# Patient Record
Sex: MALE | Race: WHITE | NOT HISPANIC OR LATINO | Employment: OTHER | URBAN - METROPOLITAN AREA
[De-identification: names, ages, dates, MRNs, and addresses within clinical notes are randomized per-mention and may not be internally consistent; named-entity substitution may affect disease eponyms.]

---

## 2017-01-03 ENCOUNTER — ANTI-COAG VISIT (OUTPATIENT)
Dept: CARDIOLOGY | Facility: CLINIC | Age: 77
End: 2017-01-03

## 2017-01-03 ENCOUNTER — LAB VISIT (OUTPATIENT)
Dept: LAB | Facility: HOSPITAL | Age: 77
End: 2017-01-03
Attending: INTERNAL MEDICINE
Payer: MEDICARE

## 2017-01-03 DIAGNOSIS — Z79.01 LONG TERM CURRENT USE OF ANTICOAGULANT THERAPY: ICD-10-CM

## 2017-01-03 DIAGNOSIS — I48.91 ATRIAL FIBRILLATION: ICD-10-CM

## 2017-01-03 LAB
INR PPP: 2.3
PROTHROMBIN TIME: 23.3 SEC

## 2017-01-03 PROCEDURE — 36415 COLL VENOUS BLD VENIPUNCTURE: CPT | Mod: PO

## 2017-01-03 PROCEDURE — 85610 PROTHROMBIN TIME: CPT

## 2017-01-24 ENCOUNTER — ANTI-COAG VISIT (OUTPATIENT)
Dept: CARDIOLOGY | Facility: CLINIC | Age: 77
End: 2017-01-24

## 2017-01-24 ENCOUNTER — LAB VISIT (OUTPATIENT)
Dept: LAB | Facility: HOSPITAL | Age: 77
End: 2017-01-24
Attending: INTERNAL MEDICINE
Payer: MEDICARE

## 2017-01-24 DIAGNOSIS — I48.91 ATRIAL FIBRILLATION: ICD-10-CM

## 2017-01-24 DIAGNOSIS — Z79.01 LONG TERM CURRENT USE OF ANTICOAGULANT THERAPY: ICD-10-CM

## 2017-01-24 LAB
INR PPP: 2.1
PROTHROMBIN TIME: 22.9 SEC

## 2017-01-24 PROCEDURE — 85610 PROTHROMBIN TIME: CPT

## 2017-01-24 PROCEDURE — 36415 COLL VENOUS BLD VENIPUNCTURE: CPT

## 2017-02-03 DIAGNOSIS — I10 ESSENTIAL HYPERTENSION: Chronic | ICD-10-CM

## 2017-02-03 DIAGNOSIS — I25.10 CORONARY ARTERY DISEASE INVOLVING NATIVE CORONARY ARTERY OF NATIVE HEART WITHOUT ANGINA PECTORIS: Chronic | ICD-10-CM

## 2017-02-03 NOTE — TELEPHONE ENCOUNTER
----- Message from Partha Lamar sent at 2/3/2017  4:31 PM CST -----  Contact: Nathalie/ SHAWANDA in [place]/ [phone]   Type: Rx    Name of medication(s): losartan (COZAAR) 50 MG tablet    Is this a refill? New rx? refill    Who prescribed medication? Dr. Kothari    Pharmacy Name, Phone, & Location: [facility] on file    Comments: Pt is switching pharmacies and needs to have a new prescription for a 90 day refill sent to [facility].  Please call and advise.    Thank you

## 2017-02-06 RX ORDER — LOSARTAN POTASSIUM 50 MG/1
50 TABLET ORAL DAILY
Qty: 90 TABLET | Refills: 3 | Status: SHIPPED | OUTPATIENT
Start: 2017-02-06 | End: 2017-03-09 | Stop reason: SDUPTHER

## 2017-02-14 ENCOUNTER — LAB VISIT (OUTPATIENT)
Dept: LAB | Facility: HOSPITAL | Age: 77
End: 2017-02-14
Attending: INTERNAL MEDICINE
Payer: MEDICARE

## 2017-02-14 ENCOUNTER — ANTI-COAG VISIT (OUTPATIENT)
Dept: CARDIOLOGY | Facility: CLINIC | Age: 77
End: 2017-02-14

## 2017-02-14 DIAGNOSIS — Z79.01 LONG TERM CURRENT USE OF ANTICOAGULANT THERAPY: ICD-10-CM

## 2017-02-14 DIAGNOSIS — I48.91 ATRIAL FIBRILLATION: ICD-10-CM

## 2017-02-14 LAB
INR PPP: 2.5
PROTHROMBIN TIME: 24.9 SEC

## 2017-02-14 PROCEDURE — 36415 COLL VENOUS BLD VENIPUNCTURE: CPT | Mod: PO

## 2017-02-14 PROCEDURE — 85610 PROTHROMBIN TIME: CPT

## 2017-02-15 DIAGNOSIS — I10 ESSENTIAL HYPERTENSION: Chronic | ICD-10-CM

## 2017-02-16 RX ORDER — DILTIAZEM HYDROCHLORIDE 180 MG/1
180 CAPSULE, COATED, EXTENDED RELEASE ORAL DAILY
Qty: 90 CAPSULE | Refills: 3 | Status: SHIPPED | OUTPATIENT
Start: 2017-02-16 | End: 2017-03-09 | Stop reason: SDUPTHER

## 2017-03-07 ENCOUNTER — ANTI-COAG VISIT (OUTPATIENT)
Dept: CARDIOLOGY | Facility: CLINIC | Age: 77
End: 2017-03-07

## 2017-03-07 ENCOUNTER — LAB VISIT (OUTPATIENT)
Dept: LAB | Facility: HOSPITAL | Age: 77
End: 2017-03-07
Attending: INTERNAL MEDICINE
Payer: MEDICARE

## 2017-03-07 DIAGNOSIS — I48.91 ATRIAL FIBRILLATION: ICD-10-CM

## 2017-03-07 DIAGNOSIS — I25.810 CORONARY ARTERY DISEASE INVOLVING OTHER CORONARY ARTERY BYPASS GRAFT WITHOUT ANGINA PECTORIS: Chronic | ICD-10-CM

## 2017-03-07 DIAGNOSIS — Z79.01 LONG TERM CURRENT USE OF ANTICOAGULANT THERAPY: ICD-10-CM

## 2017-03-07 LAB
ALBUMIN SERPL BCP-MCNC: 3.6 G/DL
ALP SERPL-CCNC: 57 U/L
ALT SERPL W/O P-5'-P-CCNC: 23 U/L
ANION GAP SERPL CALC-SCNC: 5 MMOL/L
AST SERPL-CCNC: 16 U/L
BASOPHILS # BLD AUTO: 0.03 K/UL
BASOPHILS NFR BLD: 0.5 %
BILIRUB SERPL-MCNC: 0.8 MG/DL
BUN SERPL-MCNC: 16 MG/DL
CALCIUM SERPL-MCNC: 9 MG/DL
CHLORIDE SERPL-SCNC: 106 MMOL/L
CO2 SERPL-SCNC: 29 MMOL/L
CREAT SERPL-MCNC: 1.2 MG/DL
DIFFERENTIAL METHOD: ABNORMAL
EOSINOPHIL # BLD AUTO: 0.2 K/UL
EOSINOPHIL NFR BLD: 3 %
ERYTHROCYTE [DISTWIDTH] IN BLOOD BY AUTOMATED COUNT: 13.1 %
EST. GFR  (AFRICAN AMERICAN): >60 ML/MIN/1.73 M^2
EST. GFR  (NON AFRICAN AMERICAN): 58 ML/MIN/1.73 M^2
GLUCOSE SERPL-MCNC: 117 MG/DL
HCT VFR BLD AUTO: 43 %
HGB BLD-MCNC: 14.2 G/DL
INR PPP: 2.2
LYMPHOCYTES # BLD AUTO: 1.3 K/UL
LYMPHOCYTES NFR BLD: 19 %
MCH RBC QN AUTO: 32.3 PG
MCHC RBC AUTO-ENTMCNC: 33 %
MCV RBC AUTO: 98 FL
MONOCYTES # BLD AUTO: 0.6 K/UL
MONOCYTES NFR BLD: 8.9 %
NEUTROPHILS # BLD AUTO: 4.5 K/UL
NEUTROPHILS NFR BLD: 68.4 %
PLATELET # BLD AUTO: 185 K/UL
PMV BLD AUTO: 10.9 FL
POTASSIUM SERPL-SCNC: 3.9 MMOL/L
PROT SERPL-MCNC: 6.9 G/DL
PROTHROMBIN TIME: 22 SEC
RBC # BLD AUTO: 4.39 M/UL
SODIUM SERPL-SCNC: 140 MMOL/L
WBC # BLD AUTO: 6.63 K/UL

## 2017-03-07 PROCEDURE — 85610 PROTHROMBIN TIME: CPT

## 2017-03-07 PROCEDURE — 80053 COMPREHEN METABOLIC PANEL: CPT

## 2017-03-07 PROCEDURE — 85025 COMPLETE CBC W/AUTO DIFF WBC: CPT

## 2017-03-07 PROCEDURE — 36415 COLL VENOUS BLD VENIPUNCTURE: CPT | Mod: PO

## 2017-03-09 ENCOUNTER — OFFICE VISIT (OUTPATIENT)
Dept: INTERNAL MEDICINE | Facility: CLINIC | Age: 77
End: 2017-03-09
Payer: MEDICARE

## 2017-03-09 VITALS
BODY MASS INDEX: 27.73 KG/M2 | WEIGHT: 187.19 LBS | HEART RATE: 57 BPM | HEIGHT: 69 IN | DIASTOLIC BLOOD PRESSURE: 78 MMHG | SYSTOLIC BLOOD PRESSURE: 124 MMHG

## 2017-03-09 DIAGNOSIS — Z86.73 HX-TIA (TRANSIENT ISCHEMIC ATTACK): Chronic | ICD-10-CM

## 2017-03-09 DIAGNOSIS — Z95.1 S/P CABG (CORONARY ARTERY BYPASS GRAFT): Chronic | ICD-10-CM

## 2017-03-09 DIAGNOSIS — E78.5 HYPERLIPIDEMIA, UNSPECIFIED HYPERLIPIDEMIA TYPE: Chronic | ICD-10-CM

## 2017-03-09 DIAGNOSIS — I25.10 CORONARY ARTERY DISEASE INVOLVING NATIVE CORONARY ARTERY OF NATIVE HEART WITHOUT ANGINA PECTORIS: Chronic | ICD-10-CM

## 2017-03-09 DIAGNOSIS — I48.91 ATRIAL FIBRILLATION, UNSPECIFIED TYPE: Primary | Chronic | ICD-10-CM

## 2017-03-09 DIAGNOSIS — I10 ESSENTIAL HYPERTENSION: Chronic | ICD-10-CM

## 2017-03-09 DIAGNOSIS — Z79.01 LONG TERM CURRENT USE OF ANTICOAGULANT THERAPY: ICD-10-CM

## 2017-03-09 PROCEDURE — 99499 UNLISTED E&M SERVICE: CPT | Mod: S$PBB,,, | Performed by: INTERNAL MEDICINE

## 2017-03-09 PROCEDURE — 99999 PR PBB SHADOW E&M-EST. PATIENT-LVL III: CPT | Mod: PBBFAC,,, | Performed by: INTERNAL MEDICINE

## 2017-03-09 PROCEDURE — 1126F AMNT PAIN NOTED NONE PRSNT: CPT | Mod: S$GLB,,, | Performed by: INTERNAL MEDICINE

## 2017-03-09 PROCEDURE — 1160F RVW MEDS BY RX/DR IN RCRD: CPT | Mod: S$GLB,,, | Performed by: INTERNAL MEDICINE

## 2017-03-09 PROCEDURE — 1157F ADVNC CARE PLAN IN RCRD: CPT | Mod: S$GLB,,, | Performed by: INTERNAL MEDICINE

## 2017-03-09 PROCEDURE — G0009 ADMIN PNEUMOCOCCAL VACCINE: HCPCS | Mod: S$GLB,,, | Performed by: INTERNAL MEDICINE

## 2017-03-09 PROCEDURE — 3074F SYST BP LT 130 MM HG: CPT | Mod: S$GLB,,, | Performed by: INTERNAL MEDICINE

## 2017-03-09 PROCEDURE — 1159F MED LIST DOCD IN RCRD: CPT | Mod: S$GLB,,, | Performed by: INTERNAL MEDICINE

## 2017-03-09 PROCEDURE — 90732 PPSV23 VACC 2 YRS+ SUBQ/IM: CPT | Mod: S$GLB,,, | Performed by: INTERNAL MEDICINE

## 2017-03-09 PROCEDURE — 3078F DIAST BP <80 MM HG: CPT | Mod: S$GLB,,, | Performed by: INTERNAL MEDICINE

## 2017-03-09 PROCEDURE — 99214 OFFICE O/P EST MOD 30 MIN: CPT | Mod: S$GLB,,, | Performed by: INTERNAL MEDICINE

## 2017-03-09 RX ORDER — CARVEDILOL 12.5 MG/1
12.5 TABLET ORAL 2 TIMES DAILY WITH MEALS
Qty: 180 TABLET | Refills: 3 | Status: SHIPPED | OUTPATIENT
Start: 2017-03-09 | End: 2018-04-09 | Stop reason: SDUPTHER

## 2017-03-09 RX ORDER — FLUTICASONE PROPIONATE 50 MCG
2 SPRAY, SUSPENSION (ML) NASAL DAILY
Qty: 16 G | Refills: 12 | Status: SHIPPED | OUTPATIENT
Start: 2017-03-09 | End: 2018-04-09 | Stop reason: SDUPTHER

## 2017-03-09 RX ORDER — DILTIAZEM HYDROCHLORIDE 180 MG/1
180 CAPSULE, COATED, EXTENDED RELEASE ORAL DAILY
Qty: 90 CAPSULE | Refills: 3 | Status: SHIPPED | OUTPATIENT
Start: 2017-03-09 | End: 2017-11-14 | Stop reason: ALTCHOICE

## 2017-03-09 RX ORDER — ATORVASTATIN CALCIUM 40 MG/1
40 TABLET, FILM COATED ORAL DAILY
Qty: 90 TABLET | Refills: 3 | Status: SHIPPED | OUTPATIENT
Start: 2017-03-09 | End: 2018-04-09 | Stop reason: SDUPTHER

## 2017-03-09 RX ORDER — LOSARTAN POTASSIUM 50 MG/1
50 TABLET ORAL DAILY
Qty: 90 TABLET | Refills: 3 | Status: SHIPPED | OUTPATIENT
Start: 2017-03-09 | End: 2018-03-20 | Stop reason: SDUPTHER

## 2017-03-09 NOTE — MR AVS SNAPSHOT
[facility]  [address]  [facility] [address]  Phone: [phone]  Fax: [phone]                  Eliazar James   3/9/2017 8:00 AM   Office Visit    Description:  Male : 1940   Provider:  Devon Kothari Jr., MD   Department:  [facility]           Reason for Visit     Follow-up           Diagnoses this Visit        Comments    Atrial fibrillation, unspecified type    -  Primary     Coronary artery disease involving native coronary artery of native heart without angina pectoris         Essential hypertension         Hx-TIA (transient ischemic attack)         Hyperlipidemia, unspecified hyperlipidemia type         S/P CABG (coronary artery bypass graft)         Long term current use of anticoagulant therapy                To Do List           Future Appointments        Provider Department Dept Phone    3/21/2017 8:00 AM [facility], [facility] [phone]      Goals (5 Years of Data)     None       These Medications        Disp Refills Start End    losartan (COZAAR) 50 MG tablet 90 tablet 3 3/9/2017     Take 1 tablet (50 mg total) by mouth once daily. - Oral    Pharmacy: [facility]/pharmacy #[id number] - [place] - [address] AT [facility] Ph #: [phone]       diltiaZEM (CARDIZEM CD) 180 MG 24 hr capsule 90 capsule 3 3/9/2017     Take 1 capsule (180 mg total) by mouth once daily. - Oral    Pharmacy: [facility]/pharmacy #[id number] - [place] - [address] AT [facility] Ph #: [phone]       carvedilol (COREG) 12.5 MG tablet 180 tablet 3 3/9/2017     Take 1 tablet (12.5 mg total) by mouth 2 (two) times daily with meals. - Oral    Pharmacy: [facility]/pharmacy #[id number] - [place] - [address] AT [facility] Ph #: [phone]       atorvastatin (LIPITOR) 40 MG tablet 90 tablet 3 3/9/2017     Take 1 tablet (40 mg total) by mouth once daily. - Oral    Pharmacy: [facility]/pharmacy #[id number] - [place] -  [address] AT [facility] #: [phone]       fluticasone (FLONASE) 50 mcg/actuation nasal spray 16 g 12 3/9/2017     2 sprays by Each Nare route once daily. - Each Nare    Pharmacy: [facility]/pharmacy #[id number] - [place] - [address] AT [facility] Ph #: [phone]         [facility]     [facility] On Call Nurse Care Line - 24/7 Assistance  Registered nurses in the [facility] provide clinical advisement, health education, appointment booking, and other advisory services.  Call for this free service at [phone].             Medications           Message regarding Medications     Verify the changes and/or additions to your medication regime listed below are the same as discussed with your clinician today.  If any of these changes or additions are incorrect, please notify your healthcare provider.             Verify that the below list of medications is an accurate representation of the medications you are currently taking.  If none reported, the list may be blank. If incorrect, please contact your healthcare provider. Carry this list with you in case of emergency.           Current Medications     aspirin 81 mg Tab Take 1 tablet by mouth Daily.    atorvastatin (LIPITOR) 40 MG tablet Take 1 tablet (40 mg total) by mouth once daily.    carvedilol (COREG) 12.5 MG tablet Take 1 tablet (12.5 mg total) by mouth 2 (two) times daily with meals.    diltiaZEM (CARDIZEM CD) 180 MG 24 hr capsule Take 1 capsule (180 mg total) by mouth once daily.    fluticasone (FLONASE) 50 mcg/actuation nasal spray 2 sprays by Each Nare route once daily.    losartan (COZAAR) 50 MG tablet Take 1 tablet (50 mg total) by mouth once daily.    nitroGLYCERIN (NITROSTAT) 0.4 MG SL tablet Place 1 tablet (0.4 mg total) under the tongue every 5 (five) minutes as needed for Chest pain.    warfarin (COUMADIN) 5 MG tablet Take 1 tablet (5mg.) by mouth daily, except 1/2 tablet (2.5mg.) on Saturdays,   "Or as directed by Coumadin Clinic           Clinical Reference Information           Your Vitals Were     BP Pulse Height Weight BMI    124/78 (BP Location: Left arm, Patient Position: Sitting, BP Method: Manual) 57 5' 9" (1.753 m) 84.9 kg (187 lb 2.7 oz) 27.64 kg/m2      Blood Pressure          Most Recent Value    BP  124/78      Allergies as of 3/9/2017     No Known Drug Allergies      Immunizations Administered on Date of Encounter - 3/9/2017     Name Date Dose VIS Date Route    Pneumococcal Polysaccharide - 23 Valent 3/9/2017 0.5 mL 4/24/2015 Intramuscular      Orders Placed During Today's Visit      Normal Orders This Visit    Pneumococcal Polysaccharide Vaccine (23 Valent) (SQ/IM)     Future Labs/Procedures Expected by Expires    Comprehensive metabolic panel  3/9/2017 5/8/2018    Lipid panel  3/9/2017 5/8/2018      [facility] Sign-Up     Activating your [facility] account is as easy as 1-2-3!     1) Visit [email or web address], select Sign Up Now, enter this activation code and your date of birth, then select Next.  [email or web address]  Expires: 4/23/2017  8:52 AM      2) Create a username and password to use when you visit [facility] in the future and select a security question in case you lose your password and select Next.    3) Enter your e-mail address and click Sign Up!    Additional Information  If you have questions, please e-mail [email or web address] or call [phone] to talk to our [facility] staff. Remember, [facility] is NOT to be used for urgent needs. For medical emergencies, dial 911.         Language Assistance Services     ATTENTION: Language assistance services are available, free of charge. Please call [phone].      ATENCIÓN: Si habla vandana, tiene a guillen disposición servicios gratuitos de asistencia lingüística. Llame al [phone].     CHÚ Ý: N?u b?n nói Ti?ng Vi?t, có các d?ch v? h? tr? ngôn ng? mi?n phí dành cho b?n. G?i s? [phone].         [facility] complies " with applicable Federal civil rights laws and does not discriminate on the basis of race, color, national origin, age, disability, or sex.

## 2017-03-09 NOTE — PROGRESS NOTES
Subjective:      Patient ID: Eliazar James is a 75 y.o. male.      Chief Complaint: Follow-up      HPI    Mr. James is 76-year-old gentleman who comes in today for follow up   of his medical problems.    1. History of hyperlipidemia. Stable. Checked lipid panel ; it   showed total cholesterol 103, HDL 46, LDL 47. He is currently on   Lipitor 40 mg a day, and tolerating them well.     2. He has hypertension. He saw Cards recently . He has had not had any problems with his meds. He denies any current chest pain, shortness of breath, palpitations, nausea, vomiting. Blood pressure today is 124/78.  He is tolerating Cozaar 50mg, Coreg 12.5., and Cardizem  CD .  NO HA or dizziness.      3. Atrial fibrillation. DCCV 6/11/12 and placed on ditiazem-- has maintained NSR since then and on coumadin; last INR w/i range at 2.2. Sees cards later today. Had a TIA in 2012 secondary to Afib.       4. CAD history of coronary artery disease, status post CABG in 1997 x3 (see details below). He has   had several stents in the past. No Chest pain- He is relativity active. Walks, cut grass. Has not had to use SL NTG in over a year.      REVASCULARIZATION   Previous Angios:   08/15/2000 mid LAD,   07/10/2000 mid LAD, mid RCA, proximal PDA,   CABG x 3 (1997 LIMA to LAD (single graft), SVG to OM1 (single graft),   SVG to RCA (single graft))   Status (07/08/2009 LIMA to LAD (single graft) atretic, 07/08/2009   SVG to OM1 (single graft) patent, SVG to RCA (single graft) patent)   Previous PCI:   S/P stent to LAD, 08/15/2000   S/P stent to RCA, 07/10/2000   S/P stent to LAD, 07/10/2000   S/P stent to PDA, 07/10/2000   ACS:   unstable angina 07/07/2009         Review of Systems    Constitutional: Negative for fever, chills, activity change, appetite change and unexpected weight change.    Eyes: Negative for visual disturbance.    Respiratory: Negative for cough, choking, chest tightness, shortness of breath and wheezing.     Cardiovascular: Negative for chest pain, palpitations and leg swelling.    Gastrointestinal: Negative for nausea, vomiting, diarrhea and constipation.    Genitourinary: Negative for dysuria, urgency and decreased urine volume.    Musculoskeletal: Negative for myalgias.    Skin: Negative for rash.    Neurological: Negative for dizziness, syncope, weakness, light-headedness and headaches.    Objective:      Physical Exam   Constitutional: He is oriented to person, place, and time. He appears well-developed and well-nourished. No distress.    HENT:    Head: Normocephalic and atraumatic.    Mouth/Throat: No oropharyngeal exudate.    Eyes: EOM are normal. Pupils are equal, round, and reactive to light. No scleral icterus.    Neck: Normal range of motion. Neck supple. No tracheal deviation present. No thyromegaly present.    Cardiovascular: Normal rate. An regular rhythm present. Exam reveals no friction rub.    No murmur heard.  Pulmonary/Chest: Effort normal and breath sounds normal. No respiratory distress. He has no wheezes. He has no rales.    Abdominal: Soft. Bowel sounds are normal. There is no tenderness.   Lymphadenopathy:   He has no cervical adenopathy.   Neurological: He is alert and oriented to person, place, and time.    Skin: Skin is warm and dry. No rash noted. He does have  A couple of spots on his face , papules- raised, on the right side- slightly scaly.   He is not diaphoretic. No erythema. No pallor.   Psychiatric: He has a normal mood and affect. His behavior is normal. Judgment and thought content normal.         Assessment:        1.   Atrial fibrillation      2.   CAD (coronary artery disease)     3.   HTN (hypertension)     4.   Hyperlipidemia     5.   Hx-TIA (transient ischemic attack)     Plan:        Atrial fibrillation/CAD (coronary artery disease)  Continue current meds.       HTN (hypertension)  - Well controlled on current regimen; no changes.      Hyperlipidemia  - Continue  atorvastatin (LIPITOR) 40 MG tablet; Take 1 tablet (40 mg total) by mouth once daily. Dispense: 90 tablet; Refill: 3      Hx-TIA (transient ischemic attack)  - Stable. On warfarin, daily aspirin.      F/u in 6 mos, or sooner PRN.

## 2017-03-21 ENCOUNTER — ANTI-COAG VISIT (OUTPATIENT)
Dept: CARDIOLOGY | Facility: CLINIC | Age: 77
End: 2017-03-21

## 2017-03-21 ENCOUNTER — LAB VISIT (OUTPATIENT)
Dept: LAB | Facility: HOSPITAL | Age: 77
End: 2017-03-21
Attending: INTERNAL MEDICINE
Payer: MEDICARE

## 2017-03-21 DIAGNOSIS — Z79.01 LONG TERM CURRENT USE OF ANTICOAGULANT THERAPY: ICD-10-CM

## 2017-03-21 DIAGNOSIS — I48.91 ATRIAL FIBRILLATION, UNSPECIFIED TYPE: ICD-10-CM

## 2017-03-21 DIAGNOSIS — E78.5 HYPERLIPIDEMIA, UNSPECIFIED HYPERLIPIDEMIA TYPE: Chronic | ICD-10-CM

## 2017-03-21 DIAGNOSIS — I48.91 ATRIAL FIBRILLATION: ICD-10-CM

## 2017-03-21 LAB
ALBUMIN SERPL BCP-MCNC: 3.6 G/DL
ALP SERPL-CCNC: 55 U/L
ALT SERPL W/O P-5'-P-CCNC: 25 U/L
ANION GAP SERPL CALC-SCNC: 5 MMOL/L
AST SERPL-CCNC: 19 U/L
BILIRUB SERPL-MCNC: 1.2 MG/DL
BUN SERPL-MCNC: 23 MG/DL
CALCIUM SERPL-MCNC: 9 MG/DL
CHLORIDE SERPL-SCNC: 107 MMOL/L
CHOLEST/HDLC SERPL: 2.6 {RATIO}
CO2 SERPL-SCNC: 28 MMOL/L
CREAT SERPL-MCNC: 1.3 MG/DL
EST. GFR  (AFRICAN AMERICAN): >60 ML/MIN/1.73 M^2
EST. GFR  (NON AFRICAN AMERICAN): 52.6 ML/MIN/1.73 M^2
GLUCOSE SERPL-MCNC: 100 MG/DL
HDL/CHOLESTEROL RATIO: 38 %
HDLC SERPL-MCNC: 108 MG/DL
HDLC SERPL-MCNC: 41 MG/DL
INR PPP: 2.2
LDLC SERPL CALC-MCNC: 55.8 MG/DL
NONHDLC SERPL-MCNC: 67 MG/DL
POTASSIUM SERPL-SCNC: 4.4 MMOL/L
PROT SERPL-MCNC: 7.2 G/DL
PROTHROMBIN TIME: 21.8 SEC
SODIUM SERPL-SCNC: 140 MMOL/L
TRIGL SERPL-MCNC: 56 MG/DL

## 2017-03-21 PROCEDURE — 36415 COLL VENOUS BLD VENIPUNCTURE: CPT | Mod: PO

## 2017-03-21 PROCEDURE — 85610 PROTHROMBIN TIME: CPT

## 2017-03-21 PROCEDURE — 80053 COMPREHEN METABOLIC PANEL: CPT

## 2017-03-21 PROCEDURE — 80061 LIPID PANEL: CPT

## 2017-04-11 ENCOUNTER — LAB VISIT (OUTPATIENT)
Dept: LAB | Facility: HOSPITAL | Age: 77
End: 2017-04-11
Attending: INTERNAL MEDICINE
Payer: MEDICARE

## 2017-04-11 ENCOUNTER — ANTI-COAG VISIT (OUTPATIENT)
Dept: CARDIOLOGY | Facility: CLINIC | Age: 77
End: 2017-04-11

## 2017-04-11 DIAGNOSIS — Z79.01 LONG TERM CURRENT USE OF ANTICOAGULANT THERAPY: ICD-10-CM

## 2017-04-11 DIAGNOSIS — I48.91 ATRIAL FIBRILLATION, UNSPECIFIED TYPE: ICD-10-CM

## 2017-04-11 DIAGNOSIS — I48.91 ATRIAL FIBRILLATION: ICD-10-CM

## 2017-04-11 LAB
INR PPP: 2.8
PROTHROMBIN TIME: 27.8 SEC

## 2017-04-11 PROCEDURE — 36415 COLL VENOUS BLD VENIPUNCTURE: CPT | Mod: PO

## 2017-04-11 PROCEDURE — 85610 PROTHROMBIN TIME: CPT

## 2017-04-25 ENCOUNTER — OFFICE VISIT (OUTPATIENT)
Dept: CARDIOLOGY | Facility: CLINIC | Age: 77
End: 2017-04-25
Payer: MEDICARE

## 2017-04-25 VITALS
SYSTOLIC BLOOD PRESSURE: 119 MMHG | WEIGHT: 185.88 LBS | HEART RATE: 61 BPM | HEIGHT: 69 IN | DIASTOLIC BLOOD PRESSURE: 68 MMHG | BODY MASS INDEX: 27.53 KG/M2

## 2017-04-25 DIAGNOSIS — I25.810 CORONARY ARTERY DISEASE INVOLVING CORONARY BYPASS GRAFT OF NATIVE HEART WITHOUT ANGINA PECTORIS: Primary | ICD-10-CM

## 2017-04-25 PROCEDURE — 99213 OFFICE O/P EST LOW 20 MIN: CPT | Mod: GC,S$GLB,, | Performed by: INTERNAL MEDICINE

## 2017-04-25 PROCEDURE — 1159F MED LIST DOCD IN RCRD: CPT | Mod: GC,S$GLB,, | Performed by: INTERNAL MEDICINE

## 2017-04-25 PROCEDURE — 3078F DIAST BP <80 MM HG: CPT | Mod: GC,S$GLB,, | Performed by: INTERNAL MEDICINE

## 2017-04-25 PROCEDURE — 1126F AMNT PAIN NOTED NONE PRSNT: CPT | Mod: GC,S$GLB,, | Performed by: INTERNAL MEDICINE

## 2017-04-25 PROCEDURE — 99999 PR PBB SHADOW E&M-EST. PATIENT-LVL III: CPT | Mod: PBBFAC,GC,, | Performed by: INTERNAL MEDICINE

## 2017-04-25 PROCEDURE — 3074F SYST BP LT 130 MM HG: CPT | Mod: GC,S$GLB,, | Performed by: INTERNAL MEDICINE

## 2017-04-25 PROCEDURE — 1160F RVW MEDS BY RX/DR IN RCRD: CPT | Mod: GC,S$GLB,, | Performed by: INTERNAL MEDICINE

## 2017-04-25 NOTE — PROGRESS NOTES
Subjective:    Patient ID:  Eliazar James is a 76 y.o. male who presents for follow-up of Coronary Artery Disease (8 month f/u )      HPI Comments: Pt has a h/o HTN, mixed HPL, CAD s/p CABG in 1997 and is s/p PCI in 7-09. Anatomy as of 7-09 was LIMA-->LAD which is atretic, SVG--->OM patent and SVG--->RCA patent. In 7-09 pt was admitted for an ACS and had DALY placed in prox LAD and distal RCA. Since that time, he has been CP free and denies any LUCAS. LVEF is 55% with diastolic dysfunction and apical HK.  Pt with TIA in 2-2012 and new onset afib. Now on coumadin- declined NOACs due to cost. CHADS 4- was followed by EP. Was on Multaq initially -discontinued due to cost issues and it was decided to continue rate control strategy with anticoagulation.  SPECT on 03/09/2012 was normal.     Interval History : He denies new episodes of exertional chest discomfort, exertional dyspnea, palpitations, TIA's, syncope or presyncope. He walks (45-60 min) for exercise daily and is the primary caregiver for his wife. Home BPs are all under 130/80.       Past Medical History:   Diagnosis Date    *Atrial fibrillation     Anxiety     Atrial fibrillation 7/5/2012    Blood transfusion     CAD (coronary artery disease) 7/10/2012    Cancer     skin    Cataract     removed from both    Clotting disorder     Coronary artery disease     Depression     HTN (hypertension) 7/10/2012    Hx-TIA (transient ischemic attack) 7/10/2012    Hyperlipidemia     Hypertension     Myocardial infarction     S/P CABG (coronary artery bypass graft) 1/8/2013    Stroke 2/12    TIA     Past Surgical History:   Procedure Laterality Date    CORONARY ARTERY BYPASS GRAFT      triple bypass       Current Outpatient Prescriptions on File Prior to Visit   Medication Sig Dispense Refill    aspirin 81 mg Tab Take 1 tablet by mouth Daily.      atorvastatin (LIPITOR) 40 MG tablet Take 1 tablet (40 mg total) by mouth once daily. 90 tablet 3     carvedilol (COREG) 12.5 MG tablet Take 1 tablet (12.5 mg total) by mouth 2 (two) times daily with meals. 180 tablet 3    diltiaZEM (CARDIZEM CD) 180 MG 24 hr capsule Take 1 capsule (180 mg total) by mouth once daily. 90 capsule 3    fluticasone (FLONASE) 50 mcg/actuation nasal spray 2 sprays by Each Nare route once daily. 16 g 12    losartan (COZAAR) 50 MG tablet Take 1 tablet (50 mg total) by mouth once daily. 90 tablet 3    nitroGLYCERIN (NITROSTAT) 0.4 MG SL tablet Place 1 tablet (0.4 mg total) under the tongue every 5 (five) minutes as needed for Chest pain. 20 tablet 12    warfarin (COUMADIN) 5 MG tablet Take 1 tablet (5mg.) by mouth daily, except 1/2 tablet (2.5mg.) on Saturdays,  Or as directed by Coumadin Clinic 90 tablet 2     No current facility-administered medications on file prior to visit.      Review of patient's allergies indicates:   Allergen Reactions    No known drug allergies        Review of Systems   Constitution: Negative for decreased appetite, weakness, malaise/fatigue, weight gain and weight loss.   HENT: Negative for headaches.    Eyes: Negative for visual disturbance.   Cardiovascular: Negative for chest pain, claudication, dyspnea on exertion, irregular heartbeat, orthopnea, palpitations, paroxysmal nocturnal dyspnea and syncope.   Respiratory: Negative for cough, shortness of breath and snoring.    Skin: Negative for rash.   Musculoskeletal: Negative for arthritis, muscle cramps, muscle weakness and myalgias.   Gastrointestinal: Negative for abdominal pain, anorexia, change in bowel habit and nausea.   Genitourinary: Negative for dysuria and frequency.   Neurological: Negative for excessive daytime sleepiness, dizziness, loss of balance and numbness.   Psychiatric/Behavioral: Negative for depression.        Objective:    Physical Exam   Constitutional: He is oriented to person, place, and time. He appears well-developed and well-nourished.   HENT:   Head: Normocephalic and  "atraumatic.   Eyes: Pupils are equal, round, and reactive to light.   Neck: Normal range of motion. Neck supple.   Cardiovascular: Normal rate, regular rhythm, normal heart sounds, intact distal pulses and normal pulses.  Exam reveals no gallop.    No murmur heard.  Pulmonary/Chest: Effort normal and breath sounds normal.   Abdominal: Soft. Bowel sounds are normal. There is no hepatosplenomegaly. There is no tenderness.   Musculoskeletal: Normal range of motion.   Neurological: He is alert and oriented to person, place, and time.   Skin: Skin is warm and dry.   Psychiatric: He has a normal mood and affect. His speech is normal and behavior is normal. Judgment and thought content normal.   Vitals reviewed.  /68 (BP Location: Left arm, Patient Position: Sitting, BP Method: Automatic)  Pulse 61  Ht 5' 9" (1.753 m)  Wt 84.3 kg (185 lb 13.6 oz)  BMI 27.44 kg/m2      Assessment:       1. Coronary artery disease involving native coronary artery of native heart without angina pectoris    2. Atrial fibrillation, unspecified    3. Essential hypertension    4. Hyperlipidemia    5. S/P CABG (coronary artery bypass graft)    6. Hx-TIA (transient ischemic attack)    7. Long term current use of anticoagulant therapy        Plan:       76 year old male patient with known CAD with previous CABG and prior PCIs stable from a cardiac standpoint on appropriate medical therapy.    RTC 6 months.    D/w Dr Anderson.    Drew Arana MD, MPH, FACP  Cardiovascular Disease Fellow( PGY 5)  Pager: [phone]            "

## 2017-04-25 NOTE — MR AVS SNAPSHOT
[facility]  [address]  [place] [address]  Phone: [phone]                  Eliazar James   2017 2:20 PM   Office Visit    Description:  Male : 1940   Provider:  Drew Arana MD   Department:  [facility]           Reason for Visit     Coronary Artery Disease                To Do List           Future Appointments        Provider Department Dept Phone    2017 2:20 PM Drew Arana MD [facility] Cardiology [phone]    2017 9:00 AM LAB, [facility] [phone]      Goals (5 Years of Data)     None      [facility] On Call     [facility] Nurse Care Line -  Assistance  Unless otherwise directed by your provider, please contact [facility], our nurse care line that is available for  assistance.     Registered nurses in the [facility] provide: appointment scheduling, clinical advisement, health education, and other advisory services.  Call: [phone] (toll free)               Medications           Message regarding Medications     Verify the changes and/or additions to your medication regime listed below are the same as discussed with your clinician today.  If any of these changes or additions are incorrect, please notify your healthcare provider.             Verify that the below list of medications is an accurate representation of the medications you are currently taking.  If none reported, the list may be blank. If incorrect, please contact your healthcare provider. Carry this list with you in case of emergency.           Current Medications     aspirin 81 mg Tab Take 1 tablet by mouth Daily.    atorvastatin (LIPITOR) 40 MG tablet Take 1 tablet (40 mg total) by mouth once daily.    carvedilol (COREG) 12.5 MG tablet Take 1 tablet (12.5 mg total) by mouth 2 (two) times daily with meals.    diltiaZEM (CARDIZEM CD) 180 MG 24 hr capsule Take 1 capsule (180 mg total) by  "mouth once daily.    fluticasone (FLONASE) 50 mcg/actuation nasal spray 2 sprays by Each Nare route once daily.    losartan (COZAAR) 50 MG tablet Take 1 tablet (50 mg total) by mouth once daily.    nitroGLYCERIN (NITROSTAT) 0.4 MG SL tablet Place 1 tablet (0.4 mg total) under the tongue every 5 (five) minutes as needed for Chest pain.    warfarin (COUMADIN) 5 MG tablet Take 1 tablet (5mg.) by mouth daily, except 1/2 tablet (2.5mg.) on Saturdays,  Or as directed by Coumadin Clinic           Clinical Reference Information           Your Vitals Were     BP Pulse Height Weight BMI    119/68 (BP Location: Left arm, Patient Position: Sitting, BP Method: Automatic) 61 5' 9" (1.753 m) 84.3 kg (185 lb 13.6 oz) 27.44 kg/m2      Blood Pressure          Most Recent Value    Right Arm BP - Sitting  114/73    Left Arm BP - Sitting  119/68    BP  119/68      Allergies as of 4/25/2017     No Known Drug Allergies      Immunizations Administered on Date of Encounter - 4/25/2017     None      [facility] Sign-Up     Activating your [facility] account is as easy as 1-2-3!     1) Visit [email or web address], select Sign Up Now, enter this activation code and your date of birth, then select Next.  [email or web address]  Expires: 6/9/2017 12:54 PM      2) Create a username and password to use when you visit [facility] in the future and select a security question in case you lose your password and select Next.    3) Enter your e-mail address and click Sign Up!    Additional Information  If you have questions, please e-mail [email or web address] or call [phone] to talk to our [facility] staff. Remember, [facility] is NOT to be used for urgent needs. For medical emergencies, dial 911.         Language Assistance Services     ATTENTION: Language assistance services are available, free of charge. Please call [phone].      ATENCIÓN: Si habla español, tiene a guillen disposición servicios gratuitos de asistencia lingüística. Llame al " [phone].     ZI Ý: N?u b?n nói Ti?ng Vi?t, có các d?ch v? h? tr? ngôn ng? mi?n phí dành cho b?n. G?i s? [phone].         [facility] complies with applicable Federal civil rights laws and does not discriminate on the basis of race, color, national origin, age, disability, or sex.

## 2017-05-02 ENCOUNTER — ANTI-COAG VISIT (OUTPATIENT)
Dept: CARDIOLOGY | Facility: CLINIC | Age: 77
End: 2017-05-02

## 2017-05-02 ENCOUNTER — LAB VISIT (OUTPATIENT)
Dept: LAB | Facility: HOSPITAL | Age: 77
End: 2017-05-02
Attending: INTERNAL MEDICINE
Payer: MEDICARE

## 2017-05-02 DIAGNOSIS — Z79.01 LONG TERM CURRENT USE OF ANTICOAGULANT THERAPY: ICD-10-CM

## 2017-05-02 DIAGNOSIS — I48.91 ATRIAL FIBRILLATION: ICD-10-CM

## 2017-05-02 LAB
INR PPP: 2.8
PROTHROMBIN TIME: 28 SEC

## 2017-05-02 PROCEDURE — 85610 PROTHROMBIN TIME: CPT

## 2017-05-02 PROCEDURE — 36415 COLL VENOUS BLD VENIPUNCTURE: CPT | Mod: PO

## 2017-06-02 ENCOUNTER — LAB VISIT (OUTPATIENT)
Dept: LAB | Facility: HOSPITAL | Age: 77
End: 2017-06-02
Attending: INTERNAL MEDICINE
Payer: MEDICARE

## 2017-06-02 ENCOUNTER — ANTI-COAG VISIT (OUTPATIENT)
Dept: CARDIOLOGY | Facility: CLINIC | Age: 77
End: 2017-06-02

## 2017-06-02 DIAGNOSIS — I48.91 ATRIAL FIBRILLATION: ICD-10-CM

## 2017-06-02 DIAGNOSIS — Z79.01 LONG TERM CURRENT USE OF ANTICOAGULANT THERAPY: ICD-10-CM

## 2017-06-02 LAB
INR PPP: 2.5
PROTHROMBIN TIME: 24.6 SEC

## 2017-06-02 PROCEDURE — 36415 COLL VENOUS BLD VENIPUNCTURE: CPT | Mod: PO

## 2017-06-02 PROCEDURE — 85610 PROTHROMBIN TIME: CPT

## 2017-06-28 ENCOUNTER — LAB VISIT (OUTPATIENT)
Dept: LAB | Facility: HOSPITAL | Age: 77
End: 2017-06-28
Attending: INTERNAL MEDICINE
Payer: MEDICARE

## 2017-06-28 ENCOUNTER — ANTI-COAG VISIT (OUTPATIENT)
Dept: CARDIOLOGY | Facility: CLINIC | Age: 77
End: 2017-06-28

## 2017-06-28 DIAGNOSIS — Z79.01 LONG TERM CURRENT USE OF ANTICOAGULANT THERAPY: ICD-10-CM

## 2017-06-28 DIAGNOSIS — I48.91 ATRIAL FIBRILLATION: ICD-10-CM

## 2017-06-28 LAB
INR PPP: 2.5
PROTHROMBIN TIME: 24.7 SEC

## 2017-06-28 PROCEDURE — 36415 COLL VENOUS BLD VENIPUNCTURE: CPT | Mod: PO

## 2017-06-28 PROCEDURE — 85610 PROTHROMBIN TIME: CPT

## 2017-07-10 ENCOUNTER — OFFICE VISIT (OUTPATIENT)
Dept: OPTOMETRY | Facility: CLINIC | Age: 77
End: 2017-07-10
Payer: MEDICARE

## 2017-07-10 DIAGNOSIS — Z96.1 PSEUDOPHAKIA OF BOTH EYES: ICD-10-CM

## 2017-07-10 DIAGNOSIS — I10 ESSENTIAL HYPERTENSION: Primary | Chronic | ICD-10-CM

## 2017-07-10 DIAGNOSIS — H26.492 AFTER CATARACT NOT OBSCURING VISION, LEFT: ICD-10-CM

## 2017-07-10 PROCEDURE — 92004 COMPRE OPH EXAM NEW PT 1/>: CPT | Mod: S$GLB,,, | Performed by: OPTOMETRIST

## 2017-07-10 PROCEDURE — 99499 UNLISTED E&M SERVICE: CPT | Mod: S$PBB,,, | Performed by: OPTOMETRIST

## 2017-07-10 PROCEDURE — 99999 PR PBB SHADOW E&M-EST. PATIENT-LVL II: CPT | Mod: PBBFAC,,, | Performed by: OPTOMETRIST

## 2017-07-10 NOTE — PROGRESS NOTES
HPI     Concerns About Ocular Health    Additional comments: overdue for eye exam           Comments   Last eye exam was 12/10/08 with Dr. Valdivia.  Patient states no vision complaints. Uses OTC readers only in dim light   (unsure of power).   Patient denies diplopia, headaches, flashes/floaters, and pain.           Last edited by Luci Ruth on 7/10/2017  8:20 AM. (History)        ROS     Positive for: Eyes    Negative for: Constitutional, Gastrointestinal, Neurological, Skin,   Genitourinary, Musculoskeletal, HENT, Endocrine, Cardiovascular,   Respiratory, Psychiatric, Allergic/Imm, Heme/Lymph    Last edited by Kiana Reed, OD on 7/10/2017  8:49 AM. (History)        Assessment /Plan     For exam results, see Encounter Report.    Essential hypertension    After cataract not obscuring vision, left    Pseudophakia of both eyes          1.  No retinopathy--monitor yearly.  BP control.  2.  Early PCO OS--not affecting vision.  Monitor.  3.  No rx given.  Continue w/ current rx          RTC 1 year for routine exam.

## 2017-07-25 ENCOUNTER — LAB VISIT (OUTPATIENT)
Dept: LAB | Facility: HOSPITAL | Age: 77
End: 2017-07-25
Attending: INTERNAL MEDICINE
Payer: MEDICARE

## 2017-07-25 ENCOUNTER — ANTI-COAG VISIT (OUTPATIENT)
Dept: CARDIOLOGY | Facility: CLINIC | Age: 77
End: 2017-07-25

## 2017-07-25 DIAGNOSIS — Z79.01 LONG TERM CURRENT USE OF ANTICOAGULANT THERAPY: Primary | ICD-10-CM

## 2017-07-25 DIAGNOSIS — I48.91 ATRIAL FIBRILLATION: ICD-10-CM

## 2017-07-25 LAB
INR PPP: 2.6
PROTHROMBIN TIME: 26.1 SEC

## 2017-07-25 PROCEDURE — 36415 COLL VENOUS BLD VENIPUNCTURE: CPT | Mod: PO

## 2017-07-25 PROCEDURE — 85610 PROTHROMBIN TIME: CPT

## 2017-07-29 ENCOUNTER — ANTI-COAG VISIT (OUTPATIENT)
Dept: CARDIOLOGY | Facility: CLINIC | Age: 77
End: 2017-07-29

## 2017-07-29 DIAGNOSIS — Z79.01 LONG TERM CURRENT USE OF ANTICOAGULANT THERAPY: ICD-10-CM

## 2017-07-29 LAB — INR PPP: 4

## 2017-07-31 ENCOUNTER — TELEPHONE (OUTPATIENT)
Dept: INTERNAL MEDICINE | Facility: CLINIC | Age: 77
End: 2017-07-31

## 2017-07-31 ENCOUNTER — LAB VISIT (OUTPATIENT)
Dept: LAB | Facility: HOSPITAL | Age: 77
End: 2017-07-31
Attending: INTERNAL MEDICINE
Payer: MEDICARE

## 2017-07-31 ENCOUNTER — ANTI-COAG VISIT (OUTPATIENT)
Dept: CARDIOLOGY | Facility: CLINIC | Age: 77
End: 2017-07-31

## 2017-07-31 ENCOUNTER — OFFICE VISIT (OUTPATIENT)
Dept: INTERNAL MEDICINE | Facility: CLINIC | Age: 77
End: 2017-07-31
Payer: MEDICARE

## 2017-07-31 VITALS
WEIGHT: 183.44 LBS | HEIGHT: 69 IN | DIASTOLIC BLOOD PRESSURE: 79 MMHG | BODY MASS INDEX: 27.17 KG/M2 | SYSTOLIC BLOOD PRESSURE: 133 MMHG | OXYGEN SATURATION: 98 % | HEART RATE: 75 BPM

## 2017-07-31 DIAGNOSIS — I48.91 ATRIAL FIBRILLATION: ICD-10-CM

## 2017-07-31 DIAGNOSIS — Z79.01 LONG TERM CURRENT USE OF ANTICOAGULANT THERAPY: ICD-10-CM

## 2017-07-31 DIAGNOSIS — R31.9 HEMATURIA: Primary | ICD-10-CM

## 2017-07-31 LAB
INR PPP: 3
PROTHROMBIN TIME: 32.6 SEC

## 2017-07-31 PROCEDURE — 99999 PR PBB SHADOW E&M-EST. PATIENT-LVL IV: CPT | Mod: PBBFAC,,, | Performed by: INTERNAL MEDICINE

## 2017-07-31 PROCEDURE — 1159F MED LIST DOCD IN RCRD: CPT | Mod: S$GLB,,, | Performed by: INTERNAL MEDICINE

## 2017-07-31 PROCEDURE — 1126F AMNT PAIN NOTED NONE PRSNT: CPT | Mod: S$GLB,,, | Performed by: INTERNAL MEDICINE

## 2017-07-31 PROCEDURE — 3075F SYST BP GE 130 - 139MM HG: CPT | Mod: S$GLB,,, | Performed by: INTERNAL MEDICINE

## 2017-07-31 PROCEDURE — 3078F DIAST BP <80 MM HG: CPT | Mod: S$GLB,,, | Performed by: INTERNAL MEDICINE

## 2017-07-31 PROCEDURE — 99499 UNLISTED E&M SERVICE: CPT | Mod: S$GLB,,, | Performed by: INTERNAL MEDICINE

## 2017-07-31 PROCEDURE — 3008F BODY MASS INDEX DOCD: CPT | Mod: S$GLB,,, | Performed by: INTERNAL MEDICINE

## 2017-07-31 PROCEDURE — 99214 OFFICE O/P EST MOD 30 MIN: CPT | Mod: S$GLB,,, | Performed by: INTERNAL MEDICINE

## 2017-07-31 NOTE — PROGRESS NOTES
The pt called this morning to let us know that he was seen in the ED on 7/29 for blood in his urine.  He reports that he was not given any antibiotics or new meds and it was determined that he did not have a UTI. He held his coumadin on 7/30 and will get an INR re-check today.  Prior to this, his INR was therapeutic on 7/25.  He denies any new meds, no N/V/D, no alcohol intake and no changes to his diet.

## 2017-07-31 NOTE — TELEPHONE ENCOUNTER
----- Message from Meron Granda sent at 7/31/2017  8:20 AM CDT -----  Contact: Self/ [phone]   Type: Sooner appointment than  is able to schedule    When is the first available appointment? 09/11/2017     What is the nature of the appointment? Blood in the urine     What appointment type: ep     Comments: pt is calling for a sooner appt. Please call and advise     Thank you

## 2017-08-01 NOTE — PROGRESS NOTES
Patient was called and given lab result, states he did take 2.5mg -7/31, reports no more bleeding, Patient was advised of coumadin  Instructions and next lab date, Patient refused 8/03 appointment, rescheduled 8/08 appointment to 8/08 at [facility] when his wife has an appointment, appointment was rescheduled

## 2017-08-04 ENCOUNTER — TELEPHONE (OUTPATIENT)
Dept: INTERNAL MEDICINE | Facility: CLINIC | Age: 77
End: 2017-08-04

## 2017-08-04 DIAGNOSIS — R91.8 PULMONARY NODULES: Primary | ICD-10-CM

## 2017-08-04 NOTE — TELEPHONE ENCOUNTER
Please call-- he has 1 small kidney stone that might be causing his problem-- follow up with urology.  He also has lung nodule that is probably nothing.  However I would like to get a ct of his lung to make sure nothing is going on.  Orders are in.

## 2017-08-04 NOTE — TELEPHONE ENCOUNTER
Spoke to pt and advised of results. Pt already scheduled with urology for 8/18/17. Scheduled Chest CT for 8/14/17 at [place]

## 2017-08-07 ENCOUNTER — TELEPHONE (OUTPATIENT)
Dept: INTERNAL MEDICINE | Facility: CLINIC | Age: 77
End: 2017-08-07

## 2017-08-07 NOTE — TELEPHONE ENCOUNTER
----- Message from Meron Granda sent at 8/7/2017  3:38 PM CDT -----  Contact: Self/ [phone]   Pt stated he was supposed to have a CT Scan schedule but is not schedule. He want to know if anyone will schedule the CT Scan. Please call and advise     Thank you

## 2017-08-08 ENCOUNTER — ANTI-COAG VISIT (OUTPATIENT)
Dept: CARDIOLOGY | Facility: CLINIC | Age: 77
End: 2017-08-08

## 2017-08-08 ENCOUNTER — LAB VISIT (OUTPATIENT)
Dept: LAB | Facility: HOSPITAL | Age: 77
End: 2017-08-08
Payer: MEDICARE

## 2017-08-08 DIAGNOSIS — I48.91 ATRIAL FIBRILLATION: ICD-10-CM

## 2017-08-08 DIAGNOSIS — Z79.01 LONG TERM CURRENT USE OF ANTICOAGULANT THERAPY: ICD-10-CM

## 2017-08-08 LAB — INR PPP: 2.4

## 2017-08-08 PROCEDURE — 36415 COLL VENOUS BLD VENIPUNCTURE: CPT

## 2017-08-08 PROCEDURE — 85610 PROTHROMBIN TIME: CPT

## 2017-08-09 LAB
INR PPP: 2.4
PROTHROMBIN TIME: 24 SEC

## 2017-08-15 NOTE — PROGRESS NOTES
HISTORY OF PRESENT ILLNESS:  He is a 76-year-old gentleman coming in.  He had   blood in urine, Saturday.  He had a bit clot of blood that he noticed first   thing in the morning and some read urine.  He had his next urine and the urine   had cleared out a little bit.  He denies any dysuria or any other complaints.    He did go to the Emergency Room and there, it is noted his INR to be 4.  He had   a repeat INR today and it is down to 3.  He has not noticed any more hematuria.    He had normal appearing CBC on the 29th Emergency Room and he has CKD III with   creatinine clearance of 48.5, in the Emergency Room on the 29th.  His creatinine   usually hangs around 1.2-1.3.  Otherwise, he denies any toilet complaints.  No   chest pain, shortness of breath, palpitations, nausea, or vomiting.  No   blurriness of vision.  No PND or orthopnea.    PHYSICAL EXAMINATION:  GENERAL:  He is a well-appearing 76-year-old gentleman in no acute distress.  NECK:  Supple.  No JVD.  Thyroid is not enlarged.  CARDIOVASCULAR:  S1 and S2, regular rate and rhythm without murmur, gallop, or   rub.  ABDOMEN:  Soft, nontender, no hepatosplenomegaly, no guarding or rebound   tenderness.  LOWER EXTREMITIES:  No edema.  GENITOURINARY:  No testicular masses or penile lesions.    ASSESSMENT:  Blood in urine in a gentleman who has chronic coumadinization   secondary to AFib.  We are going to continue his Coumadin.  We will get his   renal stone CT and we will have him followed up with Urology for this.  If he   has any more bleeding, he can let me know.  We will see him back again as   already scheduled.      ELIANA  dd: 08/15/2017 09:59:52 (CDT)  td: 08/15/2017 23:44:13 (CDT)  Doc ID   #[id number]  Job ID #[id number]    CC:

## 2017-08-16 ENCOUNTER — TELEPHONE (OUTPATIENT)
Dept: INTERNAL MEDICINE | Facility: CLINIC | Age: 77
End: 2017-08-16

## 2017-08-16 DIAGNOSIS — R91.8 PULMONARY NODULES: Primary | ICD-10-CM

## 2017-08-16 NOTE — TELEPHONE ENCOUNTER
Please call-- he has a few pulmonary nodules-- they don't look too big, but I would like to get a PET scan to rule out anything bad.  Please help him set this up.

## 2017-08-18 ENCOUNTER — OFFICE VISIT (OUTPATIENT)
Dept: UROLOGY | Facility: CLINIC | Age: 77
End: 2017-08-18
Payer: MEDICARE

## 2017-08-18 VITALS — BODY MASS INDEX: 26.66 KG/M2 | WEIGHT: 180 LBS | HEIGHT: 69 IN

## 2017-08-18 DIAGNOSIS — N28.1 BILATERAL RENAL CYSTS: ICD-10-CM

## 2017-08-18 DIAGNOSIS — R35.0 URINARY FREQUENCY: ICD-10-CM

## 2017-08-18 DIAGNOSIS — R31.9 HEMATURIA: Primary | ICD-10-CM

## 2017-08-18 DIAGNOSIS — R35.1 NOCTURIA: ICD-10-CM

## 2017-08-18 DIAGNOSIS — N20.0 RENAL CALCULUS, LEFT: ICD-10-CM

## 2017-08-18 LAB
BILIRUB UR QL STRIP: NEGATIVE
CLARITY UR REFRACT.AUTO: CLEAR
COLOR UR AUTO: YELLOW
GLUCOSE UR QL STRIP: NEGATIVE
HGB UR QL STRIP: ABNORMAL
KETONES UR QL STRIP: NEGATIVE
LEUKOCYTE ESTERASE UR QL STRIP: NEGATIVE
MICROSCOPIC COMMENT: ABNORMAL
NITRITE UR QL STRIP: NEGATIVE
PH UR STRIP: 5 [PH] (ref 5–8)
PROT UR QL STRIP: NEGATIVE
RBC #/AREA URNS AUTO: 8 /HPF (ref 0–4)
SP GR UR STRIP: 1.01 (ref 1–1.03)
URN SPEC COLLECT METH UR: ABNORMAL
UROBILINOGEN UR STRIP-ACNC: NEGATIVE EU/DL
WBC #/AREA URNS AUTO: 1 /HPF (ref 0–5)

## 2017-08-18 PROCEDURE — 99204 OFFICE O/P NEW MOD 45 MIN: CPT | Mod: S$GLB,,, | Performed by: UROLOGY

## 2017-08-18 PROCEDURE — 81001 URINALYSIS AUTO W/SCOPE: CPT

## 2017-08-18 PROCEDURE — 1159F MED LIST DOCD IN RCRD: CPT | Mod: S$GLB,,, | Performed by: UROLOGY

## 2017-08-18 PROCEDURE — 87086 URINE CULTURE/COLONY COUNT: CPT

## 2017-08-18 PROCEDURE — 99999 PR PBB SHADOW E&M-EST. PATIENT-LVL III: CPT | Mod: PBBFAC,,, | Performed by: UROLOGY

## 2017-08-18 PROCEDURE — 88112 CYTOPATH CELL ENHANCE TECH: CPT | Performed by: PATHOLOGY

## 2017-08-18 PROCEDURE — 3008F BODY MASS INDEX DOCD: CPT | Mod: S$GLB,,, | Performed by: UROLOGY

## 2017-08-18 PROCEDURE — 1126F AMNT PAIN NOTED NONE PRSNT: CPT | Mod: S$GLB,,, | Performed by: UROLOGY

## 2017-08-18 NOTE — LETTER
August 18, 2017      Devon Kothari Jr., MD  [address]  [facility] [address]           [place] [facility]  [address] [place] [address] [address]  [facility] [address]  Phone: [phone]  Fax: [phone]          Patient: Eliazar James   MR Number: [id number]   YOB: 1940   Date of Visit: 8/18/2017       Dear Dr. Devon Kothari Jr.:    Thank you for referring Eliazar James to me for evaluation. Attached you will find relevant portions of my assessment and plan of care.    If you have questions, please do not hesitate to call me. I look forward to following Eliazar James along with you.    Sincerely,    Ge Rmaon MD    Enclosure  CC:  No Recipients    If you would like to receive this communication electronically, please contact [email or web address] or [phone] to request more information on [employer] access.    For providers and/or their staff who would like to refer a patient to [facility], please contact us through our one-stop-shop provider referral line, [facility], at [phone].    If you feel you have received this communication in error or would no longer like to receive these types of communications, please e-mail [email or web address]

## 2017-08-18 NOTE — PROGRESS NOTES
Subjective:       Patient ID: Eliazar James is a 76 y.o. male.    Chief Complaint: Hematuria    One episode of clot and gross blood In urine. No pain and no residual bleeding. Ct - bilateral renal cysts, left renal calculi. Referred for evaluation.        Hematuria   This is a new problem. The current episode started 1 to 4 weeks ago. The problem has been resolved since onset. He describes the hematuria as gross hematuria. The hematuria occurs during the initial portion of his urinary stream. He reports no clotting in his urine stream. His pain is at a severity of 0/10. He is experiencing no pain. He describes his urine color as bright red. Irritative symptoms include frequency, nocturia and urgency. Pertinent negatives include no abdominal pain, chills, dysuria, facial swelling, fever, flank pain, genital pain, hesitancy, inability to urinate, nausea, vomiting or sore throat. He is not sexually active. His past medical history is significant for hypertension and kidney stones. There is no history of  trauma, recent infection, sickle cell disease, STDs, tobacco use or UTI.     Review of Systems   Constitutional: Negative for activity change, appetite change, chills, diaphoresis, fatigue, fever and unexpected weight change.   HENT: Negative for congestion, facial swelling, hearing loss, sinus pressure, sore throat and trouble swallowing.    Eyes: Negative for photophobia, pain, discharge and visual disturbance.   Respiratory: Negative for apnea, cough and shortness of breath.    Cardiovascular: Negative for chest pain, palpitations and leg swelling.   Gastrointestinal: Negative for abdominal distention, abdominal pain, anal bleeding, blood in stool, constipation, diarrhea, nausea, rectal pain and vomiting.   Endocrine: Negative for cold intolerance, heat intolerance, polydipsia, polyphagia and polyuria.   Genitourinary: Positive for frequency, hematuria, nocturia and urgency. Negative for decreased urine volume,  difficulty urinating, discharge, dysuria, enuresis, flank pain, genital sores, hesitancy, penile pain, penile swelling, scrotal swelling and testicular pain.   Musculoskeletal: Negative for arthralgias, back pain and myalgias.   Skin: Negative for color change, pallor, rash and wound.   Allergic/Immunologic: Negative for environmental allergies, food allergies and immunocompromised state.   Neurological: Negative for dizziness, seizures, weakness and headaches.   Hematological: Negative for adenopathy. Does not bruise/bleed easily.   Psychiatric/Behavioral: Negative.        Objective:      Physical Exam   Nursing note and vitals reviewed.  Constitutional: He is oriented to person, place, and time. He appears well-developed and well-nourished.   HENT:   Head: Normocephalic.   Nose: Nose normal.   Mouth/Throat: Oropharynx is clear and moist.   Eyes: Conjunctivae and EOM are normal. Pupils are equal, round, and reactive to light.   Neck: Normal range of motion. Neck supple.   Cardiovascular: Normal rate, regular rhythm, normal heart sounds and intact distal pulses.    Pulmonary/Chest: Effort normal and breath sounds normal.   Abdominal: Soft. Bowel sounds are normal.   Genitourinary: Rectum normal, testes normal and penis normal. Prostate is enlarged. Prostate is not tender. Cremasteric reflex is present. Circumcised.   Musculoskeletal: Normal range of motion.   Neurological: He is alert and oriented to person, place, and time. He has normal reflexes.   Skin: Skin is warm and dry.     Psychiatric: He has a normal mood and affect. His behavior is normal. Judgment and thought content normal.       Assessment:       1. Hematuria    2. Nocturia    3. Urinary frequency    4. Renal calculus, left    5. Bilateral renal cysts        Plan:       Patient Instructions   Renal U/S  U/A and Urine C+S  Urine Cytology

## 2017-08-20 LAB — BACTERIA UR CULT: NO GROWTH

## 2017-08-22 ENCOUNTER — TELEPHONE (OUTPATIENT)
Dept: UROLOGY | Facility: CLINIC | Age: 77
End: 2017-08-22

## 2017-08-22 NOTE — TELEPHONE ENCOUNTER
----- Message from Ge Ramon MD sent at 8/22/2017  5:46 AM CDT -----  Labs normal, culture neg. please notify pt

## 2017-08-28 ENCOUNTER — TELEPHONE (OUTPATIENT)
Dept: UROLOGY | Facility: CLINIC | Age: 77
End: 2017-08-28

## 2017-08-28 NOTE — TELEPHONE ENCOUNTER
----- Message from Piedad Jacob sent at 8/28/2017  8:43 AM CDT -----  Contact: [phone] / self   Patient called in returning your call. Please advise.

## 2017-08-28 NOTE — TELEPHONE ENCOUNTER
----- Message from Ge Ramon MD sent at 8/27/2017  3:45 PM CDT -----  Cytology is negative, please notify pt

## 2017-08-31 ENCOUNTER — TELEPHONE (OUTPATIENT)
Dept: UROLOGY | Facility: CLINIC | Age: 77
End: 2017-08-31

## 2017-08-31 NOTE — TELEPHONE ENCOUNTER
----- Message from Ge Ramon MD sent at 8/31/2017  2:12 PM CDT -----  U/S shows renal cysts, medical renal disease and enlarged prostate

## 2017-08-31 NOTE — TELEPHONE ENCOUNTER
----- Message from Audie Wallace sent at 8/31/2017  2:31 PM CDT -----  Contact: self/[phone]  Patient is returning your call.  Please advise

## 2017-09-05 ENCOUNTER — LAB VISIT (OUTPATIENT)
Dept: LAB | Facility: HOSPITAL | Age: 77
End: 2017-09-05
Attending: INTERNAL MEDICINE
Payer: MEDICARE

## 2017-09-05 ENCOUNTER — ANTI-COAG VISIT (OUTPATIENT)
Dept: CARDIOLOGY | Facility: CLINIC | Age: 77
End: 2017-09-05

## 2017-09-05 DIAGNOSIS — Z79.01 LONG TERM CURRENT USE OF ANTICOAGULANT THERAPY: ICD-10-CM

## 2017-09-05 LAB
INR PPP: 3.3
PROTHROMBIN TIME: 32.8 SEC

## 2017-09-05 PROCEDURE — 36415 COLL VENOUS BLD VENIPUNCTURE: CPT | Mod: PO

## 2017-09-05 PROCEDURE — 85610 PROTHROMBIN TIME: CPT

## 2017-09-12 ENCOUNTER — PROCEDURE VISIT (OUTPATIENT)
Dept: UROLOGY | Facility: CLINIC | Age: 77
End: 2017-09-12
Payer: MEDICARE

## 2017-09-12 ENCOUNTER — ANTI-COAG VISIT (OUTPATIENT)
Dept: CARDIOLOGY | Facility: CLINIC | Age: 77
End: 2017-09-12

## 2017-09-12 VITALS — WEIGHT: 180 LBS | HEIGHT: 69 IN | BODY MASS INDEX: 26.66 KG/M2

## 2017-09-12 DIAGNOSIS — R31.9 HEMATURIA: Primary | ICD-10-CM

## 2017-09-12 DIAGNOSIS — R35.0 URINARY FREQUENCY: ICD-10-CM

## 2017-09-12 DIAGNOSIS — Z79.01 LONG TERM CURRENT USE OF ANTICOAGULANT THERAPY: ICD-10-CM

## 2017-09-12 DIAGNOSIS — R35.1 NOCTURIA: ICD-10-CM

## 2017-09-12 DIAGNOSIS — R35.1 BENIGN PROSTATIC HYPERPLASIA WITH NOCTURIA: ICD-10-CM

## 2017-09-12 DIAGNOSIS — N40.1 BENIGN PROSTATIC HYPERPLASIA WITH NOCTURIA: ICD-10-CM

## 2017-09-12 PROCEDURE — 52000 CYSTOURETHROSCOPY: CPT | Mod: S$GLB,,, | Performed by: UROLOGY

## 2017-09-12 RX ORDER — CIPROFLOXACIN 500 MG/1
500 TABLET ORAL 2 TIMES DAILY
Qty: 10 TABLET | Refills: 0 | Status: SHIPPED | OUTPATIENT
Start: 2017-09-12 | End: 2017-09-17

## 2017-09-12 RX ORDER — DUTASTERIDE AND TAMSULOSIN HYDROCHLORIDE CAPSULES .5; .4 MG/1; MG/1
1 CAPSULE ORAL DAILY
Qty: 90 CAPSULE | Refills: 3 | Status: SHIPPED | OUTPATIENT
Start: 2017-09-12 | End: 2018-04-09 | Stop reason: SDUPTHER

## 2017-09-12 NOTE — PROCEDURES
"Cystoscopy  Date/Time: 9/12/2017 10:20 AM  Performed by: BEATRIZ TITUS  Authorized by: BEATRIZ TITUS     Consent Done?:  Yes (Written)  Time out: Immediately prior to procedure a "time out" was called to verify the correct patient, procedure, equipment, support staff and site/side marked as required.    Indications: hematuria and BPH    Position:  Supine  Anesthesia:  Intraurethral instillation  Patient sedated?: No    Preparation: Patient was prepped and draped in usual sterile fashion      Scope type:  Flexible cystoscope  Stent inserted: No    Stent removed: No    External exam normal: Yes    Digital exam performed: No    Urethra normal: No (multiple soft non-obstructing strictures)         Urethral Internal Findings:  Stricture  Prostate normal: No (elevated median bar)          Hyperplasia (Bilobar)(Length (cm):  6)Bladder neck normal: Bladder neck normal   Bladder normal: No (Grade 4 trabeculation and Cellules)      Patient tolerance:  Patient tolerated the procedure well with no immediate complications      "

## 2017-09-25 ENCOUNTER — OFFICE VISIT (OUTPATIENT)
Dept: DERMATOLOGY | Facility: CLINIC | Age: 77
End: 2017-09-25
Payer: MEDICARE

## 2017-09-25 DIAGNOSIS — Z86.018 HISTORY OF DYSPLASTIC NEVUS: Primary | ICD-10-CM

## 2017-09-25 DIAGNOSIS — L82.1 SK (SEBORRHEIC KERATOSIS): ICD-10-CM

## 2017-09-25 DIAGNOSIS — L73.8 SEBACEOUS HYPERPLASIA: ICD-10-CM

## 2017-09-25 DIAGNOSIS — D22.9 NEVUS: ICD-10-CM

## 2017-09-25 DIAGNOSIS — L30.9 DERMATITIS: ICD-10-CM

## 2017-09-25 PROCEDURE — 88305 TISSUE EXAM BY PATHOLOGIST: CPT | Mod: 26,,, | Performed by: PATHOLOGY

## 2017-09-25 PROCEDURE — 1126F AMNT PAIN NOTED NONE PRSNT: CPT | Mod: S$GLB,,, | Performed by: DERMATOLOGY

## 2017-09-25 PROCEDURE — 99214 OFFICE O/P EST MOD 30 MIN: CPT | Mod: 25,S$GLB,, | Performed by: DERMATOLOGY

## 2017-09-25 PROCEDURE — 99499 UNLISTED E&M SERVICE: CPT | Mod: S$PBB,,, | Performed by: DERMATOLOGY

## 2017-09-25 PROCEDURE — 88305 TISSUE EXAM BY PATHOLOGIST: CPT | Performed by: PATHOLOGY

## 2017-09-25 PROCEDURE — 99999 PR PBB SHADOW E&M-EST. PATIENT-LVL II: CPT | Mod: PBBFAC,,, | Performed by: DERMATOLOGY

## 2017-09-25 PROCEDURE — 11100 PR BIOPSY OF SKIN LESION: CPT | Mod: S$GLB,,, | Performed by: DERMATOLOGY

## 2017-09-25 PROCEDURE — 88313 SPECIAL STAINS GROUP 2: CPT | Mod: 26,,, | Performed by: PATHOLOGY

## 2017-09-25 PROCEDURE — 3008F BODY MASS INDEX DOCD: CPT | Mod: S$GLB,,, | Performed by: DERMATOLOGY

## 2017-09-25 PROCEDURE — 1159F MED LIST DOCD IN RCRD: CPT | Mod: S$GLB,,, | Performed by: DERMATOLOGY

## 2017-10-04 ENCOUNTER — ANTI-COAG VISIT (OUTPATIENT)
Dept: CARDIOLOGY | Facility: CLINIC | Age: 77
End: 2017-10-04

## 2017-10-04 DIAGNOSIS — Z79.01 LONG TERM CURRENT USE OF ANTICOAGULANT THERAPY: ICD-10-CM

## 2017-10-06 DIAGNOSIS — Z79.01 LONG-TERM (CURRENT) USE OF ANTICOAGULANTS: ICD-10-CM

## 2017-10-06 RX ORDER — WARFARIN SODIUM 5 MG/1
TABLET ORAL
Qty: 90 TABLET | Refills: 2
Start: 2017-10-06 | End: 2017-12-26 | Stop reason: SDUPTHER

## 2017-10-06 NOTE — TELEPHONE ENCOUNTER
----- Message from Meron Granda sent at 10/5/2017  4:35 PM CDT -----  Contact: [facility]/ [facility]/ [phone]   Type: Rx    Name of medication(s): warfarin (COUMADIN) 5 MG tablet    Is this a refill? New rx? Refill     Who prescribed medication? Dr. Kothari     Pharmacy Name, Phone, & Location:  [facility] - [place] - [address], [facility] [phone] (Phone)  [phone] (Fax    Comments: Gama is calling to have a refill on the Rx above. Please call and advise     Thank you

## 2017-10-10 RX ORDER — WARFARIN SODIUM 5 MG/1
2.5-5 TABLET ORAL DAILY
Qty: 90 TABLET | Refills: 3 | Status: SHIPPED | OUTPATIENT
Start: 2017-10-10 | End: 2017-10-10 | Stop reason: SDUPTHER

## 2017-10-17 ENCOUNTER — ANTI-COAG VISIT (OUTPATIENT)
Dept: CARDIOLOGY | Facility: CLINIC | Age: 77
End: 2017-10-17

## 2017-10-17 DIAGNOSIS — Z79.01 LONG TERM CURRENT USE OF ANTICOAGULANT THERAPY: ICD-10-CM

## 2017-10-31 ENCOUNTER — ANTI-COAG VISIT (OUTPATIENT)
Dept: CARDIOLOGY | Facility: CLINIC | Age: 77
End: 2017-10-31

## 2017-10-31 DIAGNOSIS — Z79.01 LONG TERM CURRENT USE OF ANTICOAGULANT THERAPY: ICD-10-CM

## 2017-11-14 ENCOUNTER — ANTI-COAG VISIT (OUTPATIENT)
Dept: CARDIOLOGY | Facility: CLINIC | Age: 77
End: 2017-11-14

## 2017-11-14 ENCOUNTER — LAB VISIT (OUTPATIENT)
Dept: LAB | Facility: HOSPITAL | Age: 77
End: 2017-11-14
Attending: INTERNAL MEDICINE
Payer: MEDICARE

## 2017-11-14 ENCOUNTER — OFFICE VISIT (OUTPATIENT)
Dept: CARDIOLOGY | Facility: CLINIC | Age: 77
End: 2017-11-14
Payer: MEDICARE

## 2017-11-14 VITALS
BODY MASS INDEX: 26.66 KG/M2 | HEIGHT: 69 IN | SYSTOLIC BLOOD PRESSURE: 134 MMHG | DIASTOLIC BLOOD PRESSURE: 63 MMHG | WEIGHT: 180 LBS | HEART RATE: 53 BPM

## 2017-11-14 DIAGNOSIS — Z79.01 LONG TERM CURRENT USE OF ANTICOAGULANT THERAPY: ICD-10-CM

## 2017-11-14 DIAGNOSIS — I25.10 CORONARY ARTERY DISEASE INVOLVING NATIVE CORONARY ARTERY OF NATIVE HEART WITHOUT ANGINA PECTORIS: Chronic | ICD-10-CM

## 2017-11-14 LAB
INR PPP: 3
PROTHROMBIN TIME: 30.4 SEC

## 2017-11-14 PROCEDURE — 85610 PROTHROMBIN TIME: CPT

## 2017-11-14 PROCEDURE — 36415 COLL VENOUS BLD VENIPUNCTURE: CPT

## 2017-11-14 PROCEDURE — 99999 PR PBB SHADOW E&M-EST. PATIENT-LVL IV: CPT | Mod: PBBFAC,GC,, | Performed by: INTERNAL MEDICINE

## 2017-11-14 PROCEDURE — 99213 OFFICE O/P EST LOW 20 MIN: CPT | Mod: GC,S$GLB,, | Performed by: INTERNAL MEDICINE

## 2017-11-14 RX ORDER — NITROGLYCERIN 0.4 MG/1
0.4 TABLET SUBLINGUAL EVERY 5 MIN PRN
Qty: 30 TABLET | Refills: 6 | Status: ON HOLD | OUTPATIENT
Start: 2017-11-14 | End: 2019-04-29 | Stop reason: HOSPADM

## 2017-11-14 NOTE — PROGRESS NOTES
Subjective:    Patient ID:  Eliazar James is a 77 y.o. male who presents for follow-up of Coronary Artery Disease (6 month f/u )      Pt has a h/o HTN, mixed HPL, CAD s/p CABG in 1997 and is s/p PCI in 7-09. Anatomy as of 7-09 was LIMA-->LAD which is atretic, SVG--->OM patent and SVG--->RCA patent. In 7-09 pt was admitted for an ACS and had DALY placed in prox LAD and distal RCA. Since that time, he has been CP free and denies any LUCAS. LVEF is 55% with diastolic dysfunction and apical HK.  Pt with TIA in 2-2012 and new onset afib. Now on coumadin- declined NOACs due to cost. CHADS 4- was followed by EP. Was on Multaq initially -discontinued due to cost issues and it was decided to continue rate control strategy with anticoagulation.  SPECT on 03/09/2012 was normal.    Interval history: Patient denies any chest pain, SOB, palpitation, syncope. He walks for about 45 minutes 5 times a week and cuts his own grass. Reports that his blood pressure sometimes is in high 90s and low 100s.       Past Medical History:   Diagnosis Date    *Atrial fibrillation     Anxiety     Atrial fibrillation 7/5/2012    Blood transfusion     CAD (coronary artery disease) 7/10/2012    Cancer     skin    Cataract     removed from both    Clotting disorder     Coronary artery disease     Depression     HTN (hypertension) 7/10/2012    Hx-TIA (transient ischemic attack) 7/10/2012    Hyperlipidemia     Hypertension     Myocardial infarction     Renal calculus, left 8/18/2017    S/P CABG (coronary artery bypass graft) 1/8/2013    Stroke 2/12    TIA    Urinary tract infection      Past Surgical History:   Procedure Laterality Date    CATARACT EXTRACTION W/  INTRAOCULAR LENS IMPLANT Right 03/27/2008    CATARACT EXTRACTION W/  INTRAOCULAR LENS IMPLANT Left 12/06/2007    CORONARY ARTERY BYPASS GRAFT      triple bypass       Current Outpatient Prescriptions on File Prior to Visit   Medication Sig Dispense Refill    aspirin 81  mg Tab Take 1 tablet by mouth Daily.      atorvastatin (LIPITOR) 40 MG tablet Take 1 tablet (40 mg total) by mouth once daily. 90 tablet 3    carvedilol (COREG) 12.5 MG tablet Take 1 tablet (12.5 mg total) by mouth 2 (two) times daily with meals. 180 tablet 3    dutasteride-tamsulosin 0.5-0.4 mg CM24 Take 1 capsule by mouth once daily. 90 capsule 3    fluticasone (FLONASE) 50 mcg/actuation nasal spray 2 sprays by Each Nare route once daily. 16 g 12    losartan (COZAAR) 50 MG tablet Take 1 tablet (50 mg total) by mouth once daily. 90 tablet 3    warfarin (COUMADIN) 5 MG tablet Take 1 tablet (5mg.) by mouth daily, except 1/2 tablet (2.5mg.) on Saturdays,  Or as directed by [facility] 90 tablet 2    [DISCONTINUED] diltiaZEM (CARDIZEM CD) 180 MG 24 hr capsule Take 1 capsule (180 mg total) by mouth once daily. 90 capsule 3    [DISCONTINUED] nitroGLYCERIN (NITROSTAT) 0.4 MG SL tablet Place 1 tablet (0.4 mg total) under the tongue every 5 (five) minutes as needed for Chest pain. 20 tablet 12     No current facility-administered medications on file prior to visit.      Review of patient's allergies indicates:   Allergen Reactions    No known drug allergies        Review of Systems   Constitution: Negative for decreased appetite, weakness, malaise/fatigue, weight gain and weight loss.   Eyes: Negative for visual disturbance.   Cardiovascular: Negative for chest pain, claudication, dyspnea on exertion, irregular heartbeat, orthopnea, palpitations, paroxysmal nocturnal dyspnea and syncope.   Respiratory: Negative for cough, shortness of breath and snoring.    Skin: Negative for rash.   Musculoskeletal: Negative for arthritis, muscle cramps, muscle weakness and myalgias.   Gastrointestinal: Negative for abdominal pain, anorexia, change in bowel habit and nausea.   Genitourinary: Negative for dysuria and frequency.   Neurological: Negative for excessive daytime sleepiness, dizziness, headaches, loss of balance and  "numbness.   Psychiatric/Behavioral: Negative for depression.     /63 (BP Location: Left arm, Patient Position: Sitting, BP Method: Medium (Automatic))   Pulse (!) 53   Ht 5' 9" (1.753 m)   Wt 81.6 kg (180 lb)   BMI 26.58 kg/m²      Objective:    Physical Exam   Constitutional: He is oriented to person, place, and time. He appears well-developed and well-nourished.   HENT:   Head: Normocephalic and atraumatic.   Eyes: Pupils are equal, round, and reactive to light.   Neck: Normal range of motion. Neck supple.   Cardiovascular: Normal rate, regular rhythm, normal heart sounds, intact distal pulses and normal pulses.  Exam reveals no gallop.    No murmur heard.  Pulmonary/Chest: Effort normal and breath sounds normal.   Abdominal: Soft. Bowel sounds are normal. There is no hepatosplenomegaly. There is no tenderness.   Musculoskeletal: Normal range of motion.   Neurological: He is alert and oriented to person, place, and time.   Skin: Skin is warm and dry.   Psychiatric: He has a normal mood and affect. His speech is normal and behavior is normal. Judgment and thought content normal.   Vitals reviewed.         Ref. Range 3/21/2017 08:04   Cholesterol Latest Ref Range: 120 - 199 mg/dL 108 (L)   HDL Latest Ref Range: 40 - 75 mg/dL 41   LDL Cholesterol Latest Ref Range: 63.0 - 159.0 mg/dL 55.8 (L)   Total Cholesterol/HDL Ratio Latest Ref Range: 2.0 - 5.0  2.6   Triglycerides Latest Ref Range: 30 - 150 mg/dL 56           Assessment:         1. Coronary artery disease involving native coronary artery of native heart without angina pectoris    2. Atrial fibrillation, unspecified    3. Essential hypertension    4. Hyperlipidemia    5. S/P CABG (coronary artery bypass graft)    6. Hx-TIA (transient ischemic attack)    7. Long term current use of anticoagulant therapy          Plan:       76 year old male patient with known permanent Afib on rate control and coumadin, CAD with previous CABG and prior PCIs stable from a " cardiac standpoint on appropriate medical therapy.     RTC 6 months.     D/w Dr Anderson.     Drew Arana MD, MPH, FACP  Cardiovascular Disease Fellow  Pager: [phone]

## 2017-12-05 ENCOUNTER — LAB VISIT (OUTPATIENT)
Dept: LAB | Facility: HOSPITAL | Age: 77
End: 2017-12-05
Attending: INTERNAL MEDICINE
Payer: MEDICARE

## 2017-12-05 DIAGNOSIS — Z79.01 LONG TERM CURRENT USE OF ANTICOAGULANT THERAPY: ICD-10-CM

## 2017-12-05 LAB
INR PPP: 2.6
PROTHROMBIN TIME: 26.2 SEC

## 2017-12-05 PROCEDURE — 36415 COLL VENOUS BLD VENIPUNCTURE: CPT | Mod: PO

## 2017-12-05 PROCEDURE — 85610 PROTHROMBIN TIME: CPT

## 2017-12-06 ENCOUNTER — ANTI-COAG VISIT (OUTPATIENT)
Dept: CARDIOLOGY | Facility: CLINIC | Age: 77
End: 2017-12-06

## 2017-12-06 DIAGNOSIS — Z79.01 LONG TERM CURRENT USE OF ANTICOAGULANT THERAPY: ICD-10-CM

## 2017-12-26 ENCOUNTER — ANTI-COAG VISIT (OUTPATIENT)
Dept: CARDIOLOGY | Facility: CLINIC | Age: 77
End: 2017-12-26

## 2017-12-26 ENCOUNTER — LAB VISIT (OUTPATIENT)
Dept: LAB | Facility: HOSPITAL | Age: 77
End: 2017-12-26
Payer: MEDICARE

## 2017-12-26 DIAGNOSIS — Z79.01 LONG TERM CURRENT USE OF ANTICOAGULANT THERAPY: ICD-10-CM

## 2017-12-26 DIAGNOSIS — Z79.01 LONG TERM (CURRENT) USE OF ANTICOAGULANTS: ICD-10-CM

## 2017-12-26 LAB
INR PPP: 2.7
PROTHROMBIN TIME: 26.7 SEC

## 2017-12-26 PROCEDURE — 36415 COLL VENOUS BLD VENIPUNCTURE: CPT

## 2017-12-26 PROCEDURE — 85610 PROTHROMBIN TIME: CPT

## 2018-01-08 ENCOUNTER — ANTI-COAG VISIT (OUTPATIENT)
Dept: CARDIOLOGY | Facility: CLINIC | Age: 78
End: 2018-01-08

## 2018-01-08 ENCOUNTER — LAB VISIT (OUTPATIENT)
Dept: LAB | Facility: HOSPITAL | Age: 78
End: 2018-01-08
Payer: MEDICARE

## 2018-01-08 DIAGNOSIS — Z79.01 LONG TERM CURRENT USE OF ANTICOAGULANT THERAPY: ICD-10-CM

## 2018-01-08 LAB
INR PPP: 2.4
PROTHROMBIN TIME: 25.8 SEC

## 2018-01-08 PROCEDURE — 85610 PROTHROMBIN TIME: CPT

## 2018-01-08 PROCEDURE — 36415 COLL VENOUS BLD VENIPUNCTURE: CPT

## 2018-01-17 RX ORDER — WARFARIN SODIUM 5 MG/1
TABLET ORAL
Qty: 90 TABLET | Refills: 2 | Status: SHIPPED | OUTPATIENT
Start: 2018-01-17 | End: 2018-09-14 | Stop reason: SDUPTHER

## 2018-02-06 ENCOUNTER — LAB VISIT (OUTPATIENT)
Dept: LAB | Facility: HOSPITAL | Age: 78
End: 2018-02-06
Attending: INTERNAL MEDICINE
Payer: MEDICARE

## 2018-02-06 DIAGNOSIS — Z79.01 LONG TERM CURRENT USE OF ANTICOAGULANT THERAPY: ICD-10-CM

## 2018-02-06 LAB
INR PPP: 2.6
PROTHROMBIN TIME: 25 SEC

## 2018-02-06 PROCEDURE — 36415 COLL VENOUS BLD VENIPUNCTURE: CPT | Mod: PO

## 2018-02-06 PROCEDURE — 85610 PROTHROMBIN TIME: CPT

## 2018-02-07 ENCOUNTER — ANTI-COAG VISIT (OUTPATIENT)
Dept: CARDIOLOGY | Facility: CLINIC | Age: 78
End: 2018-02-07

## 2018-02-07 DIAGNOSIS — Z79.01 LONG TERM CURRENT USE OF ANTICOAGULANT THERAPY: ICD-10-CM

## 2018-03-06 ENCOUNTER — ANTI-COAG VISIT (OUTPATIENT)
Dept: CARDIOLOGY | Facility: CLINIC | Age: 78
End: 2018-03-06

## 2018-03-06 ENCOUNTER — LAB VISIT (OUTPATIENT)
Dept: LAB | Facility: HOSPITAL | Age: 78
End: 2018-03-06
Attending: INTERNAL MEDICINE
Payer: MEDICARE

## 2018-03-06 DIAGNOSIS — Z79.01 LONG TERM CURRENT USE OF ANTICOAGULANT THERAPY: ICD-10-CM

## 2018-03-06 LAB
INR PPP: 2.8
PROTHROMBIN TIME: 27.2 SEC

## 2018-03-06 PROCEDURE — 85610 PROTHROMBIN TIME: CPT

## 2018-03-06 PROCEDURE — 36415 COLL VENOUS BLD VENIPUNCTURE: CPT | Mod: PO

## 2018-03-20 DIAGNOSIS — I10 ESSENTIAL HYPERTENSION: Chronic | ICD-10-CM

## 2018-03-21 RX ORDER — LOSARTAN POTASSIUM 50 MG/1
50 TABLET ORAL DAILY
Qty: 90 TABLET | Refills: 0 | Status: SHIPPED | OUTPATIENT
Start: 2018-03-21 | End: 2018-03-21

## 2018-03-21 RX ORDER — LOSARTAN POTASSIUM 50 MG/1
TABLET ORAL
Qty: 90 TABLET | Refills: 0 | Status: SHIPPED | OUTPATIENT
Start: 2018-03-21 | End: 2018-04-09 | Stop reason: SDUPTHER

## 2018-04-09 ENCOUNTER — OFFICE VISIT (OUTPATIENT)
Dept: FAMILY MEDICINE | Facility: CLINIC | Age: 78
End: 2018-04-09
Payer: MEDICARE

## 2018-04-09 VITALS
BODY MASS INDEX: 27.06 KG/M2 | RESPIRATION RATE: 18 BRPM | HEART RATE: 63 BPM | WEIGHT: 182.69 LBS | SYSTOLIC BLOOD PRESSURE: 120 MMHG | DIASTOLIC BLOOD PRESSURE: 60 MMHG | HEIGHT: 69 IN | OXYGEN SATURATION: 97 %

## 2018-04-09 DIAGNOSIS — E78.5 HYPERLIPIDEMIA, UNSPECIFIED HYPERLIPIDEMIA TYPE: Chronic | ICD-10-CM

## 2018-04-09 DIAGNOSIS — Z95.1 S/P CABG (CORONARY ARTERY BYPASS GRAFT): Chronic | ICD-10-CM

## 2018-04-09 DIAGNOSIS — Z79.01 LONG TERM CURRENT USE OF ANTICOAGULANT THERAPY: ICD-10-CM

## 2018-04-09 DIAGNOSIS — Z86.73 HX-TIA (TRANSIENT ISCHEMIC ATTACK): Chronic | ICD-10-CM

## 2018-04-09 DIAGNOSIS — I10 ESSENTIAL HYPERTENSION: Chronic | ICD-10-CM

## 2018-04-09 DIAGNOSIS — R31.9 HEMATURIA, UNSPECIFIED TYPE: ICD-10-CM

## 2018-04-09 DIAGNOSIS — I48.20 CHRONIC ATRIAL FIBRILLATION: Chronic | ICD-10-CM

## 2018-04-09 DIAGNOSIS — F43.0 ACUTE STRESS REACTION: ICD-10-CM

## 2018-04-09 DIAGNOSIS — I25.10 CORONARY ARTERY DISEASE INVOLVING NATIVE CORONARY ARTERY OF NATIVE HEART WITHOUT ANGINA PECTORIS: Chronic | ICD-10-CM

## 2018-04-09 DIAGNOSIS — Z00.00 GENERAL MEDICAL EXAM: Primary | ICD-10-CM

## 2018-04-09 DIAGNOSIS — N30.01 ACUTE CYSTITIS WITH HEMATURIA: ICD-10-CM

## 2018-04-09 LAB
BILIRUB SERPL-MCNC: ABNORMAL MG/DL
BLOOD, POC UA: 250
GLUCOSE UR QL STRIP: ABNORMAL
KETONES UR QL STRIP: ABNORMAL
LEUKOCYTE ESTERASE URINE, POC: ABNORMAL
NITRITE, POC UA: ABNORMAL
PH, POC UA: 5
PROTEIN, POC: ABNORMAL
SPECIFIC GRAVITY, POC UA: 1010
UROBILINOGEN, POC UA: 1

## 2018-04-09 PROCEDURE — 99999 PR PBB SHADOW E&M-EST. PATIENT-LVL III: CPT | Mod: PBBFAC,,, | Performed by: INTERNAL MEDICINE

## 2018-04-09 PROCEDURE — 99397 PER PM REEVAL EST PAT 65+ YR: CPT | Mod: 25,S$GLB,, | Performed by: INTERNAL MEDICINE

## 2018-04-09 PROCEDURE — 99499 UNLISTED E&M SERVICE: CPT | Mod: S$GLB,,, | Performed by: INTERNAL MEDICINE

## 2018-04-09 PROCEDURE — 93005 ELECTROCARDIOGRAM TRACING: CPT | Mod: S$GLB,,, | Performed by: INTERNAL MEDICINE

## 2018-04-09 PROCEDURE — 81000 URINALYSIS NONAUTO W/SCOPE: CPT | Mod: S$GLB,,, | Performed by: INTERNAL MEDICINE

## 2018-04-09 PROCEDURE — 93010 ELECTROCARDIOGRAM REPORT: CPT | Mod: S$GLB,,, | Performed by: INTERNAL MEDICINE

## 2018-04-09 RX ORDER — ATORVASTATIN CALCIUM 40 MG/1
40 TABLET, FILM COATED ORAL DAILY
Qty: 90 TABLET | Refills: 1 | Status: SHIPPED | OUTPATIENT
Start: 2018-04-09 | End: 2018-09-14 | Stop reason: SDUPTHER

## 2018-04-09 RX ORDER — DUTASTERIDE AND TAMSULOSIN HYDROCHLORIDE CAPSULES .5; .4 MG/1; MG/1
1 CAPSULE ORAL DAILY
Qty: 90 CAPSULE | Refills: 1 | Status: SHIPPED | OUTPATIENT
Start: 2018-04-09 | End: 2018-10-30

## 2018-04-09 RX ORDER — LOSARTAN POTASSIUM 50 MG/1
50 TABLET ORAL DAILY
Qty: 90 TABLET | Refills: 1 | Status: SHIPPED | OUTPATIENT
Start: 2018-04-09 | End: 2018-09-14 | Stop reason: DRUGHIGH

## 2018-04-09 RX ORDER — CARVEDILOL 12.5 MG/1
12.5 TABLET ORAL 2 TIMES DAILY WITH MEALS
Qty: 180 TABLET | Refills: 1 | Status: SHIPPED | OUTPATIENT
Start: 2018-04-09 | End: 2018-06-12 | Stop reason: SDUPTHER

## 2018-04-09 RX ORDER — FLUTICASONE PROPIONATE 50 MCG
2 SPRAY, SUSPENSION (ML) NASAL DAILY
Qty: 16 G | Refills: 5 | Status: SHIPPED | OUTPATIENT
Start: 2018-04-09

## 2018-04-09 RX ORDER — CIPROFLOXACIN 500 MG/1
500 TABLET ORAL 2 TIMES DAILY
Qty: 14 TABLET | Refills: 0 | Status: SHIPPED | OUTPATIENT
Start: 2018-04-09 | End: 2018-04-16

## 2018-04-09 NOTE — PATIENT INSTRUCTIONS
You are up-to-date on preventive services. I have refilled your medicines. We will check your labs. I am sending you a prescription for cipro for a bladder infection. Drink plenty of water. Keep your appointment with Dr Alvarez tomorrow.

## 2018-04-10 ENCOUNTER — TELEPHONE (OUTPATIENT)
Dept: FAMILY MEDICINE | Facility: CLINIC | Age: 78
End: 2018-04-10

## 2018-04-10 NOTE — TELEPHONE ENCOUNTER
----- Message from Jazlyn Carroll sent at 4/10/2018  2:03 PM CDT -----  Contact: [phone]  Patient says that he a call that the lab made an error with his coumadin labs and he wants to know if there was an issue with the other labs Dr Domingo ordered. Please advise.

## 2018-04-10 NOTE — PROGRESS NOTES
Received call from lab that unable to process and coming in as PT>100. Lab could be falsely elevated. We are cancelling this draw and he will return to lab for redrawn. Contacted patient to get repeat INR. He is not able to go today. He will go tomorrow. No signs or symptoms of bleeding. He has increased stress due to wife being in [place]. He does has UTI and started cipro yesterday. We will hold coumadin today as INR could be truly elevated. Follow up 4/11.

## 2018-04-10 NOTE — PROGRESS NOTES
Subjective:       Patient ID: Eliazar James is a 77 y.o. male.    Chief Complaint: Annual Exam    This is a new patient to me.  I have reviewed the PMH,  and  for this patient. The patient presents today for a wellness exam. He has a history of CAD with Cabg and stents. HE also has a fib. He saw his cardiologist recently. He wants to get labs tomorrow when he gets his Pt. He has BPH with nocturia for which he sees DR Ramon. He is on lipitor for his cholesterol. HE is up-to-date on prtevention except for flu shot and tetanus shot.       Hyperlipidemia   This is a chronic problem. The current episode started more than 1 year ago. The problem is controlled. Recent lipid tests were reviewed and are normal. He has no history of chronic renal disease, diabetes, hypothyroidism, liver disease, obesity or nephrotic syndrome. Factors aggravating his hyperlipidemia include beta blockers. Pertinent negatives include no chest pain, focal sensory loss, focal weakness, leg pain, myalgias or shortness of breath. Current antihyperlipidemic treatment includes statins. The current treatment provides significant improvement of lipids. There are no compliance problems.  Risk factors for coronary artery disease include male sex.     Review of Systems   Constitutional: Negative for appetite change, chills, fatigue, fever and unexpected weight change.   HENT: Negative for congestion, ear pain, mouth sores, postnasal drip, rhinorrhea, sinus pain, sinus pressure and sore throat.    Eyes: Negative for photophobia, discharge, redness, itching and visual disturbance.   Respiratory: Negative for cough, chest tightness, shortness of breath and wheezing.    Cardiovascular: Negative for chest pain, palpitations and leg swelling.   Gastrointestinal: Negative for abdominal pain, blood in stool, constipation, diarrhea, nausea and vomiting.   Endocrine: Negative for cold intolerance and heat intolerance.   Genitourinary: Negative for difficulty  urinating, discharge, dysuria, flank pain, frequency and urgency.   Musculoskeletal: Negative for arthralgias, back pain, joint swelling, myalgias and neck pain.   Skin: Negative for rash and wound.   Neurological: Negative for dizziness, tremors, focal weakness, syncope, weakness, light-headedness, numbness and headaches.   Hematological: Negative for adenopathy. Does not bruise/bleed easily.   Psychiatric/Behavioral: Negative for agitation, confusion and sleep disturbance. The patient is not nervous/anxious.        Objective:      Physical Exam   Constitutional: He is oriented to person, place, and time. He appears well-developed and well-nourished.   HENT:   Head: Normocephalic and atraumatic.   Right Ear: External ear normal. Tympanic membrane is not erythematous. No middle ear effusion.   Left Ear: External ear normal. Tympanic membrane is not erythematous.  No middle ear effusion.   Mouth/Throat: No oropharyngeal exudate or posterior oropharyngeal erythema.   Eyes: Conjunctivae and EOM are normal. Pupils are equal, round, and reactive to light. Right eye exhibits no discharge. Left eye exhibits no discharge. No scleral icterus.   Neck: Normal range of motion. Neck supple. No thyromegaly present.   Cardiovascular: Normal rate, regular rhythm, normal heart sounds and intact distal pulses.  Exam reveals no gallop and no friction rub.    No murmur heard.  Pulmonary/Chest: He has no wheezes. He has no rales. He exhibits no tenderness.   Abdominal: Soft. He exhibits no distension and no mass. There is no tenderness. There is no rebound and no guarding. No hernia.   Musculoskeletal: Normal range of motion. He exhibits no edema or tenderness.   Neurological: He is alert and oriented to person, place, and time. He displays normal reflexes. No cranial nerve deficit or sensory deficit.   Skin: Skin is warm and dry.   Psychiatric: He has a normal mood and affect. His behavior is normal. Judgment normal.   Vitals  reviewed.      Assessment and Plan:     Problem List Items Addressed This Visit     Atrial fibrillation (chronic) - he is on carvedilol and coumadin.     Long term current use of anticoagulant therapy - his cardiologist manages his coumadin.       CAD (coronary artery disease) (chronic) - he sees cardiology      HTN (hypertension) (chronic) - BP looks good on current meds.     Relevant Medications    losartan (COZAAR) 50 MG tablet    carvedilol (COREG) 12.5 MG tablet    Other Relevant Orders    CBC auto differential    Comprehensive metabolic panel    Hx-TIA (transient ischemic attack) (chronic) - control bp. On coumadin for A fib.       Hyperlipidemia (chronic) - continue lipitor.     Relevant Medications    atorvastatin (LIPITOR) 40 MG tablet    Other Relevant Orders    Lipid panel    S/P CABG (coronary artery bypass graft) (chronic) - stbale. F/up with cardiology      Hematuria - UA shows UTI    Relevant Orders    POCT Urinalysis (Completed)      Other Visit Diagnoses     General medical exam    -  Primary - stable exam    Relevant Orders    IN OFFICE EKG 12-LEAD (to Muse) (Completed)    Acute stress reaction    - he does not want medication.       Acute cystitis with hematuria    - treat with cipro and send culture.    Relevant Medications    ciprofloxacin HCl (CIPRO) 500 MG tablet    Other Relevant Orders    Urine culture

## 2018-04-11 ENCOUNTER — ANTI-COAG VISIT (OUTPATIENT)
Dept: CARDIOLOGY | Facility: CLINIC | Age: 78
End: 2018-04-11

## 2018-04-11 DIAGNOSIS — I48.20 CHRONIC ATRIAL FIBRILLATION: ICD-10-CM

## 2018-04-11 DIAGNOSIS — Z79.01 LONG TERM CURRENT USE OF ANTICOAGULANT THERAPY: ICD-10-CM

## 2018-04-13 ENCOUNTER — OFFICE VISIT (OUTPATIENT)
Dept: CARDIOLOGY | Facility: CLINIC | Age: 78
End: 2018-04-13
Payer: MEDICARE

## 2018-04-13 VITALS
HEIGHT: 69 IN | BODY MASS INDEX: 26.83 KG/M2 | SYSTOLIC BLOOD PRESSURE: 126 MMHG | WEIGHT: 181.13 LBS | DIASTOLIC BLOOD PRESSURE: 60 MMHG | HEART RATE: 54 BPM | OXYGEN SATURATION: 97 %

## 2018-04-13 DIAGNOSIS — Z95.1 S/P CABG (CORONARY ARTERY BYPASS GRAFT): Chronic | ICD-10-CM

## 2018-04-13 DIAGNOSIS — E78.2 MIXED HYPERLIPIDEMIA: Chronic | ICD-10-CM

## 2018-04-13 DIAGNOSIS — I10 ESSENTIAL HYPERTENSION: Chronic | ICD-10-CM

## 2018-04-13 DIAGNOSIS — I25.10 CORONARY ARTERY DISEASE INVOLVING NATIVE CORONARY ARTERY OF NATIVE HEART WITHOUT ANGINA PECTORIS: Chronic | ICD-10-CM

## 2018-04-13 DIAGNOSIS — I48.20 CHRONIC ATRIAL FIBRILLATION: Primary | Chronic | ICD-10-CM

## 2018-04-13 DIAGNOSIS — Z79.01 LONG TERM CURRENT USE OF ANTICOAGULANT THERAPY: ICD-10-CM

## 2018-04-13 PROCEDURE — 3078F DIAST BP <80 MM HG: CPT | Mod: CPTII,S$GLB,, | Performed by: INTERNAL MEDICINE

## 2018-04-13 PROCEDURE — 99215 OFFICE O/P EST HI 40 MIN: CPT | Mod: S$GLB,,, | Performed by: INTERNAL MEDICINE

## 2018-04-13 PROCEDURE — 99999 PR PBB SHADOW E&M-EST. PATIENT-LVL IV: CPT | Mod: PBBFAC,,, | Performed by: INTERNAL MEDICINE

## 2018-04-13 PROCEDURE — 3074F SYST BP LT 130 MM HG: CPT | Mod: CPTII,S$GLB,, | Performed by: INTERNAL MEDICINE

## 2018-04-13 NOTE — LETTER
April 13, 2018      Macario Valencia           [facility]  [address]  [address]  [place] [address]  Phone: [phone]  Fax: [phone]          Patient: Eliazar James   MR Number: [id number]   YOB: 1940   Date of Visit: 4/13/2018       Dear Macario Valencia:    Thank you for referring Eliazar James to me for evaluation. Attached you will find relevant portions of my assessment and plan of care.    If you have questions, please do not hesitate to call me. I look forward to following Eliazar James along with you.    Sincerely,    Macario Valencia MD PhD    Enclosure  CC:  No Recipients    If you would like to receive this communication electronically, please contact [email or web address] or [phone] to request more information on [facility] access.    For providers and/or their staff who would like to refer a patient to [facility], please contact us through our one-stop-shop provider referral line, [facility], at [phone].    If you feel you have received this communication in error or would no longer like to receive these types of communications, please e-mail [email or web address]

## 2018-04-13 NOTE — PATIENT INSTRUCTIONS
Assessment/Plan:  Eliazar James is a 77 y.o. male with a past medical history of HTN, HLD, CAD s/p CABG in 1997 and is s/p PCI/DALY in 7/2009 (prox LAD and distal RCA), chronic Afib (on rate control and coumadin), who presents for an initial appointment.      1. CAD s/p CABG- No angina currently.  Continue ASA, beta blocker, statin, ARB.      2. Chronic Afib- Continue rate control and coumadin.    3. HTN- Controlled.  Continue current medication regimen.    4. HLD- Continue atorvastatin 40 mg daily.     5. Depression- Mr. James is very depressed due to his wife's failing health.  He does not want to be evaluated by a psychiatrist, but would rather talk with me on a regular basis.  Will see him monthly to assist with coping, given his wife's clinical status.     Follow up in 1 month

## 2018-04-13 NOTE — PROGRESS NOTES
[facility]    CC:   Chief Complaint   Patient presents with    [facility]       Patient ID: Eliazar James is a 77 y.o. male with a past medical history of HTN, HLD, CAD s/p CABG in 1997 and is s/p PCI/DALY in 7/2009 (prox LAD and distal RCA), chronic Afib (on rate control and coumadin), who presents for an initial appointment.  Pertinent history/events are as follows:    -Pt followed by Cardiology at [facility] in the past.     -Anatomy as of 7/2009 was LIMA-->LAD which is atretic, SVG--->OM patent and SVG--->RCA patent. In 7/2009 pt was admitted for an ACS and had DALY placed in prox LAD and distal RCA.  Since that time, he has been CP free and denies any LUCAS. LVEF is 55% with diastolic dysfunction and apical HK.      -Pt with TIA in 2-2012 and new onset afib. Now on coumadin- declined NOACs due to cost. CHADS 4- was followed by EP. Was on Multaq initially -discontinued due to cost issues and it was decided to continue rate control strategy with anticoagulation.  SPECT on 03/09/2012 was normal.    HPI:  Mr. James reports no chest pain shortness of breath, lower extremity edema call palpitations, TIA symptoms, or syncope.  He is tearful and very depressed because his wife is not doing well clinically.  She is currently residing in a nursing home and suffering with recurrent episodes of decompensated heart failure.  States he's taking all medications as prescribed.  Blood pressure today is 120/60.    Past Medical History:   Diagnosis Date    *Atrial fibrillation     Anxiety     Atrial fibrillation 7/5/2012    Blood transfusion     CAD (coronary artery disease) 7/10/2012    Cancer     skin    Cataract     removed from both    Clotting disorder     Coronary artery disease     Depression     HTN (hypertension) 7/10/2012    Hx-TIA (transient ischemic attack) 7/10/2012    Hyperlipidemia     Hypertension     Myocardial infarction     Renal calculus, left 8/18/2017    S/P CABG  (coronary artery bypass graft) 1/8/2013    Stroke 2/12    TIA    Urinary tract infection      Past Surgical History:   Procedure Laterality Date    CATARACT EXTRACTION W/  INTRAOCULAR LENS IMPLANT Right 03/27/2008    CATARACT EXTRACTION W/  INTRAOCULAR LENS IMPLANT Left 12/06/2007    CORONARY ARTERY BYPASS GRAFT      triple bypass       Social History     Social History    Marital status:      Spouse name: N/A    Number of children: N/A    Years of education: N/A     Occupational History    Not on file.     Social History Main Topics    Smoking status: Never Smoker    Smokeless tobacco: Never Used    Alcohol use No    Drug use: No    Sexual activity: No     Other Topics Concern    Not on file     Social History Narrative    No narrative on file     Family History   Problem Relation Age of Onset    Heart attack Father     Heart disease Father     Melanoma Neg Hx     Psoriasis Neg Hx     Lupus Neg Hx     Eczema Neg Hx     Acne Neg Hx     Prostate cancer Neg Hx     Kidney disease Neg Hx        Review of patient's allergies indicates:   Allergen Reactions    No known drug allergies        Medication List with Changes/Refills   Current Medications    ASPIRIN 81 MG TAB    Take 1 tablet by mouth Daily.    ATORVASTATIN (LIPITOR) 40 MG TABLET    Take 1 tablet (40 mg total) by mouth once daily.    CARVEDILOL (COREG) 12.5 MG TABLET    Take 1 tablet (12.5 mg total) by mouth 2 (two) times daily with meals.    CIPROFLOXACIN HCL (CIPRO) 500 MG TABLET    Take 1 tablet (500 mg total) by mouth 2 (two) times daily.    DUTASTERIDE-TAMSULOSIN 0.5-0.4 MG CM24    Take 1 capsule by mouth once daily.    FLUTICASONE (FLONASE) 50 MCG/ACTUATION NASAL SPRAY    2 sprays (100 mcg total) by Each Nare route once daily.    LOSARTAN (COZAAR) 50 MG TABLET    Take 1 tablet (50 mg total) by mouth once daily.    NITROGLYCERIN (NITROSTAT) 0.4 MG SL TABLET    Place 1 tablet (0.4 mg total) under the tongue every 5 (five)  "minutes as needed for Chest pain.    WARFARIN (COUMADIN) 5 MG TABLET    Take 1 tablet (5mg.) by mouth daily, except 1/2 tablet (2.5mg.) on Tuesdays and Fridays,  Or as directed by Coumadin Clinic       Review of Systems  Constitution: Denies chills, fever, and sweats.  HENT: Denies headaches or blurry vision.  Cardiovascular: Denies chest pain or irregular heart beat.  Respiratory: Denies cough or shortness of breath.  Gastrointestinal: Denies abdominal pain, nausea, or vomiting.  Musculoskeletal: Denies muscle cramps.  Neurological: Denies dizziness or focal weakness.  Psychiatric/Behavioral: Depressed and tearful.  Hematologic/Lymphatic: Denies bleeding problem or easy bruising/bleeding.  Skin: Denies rash or suspicious lesions    Physical Examination  /60 (BP Location: Right arm, Patient Position: Sitting, BP Method: Large (Manual))   Pulse (!) 54   Ht 5' 9" (1.753 m)   Wt 82.2 kg (181 lb 1.7 oz)   SpO2 97%   BMI 26.74 kg/m²     Constitutional: No acute distress, conversant  HEENT: Sclera anicteric, Pupils equal, round and reactive to light, extraocular motions intact, Oropharynx clear  Neck: No JVD, no carotid bruits  Cardiovascular: Irregularly irregular, no murmur, rubs or gallops  Pulmonary: Clear to auscultation bilaterally  Abdominal: Abdomen soft, nontender, nondistended, positive bowel sounds  Extremities: No lower extremity edema,   Pulses:  Carotid pulses are 2+ on the right side, and 2+ on the left side.  Radial pulses are 2+ on the right side, and 2+ on the left side.   Femoral pulses are 2+ on the right side, and 2+ on the left side.  Skin: No ecchymosis, erythema, or ulcers  Psych: Alert and oriented x 3, appropriate affect  Neuro: CNII-XII intact, no focal deficits    Labs:  Most Recent Data  CBC:   Lab Results   Component Value Date    WBC 6.51 04/10/2018    HGB 14.0 04/10/2018    HCT 41.5 04/10/2018     04/10/2018    MCV 98 04/10/2018    RDW 14.3 04/10/2018     BMP:   Lab " Results   Component Value Date     04/10/2018    K 4.4 04/10/2018     04/10/2018    CO2 26 04/10/2018    BUN 19 04/10/2018    CREATININE 1.18 04/10/2018    GLU 95 04/10/2018    CALCIUM 8.8 04/10/2018    MG 2.2 08/11/2009    PHOS 2.7 07/07/2009     LFTS;   Lab Results   Component Value Date    PROT 7.1 04/10/2018    ALBUMIN 3.7 04/10/2018    BILITOT 0.8 04/10/2018    AST 26 04/10/2018    ALKPHOS 61 04/10/2018    ALT 36 04/10/2018     COAGS:   Lab Results   Component Value Date    INR 2.7 (H) 04/11/2018     FLP:   Lab Results   Component Value Date    CHOL 104 (L) 04/10/2018    HDL 41 04/10/2018    LDLCALC 54.6 (L) 04/10/2018    TRIG 42 04/10/2018    CHOLHDL 39.4 04/10/2018     CARDIAC:   Lab Results   Component Value Date    TROPONINI 0.008 08/09/2009     (H) 06/15/2010       EKG 4/9/2018:  Atrial fibrillation with slow ventricular response  Nonspecific T wave abnormality    Echo 4/24/2015:  CONCLUSIONS     1 - Enlarged ascending aorta.     2 - Normal left ventricular systolic function (EF 55-60%).     3 - Trivial mitral regurgitation.     4 - Mild pulmonic regurgitation.     5 - Low normal to mildly depressed right ventricular systolic function .     6 - Trivial tricuspid regurgitation.      Assessment/Plan:  Eliazar James is a 77 y.o. male with a past medical history of HTN, HLD, CAD s/p CABG in 1997 and is s/p PCI/DALY in 7/2009 (prox LAD and distal RCA), chronic Afib (on rate control and coumadin), who presents for an initial appointment.      1. CAD s/p CABG- No angina currently.  Continue ASA, beta blocker, statin, ARB.      2. Chronic Afib- Continue rate control and coumadin.    3. HTN- Controlled.  Continue current medication regimen.    4. HLD- Continue atorvastatin 40 mg daily.     5. Depression- Mr. James is very depressed due to his wife's failing health.  He does not want to be evaluated by a psychiatrist, but would rather talk with me on a regular basis.  Will see him monthly to  assist with coping, given his wife's clinical status.     Follow up in 1 month    Total duration of face to face visit time 30 minutes.  Total time spent counseling greater than fifty percent of total visit time.  Counseling included discussion regarding imaging findings, diagnosis, possibilities, treatment options, risks and benefits.  The patient had many questions regarding the options and long-term effects.    Macario Valencia MD, PhD  Interventional Cardiology

## 2018-04-23 ENCOUNTER — ANTI-COAG VISIT (OUTPATIENT)
Dept: CARDIOLOGY | Facility: CLINIC | Age: 78
End: 2018-04-23

## 2018-04-23 DIAGNOSIS — I48.20 CHRONIC ATRIAL FIBRILLATION: ICD-10-CM

## 2018-04-23 DIAGNOSIS — Z79.01 LONG TERM CURRENT USE OF ANTICOAGULANT THERAPY: ICD-10-CM

## 2018-05-10 ENCOUNTER — ANTI-COAG VISIT (OUTPATIENT)
Dept: CARDIOLOGY | Facility: CLINIC | Age: 78
End: 2018-05-10

## 2018-05-10 DIAGNOSIS — Z79.01 LONG TERM CURRENT USE OF ANTICOAGULANT THERAPY: ICD-10-CM

## 2018-05-23 ENCOUNTER — OFFICE VISIT (OUTPATIENT)
Dept: CARDIOLOGY | Facility: CLINIC | Age: 78
End: 2018-05-23
Payer: MEDICARE

## 2018-05-23 VITALS
WEIGHT: 184.06 LBS | OXYGEN SATURATION: 98 % | BODY MASS INDEX: 27.26 KG/M2 | DIASTOLIC BLOOD PRESSURE: 80 MMHG | SYSTOLIC BLOOD PRESSURE: 170 MMHG | HEART RATE: 47 BPM | HEIGHT: 69 IN

## 2018-05-23 DIAGNOSIS — Z79.01 LONG TERM CURRENT USE OF ANTICOAGULANT THERAPY: ICD-10-CM

## 2018-05-23 DIAGNOSIS — I10 ESSENTIAL HYPERTENSION: Chronic | ICD-10-CM

## 2018-05-23 DIAGNOSIS — I48.20 CHRONIC ATRIAL FIBRILLATION: Primary | Chronic | ICD-10-CM

## 2018-05-23 DIAGNOSIS — Z95.1 S/P CABG (CORONARY ARTERY BYPASS GRAFT): Chronic | ICD-10-CM

## 2018-05-23 DIAGNOSIS — I25.10 CORONARY ARTERY DISEASE INVOLVING NATIVE CORONARY ARTERY OF NATIVE HEART WITHOUT ANGINA PECTORIS: Chronic | ICD-10-CM

## 2018-05-23 DIAGNOSIS — E78.2 MIXED HYPERLIPIDEMIA: Chronic | ICD-10-CM

## 2018-05-23 PROCEDURE — 99999 PR PBB SHADOW E&M-EST. PATIENT-LVL III: CPT | Mod: PBBFAC,,, | Performed by: INTERNAL MEDICINE

## 2018-05-23 PROCEDURE — 3079F DIAST BP 80-89 MM HG: CPT | Mod: CPTII,S$GLB,, | Performed by: INTERNAL MEDICINE

## 2018-05-23 PROCEDURE — 3077F SYST BP >= 140 MM HG: CPT | Mod: CPTII,S$GLB,, | Performed by: INTERNAL MEDICINE

## 2018-05-23 PROCEDURE — 99215 OFFICE O/P EST HI 40 MIN: CPT | Mod: S$GLB,,, | Performed by: INTERNAL MEDICINE

## 2018-05-23 NOTE — PROGRESS NOTES
"[facility]    CC:   Chief Complaint   Patient presents with    Hypertension    Fatigue       Patient ID: Eliazar James is a 77 y.o. male with a past medical history of HTN, HLD, CAD s/p CABG in 1997 and is s/p PCI/DALY in 7/2009 (prox LAD and distal RCA), chronic Afib (on rate control and coumadin), who presents for a follow up appointment.  Pertinent history/events are as follows:    -Pt followed by Cardiology at [facility] in the past.     -Anatomy as of 7/2009 was LIMA-->LAD which is atretic, SVG--->OM patent and SVG--->RCA patent. In 7/2009 pt was admitted for an ACS and had DALY placed in prox LAD and distal RCA.  Since that time, he has been CP free and denies any LUCAS. LVEF is 55% with diastolic dysfunction and apical HK.      -Pt with TIA in 2-2012 and new onset afib. Now on coumadin- declined NOACs due to cost. CHADS 4- was followed by EP. Was on Multaq initially -discontinued due to cost issues and it was decided to continue rate control strategy with anticoagulation.  SPECT on 03/09/2012 was normal.    -At our initial clinic visit on 4/13/2018, Mr. James reported no chest pain shortness of breath, lower extremity edema call palpitations, TIA symptoms, or syncope.  He is tearful and very depressed because his wife is not doing well clinically.  She is currently residing in a nursing home and suffering with recurrent episodes of decompensated heart failure.  States he's taking all medications as prescribed.  Blood pressure today is 120/60.  Plan: Continue current medication regimen.  Mr. James is very depressed due to his wife's failing health.  He does not want to be evaluated by a psychiatrist, but would rather talk with me on a regular basis.  Will see him monthly to assist with coping, given his wife's clinical status.    HPI:  Feels tired and depressed.  Wife "doing a little better".  Feels stressed and blood pressure elevated today at 170/80.      Past Medical History: "   Diagnosis Date    *Atrial fibrillation     Anxiety     Atrial fibrillation 7/5/2012    Blood transfusion     CAD (coronary artery disease) 7/10/2012    Cancer     skin    Cataract     removed from both    Clotting disorder     Coronary artery disease     Depression     HTN (hypertension) 7/10/2012    Hx-TIA (transient ischemic attack) 7/10/2012    Hyperlipidemia     Hypertension     Myocardial infarction     Renal calculus, left 8/18/2017    S/P CABG (coronary artery bypass graft) 1/8/2013    Stroke 2/12    TIA    Urinary tract infection      Past Surgical History:   Procedure Laterality Date    CATARACT EXTRACTION W/  INTRAOCULAR LENS IMPLANT Right 03/27/2008    CATARACT EXTRACTION W/  INTRAOCULAR LENS IMPLANT Left 12/06/2007    CORONARY ARTERY BYPASS GRAFT      triple bypass       Social History     Social History    Marital status:      Spouse name: N/A    Number of children: N/A    Years of education: N/A     Occupational History    Not on file.     Social History Main Topics    Smoking status: Never Smoker    Smokeless tobacco: Never Used    Alcohol use No    Drug use: No    Sexual activity: No     Other Topics Concern    Not on file     Social History Narrative    No narrative on file     Family History   Problem Relation Age of Onset    Heart attack Father     Heart disease Father     Melanoma Neg Hx     Psoriasis Neg Hx     Lupus Neg Hx     Eczema Neg Hx     Acne Neg Hx     Prostate cancer Neg Hx     Kidney disease Neg Hx        Review of patient's allergies indicates:   Allergen Reactions    No known drug allergies        Medication List with Changes/Refills   Current Medications    ASPIRIN 81 MG TAB    Take 1 tablet by mouth Daily.    ATORVASTATIN (LIPITOR) 40 MG TABLET    Take 1 tablet (40 mg total) by mouth once daily.    CARVEDILOL (COREG) 12.5 MG TABLET    Take 1 tablet (12.5 mg total) by mouth 2 (two) times daily with meals.     "DUTASTERIDE-TAMSULOSIN 0.5-0.4 MG CM24    Take 1 capsule by mouth once daily.    FLUTICASONE (FLONASE) 50 MCG/ACTUATION NASAL SPRAY    2 sprays (100 mcg total) by Each Nare route once daily.    LOSARTAN (COZAAR) 50 MG TABLET    Take 1 tablet (50 mg total) by mouth once daily.    NITROGLYCERIN (NITROSTAT) 0.4 MG SL TABLET    Place 1 tablet (0.4 mg total) under the tongue every 5 (five) minutes as needed for Chest pain.    WARFARIN (COUMADIN) 5 MG TABLET    Take 1 tablet (5mg.) by mouth daily, except 1/2 tablet (2.5mg.) on Tuesdays and Fridays,  Or as directed by Coumadin Clinic       Review of Systems  Constitution: Denies chills, fever, and sweats.  HENT: Denies headaches or blurry vision.  Cardiovascular: Denies chest pain or irregular heart beat.  Respiratory: Denies cough or shortness of breath.  Gastrointestinal: Denies abdominal pain, nausea, or vomiting.  Musculoskeletal: Denies muscle cramps.  Neurological: Denies dizziness or focal weakness.  Psychiatric/Behavioral: Depressed and tearful.  Hematologic/Lymphatic: Denies bleeding problem or easy bruising/bleeding.  Skin: Denies rash or suspicious lesions    Physical Examination  BP (!) 170/80 (BP Location: Left arm, Patient Position: Sitting, BP Method: Large (Manual))   Pulse (!) 47   Ht 5' 9" (1.753 m)   Wt 83.5 kg (184 lb 1.4 oz)   SpO2 98%   BMI 27.18 kg/m²     Constitutional: No acute distress, conversant  HEENT: Sclera anicteric, Pupils equal, round and reactive to light, extraocular motions intact, Oropharynx clear  Neck: No JVD, no carotid bruits  Cardiovascular: Irregularly irregular, no murmur, rubs or gallops  Pulmonary: Clear to auscultation bilaterally  Abdominal: Abdomen soft, nontender, nondistended, positive bowel sounds  Extremities: No lower extremity edema,   Pulses:  Carotid pulses are 2+ on the right side, and 2+ on the left side.  Radial pulses are 2+ on the right side, and 2+ on the left side.   Femoral pulses are 2+ on the right " side, and 2+ on the left side.  Skin: No ecchymosis, erythema, or ulcers  Psych: Alert and oriented x 3, appropriate affect  Neuro: CNII-XII intact, no focal deficits    Labs:  Most Recent Data  CBC:   Lab Results   Component Value Date    WBC 6.51 04/10/2018    HGB 14.0 04/10/2018    HCT 41.5 04/10/2018     04/10/2018    MCV 98 04/10/2018    RDW 14.3 04/10/2018     BMP:   Lab Results   Component Value Date     04/10/2018    K 4.4 04/10/2018     04/10/2018    CO2 26 04/10/2018    BUN 19 04/10/2018    CREATININE 1.18 04/10/2018    GLU 95 04/10/2018    CALCIUM 8.8 04/10/2018    MG 2.2 08/11/2009    PHOS 2.7 07/07/2009     LFTS;   Lab Results   Component Value Date    PROT 7.1 04/10/2018    ALBUMIN 3.7 04/10/2018    BILITOT 0.8 04/10/2018    AST 26 04/10/2018    ALKPHOS 61 04/10/2018    ALT 36 04/10/2018     COAGS:   Lab Results   Component Value Date    INR 3.1 (H) 05/10/2018     FLP:   Lab Results   Component Value Date    CHOL 104 (L) 04/10/2018    HDL 41 04/10/2018    LDLCALC 54.6 (L) 04/10/2018    TRIG 42 04/10/2018    CHOLHDL 39.4 04/10/2018     CARDIAC:   Lab Results   Component Value Date    TROPONINI 0.008 08/09/2009     (H) 06/15/2010       EKG 4/9/2018:  Atrial fibrillation with slow ventricular response  Nonspecific T wave abnormality    Echo 4/24/2015:  CONCLUSIONS     1 - Enlarged ascending aorta.     2 - Normal left ventricular systolic function (EF 55-60%).     3 - Trivial mitral regurgitation.     4 - Mild pulmonic regurgitation.     5 - Low normal to mildly depressed right ventricular systolic function .     6 - Trivial tricuspid regurgitation.      Assessment/Plan:  Eliazar James is a 77 y.o. male with a past medical history of HTN, HLD, CAD s/p CABG in 1997 and is s/p PCI/DALY in 7/2009 (prox LAD and distal RCA), chronic Afib (on rate control and coumadin), who presents for a follow up appointment.      1. CAD s/p CABG- No angina currently.  Continue ASA, beta blocker,  statin, ARB.      2. Chronic Afib- Continue rate control and coumadin.    3. HTN- Controlled.  Continue current medication regimen.    4. HLD- Continue atorvastatin 40 mg daily.     5. Depression- Mr. James is very depressed due to his wife's failing health.  He does not want to be evaluated by a psychiatrist, but would rather talk with me on a regular basis.  Will see him monthly to assist with coping, given his wife's clinical status.     Follow up in 1 month    Total duration of face to face visit time 30 minutes.  Total time spent counseling greater than fifty percent of total visit time.  Counseling included discussion regarding imaging findings, diagnosis, possibilities, treatment options, risks and benefits.  The patient had many questions regarding the options and long-term effects.    Macario Valencia MD, PhD  Interventional Cardiology

## 2018-05-31 ENCOUNTER — ANTI-COAG VISIT (OUTPATIENT)
Dept: CARDIOLOGY | Facility: CLINIC | Age: 78
End: 2018-05-31

## 2018-05-31 DIAGNOSIS — Z79.01 LONG TERM CURRENT USE OF ANTICOAGULANT THERAPY: ICD-10-CM

## 2018-06-12 DIAGNOSIS — I10 ESSENTIAL HYPERTENSION: Chronic | ICD-10-CM

## 2018-06-12 RX ORDER — CARVEDILOL 12.5 MG/1
12.5 TABLET ORAL 2 TIMES DAILY WITH MEALS
Qty: 180 TABLET | Refills: 1 | Status: SHIPPED | OUTPATIENT
Start: 2018-06-12 | End: 2018-09-14 | Stop reason: SDUPTHER

## 2018-06-12 NOTE — TELEPHONE ENCOUNTER
----- Message from Jennifer Ramos sent at 6/12/2018 11:03 AM CDT -----  Contact: [facility] [phone]  Prescription Request:     Name of medication: diltiaZEM (CARDIZEM CD) 180 MG 24 hr capsule     Reason for request: Refill    Pharmacy: [facility] - [place], [place] - [address], [address]    Please advise.    Thank You

## 2018-06-28 ENCOUNTER — ANTI-COAG VISIT (OUTPATIENT)
Dept: CARDIOLOGY | Facility: CLINIC | Age: 78
End: 2018-06-28

## 2018-06-28 DIAGNOSIS — Z79.01 LONG TERM CURRENT USE OF ANTICOAGULANT THERAPY: ICD-10-CM

## 2018-07-03 ENCOUNTER — HOSPITAL ENCOUNTER (INPATIENT)
Facility: HOSPITAL | Age: 78
LOS: 2 days | Discharge: HOME | DRG: 923 | End: 2018-07-05
Attending: EMERGENCY MEDICINE | Admitting: HOSPITALIST
Payer: MEDICARE

## 2018-07-03 DIAGNOSIS — R65.10 SIRS (SYSTEMIC INFLAMMATORY RESPONSE SYNDROME): ICD-10-CM

## 2018-07-03 DIAGNOSIS — R41.82 ALTERED MENTAL STATUS: ICD-10-CM

## 2018-07-03 DIAGNOSIS — T68.XXXA HYPOTHERMIA, INITIAL ENCOUNTER: Primary | ICD-10-CM

## 2018-07-03 LAB
ALBUMIN SERPL BCP-MCNC: 4 G/DL
ALLENS TEST: ABNORMAL
ALP SERPL-CCNC: 67 U/L
ALT SERPL W/O P-5'-P-CCNC: 28 U/L
AMMONIA PLAS-SCNC: 36 UMOL/L
ANION GAP SERPL CALC-SCNC: 10 MMOL/L
AST SERPL-CCNC: 22 U/L
BASOPHILS # BLD AUTO: 0.06 K/UL
BASOPHILS NFR BLD: 0.4 %
BILIRUB SERPL-MCNC: 1 MG/DL
BILIRUB UR QL STRIP: NEGATIVE
BUN SERPL-MCNC: 20 MG/DL
CALCIUM SERPL-MCNC: 9.2 MG/DL
CHLORIDE SERPL-SCNC: 106 MMOL/L
CLARITY UR REFRACT.AUTO: ABNORMAL
CO2 SERPL-SCNC: 23 MMOL/L
COLOR UR AUTO: YELLOW
CREAT SERPL-MCNC: 1.4 MG/DL
DELSYS: ABNORMAL
DIFFERENTIAL METHOD: ABNORMAL
EOSINOPHIL # BLD AUTO: 0.1 K/UL
EOSINOPHIL NFR BLD: 0.8 %
ERYTHROCYTE [DISTWIDTH] IN BLOOD BY AUTOMATED COUNT: 13.1 %
EST. GFR  (AFRICAN AMERICAN): 55.2 ML/MIN/1.73 M^2
EST. GFR  (NON AFRICAN AMERICAN): 47.8 ML/MIN/1.73 M^2
GLUCOSE SERPL-MCNC: 163 MG/DL
GLUCOSE UR QL STRIP: NEGATIVE
HCO3 UR-SCNC: 26.4 MMOL/L (ref 24–28)
HCT VFR BLD AUTO: 42.4 %
HGB BLD-MCNC: 14.6 G/DL
HGB UR QL STRIP: NEGATIVE
HYALINE CASTS UR QL AUTO: 11 /LPF
IMM GRANULOCYTES # BLD AUTO: 0.08 K/UL
IMM GRANULOCYTES NFR BLD AUTO: 0.5 %
INR PPP: 2.8
KETONES UR QL STRIP: NEGATIVE
LACTATE SERPL-SCNC: 0.8 MMOL/L
LEUKOCYTE ESTERASE UR QL STRIP: NEGATIVE
LYMPHOCYTES # BLD AUTO: 1.1 K/UL
LYMPHOCYTES NFR BLD: 7.3 %
MCH RBC QN AUTO: 34.2 PG
MCHC RBC AUTO-ENTMCNC: 34.4 G/DL
MCV RBC AUTO: 99 FL
MICROSCOPIC COMMENT: ABNORMAL
MODE: ABNORMAL
MONOCYTES # BLD AUTO: 0.9 K/UL
MONOCYTES NFR BLD: 6 %
NEUTROPHILS # BLD AUTO: 13.1 K/UL
NEUTROPHILS NFR BLD: 85 %
NITRITE UR QL STRIP: NEGATIVE
NRBC BLD-RTO: 0 /100 WBC
PCO2 BLDA: 56.7 MMHG (ref 35–45)
PH SMN: 7.28 [PH] (ref 7.35–7.45)
PH UR STRIP: 5 [PH] (ref 5–8)
PLATELET # BLD AUTO: 202 K/UL
PMV BLD AUTO: 9.9 FL
PO2 BLDA: 38 MMHG (ref 40–60)
POC BE: 0 MMOL/L
POC SATURATED O2: 64 % (ref 95–100)
POC TCO2: 28 MMOL/L (ref 24–29)
POCT GLUCOSE: 163 MG/DL (ref 70–110)
POTASSIUM SERPL-SCNC: 4.1 MMOL/L
PROT SERPL-MCNC: 7.7 G/DL
PROT UR QL STRIP: NEGATIVE
PROTHROMBIN TIME: 26.9 SEC
RBC # BLD AUTO: 4.27 M/UL
RBC #/AREA URNS AUTO: 14 /HPF (ref 0–4)
SAMPLE: ABNORMAL
SITE: ABNORMAL
SODIUM SERPL-SCNC: 139 MMOL/L
SP GR UR STRIP: 1.02 (ref 1–1.03)
SQUAMOUS #/AREA URNS AUTO: 0 /HPF
TROPONIN I SERPL DL<=0.01 NG/ML-MCNC: <0.006 NG/ML
URN SPEC COLLECT METH UR: ABNORMAL
UROBILINOGEN UR STRIP-ACNC: 2 EU/DL
WBC # BLD AUTO: 15.39 K/UL
WBC #/AREA URNS AUTO: 1 /HPF (ref 0–5)

## 2018-07-03 PROCEDURE — 80053 COMPREHEN METABOLIC PANEL: CPT

## 2018-07-03 PROCEDURE — 99285 EMERGENCY DEPT VISIT HI MDM: CPT | Mod: 25

## 2018-07-03 PROCEDURE — 80307 DRUG TEST PRSMV CHEM ANLYZR: CPT

## 2018-07-03 PROCEDURE — 81001 URINALYSIS AUTO W/SCOPE: CPT

## 2018-07-03 PROCEDURE — 96375 TX/PRO/DX INJ NEW DRUG ADDON: CPT

## 2018-07-03 PROCEDURE — 99900035 HC TECH TIME PER 15 MIN (STAT)

## 2018-07-03 PROCEDURE — 82803 BLOOD GASES ANY COMBINATION: CPT

## 2018-07-03 PROCEDURE — 96365 THER/PROPH/DIAG IV INF INIT: CPT

## 2018-07-03 PROCEDURE — 83605 ASSAY OF LACTIC ACID: CPT

## 2018-07-03 PROCEDURE — 12000002 HC ACUTE/MED SURGE SEMI-PRIVATE ROOM

## 2018-07-03 PROCEDURE — 82962 GLUCOSE BLOOD TEST: CPT

## 2018-07-03 PROCEDURE — 99285 EMERGENCY DEPT VISIT HI MDM: CPT | Mod: ,,, | Performed by: PHYSICIAN ASSISTANT

## 2018-07-03 PROCEDURE — 85610 PROTHROMBIN TIME: CPT

## 2018-07-03 PROCEDURE — 93010 ELECTROCARDIOGRAM REPORT: CPT | Mod: ,,, | Performed by: INTERNAL MEDICINE

## 2018-07-03 PROCEDURE — 84145 PROCALCITONIN (PCT): CPT

## 2018-07-03 PROCEDURE — 87040 BLOOD CULTURE FOR BACTERIA: CPT

## 2018-07-03 PROCEDURE — 93005 ELECTROCARDIOGRAM TRACING: CPT

## 2018-07-03 PROCEDURE — 84484 ASSAY OF TROPONIN QUANT: CPT

## 2018-07-03 PROCEDURE — 82140 ASSAY OF AMMONIA: CPT

## 2018-07-03 PROCEDURE — 85025 COMPLETE CBC W/AUTO DIFF WBC: CPT

## 2018-07-03 PROCEDURE — 63600175 PHARM REV CODE 636 W HCPCS: Performed by: PHYSICIAN ASSISTANT

## 2018-07-03 RX ORDER — ONDANSETRON 2 MG/ML
4 INJECTION INTRAMUSCULAR; INTRAVENOUS
Status: COMPLETED | OUTPATIENT
Start: 2018-07-03 | End: 2018-07-03

## 2018-07-03 RX ORDER — CEFEPIME HYDROCHLORIDE 2 G/1
2 INJECTION, POWDER, FOR SOLUTION INTRAVENOUS
Status: COMPLETED | OUTPATIENT
Start: 2018-07-03 | End: 2018-07-03

## 2018-07-03 RX ORDER — NALOXONE HCL 0.4 MG/ML
0.4 VIAL (ML) INJECTION
Status: COMPLETED | OUTPATIENT
Start: 2018-07-04 | End: 2018-07-03

## 2018-07-03 RX ADMIN — CEFEPIME 2 G: 2 INJECTION, POWDER, FOR SOLUTION INTRAVENOUS at 11:07

## 2018-07-03 RX ADMIN — NALOXONE HYDROCHLORIDE 0.4 MG: 0.4 INJECTION, SOLUTION INTRAMUSCULAR; INTRAVENOUS; SUBCUTANEOUS at 11:07

## 2018-07-03 RX ADMIN — ONDANSETRON 4 MG: 2 INJECTION INTRAMUSCULAR; INTRAVENOUS at 10:07

## 2018-07-04 PROBLEM — R40.1 STUPOR: Status: ACTIVE | Noted: 2018-07-04

## 2018-07-04 PROBLEM — R41.82 ALTERED MENTAL STATUS: Status: ACTIVE | Noted: 2018-07-04

## 2018-07-04 PROBLEM — T68.XXXA HYPOTHERMIA: Status: ACTIVE | Noted: 2018-07-04

## 2018-07-04 PROBLEM — R65.10 SIRS (SYSTEMIC INFLAMMATORY RESPONSE SYNDROME): Status: ACTIVE | Noted: 2018-07-04

## 2018-07-04 LAB
AMPHET+METHAMPHET UR QL: NEGATIVE
ANION GAP SERPL CALC-SCNC: 9 MMOL/L
BARBITURATES UR QL SCN>200 NG/ML: NEGATIVE
BASOPHILS # BLD AUTO: 0.03 K/UL
BASOPHILS NFR BLD: 0.3 %
BENZODIAZ UR QL SCN>200 NG/ML: NEGATIVE
BUN SERPL-MCNC: 22 MG/DL
BZE UR QL SCN: NEGATIVE
CALCIUM SERPL-MCNC: 9.2 MG/DL
CANNABINOIDS UR QL SCN: NEGATIVE
CHLORIDE SERPL-SCNC: 107 MMOL/L
CO2 SERPL-SCNC: 26 MMOL/L
CREAT SERPL-MCNC: 1.2 MG/DL
CREAT UR-MCNC: 147 MG/DL
DIFFERENTIAL METHOD: ABNORMAL
EOSINOPHIL # BLD AUTO: 0 K/UL
EOSINOPHIL NFR BLD: 0.3 %
ERYTHROCYTE [DISTWIDTH] IN BLOOD BY AUTOMATED COUNT: 13.3 %
EST. GFR  (AFRICAN AMERICAN): >60 ML/MIN/1.73 M^2
EST. GFR  (NON AFRICAN AMERICAN): 57.6 ML/MIN/1.73 M^2
GLUCOSE SERPL-MCNC: 88 MG/DL
HCT VFR BLD AUTO: 40.7 %
HGB BLD-MCNC: 13.8 G/DL
IMM GRANULOCYTES # BLD AUTO: 0.03 K/UL
IMM GRANULOCYTES NFR BLD AUTO: 0.3 %
INR PPP: 2.8
LYMPHOCYTES # BLD AUTO: 1.3 K/UL
LYMPHOCYTES NFR BLD: 12.1 %
MAGNESIUM SERPL-MCNC: 2.2 MG/DL
MCH RBC QN AUTO: 34.2 PG
MCHC RBC AUTO-ENTMCNC: 33.9 G/DL
MCV RBC AUTO: 101 FL
METHADONE UR QL SCN>300 NG/ML: NEGATIVE
MONOCYTES # BLD AUTO: 1 K/UL
MONOCYTES NFR BLD: 8.9 %
NEUTROPHILS # BLD AUTO: 8.3 K/UL
NEUTROPHILS NFR BLD: 78.1 %
NRBC BLD-RTO: 0 /100 WBC
OPIATES UR QL SCN: NEGATIVE
PCP UR QL SCN>25 NG/ML: NEGATIVE
PHOSPHATE SERPL-MCNC: 4 MG/DL
PLATELET # BLD AUTO: 192 K/UL
PMV BLD AUTO: 10.1 FL
POTASSIUM SERPL-SCNC: 4.5 MMOL/L
PROCALCITONIN SERPL IA-MCNC: <0.02 NG/ML
PROTHROMBIN TIME: 27.2 SEC
RBC # BLD AUTO: 4.04 M/UL
SODIUM SERPL-SCNC: 142 MMOL/L
TOXICOLOGY INFORMATION: NORMAL
TROPONIN I SERPL DL<=0.01 NG/ML-MCNC: <0.006 NG/ML
TSH SERPL DL<=0.005 MIU/L-ACNC: 1.34 UIU/ML
WBC # BLD AUTO: 10.66 K/UL

## 2018-07-04 PROCEDURE — 25000003 PHARM REV CODE 250: Performed by: PHYSICIAN ASSISTANT

## 2018-07-04 PROCEDURE — 85610 PROTHROMBIN TIME: CPT

## 2018-07-04 PROCEDURE — 83735 ASSAY OF MAGNESIUM: CPT

## 2018-07-04 PROCEDURE — 25000003 PHARM REV CODE 250: Performed by: STUDENT IN AN ORGANIZED HEALTH CARE EDUCATION/TRAINING PROGRAM

## 2018-07-04 PROCEDURE — 99223 1ST HOSP IP/OBS HIGH 75: CPT | Mod: AI,GC,, | Performed by: HOSPITALIST

## 2018-07-04 PROCEDURE — 36415 COLL VENOUS BLD VENIPUNCTURE: CPT

## 2018-07-04 PROCEDURE — 25000242 PHARM REV CODE 250 ALT 637 W/ HCPCS: Performed by: STUDENT IN AN ORGANIZED HEALTH CARE EDUCATION/TRAINING PROGRAM

## 2018-07-04 PROCEDURE — 84484 ASSAY OF TROPONIN QUANT: CPT

## 2018-07-04 PROCEDURE — 84443 ASSAY THYROID STIM HORMONE: CPT

## 2018-07-04 PROCEDURE — 20600001 HC STEP DOWN PRIVATE ROOM

## 2018-07-04 PROCEDURE — 85025 COMPLETE CBC W/AUTO DIFF WBC: CPT

## 2018-07-04 PROCEDURE — 63600175 PHARM REV CODE 636 W HCPCS: Performed by: PHYSICIAN ASSISTANT

## 2018-07-04 PROCEDURE — 84100 ASSAY OF PHOSPHORUS: CPT

## 2018-07-04 PROCEDURE — 80048 BASIC METABOLIC PNL TOTAL CA: CPT

## 2018-07-04 RX ORDER — FLUTICASONE PROPIONATE 50 MCG
2 SPRAY, SUSPENSION (ML) NASAL DAILY
Status: DISCONTINUED | OUTPATIENT
Start: 2018-07-04 | End: 2018-07-05 | Stop reason: HOSPADM

## 2018-07-04 RX ORDER — ATORVASTATIN CALCIUM 20 MG/1
40 TABLET, FILM COATED ORAL DAILY
Status: DISCONTINUED | OUTPATIENT
Start: 2018-07-04 | End: 2018-07-05 | Stop reason: HOSPADM

## 2018-07-04 RX ORDER — NAPROXEN SODIUM 220 MG/1
81 TABLET, FILM COATED ORAL DAILY
Status: DISCONTINUED | OUTPATIENT
Start: 2018-07-04 | End: 2018-07-05 | Stop reason: HOSPADM

## 2018-07-04 RX ORDER — DILTIAZEM HYDROCHLORIDE 180 MG/1
180 CAPSULE, EXTENDED RELEASE ORAL DAILY
Refills: 3 | COMMUNITY
Start: 2018-06-13 | End: 2018-09-14 | Stop reason: SDUPTHER

## 2018-07-04 RX ORDER — WARFARIN SODIUM 5 MG/1
5 TABLET ORAL DAILY
Status: DISCONTINUED | OUTPATIENT
Start: 2018-07-04 | End: 2018-07-05

## 2018-07-04 RX ORDER — SODIUM CHLORIDE 0.9 % (FLUSH) 0.9 %
5 SYRINGE (ML) INJECTION
Status: DISCONTINUED | OUTPATIENT
Start: 2018-07-04 | End: 2018-07-05 | Stop reason: HOSPADM

## 2018-07-04 RX ORDER — ACETAMINOPHEN 325 MG/1
650 TABLET ORAL EVERY 6 HOURS PRN
Status: DISCONTINUED | OUTPATIENT
Start: 2018-07-04 | End: 2018-07-05 | Stop reason: HOSPADM

## 2018-07-04 RX ORDER — NITROGLYCERIN 0.4 MG/1
0.4 TABLET SUBLINGUAL EVERY 5 MIN PRN
Status: DISCONTINUED | OUTPATIENT
Start: 2018-07-04 | End: 2018-07-05 | Stop reason: HOSPADM

## 2018-07-04 RX ORDER — LOSARTAN POTASSIUM 50 MG/1
50 TABLET ORAL DAILY
Status: DISCONTINUED | OUTPATIENT
Start: 2018-07-04 | End: 2018-07-05 | Stop reason: HOSPADM

## 2018-07-04 RX ORDER — DILTIAZEM HYDROCHLORIDE 180 MG/1
180 CAPSULE, COATED, EXTENDED RELEASE ORAL DAILY
Status: DISCONTINUED | OUTPATIENT
Start: 2018-07-04 | End: 2018-07-05 | Stop reason: HOSPADM

## 2018-07-04 RX ORDER — CARVEDILOL 6.25 MG/1
12.5 TABLET ORAL 2 TIMES DAILY WITH MEALS
Status: DISCONTINUED | OUTPATIENT
Start: 2018-07-04 | End: 2018-07-05 | Stop reason: HOSPADM

## 2018-07-04 RX ADMIN — CARVEDILOL 12.5 MG: 6.25 TABLET, FILM COATED ORAL at 08:07

## 2018-07-04 RX ADMIN — CARVEDILOL 12.5 MG: 6.25 TABLET, FILM COATED ORAL at 05:07

## 2018-07-04 RX ADMIN — VANCOMYCIN HYDROCHLORIDE 1250 MG: 10 INJECTION, POWDER, LYOPHILIZED, FOR SOLUTION INTRAVENOUS at 12:07

## 2018-07-04 RX ADMIN — WARFARIN SODIUM 5 MG: 5 TABLET ORAL at 05:07

## 2018-07-04 RX ADMIN — FLUTICASONE PROPIONATE 100 MCG: 50 SPRAY, METERED NASAL at 09:07

## 2018-07-04 RX ADMIN — SODIUM CHLORIDE 1000 ML: 0.9 INJECTION, SOLUTION INTRAVENOUS at 03:07

## 2018-07-04 RX ADMIN — ATORVASTATIN CALCIUM 40 MG: 20 TABLET, FILM COATED ORAL at 08:07

## 2018-07-04 RX ADMIN — DILTIAZEM HYDROCHLORIDE 180 MG: 180 CAPSULE, COATED, EXTENDED RELEASE ORAL at 09:07

## 2018-07-04 RX ADMIN — ASPIRIN 81 MG CHEWABLE TABLET 81 MG: 81 TABLET CHEWABLE at 08:07

## 2018-07-04 RX ADMIN — LOSARTAN POTASSIUM 50 MG: 50 TABLET ORAL at 08:07

## 2018-07-04 NOTE — SUBJECTIVE & OBJECTIVE
Past Medical History:   Diagnosis Date    *Atrial fibrillation     Anxiety     Atrial fibrillation 7/5/2012    Blood transfusion     CAD (coronary artery disease) 7/10/2012    Cancer     skin    Cataract     removed from both    Clotting disorder     Coronary artery disease     Depression     HTN (hypertension) 7/10/2012    Hx-TIA (transient ischemic attack) 7/10/2012    Hyperlipidemia     Hypertension     Myocardial infarction     Renal calculus, left 8/18/2017    S/P CABG (coronary artery bypass graft) 1/8/2013    Stroke 2/12    TIA    Urinary tract infection        Past Surgical History:   Procedure Laterality Date    CATARACT EXTRACTION W/  INTRAOCULAR LENS IMPLANT Right 03/27/2008    CATARACT EXTRACTION W/  INTRAOCULAR LENS IMPLANT Left 12/06/2007    CORONARY ARTERY BYPASS GRAFT      triple bypass         Review of patient's allergies indicates:   Allergen Reactions    No known drug allergies        No current facility-administered medications on file prior to encounter.      Current Outpatient Prescriptions on File Prior to Encounter   Medication Sig    aspirin 81 mg Tab Take 1 tablet by mouth Daily.    atorvastatin (LIPITOR) 40 MG tablet Take 1 tablet (40 mg total) by mouth once daily.    carvedilol (COREG) 12.5 MG tablet Take 1 tablet (12.5 mg total) by mouth 2 (two) times daily with meals.    fluticasone (FLONASE) 50 mcg/actuation nasal spray 2 sprays (100 mcg total) by Each Nare route once daily.    losartan (COZAAR) 50 MG tablet Take 1 tablet (50 mg total) by mouth once daily.    nitroGLYCERIN (NITROSTAT) 0.4 MG SL tablet Place 1 tablet (0.4 mg total) under the tongue every 5 (five) minutes as needed for Chest pain.    warfarin (COUMADIN) 5 MG tablet Take 1 tablet (5mg.) by mouth daily, except 1/2 tablet (2.5mg.) on Tuesdays and Fridays,  Or as directed by [facility]    dutasteride-tamsulosin 0.5-0.4 mg CM24 Take 1 capsule by mouth once daily.     Family History      Problem Relation (Age of Onset)    Heart attack Father    Heart disease Father        Social History Main Topics    Smoking status: Never Smoker    Smokeless tobacco: Never Used    Alcohol use No    Drug use: No    Sexual activity: No     Review of Systems   Constitutional: Positive for fatigue. Negative for chills, diaphoresis and fever.   HENT: Positive for facial swelling (Due to recent fall). Negative for congestion, rhinorrhea and sore throat.    Eyes: Negative for photophobia, pain and visual disturbance.   Respiratory: Negative for cough, choking, shortness of breath and wheezing.    Cardiovascular: Negative for chest pain and leg swelling.   Gastrointestinal: Negative for abdominal pain, blood in stool, constipation, diarrhea, nausea and vomiting.   Genitourinary: Negative for dysuria, frequency and hematuria.   Musculoskeletal: Negative for arthralgias and myalgias.   Skin: Positive for wound (Abrasion to L face). Negative for color change, pallor and rash.   Neurological: Positive for dizziness, weakness and light-headedness. Negative for seizures and headaches.   Psychiatric/Behavioral: Positive for confusion. Negative for agitation.     Objective:     Vital Signs (Most Recent):  Temp: 98.3 °F (36.8 °C) (07/04/18 0204)  Pulse: 69 (07/04/18 0304)  Resp: 18 (07/04/18 0153)  BP: (!) 163/76 (07/04/18 0153)  SpO2: (!) 92 % (07/04/18 0153) Vital Signs (24h Range):  Temp:  [93.6 °F (34.2 °C)-98.3 °F (36.8 °C)] 98.3 °F (36.8 °C)  Pulse:  [54-74] 69  Resp:  [12-20] 18  SpO2:  [91 %-100 %] 92 %  BP: (163-206)/() 163/76     Weight: 80.8 kg (178 lb 2.1 oz)  Body mass index is 26.31 kg/m².    Physical Exam   Constitutional: He appears lethargic. No distress.   HENT:   Mouth/Throat: Oropharynx is clear and moist.   Abrasion to L side of face, no bleeding currently.  Mildly tender to palpation   Eyes: Conjunctivae and EOM are normal. Pupils are equal, round, and reactive to light.   Neck: Normal range of  motion. No JVD present.   Cardiovascular: Normal rate.    No murmur heard.  Irregularly irregular due to AFib   Pulmonary/Chest: Effort normal and breath sounds normal. No respiratory distress. He has no wheezes. He has no rales.   Abdominal: Soft. Bowel sounds are normal. He exhibits no distension and no mass. There is no tenderness.   Musculoskeletal: Normal range of motion. He exhibits no edema or tenderness.   Neurological: He appears lethargic. No sensory deficit.   AOx2, can state name and hospital but not day of the week.  Normal strength in upper extremities, decreased strength in lower extremities.   Skin: Skin is warm and dry. No rash noted. He is not diaphoretic. No erythema.         CRANIAL NERVES     CN III, IV, VI   Pupils are equal, round, and reactive to light.  Extraocular motions are normal.        Significant Labs:   Recent Lab Results       07/03/18  2343 07/03/18  2322 07/03/18  2250 07/03/18  2230 07/03/18  2229      Benzodiazepines  Negative        Methadone metabolites  Negative        Phencyclidine  Negative        Immature Granulocytes    0.5      Immature Grans (Abs)    0.08  Comment:  Mild elevation in immature granulocytes is non specific and   can be seen in a variety of conditions including stress response,   acute inflammation, trauma and pregnancy. Correlation with other   laboratory and clinical findings is essential.  (H)      Procalcitonin    <0.02  Comment:  Please re-baseline procalcitonin if a patient is transferred from  other facilities to [facility].  A concentration < 0.25 ng/mL represents a low risk bacterial   infection.  Procalcitonin may not be accurate among patients with localized   infection, recent trauma or major surgery, immunosuppressed state,   invasive fungal infection, renal dysfunction. Decisions regarding   initiation or continuation of antibiotic therapy should not be based   solely on procalcitonin levels.        Albumin    4.0      Alkaline  Phosphatase    67      Allens Test N/A         ALT    28      Ammonia   36       Amphetamine Screen, Ur  Negative        Anion Gap    10      Appearance, UA  Hazy(A)        AST    22      Barbiturate Screen, Ur  Negative        Baso #    0.06      Basophil%    0.4      Bilirubin (UA)  Negative        Total Bilirubin    1.0  Comment:  For infants and newborns, interpretation of results should be based  on gestational age, weight and in agreement with clinical  observations.  Premature Infant recommended reference ranges:  Up to 24 hours.............<8.0 mg/dL  Up to 48 hours............<12.0 mg/dL  3-5 days..................<15.0 mg/dL  6-29 days.................<15.0 mg/dL        Site Other         BUN, Bld    20      Calcium    9.2      Chloride    106      CO2    23      Cocaine (Metab.)  Negative        Color, UA  Yellow        Creatinine    1.4      Creatinine, Random Ur  147.0  Comment:  The random urine reference ranges provided were established   for 24 hour urine collections.  No reference ranges exist for  random urine specimens.  Correlate clinically.          [facility] Room Air         Differential Method    Automated      eGFR if     55.2(A)      eGFR if non     47.8  Comment:  Calculation used to obtain the estimated glomerular filtration  rate (eGFR) is the CKD-EPI equation.   (A)      Eos #    0.1      Eosinophil%    0.8      Glucose    163(H)      Glucose, UA  Negative        Gran # (ANC)    13.1(H)      Gran%    85.0(H)      Hematocrit    42.4      Hemoglobin    14.6      Hyaline Casts, UA  11(A)        Coumadin Monitoring INR    2.8  Comment:  Coumadin Therapy:  2.0 - 3.0 for INR for all indicators except mechanical heart valves  and antiphospholipid syndromes which should use 2.5 - 3.5.  (H)      Ketones, UA  Negative        Lactate, [place]   0.8  Comment:  Falsely low lactic acid results can be found in samples   containing >=13.0 mg/dL total bilirubin and/or >=3.5 mg/dL    direct bilirubin.         Leukocytes, UA  Negative        Lymph #    1.1      Lymph%    7.3(L)      MCH    34.2(H)      MCHC    34.4      MCV    99(H)      Microscopic Comment  SEE COMMENT  Comment:  Other formed elements not mentioned in the report are not   present in the microscopic examination.           Mode SPONT         Mono #    0.9      Mono%    6.0      MPV    9.9      Nitrite, UA  Negative        nRBC    0      Occult Blood UA  Negative        Opiate Scrn, Ur  Negative        pH, UA  5.0        Platelets    202      POC BE 0         POC HCO3 26.4         POC PCO2 56.7(H)         POC PH 7.275(L)         POC PO2 38(LL)         POC SATURATED O2 64(L)         POC TCO2 28         POCT Glucose     163(H)     Potassium    4.1      Total Protein    7.7      Protein, UA  Negative  Comment:  Recommend a 24 hour urine protein or a urine   protein/creatinine ratio if globulin induced proteinuria is  clinically suspected.          Protime    26.9(H)      RBC    4.27(L)      RBC, UA  14(H)        RDW    13.1      Sample VENOUS         Sodium    139      Specific [place], UA  1.020        Specimen UA  Urine, Catheterized        Squam Epithel, UA  0        Marijuana (THC) Metabolite  Negative        Toxicology Information  SEE COMMENT  Comment:  This screen includes the following classes of drugs at the   listed cut-off:  Benzodiazepines                  200 ng/ml  Methadone                        300 ng/ml  Cocaine metabolite               300 ng/ml  Opiates                          300 ng/ml  Barbiturates                     200 ng/ml  Amphetamines                    1000 ng/ml  Marijuana metabs (THC)            50 ng/ml  Phencyclidine (PCP)               25 ng/ml  High concentrations of Diphenhydramine may cross-react with  Phencyclidine PCP screening immunoassay giving a false   positive result.  High concentrations of Methylenedioxymethamphetamine (MDMA aka  Ectasy) and other structurally similar compounds may  cross-   react with the Amphetamine/Methamphetamine screening   immunoassay giving a false positive result.  A metabolite of the anti-HIV drug Sustiva () may cause  false positive results in the Marijuana metabolite (THC)   screening assay.  Note: This exception list includes only more common   interferants in toxicology screen testing.  Because of many   cross-reactantspositive results on toxicology drug screens   should be confirmed whenever results do not correlate with   clinical presentation.  This report is intended for use in clinical monitoring and  management of patients. It is not intended for use in   employment related drug testing.  Because of any cross-reactants, positive results on toxicology  drug screens should be confirmed whenever results do not  correlate with clinical presentation.  Presumptive positive results are unconfirmed and may be used   only for medical purposes.          Troponin I    <0.006  Comment:  The reference interval for Troponin I represents the 99th percentile   cutoff   for our facility and is consistent with 3rd generation assay   performance.        Urobilinogen, UA  2.0        WBC, UA  1        WBC    15.39(H)                      Significant Imaging: I have reviewed all pertinent imaging results/findings within the past 24 hours.

## 2018-07-04 NOTE — ASSESSMENT & PLAN NOTE
-2/4 SIRS criteria at admission (Temp 93.6 and WBC 15.39), currently 1/4 (temperature resolved, waiting on new WBC count)  -Cefepime 2g and vancomycin 1250mg administered in ED  -BCx pending  -CXR suggestive of edema as opposed to pneumonia or aspiration  -UA does not show signs of UTI  -No current indications to continue antibiotics; continue to monitor

## 2018-07-04 NOTE — ASSESSMENT & PLAN NOTE
-Currently in AFib  -INR 2.8, monitor daily  -Continue warfarin 5mg Mon, Wed, Thurs, Sat, Sun.  2.5mg Tues, Fri.

## 2018-07-04 NOTE — ASSESSMENT & PLAN NOTE
-Discuss baseline mental status with family  -CT head negative for acute pathology post-fall  -Collect TSH today

## 2018-07-04 NOTE — PLAN OF CARE
Problem: Patient Care Overview  Goal: Plan of Care Review  Outcome: Ongoing (interventions implemented as appropriate)  Plan of care discussed with patient, no new questions at this time. VSS, denies SOB, no complaint of pain. Pt core temperature is WNL. Pt still on observation. Safety precautions taken, no falls/trauma during the shift. Will continue to monitor.

## 2018-07-04 NOTE — PROGRESS NOTES
07/04/18 0153 07/04/18 0204   Vital Signs   Temp 98.2 °F (36.8 °C) 98.3 °F (36.8 °C)   Temp src Oral Oral   Pulse 74 --    Heart Rate Source Monitor --    Resp 18 --    SpO2 (!) 92 % --    O2 Device (Oxygen Therapy) room air --    BP (!) 163/76 --    MAP (mmHg) 109 --    BP Location Left arm --    Patient Position Lying --      Notified MD, Dr. Choi, of pt's arrival to floor. Pt does not have any orders. Also, Pt no longer Hypothermic. Oral temp (as seen above). Vitals are stale. Pt Awake, alert, oriented to self and place. Pt disoriented to situation and unable to answer admission questions. Not a good historian. Md acknowledged findings. Md stated he will come see pt shortly.

## 2018-07-04 NOTE — H&P
[facility] Medicine  History & Physical    Patient Name: Eliazar James  MRN: [id number]  Admission Date: 7/3/2018  Attending Physician: Ashley Johnson MD   Primary Care Provider: Francine Domingo MD    [facility] Medicine Team: [facility]P MED 4 Emile Peters MD     Patient information was obtained from patient, past medical records and ER records.     Subjective:     Principal Problem:Hypothermia    Chief Complaint:   Chief Complaint   Patient presents with    Fall     Pt had a fall earlier this eveing and hit the left side of his face. Denies LOC.  Pt is on coumadin. Pt is alert to painful stimulation and is oriented except for day of the weak. Pt appear to be falling alseep during examination.         HPI: Eliazar James is a 77 year old male who presented on 7/3/18 to the ED after being found with AMS by his granddaughter. He has a PMHx of HTN, HLD, CAD w/ CABG (1997) and PCI (2009), AFib, multiple TIAs, and depression.     He states that he fell in the AM while getting out of bed and walking to the bathroom.  He attributes the fall to acute onset of dizziness.  He reports that he fell on the L side of his face leading to pain and bleeding.  He denies loss of consciousness and denies previous falls.  He was later found by his granddaughter (unsure of how long after the fall) slouched over in his chair.  He was then BIBA to [facility] ED.  He claims that he does not feel any different than usual, although his family is not currently bedside to describe his baseline mental status.      At time of interview, he is in somnolent and in no apparent distress.  He repeatedly falls asleep during the interview and has trouble communicating due to low volume of voice.  He denies current pain.      Past Medical History:   Diagnosis Date    *Atrial fibrillation     Anxiety     Atrial fibrillation 7/5/2012    Blood transfusion     CAD (coronary artery disease) 7/10/2012    Cancer     skin     Cataract     removed from both    Clotting disorder     Coronary artery disease     Depression     HTN (hypertension) 7/10/2012    Hx-TIA (transient ischemic attack) 7/10/2012    Hyperlipidemia     Hypertension     Myocardial infarction     Renal calculus, left 8/18/2017    S/P CABG (coronary artery bypass graft) 1/8/2013    Stroke 2/12    TIA    Urinary tract infection        Past Surgical History:   Procedure Laterality Date    CATARACT EXTRACTION W/  INTRAOCULAR LENS IMPLANT Right 03/27/2008    CATARACT EXTRACTION W/  INTRAOCULAR LENS IMPLANT Left 12/06/2007    CORONARY ARTERY BYPASS GRAFT      triple bypass         Review of patient's allergies indicates:   Allergen Reactions    No known drug allergies        No current facility-administered medications on file prior to encounter.      Current Outpatient Prescriptions on File Prior to Encounter   Medication Sig    aspirin 81 mg Tab Take 1 tablet by mouth Daily.    atorvastatin (LIPITOR) 40 MG tablet Take 1 tablet (40 mg total) by mouth once daily.    carvedilol (COREG) 12.5 MG tablet Take 1 tablet (12.5 mg total) by mouth 2 (two) times daily with meals.    fluticasone (FLONASE) 50 mcg/actuation nasal spray 2 sprays (100 mcg total) by Each Nare route once daily.    losartan (COZAAR) 50 MG tablet Take 1 tablet (50 mg total) by mouth once daily.    nitroGLYCERIN (NITROSTAT) 0.4 MG SL tablet Place 1 tablet (0.4 mg total) under the tongue every 5 (five) minutes as needed for Chest pain.    warfarin (COUMADIN) 5 MG tablet Take 1 tablet (5mg.) by mouth daily, except 1/2 tablet (2.5mg.) on Tuesdays and Fridays,  Or as directed by [facility] Clinic    dutasteride-tamsulosin 0.5-0.4 mg CM24 Take 1 capsule by mouth once daily.     Family History     Problem Relation (Age of Onset)    Heart attack Father    Heart disease Father        Social History Main Topics    Smoking status: Never Smoker    Smokeless tobacco: Never Used    Alcohol use No     Drug use: No    Sexual activity: No     Review of Systems   Constitutional: Positive for fatigue. Negative for chills, diaphoresis and fever.   HENT: Positive for facial swelling (Due to recent fall). Negative for congestion, rhinorrhea and sore throat.    Eyes: Negative for photophobia, pain and visual disturbance.   Respiratory: Negative for cough, choking, shortness of breath and wheezing.    Cardiovascular: Negative for chest pain and leg swelling.   Gastrointestinal: Negative for abdominal pain, blood in stool, constipation, diarrhea, nausea and vomiting.   Genitourinary: Negative for dysuria, frequency and hematuria.   Musculoskeletal: Negative for arthralgias and myalgias.   Skin: Positive for wound (Abrasion to L face). Negative for color change, pallor and rash.   Neurological: Positive for dizziness, weakness and light-headedness. Negative for seizures and headaches.   Psychiatric/Behavioral: Positive for confusion. Negative for agitation.     Objective:     Vital Signs (Most Recent):  Temp: 98.3 °F (36.8 °C) (07/04/18 0204)  Pulse: 69 (07/04/18 0304)  Resp: 18 (07/04/18 0153)  BP: (!) 163/76 (07/04/18 0153)  SpO2: (!) 92 % (07/04/18 0153) Vital Signs (24h Range):  Temp:  [93.6 °F (34.2 °C)-98.3 °F (36.8 °C)] 98.3 °F (36.8 °C)  Pulse:  [54-74] 69  Resp:  [12-20] 18  SpO2:  [91 %-100 %] 92 %  BP: (163-206)/() 163/76     Weight: 80.8 kg (178 lb 2.1 oz)  Body mass index is 26.31 kg/m².    Physical Exam   Constitutional: He appears lethargic. No distress.   HENT:   Mouth/Throat: Oropharynx is clear and moist.   Abrasion to L side of face, no bleeding currently.  Mildly tender to palpation   Eyes: Conjunctivae and EOM are normal. Pupils are equal, round, and reactive to light.   Neck: Normal range of motion. No JVD present.   Cardiovascular: Normal rate.    No murmur heard.  Irregularly irregular due to AFib   Pulmonary/Chest: Effort normal and breath sounds normal. No respiratory distress. He has no  wheezes. He has no rales.   Abdominal: Soft. Bowel sounds are normal. He exhibits no distension and no mass. There is no tenderness.   Musculoskeletal: Normal range of motion. He exhibits no edema or tenderness.   Neurological: He appears lethargic. No sensory deficit.   AOx2, can state name and hospital but not day of the week.  Normal strength in upper extremities, decreased strength in lower extremities.   Skin: Skin is warm and dry. No rash noted. He is not diaphoretic. No erythema.         CRANIAL NERVES     CN III, IV, VI   Pupils are equal, round, and reactive to light.  Extraocular motions are normal.        Significant Labs:   Recent Lab Results       07/03/18  2343 07/03/18  2322 07/03/18  2250 07/03/18  2230 07/03/18  2229      Benzodiazepines  Negative        Methadone metabolites  Negative        Phencyclidine  Negative        Immature Granulocytes    0.5      Immature Grans (Abs)    0.08  Comment:  Mild elevation in immature granulocytes is non specific and   can be seen in a variety of conditions including stress response,   acute inflammation, trauma and pregnancy. Correlation with other   laboratory and clinical findings is essential.  (H)      Procalcitonin    <0.02  Comment:  Please re-baseline procalcitonin if a patient is transferred from  other facilities to [facility].  A concentration < 0.25 ng/mL represents a low risk bacterial   infection.  Procalcitonin may not be accurate among patients with localized   infection, recent trauma or major surgery, immunosuppressed state,   invasive fungal infection, renal dysfunction. Decisions regarding   initiation or continuation of antibiotic therapy should not be based   solely on procalcitonin levels.        Albumin    4.0      Alkaline Phosphatase    67      Allens Test N/A         ALT    28      Ammonia   36       Amphetamine Screen, Ur  Negative        Anion Gap    10      Appearance, UA  Hazy(A)        AST    22      Barbiturate Screen, Ur   Negative        Baso #    0.06      Basophil%    0.4      Bilirubin (UA)  Negative        Total Bilirubin    1.0  Comment:  For infants and newborns, interpretation of results should be based  on gestational age, weight and in agreement with clinical  observations.  Premature Infant recommended reference ranges:  Up to 24 hours.............<8.0 mg/dL  Up to 48 hours............<12.0 mg/dL  3-5 days..................<15.0 mg/dL  6-29 days.................<15.0 mg/dL        Site Other         BUN, Bld    20      Calcium    9.2      Chloride    106      CO2    23      Cocaine (Metab.)  Negative        Color, UA  Yellow        Creatinine    1.4      Creatinine, Random Ur  147.0  Comment:  The random urine reference ranges provided were established   for 24 hour urine collections.  No reference ranges exist for  random urine specimens.  Correlate clinically.          [facility] Room Air         Differential Method    Automated      eGFR if     55.2(A)      eGFR if non     47.8  Comment:  Calculation used to obtain the estimated glomerular filtration  rate (eGFR) is the CKD-EPI equation.   (A)      Eos #    0.1      Eosinophil%    0.8      Glucose    163(H)      Glucose, UA  Negative        Gran # (ANC)    13.1(H)      Gran%    85.0(H)      Hematocrit    42.4      Hemoglobin    14.6      Hyaline Casts, UA  11(A)        Coumadin Monitoring INR    2.8  Comment:  Coumadin Therapy:  2.0 - 3.0 for INR for all indicators except mechanical heart valves  and antiphospholipid syndromes which should use 2.5 - 3.5.  (H)      Ketones, UA  Negative        Lactate, [place]   0.8  Comment:  Falsely low lactic acid results can be found in samples   containing >=13.0 mg/dL total bilirubin and/or >=3.5 mg/dL   direct bilirubin.         Leukocytes, UA  Negative        Lymph #    1.1      Lymph%    7.3(L)      MCH    34.2(H)      MCHC    34.4      MCV    99(H)      Microscopic Comment  SEE COMMENT  Comment:  Other  formed elements not mentioned in the report are not   present in the microscopic examination.           Mode SPONT         Mono #    0.9      Mono%    6.0      MPV    9.9      Nitrite, UA  Negative        nRBC    0      Occult Blood UA  Negative        Opiate Scrn, Ur  Negative        pH, UA  5.0        Platelets    202      POC BE 0         POC HCO3 26.4         POC PCO2 56.7(H)         POC PH 7.275(L)         POC PO2 38(LL)         POC SATURATED O2 64(L)         POC TCO2 28         POCT Glucose     163(H)     Potassium    4.1      Total Protein    7.7      Protein, UA  Negative  Comment:  Recommend a 24 hour urine protein or a urine   protein/creatinine ratio if globulin induced proteinuria is  clinically suspected.          Protime    26.9(H)      RBC    4.27(L)      RBC, UA  14(H)        RDW    13.1      Sample VENOUS         Sodium    139      Specific [place], UA  1.020        Specimen UA  Urine, Catheterized        Squam Epithel, UA  0        Marijuana (THC) Metabolite  Negative        Toxicology Information  SEE COMMENT  Comment:  This screen includes the following classes of drugs at the   listed cut-off:  Benzodiazepines                  200 ng/ml  Methadone                        300 ng/ml  Cocaine metabolite               300 ng/ml  Opiates                          300 ng/ml  Barbiturates                     200 ng/ml  Amphetamines                    1000 ng/ml  Marijuana metabs (THC)            50 ng/ml  Phencyclidine (PCP)               25 ng/ml  High concentrations of Diphenhydramine may cross-react with  Phencyclidine PCP screening immunoassay giving a false   positive result.  High concentrations of Methylenedioxymethamphetamine (MDMA aka  Ectasy) and other structurally similar compounds may cross-   react with the Amphetamine/Methamphetamine screening   immunoassay giving a false positive result.  A metabolite of the anti-HIV drug Sustiva () may cause  false positive results in the Marijuana  metabolite (THC)   screening assay.  Note: This exception list includes only more common   interferants in toxicology screen testing.  Because of many   cross-reactantspositive results on toxicology drug screens   should be confirmed whenever results do not correlate with   clinical presentation.  This report is intended for use in clinical monitoring and  management of patients. It is not intended for use in   employment related drug testing.  Because of any cross-reactants, positive results on toxicology  drug screens should be confirmed whenever results do not  correlate with clinical presentation.  Presumptive positive results are unconfirmed and may be used   only for medical purposes.          Troponin I    <0.006  Comment:  The reference interval for Troponin I represents the 99th percentile   cutoff   for our facility and is consistent with 3rd generation assay   performance.        Urobilinogen, UA  2.0        WBC, UA  1        WBC    15.39(H)                      Significant Imaging: I have reviewed all pertinent imaging results/findings within the past 24 hours.    Assessment/Plan:     * Hypothermia    -Temp 93.6 at admission, currently 98.3 after Gale hugger use  -Source of hypothermia currently unknown  -Monitor today          SIRS (systemic inflammatory response syndrome)    -2/4 SIRS criteria at admission (Temp 93.6 and WBC 15.39), currently 1/4 (temperature resolved, waiting on new WBC count)  -Cefepime 2g and vancomycin 1250mg administered in ED  -BCx pending  -CXR suggestive of edema as opposed to pneumonia or aspiration  -UA does not show signs of UTI  -No current indications to continue antibiotics; continue to monitor        Altered mental status    -Discuss baseline mental status with family  -CT head negative for acute pathology post-fall  -Collect TSH today        Hyperlipidemia    -Continue atorvastatin 40mg daily        HTN (hypertension)    -BP most recently 159/77  -Continue diltiazem  24hr 180mg daily, carvedilol 12.5mg 2x daily w/ meals, losartan 50mg daily  -Monitor vitals          Atrial fibrillation    -Currently in AFib  -INR 2.8, monitor daily  -Continue warfarin 5mg Mon, Wed, Thurs, Sat, Sun.  2.5mg Tues, Fri.            VTE Risk Mitigation         Ordered     warfarin (COUMADIN) tablet 5 mg  Daily      07/04/18 0528             Emile Peters MD  Department of Hospital Medicine   [facility]

## 2018-07-04 NOTE — HPI
Eliazar James is a 77 year old male who presented on 7/3/18 to the ED after being found with AMS by his granddaughter. He has a PMHx of HTN, HLD, CAD w/ CABG (1997) and PCI (2009), AFib, multiple TIAs, and depression.     He states that he fell in the AM while getting out of bed and walking to the bathroom.  He attributes the fall to acute onset of dizziness.  He reports that he fell on the L side of his face leading to pain and bleeding.  He denies loss of consciousness and denies previous falls.  He was later found by his granddaughter (unsure of how long after the fall) slouched over in his chair.  He was then BIBA to [facility] ED.  He claims that he does not feel any different than usual, although his family is not currently bedside to describe his baseline mental status.      At time of interview, he is in somnolent and in no apparent distress.  He repeatedly falls asleep during the interview and has trouble communicating due to low volume of voice.  He denies current pain.

## 2018-07-04 NOTE — PLAN OF CARE
Problem: Patient Care Overview  Goal: Plan of Care Review  Outcome: Ongoing (interventions implemented as appropriate)  Pt remains in rate controlled AFIB.  Pt has tremors since he fell yesterday, MD aware.  Pt unable to urinate today.  Bladder scan showed 400.  Pt states he wants a little more time before a straight cath.  Will cont to monitor.

## 2018-07-04 NOTE — ED TRIAGE NOTES
Eliazar James, a 77 y.o. male presents to the ED      Chief Complaint   Patient presents with    Fall     Pt had a fall earlier this eveing and hit the left side of his face. Denies LOC.  Pt is on coumadin. Pt is alert to painful stimulation and is oriented except for day of the weak. Pt appear to be falling alseep during examination.      Review of patient's allergies indicates:   Allergen Reactions    No known drug allergies      Past Medical History:   Diagnosis Date    *Atrial fibrillation     Anxiety     Atrial fibrillation 7/5/2012    Blood transfusion     CAD (coronary artery disease) 7/10/2012    Cancer     skin    Cataract     removed from both    Clotting disorder     Coronary artery disease     Depression     HTN (hypertension) 7/10/2012    Hx-TIA (transient ischemic attack) 7/10/2012    Hyperlipidemia     Hypertension     Myocardial infarction     Renal calculus, left 8/18/2017    S/P CABG (coronary artery bypass graft) 1/8/2013    Stroke 2/12    TIA    Urinary tract infection

## 2018-07-04 NOTE — ASSESSMENT & PLAN NOTE
-Temp 93.6 at admission, currently 98.3 after Gale hugger use  -Source of hypothermia currently unknown  -Monitor today

## 2018-07-04 NOTE — HOSPITAL COURSE
Upon arrival to the ED, his temperature was 93.3.  A colleen hugger was provided and his temperature has increased to 98.3.  At time of arrival he met 2/4 SIRS criteria (temp 93.3, WBC 15.39).  Troponins were negative, INR was in therapeutic range of 2.8, lactic acid was 0.8.  Blood cultures were collected, and UA showed hazy urine w/ 14 RBCs but was not suggestive of UTI.  CT of the head showed no evidence of acute pathology, and CXR suggested edema but no significant acute changes. His blood culture were negative growth during his admission. His WCC returned to normal after his initial presentation. His temperature was also normal during his admission. However, his pulse was low during his admission which may have contributed to his syncopal episodes. HR of 40s-50s on carvedilol 12.5mg. He is stable for discharge and will need strict follow up with cardiology for possible medication adjustments. Patient was instructed to hold his coumadin for 2 days after discharge due to elevated INR prior to discharge. He will need strict follow up with coumadin consult.

## 2018-07-04 NOTE — ASSESSMENT & PLAN NOTE
-BP most recently 159/77  -Continue diltiazem 24hr 180mg daily, carvedilol 12.5mg 2x daily w/ meals, losartan 50mg daily  -Monitor vitals

## 2018-07-04 NOTE — PROGRESS NOTES
Pt arrived to unit accompanied by [facility] staff. Pt transported by stretcher. Family left pt and will come back in the am. Pt Awake, alert, and oriented to self. Pt disoriented to situation and place. Pt not a good historian for admission questions. Will ask daughter when she arrives in am. Vital signs are stable. Pt no longer hypothermic. Oral temp 98.2. Bear hugger removed. MD notified (Dr. Choi). Bed in lowest position, call bell in reach, pt oriented to unit, bed alarm on, telemetry monitoring applied. Will continue to monitor.

## 2018-07-04 NOTE — ED PROVIDER NOTES
Encounter Date: 7/3/2018       History     Chief Complaint   Patient presents with    Fall     Pt had a fall earlier this eveing and hit the left side of his face. Denies LOC.  Pt is on coumadin. Pt is alert to painful stimulation and is oriented except for day of the weak. Pt appear to be falling alseep during examination.      77-year-old male presents to the ER via EMS.  The patient was found by his granddaughter with altered mental status slumped over the bed.  Patient reports a mechanical trip and fall earlier in today and hitting his head.  Mild abrasion noted to the left side of the face.  EMS reports decreased mental status.  CBG in the field was good patient was hypertensive in the 190s.   Upon arrival to the ER the patient is alert and oriented stating he does not want to be here and wants to leave.  I advised the patient that if he left he would be AGAINST MEDICAL ADVICE.  Around this time the patient became more obtunded.  He was following commands but not answering questions.  Patient was emergently brought to the ED bed 1.            Review of patient's allergies indicates:   Allergen Reactions    No known drug allergies      Past Medical History:   Diagnosis Date    *Atrial fibrillation     Anxiety     Atrial fibrillation 7/5/2012    Blood transfusion     CAD (coronary artery disease) 7/10/2012    Cancer     skin    Cataract     removed from both    Clotting disorder     Coronary artery disease     Depression     HTN (hypertension) 7/10/2012    Hx-TIA (transient ischemic attack) 7/10/2012    Hyperlipidemia     Hypertension     Myocardial infarction     Renal calculus, left 8/18/2017    S/P CABG (coronary artery bypass graft) 1/8/2013    Stroke 2/12    TIA    Urinary tract infection      Past Surgical History:   Procedure Laterality Date    CATARACT EXTRACTION W/  INTRAOCULAR LENS IMPLANT Right 03/27/2008    CATARACT EXTRACTION W/  INTRAOCULAR LENS IMPLANT Left 12/06/2007     CORONARY ARTERY BYPASS GRAFT      triple bypass       Family History   Problem Relation Age of Onset    Heart attack Father     Heart disease Father     Melanoma Neg Hx     Psoriasis Neg Hx     Lupus Neg Hx     Eczema Neg Hx     Acne Neg Hx     Prostate cancer Neg Hx     Kidney disease Neg Hx      Social History   Substance Use Topics    Smoking status: Never Smoker    Smokeless tobacco: Never Used    Alcohol use No     Review of Systems   Constitutional: Negative for fever.   HENT: Negative for sore throat.         Head injury   Respiratory: Negative for shortness of breath.    Cardiovascular: Negative for chest pain.   Gastrointestinal: Negative for nausea.   Genitourinary: Negative for dysuria.   Musculoskeletal: Negative for back pain.   Skin: Negative for rash.   Neurological: Negative for weakness.   Hematological: Does not bruise/bleed easily.       Physical Exam     Initial Vitals   BP Pulse Resp Temp SpO2   07/03/18 2216 07/03/18 2216 07/03/18 2216 07/03/18 2250 07/03/18 2216   (!) 183/103 (!) 57 15 (!) 93.6 °F (34.2 °C) 97 %      MAP       --                Physical Exam    Constitutional: Vital signs are normal. He appears well-developed and well-nourished. He is not diaphoretic. He appears distressed.   HENT:   Head: Normocephalic.   Right Ear: External ear normal.   Left Ear: External ear normal.   Small abrasion L side of face     Eyes: Conjunctivae are normal.   Pinpoint pupils     Cardiovascular: Exam reveals no gallop and no friction rub.    No murmur heard.  Afib, rate slow   Pulmonary/Chest: No respiratory distress. He has no wheezes. He has no rhonchi. He has no rales. He exhibits no tenderness.   Clear on exam   Abdominal: Soft. Normal appearance and bowel sounds are normal.   Benign abdomen   Musculoskeletal: Normal range of motion.   No acute abnormal findings   Neurological: He is alert.   Pt obtunded after initial assesment   Skin: Skin is warm and intact.   Psychiatric: He has  a normal mood and affect. His speech is normal and behavior is normal. Cognition and memory are normal.         ED Course   Procedures  Labs Reviewed   CBC W/ AUTO DIFFERENTIAL - Abnormal; Notable for the following:        Result Value    WBC 15.39 (*)     RBC 4.27 (*)     MCV 99 (*)     MCH 34.2 (*)     Gran # (ANC) 13.1 (*)     Immature Grans (Abs) 0.08 (*)     Gran% 85.0 (*)     Lymph% 7.3 (*)     All other components within normal limits   COMPREHENSIVE METABOLIC PANEL - Abnormal; Notable for the following:     Glucose 163 (*)     eGFR if  55.2 (*)     eGFR if non  47.8 (*)     All other components within normal limits   PROTIME-INR - Abnormal; Notable for the following:     Prothrombin Time 26.9 (*)     INR 2.8 (*)     All other components within normal limits   URINALYSIS, REFLEX TO URINE CULTURE - Abnormal; Notable for the following:     Appearance, UA Hazy (*)     All other components within normal limits    Narrative:     rec'd 1 cup   URINALYSIS MICROSCOPIC - Abnormal; Notable for the following:     RBC, UA 14 (*)     Hyaline Casts, UA 11 (*)     All other components within normal limits    Narrative:     rec'd 1 cup   POCT GLUCOSE - Abnormal; Notable for the following:     POCT Glucose 163 (*)     All other components within normal limits   ISTAT PROCEDURE - Abnormal; Notable for the following:     POC PH 7.275 (*)     POC PCO2 56.7 (*)     POC PO2 38 (*)     POC SATURATED O2 64 (*)     All other components within normal limits   CULTURE, BLOOD   CULTURE, BLOOD   TROPONIN I   AMMONIA   LACTIC ACID, PLASMA   DRUG SCREEN PANEL, URINE EMERGENCY   DRUG SCREEN PANEL, URINE EMERGENCY    Narrative:     rec'd 1 cup Add on per Dr Wiggins order #[id number] 07/03/2018  23:53   UDRUG   PROCALCITONIN   POCT GLUCOSE MONITORING CONTINUOUS          Imaging Results          X-Ray Chest AP Portable (Final result)  Result time 07/03/18 23:42:38    Final result by Tanvir Gracia MD (07/03/18  23:42:38)                 Impression:      Diffusely increased interstitial attenuation with mild bibasilar airspace disease, suggestive of edema versus pneumonia or aspiration.      Electronically signed by: Tanvir Gracia MD  Date:    07/03/2018  Time:    23:42             Narrative:    EXAMINATION:  XR CHEST AP PORTABLE    CLINICAL HISTORY:  Altered mental status, unspecified    TECHNIQUE:  One view of the chest.    COMPARISON:  08/09/2009.    FINDINGS:  Cardiac wires overlie the chest.  Cardiac silhouette is magnified by technique but felt to be mildly enlarged.  Tortuous thoracic aorta with atherosclerotic calcifications.  There are postoperative changes of prior median sternotomy.  Increased interstitial attenuation is noted bilaterally along with mild bibasilar airspace disease, left side greater than right.  No large pleural effusion.  No pneumothorax.                               CT Head Without Contrast (Final result)  Result time 07/03/18 23:34:42    Final result by Tanvir Gracia MD (07/03/18 23:34:42)                 Impression:      No CT evidence of acute intracranial pathology.    Senescent changes.    Electronically signed by resident: Jone Watson  Date:    07/03/2018  Time:    22:58    Electronically signed by: Tanvir Gracia MD  Date:    07/03/2018  Time:    23:34             Narrative:    EXAMINATION:  CT HEAD WITHOUT CONTRAST    CLINICAL HISTORY:  altered mental status;    TECHNIQUE:  Low dose axial images were obtained through the head.  Coronal and sagittal reformations were also performed. Contrast was not administered.    COMPARISON:  None.    FINDINGS:  There is generalized cerebral volume loss with compensatory enlargement of the ventricular system without evidence of hydrocephalus.  No evidence of major vascular distribution infarct, hemorrhage, edema, or parenchymal mass.  No extra-axial blood or fluid collections.  The cranium is intact.  Mild mucosal thickening of the left  maxillary sinus noted.  The paranasal sinuses and mastoid air cells otherwise are clear.  Postoperative changes of cataract surgery.                                 Medical Decision Making:   ED Management:  76 yo M with AMS  No acute findings on head CT  CXR shows evidence of pulmonary edema vs pneumonia.   Leukocytosis of 15k, lactic acid WNL  Pt initially alert and oriented, though somewhat lethargic, he became much more lethargic and obtunded, and was not following commands  Bear hugger placed for temp of 93  Narcan given @ 1200am,   Assessment @ 1210, pt still sleeping, but arousable and answering questions.   Procalcitonin was added on, [employer] ABX given  Pt admitted to Hospital Medicine                      Clinical Impression:   The primary encounter diagnosis was Hypothermia, initial encounter. Diagnoses of Altered mental status and SIRS (systemic inflammatory response syndrome) were also pertinent to this visit.      Disposition:   Disposition: Admitted  Condition: Stable                        Keon Soriano PA-C  07/04/18 0040

## 2018-07-05 VITALS
WEIGHT: 178.13 LBS | HEIGHT: 69 IN | OXYGEN SATURATION: 95 % | DIASTOLIC BLOOD PRESSURE: 65 MMHG | BODY MASS INDEX: 26.38 KG/M2 | HEART RATE: 52 BPM | SYSTOLIC BLOOD PRESSURE: 142 MMHG | TEMPERATURE: 99 F | RESPIRATION RATE: 16 BRPM

## 2018-07-05 LAB
ANION GAP SERPL CALC-SCNC: 7 MMOL/L
BASOPHILS # BLD AUTO: 0.03 K/UL
BASOPHILS NFR BLD: 0.3 %
BUN SERPL-MCNC: 17 MG/DL
CALCIUM SERPL-MCNC: 8.5 MG/DL
CHLORIDE SERPL-SCNC: 105 MMOL/L
CO2 SERPL-SCNC: 26 MMOL/L
CREAT SERPL-MCNC: 0.9 MG/DL
DIFFERENTIAL METHOD: ABNORMAL
EOSINOPHIL # BLD AUTO: 0.2 K/UL
EOSINOPHIL NFR BLD: 2 %
ERYTHROCYTE [DISTWIDTH] IN BLOOD BY AUTOMATED COUNT: 13.2 %
EST. GFR  (AFRICAN AMERICAN): >60 ML/MIN/1.73 M^2
EST. GFR  (NON AFRICAN AMERICAN): >60 ML/MIN/1.73 M^2
GLUCOSE SERPL-MCNC: 94 MG/DL
HCT VFR BLD AUTO: 38.4 %
HGB BLD-MCNC: 12.8 G/DL
IMM GRANULOCYTES # BLD AUTO: 0.02 K/UL
IMM GRANULOCYTES NFR BLD AUTO: 0.2 %
INR PPP: 3.6
LYMPHOCYTES # BLD AUTO: 1.4 K/UL
LYMPHOCYTES NFR BLD: 16 %
MAGNESIUM SERPL-MCNC: 1.8 MG/DL
MCH RBC QN AUTO: 34 PG
MCHC RBC AUTO-ENTMCNC: 33.3 G/DL
MCV RBC AUTO: 102 FL
MONOCYTES # BLD AUTO: 0.8 K/UL
MONOCYTES NFR BLD: 8.9 %
NEUTROPHILS # BLD AUTO: 6.3 K/UL
NEUTROPHILS NFR BLD: 72.6 %
NRBC BLD-RTO: 0 /100 WBC
PHOSPHATE SERPL-MCNC: 2.2 MG/DL
PLATELET # BLD AUTO: 178 K/UL
PMV BLD AUTO: 10.6 FL
POTASSIUM SERPL-SCNC: 4.1 MMOL/L
PROTHROMBIN TIME: 35.2 SEC
RBC # BLD AUTO: 3.77 M/UL
SODIUM SERPL-SCNC: 138 MMOL/L
WBC # BLD AUTO: 8.69 K/UL

## 2018-07-05 PROCEDURE — 99239 HOSP IP/OBS DSCHRG MGMT >30: CPT | Mod: GC,,, | Performed by: HOSPITALIST

## 2018-07-05 PROCEDURE — 85025 COMPLETE CBC W/AUTO DIFF WBC: CPT

## 2018-07-05 PROCEDURE — 85610 PROTHROMBIN TIME: CPT

## 2018-07-05 PROCEDURE — 94761 N-INVAS EAR/PLS OXIMETRY MLT: CPT

## 2018-07-05 PROCEDURE — 25000003 PHARM REV CODE 250: Performed by: STUDENT IN AN ORGANIZED HEALTH CARE EDUCATION/TRAINING PROGRAM

## 2018-07-05 PROCEDURE — 97162 PT EVAL MOD COMPLEX 30 MIN: CPT

## 2018-07-05 PROCEDURE — 80048 BASIC METABOLIC PNL TOTAL CA: CPT

## 2018-07-05 PROCEDURE — 36415 COLL VENOUS BLD VENIPUNCTURE: CPT

## 2018-07-05 PROCEDURE — 83735 ASSAY OF MAGNESIUM: CPT

## 2018-07-05 PROCEDURE — 97116 GAIT TRAINING THERAPY: CPT

## 2018-07-05 PROCEDURE — 97165 OT EVAL LOW COMPLEX 30 MIN: CPT

## 2018-07-05 PROCEDURE — 84100 ASSAY OF PHOSPHORUS: CPT

## 2018-07-05 RX ADMIN — DILTIAZEM HYDROCHLORIDE 180 MG: 180 CAPSULE, COATED, EXTENDED RELEASE ORAL at 08:07

## 2018-07-05 RX ADMIN — ATORVASTATIN CALCIUM 40 MG: 20 TABLET, FILM COATED ORAL at 08:07

## 2018-07-05 RX ADMIN — ASPIRIN 81 MG CHEWABLE TABLET 81 MG: 81 TABLET CHEWABLE at 08:07

## 2018-07-05 RX ADMIN — CARVEDILOL 12.5 MG: 6.25 TABLET, FILM COATED ORAL at 08:07

## 2018-07-05 RX ADMIN — LOSARTAN POTASSIUM 50 MG: 50 TABLET ORAL at 08:07

## 2018-07-05 NOTE — PROGRESS NOTES
Pt is refusing to call for assistance when getting out of the chair. Chair alarm is in place and daughter is at bedside.

## 2018-07-05 NOTE — DISCHARGE INSTRUCTIONS
Please hold your Coumadin for Tomorrow and Saturday. Your INR was mildly elevated and we would like you to hold your dose for two days. You can resume your normal schedule on Sunday. Please follow up with coumadin clinic ASAP after discharge.

## 2018-07-05 NOTE — PLAN OF CARE
Problem: Occupational Therapy Goal  Goal: Occupational Therapy Goal  Outcome: Unable to achieve outcome(s) by discharge  Pt discharged from hospital prior to documentation of evaluation.

## 2018-07-05 NOTE — PT/OT/SLP EVAL
Occupational Therapy   Co-Evaluation    Name: Eliazar James  MRN: [id number]  Admitting Diagnosis:  Hypothermia      Recommendations:     Discharge Recommendations: home with home health (with 24/7 family support )  Discharge Equipment Recommendations:  walker, rolling  Barriers to discharge:  None    History:     Occupational Profile:  Living Environment: Pt reports living alone, however as per daughter, pt's granddaughter lives with him. The bathroom has a tub/shower combo.   Previous level of function: PTA, pt reports he was independent with functional mobility and ADLs. 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 He reports he will not longer be able to do this as his spouse is now in a NH. Pt reports no prior falls at home.  Roles and Routines: Pt is a father, , and grandfather.   Equipment Owned:  none  Assistance upon Discharge: As per daughter report, pt's granddaughter is able to assist as needed at d/c. Daughter will also assist.     Past Medical History:   Diagnosis Date    *Atrial fibrillation     Anxiety     Atrial fibrillation 7/5/2012    Blood transfusion     CAD (coronary artery disease) 7/10/2012    Cancer     skin    Cataract     removed from both    Clotting disorder     Coronary artery disease     Depression     HTN (hypertension) 7/10/2012    Hx-TIA (transient ischemic attack) 7/10/2012    Hyperlipidemia     Hypertension     Myocardial infarction     Renal calculus, left 8/18/2017    S/P CABG (coronary artery bypass graft) 1/8/2013    Stroke 2/12    TIA    Urinary tract infection        Past Surgical History:   Procedure Laterality Date    CATARACT EXTRACTION W/  INTRAOCULAR LENS IMPLANT Right 03/27/2008    CATARACT EXTRACTION W/  INTRAOCULAR LENS IMPLANT Left 12/06/2007    CORONARY ARTERY BYPASS GRAFT      triple bypass         Subjective     Chief Complaint: BLE weakness   Patient/Family stated goals: return home  Communicated with: RN  (Anabel) prior to session. Pt agreeable to therapy evaluation.   Pain/Comfort:  · Pain Rating 1: 0/10  · Pain Rating Post-Intervention 1: 0/10    Patients cultural, spiritual, Judaism conflicts given the current situation: none reported     Objective:     Patient found with: telemetry    General Precautions: Standard, fall (impulsive)   Orthopedic Precautions:N/A   Braces: N/A     Occupational Performance:    Bed Mobility:    · NT as pt found seated UIC at start of session    Functional Mobility/Transfers:  · Sit<>stand: SBA  · From/onto bedside chair w/o the use of any AD  · Functional Mobility: Pt engaging in functional mobility to simulate household distances with SBA mostly approx 200 ft w/o the use of any AD and with several bouts of instability and one major LOB at end of mobility in the room requiring mod A to recover.   · Pt declining use of RW or any other AD    Activities of Daily Living:  · UB Dressing: setup assistance   · To don back gown as a jacket while seated in bedside chair   · Toileting: SBA  · To stand to utilize toilet in bathroom without UE support     Cognitive/Visual Perceptual:  Pt alert and oriented with fair insight into condition and with fair motivation to engage in therapy session. Pt able to follow multi-step commands, communicate effectively throughout treatment session, and exhibits impaired memory. Pt reporting he lives alone, yet upon CM speaking with daughter, she reports pt lives with granddaughter. Pt with impaired safety awareness as d/t impulsivity as evidenced by pt engaging in mobility to the bathroom to toilet after OT reminding pt to ask for RN prior to any mobility 2/2 instability. Pt declining use of any AD. No visual deficits present as pt with intact convergence, horizontal/vertical tracking, and visual field cuts.      Physical Exam:  Balance:  · Sitting: supervision    · Standing: CGA-mod A for LOB during mobility   Postural Examination: no deviations noted    Skin Integrity: intact   Edema: none noted   Sensation: intact to light touch   UE ROM:  · Right: WFL  · Left: WFL  UE Strength:  · Right: WFL  · Left: WFL   Strength:  · Right: WFL  · Left: WFL  Fine Motor Control: WFL- however tremors present in (B) hands   Gross Motor Control: WFL    Patient left up in chair with all lines intact, call button in reach, chair alarm on and RN notified/ present    [facility] 6 Click:  [facility] Total Score: 21    Treatment & Education:  · Communicated OT POC-pt declining use of AD and HH therapy   · Updated communication board: Level 3  · Educated patient about instability with mobility and need for AD and supervision for fall prevention, however pt stating he does not feel like the need to ask for assistance as he can complete the task independently; continuous education provided, however pt is non-compliant with safety measures as he engaged in functional mobility to the bathroom immediately after therapist left the room--OT back to room to supervise for safety in bathroom  · Called SW at cessation of session regarding concerns of safety with home environment and support; CM spoke with daughter and pt will have 24/7 assist if d/c'ed   · Educated on purpose and importance of chair alarm prior to placing in bedside chair for safety   Education:    Assessment:     Eliazar James is a 77 y.o. male with a medical diagnosis of Hypothermia. Performance deficits affecting function are weakness, impaired endurance, impaired self care skills, impaired functional mobilty, gait instability, impaired balance, impaired cognition, decreased safety awareness. Pt with impulsivity and non-compliance to safety measures regarding functional mobility, placing pt at high risk for falls. Pt is not currently safe to d/c without 24/7 support at home. Pt requiring CGA for functional mobility with instability and one major LOB, requiring mod A to regain, setup assistance for UB dressing, SBA for transfer  "from chair, and SBA for standing toileting. Pt would benefit from HH, use of RW, and / supervision for safe d/c to PLOF and living environment.     Rehab Prognosis:  good; patient would benefit from acute skilled OT services to address these deficits and reach maximum level of function.         Clinical Decision Makin.  OT Low:  "Pt evaluation falls under low complexity for evaluation coding due to performance deficits noted in 1-3 areas as stated above and 0 co-morbities affecting current functional status. Data obtained from problem focused assessments. No modifications or assistance was required for completion of evaluation. Only brief occupational profile and history review completed."     Plan:     Patient to be seen 3 x/week to address the above listed problems via self-care/home management, therapeutic activities, therapeutic exercises  · Plan of Care Expires: 18  · Plan of Care Reviewed with: patient    This Plan of care has been discussed with the patient who was involved in its development and understands and is in agreement with the identified goals and treatment plan    GOALS:    Occupational Therapy Goals        Problem: Occupational Therapy Goal    Goal Priority Disciplines Outcome Interventions   Occupational Therapy Goal     OT, PT/OT Unable to achieve outcome(s) by discharge                    Time Tracking:     OT Date of Treatment: 18  OT Start Time: 920  OT Stop Time: 936  OT Total Time (min): 16 min    Billable Minutes:Evaluation 16 minutes    Linda Martell OT  2018    "

## 2018-07-05 NOTE — PT/OT/SLP EVAL
"Physical Therapy Evaluation and Discharge Note    Patient Name:  Eliazar James   MRN:  [id number]    Recommendations:     Discharge Recommendations:  home with home health   Discharge Equipment Recommendations: walker, rolling   Barriers to discharge: None    Assessment:     Eliazar James is a 77 y.o. male admitted with a medical diagnosis of Hypothermia. Pt's mobility deficits include impaired balance, gait instability, and impaired functional mobility. Pt tolerated PT session well today, but experienced LOB's t/o gait training w/ decreased safety awareness. SPT is recommending pt is discharged home w/ home health to address pt's balance deficits and to ensure pt does not experience another fall at home. OT communicated w/  regarding discharge recommendations as well as pt's need for RW at home to ensure safety.    Recent Surgery: * No surgery found *      Plan:      Discharge    Subjective     Communicated with RN prior to session.  Patient found sitting UIC upon PT entry to room, agreeable to evaluation.      Chief Complaint: "I need to get out the hospital and help my wife."  Patient comments/goals: Return home  Pain/Comfort:  · Pain Rating 1: 0/10    Patients cultural, spiritual, Tenriism conflicts given the current situation: None    Living Environment:  Pt lives w/ his granddaughter in a 1SH w/ 0 LARRY. Pt's wife is elderly and living and currently living in a nursing home. Pt has a shower/tub combo w/ a shower chair and grab bars. Pt's granddaughter will be available at all times of the day to provide assistance.  Prior to admission, patients level of function was independent w/ mobility and ADL's. Patient has the following equipment: none. Upon discharge, patient will have assistance from his granddaughter.    Objective:     Patient found with: telemetry     General Precautions: Standard, fall   Orthopedic Precautions:N/A   Braces: N/A     Exams:  · Cognitive Exam:  Patient is oriented to " "Person, Place, Time and Situation and follows 100% of verbal commands. However, pt was highly distractible and confused at times.  · Sensation: Intact  · RLE ROM: WFL  · RLE Strength: WFL  · LLE ROM: WFL  · LLE Strength: WFL    Functional Mobility:  · Transfers:  · Sit <> stand to/from bedside chair- CGA w/ no AD  · Gait:  · 400 ft w/ CGA and no AD  · Pt experienced 2 minor LOB w. Maria M to recover  · Pt lost balance when distracted by loud sounds and movements in the environment  · Pt experienced 1 major LOB w/ MaxA to recover  · Pt B LE crossing midline causing anterior LOB while turning to sit in bedside chair.    AM-PAC 6 CLICK MOBILITY  Total Score:18       Therapeutic Activities and Exercises:  Pt educated on PT role and POC. Pt verbalized understanding.  D/t poor balance and altered mental status, PT ordered chair alarm for pt to maintain safety while staying in the hospital. RN notified.  SPT and OT educated pt on need for RW use at home to ensure safety as well as discharge recommendation of home health therapy. Pt was resistant to recommendations stating "I don't need any of that, I want to be independent." SPT and OT further educated pt on benefits of both RW and HH therapy. Pt will require reinforcement.    Patient left up in chair with all lines intact, call button in reach and chair alarm on.    GOALS:    Physical Therapy Goals        Problem: Physical Therapy Goal    Goal Priority Disciplines Outcome Goal Variances Interventions   Physical Therapy Goal     PT/OT, PT      Description:  Discharge from PT.                    History:     Past Medical History:   Diagnosis Date    *Atrial fibrillation     Anxiety     Atrial fibrillation 7/5/2012    Blood transfusion     CAD (coronary artery disease) 7/10/2012    Cancer     skin    Cataract     removed from both    Clotting disorder     Coronary artery disease     Depression     HTN (hypertension) 7/10/2012    Hx-TIA (transient ischemic attack) " 7/10/2012    Hyperlipidemia     Hypertension     Myocardial infarction     Renal calculus, left 8/18/2017    S/P CABG (coronary artery bypass graft) 1/8/2013    Stroke 2/12    TIA    Urinary tract infection        Past Surgical History:   Procedure Laterality Date    CATARACT EXTRACTION W/  INTRAOCULAR LENS IMPLANT Right 03/27/2008    CATARACT EXTRACTION W/  INTRAOCULAR LENS IMPLANT Left 12/06/2007    CORONARY ARTERY BYPASS GRAFT      triple bypass         Clinical Decision Making:     History  Co-morbidities and personal factors that may impact the plan of care Examination  Body Structures and Functions, activity limitations and participation restrictions that may impact the plan of care Clinical Presentation   Decision Making/ Complexity Score   Co-morbidities:   [] Time since onset of injury / illness / exacerbation  [] Status of current condition  [x]Patient's cognitive status and safety concerns    [] Multiple Medical Problems (see med hx)  Personal Factors:   [] Patient's age  [] Prior Level of function   [] Patient's home situation (environment and family support)  [] Patient's level of motivation  [] Expected progression of patient      HISTORY:(criteria)    [] 33897 - no personal factors/history    [x] 79922 - has 1-2 personal factor/comorbidity     [] 26005 - has >3 personal factor/comorbidity     Body Regions:  [] Objective examination findings  [] Head     []  Neck  [] Trunk   [] Upper Extremity  [] Lower Extremity    Body Systems:  [x] For communication ability, affect, cognition, language, and learning style: the assessment of the ability to make needs known, consciousness, orientation (person, place, and time), expected emotional /behavioral responses, and learning preferences (eg, learning barriers, education  needs)  [] For the neuromuscular system: a general assessment of gross coordinated movement (eg, balance, gait, locomotion, transfers, and transitions) and motor function  (motor  control and motor learning)  [] For the musculoskeletal system: the assessment of gross symmetry, gross range of motion, gross strength, height, and weight  [] For the integumentary system: the assessment of pliability(texture), presence of scar formation, skin color, and skin integrity  [x] For cardiovascular/pulmonary system: the assessment of heart rate, respiratory rate, blood pressure, and edema     Activity limitations:    [x] Patient's cognitive status and saf ety concerns          [] Status of current condition      [] Weight bearing restriction  [] Cardiopulmunary Restriction    Participation Restrictions:   [] Goals and goal agreement with the patient     [] Rehab potential (prognosis) and probable outcome      Examination of Body System: (criteria)    [] 73398 - addressing 1-2 elements    [x] 11107 - addressing a total of 3 or more elements     [] 68853 -  Addressing a total of 4 or more elements         Clinical Presentation: (criteria)  Evolving - 40589     On examination of body system using standardized tests and measures patient presents with 3 or more elements from any of the following: body structures and functions, activity limitations, and/or participation restrictions.  Leading to a clinical presentation that is considered evolving with changing characteristics                              Clinical Decision Making  (Eval Complexity):  Moderate - 03519     Time Tracking:     PT Received On: 07/05/18  PT Start Time: 0920     PT Stop Time: 0943  PT Total Time (min): 23 min     Billable Minutes: Evaluation 10, Gait Training 13 and Total Time 23      ELIF Monroy  07/05/2018

## 2018-07-05 NOTE — PLAN OF CARE
07/05/18 1056   Final Note   Assessment Type Final Discharge Note   Discharge Disposition Home   Hospital Follow Up  Appt(s) scheduled? Yes   Discharge plans and expectations educations in teach back method with documentation complete? Yes   Right Care Referral Info   Post Acute Recommendation No Care

## 2018-07-05 NOTE — DISCHARGE SUMMARY
[facility] Hospital Medicine  Discharge Summary    Patient Name: Eliazar James  MRN: [id number]  Admission Date: 7/3/2018  Hospital Length of Stay: 1 days  Discharge Date and Time:  07/05/2018 10:46 AM  Attending Physician: Ashley Johnson MD   Discharging Provider: Doug Barger MD  Primary Care Provider: Francine Domingo MD  Hospital Medicine Team: [facility] 4 Doug Barger MD    HPI:   Eliazar James is a 77 year old male who presented on 7/3/18 to the ED after being found with AMS by his granddaughter. He has a PMHx of HTN, HLD, CAD w/ CABG (1997) and PCI (2009), AFib, multiple TIAs, and depression.     He states that he fell in the AM while getting out of bed and walking to the bathroom.  He attributes the fall to acute onset of dizziness.  He reports that he fell on the L side of his face leading to pain and bleeding.  He denies loss of consciousness and denies previous falls.  He was later found by his granddaughter (unsure of how long after the fall) slouched over in his chair.  He was then BIBA to [facility] ED.  He claims that he does not feel any different than usual, although his family is not currently bedside to describe his baseline mental status.      At time of interview, he is in somnolent and in no apparent distress.  He repeatedly falls asleep during the interview and has trouble communicating due to low volume of voice.  He denies current pain.      * No surgery found *      Hospital Course:   Upon arrival to the ED, his temperature was 93.3.  A colleen hugger was provided and his temperature has increased to 98.3.  At time of arrival he met 2/4 SIRS criteria (temp 93.3, WBC 15.39).  Troponins were negative, INR was in therapeutic range of 2.8, lactic acid was 0.8.  Blood cultures were collected, and UA showed hazy urine w/ 14 RBCs but was not suggestive of UTI.  CT of the head showed no evidence of acute pathology, and CXR suggested edema but no significant acute  changes. His blood culture were negative growth during his admission. His WCC returned to normal after his initial presentation. His temperature was also normal during his admission. However, his pulse was low during his admission which may have contributed to his syncopal episodes. HR of 40s-50s on carvedilol 12.5mg. He is stable for discharge and will need strict follow up with cardiology for possible medication adjustments. Patient was instructed to hold his coumadin for 2 days after discharge due to elevated INR prior to discharge. He will need strict follow up with coumadin consult.      Consults:     No new Assessment & Plan notes have been filed under this hospital service since the last note was generated.  Service: Hospital Medicine    Final Active Diagnoses:    Diagnosis Date Noted POA    PRINCIPAL PROBLEM:  Hypothermia [T68.XXXA] 07/04/2018 Yes    SIRS (systemic inflammatory response syndrome) [R65.10] 07/04/2018 Yes    Stupor [R40.1] 07/04/2018 Yes    Hyperlipidemia [E78.5] 12/11/2012 Yes     Chronic    Essential hypertension [I10] 07/10/2012 Yes    Chronic atrial fibrillation [I48.2] 07/05/2012 Yes      Problems Resolved During this Admission:    Diagnosis Date Noted Date Resolved POA       Discharged Condition: good    Disposition:     Follow Up:  Follow-up Information     Macario Valencia MD PhD On 7/13/2018.    Specialty:  Cardiology  Why:  @9:00am  Contact information:  [address] [place] [id number]  [phone]                 Patient Instructions:   No discharge procedures on file.    Significant Diagnostic Studies: Labs:   CMP     Recent Labs  Lab 07/03/18  2230 07/04/18  1055 07/05/18  0640    142 138   K 4.1 4.5 4.1    107 105   CO2 23 26 26   * 88 94   BUN 20 22 17   CREATININE 1.4 1.2 0.9   CALCIUM 9.2 9.2 8.5*   PROT 7.7  --   --    ALBUMIN 4.0  --   --    BILITOT 1.0  --   --    ALKPHOS 67  --   --    AST 22  --   --    ALT 28  --   --     ANIONGAP 10 9 7*   ESTGFRAFRICA 55.2* >60.0 >60.0   EGFRNONAA 47.8* 57.6* >60.0    and CBC     Recent Labs  Lab 07/03/18  2230 07/04/18  1055 07/05/18  0640   WBC 15.39* 10.66 8.69   HGB 14.6 13.8* 12.8*   HCT 42.4 40.7 38.4*    192 178       Pending Diagnostic Studies:     None         Medications:  Reconciled Home Medications:      Medication List      CONTINUE taking these medications    aspirin 81 mg Tab  Take 1 tablet by mouth Daily.     atorvastatin 40 MG tablet  Commonly known as:  LIPITOR  Take 1 tablet (40 mg total) by mouth once daily.     CARTIA  MG 24 hr capsule  Generic drug:  diltiaZEM  Take 180 mg by mouth once daily.     carvedilol 12.5 MG tablet  Commonly known as:  COREG  Take 1 tablet (12.5 mg total) by mouth 2 (two) times daily with meals.     dutasteride-tamsulosin 0.5-0.4 mg Cm24  Take 1 capsule by mouth once daily.     fluticasone 50 mcg/actuation nasal spray  Commonly known as:  FLONASE  2 sprays (100 mcg total) by Each Nare route once daily.     losartan 50 MG tablet  Commonly known as:  COZAAR  Take 1 tablet (50 mg total) by mouth once daily.     nitroGLYCERIN 0.4 MG SL tablet  Commonly known as:  NITROSTAT  Place 1 tablet (0.4 mg total) under the tongue every 5 (five) minutes as needed for Chest pain.     warfarin 5 MG tablet  Commonly known as:  COUMADIN  Take 1 tablet (5mg.) by mouth daily, except 1/2 tablet (2.5mg.) on Tuesdays and Fridays,  Or as directed by Coumadin Clinic          Indwelling Lines/Drains at time of discharge:   Lines/Drains/Airways          No matching active lines, drains, or airways        Time spent on the discharge of patient: 30 minutes  Patient was seen and examined on the date of discharge and determined to be suitable for discharge.     Doug Barger MD  Department of Hospital Medicine  [facility]

## 2018-07-05 NOTE — PLAN OF CARE
10:24 AM  SW received phone call from PT Yaya, requesting This Wk to f/u to see if Pt has family support if d/c'ed home.    At approx 10:22 AM This Wker spoke w/Susan, Pt's  daughter [phone].  She reported that Pt has her support and Pt's grand daughter resides in the home w/Pt.  Reported Pt has support of family for assistance at home.    SW following for DC needs.  BROOK in communication with THERESA Leiva, MSW  [facility]

## 2018-07-05 NOTE — PLAN OF CARE
Francine Domingo MD     Payor: [employer] MANAGED MEDICARE / Plan: The [employer] CHOICES 65 / Product Type: Medicare Advantage /      Extended Emergency Contact Information  Primary Emergency Contact: Lissa James  Address: [address] [place] [address] [facility] [place]  Home Phone: [phone]  Mobile Phone: [phone]  Relation: Spouse        07/05/18 1054   Discharge Assessment   Assessment Type Discharge Planning Assessment   Confirmed/corrected address and phone number on facesheet? Yes   Assessment information obtained from? Patient;Caregiver   Expected Length of Stay (days) 3   Communicated expected length of stay with patient/caregiver yes   Prior to hospitilization cognitive status: Alert/Oriented   Prior to hospitalization functional status: Needs Assistance   Current cognitive status: Alert/Oriented   Current Functional Status: Needs Assistance   Lives With grandchild(jennifer)   Able to Return to Prior Arrangements yes   Is patient able to care for self after discharge? Yes  (with assistance)   Patient's perception of discharge disposition home or selfcare   Readmission Within The Last 30 Days no previous admission in last 30 days   Patient currently being followed by outpatient case management? No   Patient currently receives any other outside agency services? No   Do you have any problems affording any of your prescribed medications? No   Is the patient taking medications as prescribed? yes   Does the patient have transportation home? Yes   Transportation Available family or friend will provide   Does the patient receive services at the Coumadin Clinic? No   Discharge Plan A Home with family   Discharge Plan B Home with family;Home Health   Patient/Family In Agreement With Plan yes   Readmission Questionnaire   Have you felt down, depressed, or hopeless? 0   Have you felt little interest or pleasure in doing things? 0

## 2018-07-05 NOTE — PLAN OF CARE
07/05/18 1049   Final Note   Assessment Type Final Discharge Note   Discharge Disposition Home   Hospital Follow Up  Appt(s) scheduled? Yes   Discharge plans and expectations educations in teach back method with documentation complete? Yes   Right Care Referral Info   Post Acute Recommendation No Care

## 2018-07-05 NOTE — PLAN OF CARE
Problem: Physical Therapy Goal  Goal: Physical Therapy Goal  Discharge from PT.  Ralf Mason, ELIF  7/5/2018

## 2018-07-06 NOTE — PT/OT/SLP DISCHARGE
Occupational Therapy Discharge Summary    Eliazar James  MRN: [id number]   Principal Problem: Hypothermia      Patient Discharged from acute Occupational Therapy on 7/5.  Please refer to prior OT note dated 7/5 for functional status.    Assessment:      Patient appropriate for care in another setting.    Objective:     GOALS:    Occupational Therapy Goals        Problem: Occupational Therapy Goal    Goal Priority Disciplines Outcome Interventions   Occupational Therapy Goal     OT, PT/OT Unable to achieve outcome(s) by discharge                    Reasons for Discontinuation of Therapy Services  Transfer to alternate level of care.      Plan:     Patient Discharged to: Home with Home Health Service    Linda Martell OT  7/6/2018

## 2018-07-09 LAB
BACTERIA BLD CULT: NORMAL
BACTERIA BLD CULT: NORMAL

## 2018-07-13 ENCOUNTER — OFFICE VISIT (OUTPATIENT)
Dept: CARDIOLOGY | Facility: CLINIC | Age: 78
End: 2018-07-13
Payer: MEDICARE

## 2018-07-13 VITALS
HEIGHT: 69 IN | WEIGHT: 181.75 LBS | OXYGEN SATURATION: 97 % | BODY MASS INDEX: 26.92 KG/M2 | SYSTOLIC BLOOD PRESSURE: 160 MMHG | HEART RATE: 64 BPM | DIASTOLIC BLOOD PRESSURE: 70 MMHG

## 2018-07-13 DIAGNOSIS — Z95.1 S/P CABG (CORONARY ARTERY BYPASS GRAFT): Chronic | ICD-10-CM

## 2018-07-13 DIAGNOSIS — T68.XXXD HYPOTHERMIA, SUBSEQUENT ENCOUNTER: ICD-10-CM

## 2018-07-13 DIAGNOSIS — E78.2 MIXED HYPERLIPIDEMIA: Chronic | ICD-10-CM

## 2018-07-13 DIAGNOSIS — I48.20 CHRONIC ATRIAL FIBRILLATION: ICD-10-CM

## 2018-07-13 DIAGNOSIS — Z86.73 HX-TIA (TRANSIENT ISCHEMIC ATTACK): Primary | Chronic | ICD-10-CM

## 2018-07-13 DIAGNOSIS — I25.10 CORONARY ARTERY DISEASE INVOLVING NATIVE CORONARY ARTERY OF NATIVE HEART WITHOUT ANGINA PECTORIS: Chronic | ICD-10-CM

## 2018-07-13 DIAGNOSIS — I10 ESSENTIAL HYPERTENSION: ICD-10-CM

## 2018-07-13 DIAGNOSIS — Z79.01 LONG TERM CURRENT USE OF ANTICOAGULANT THERAPY: ICD-10-CM

## 2018-07-13 PROCEDURE — 3078F DIAST BP <80 MM HG: CPT | Mod: CPTII,S$GLB,, | Performed by: INTERNAL MEDICINE

## 2018-07-13 PROCEDURE — 99999 PR PBB SHADOW E&M-EST. PATIENT-LVL III: CPT | Mod: PBBFAC,,, | Performed by: INTERNAL MEDICINE

## 2018-07-13 PROCEDURE — 3077F SYST BP >= 140 MM HG: CPT | Mod: CPTII,S$GLB,, | Performed by: INTERNAL MEDICINE

## 2018-07-13 PROCEDURE — 99215 OFFICE O/P EST HI 40 MIN: CPT | Mod: S$GLB,,, | Performed by: INTERNAL MEDICINE

## 2018-07-13 NOTE — PROGRESS NOTES
"[facility]    CC:   Chief Complaint   Patient presents with    Hospital Follow Up     Hypothermia       Patient ID: Eliazar James is a 77 y.o. male with a past medical history of HTN, HLD, CAD s/p CABG in 1997 and is s/p PCI/DALY in 7/2009 (prox LAD and distal RCA), chronic Afib (on rate control and coumadin), who presents for a follow up appointment after recent hospital discharge.  Pertinent history/events are as follows:    -Pt followed by Cardiology at [facility] in the past.     -Anatomy as of 7/2009 was LIMA-->LAD which is atretic, SVG--->OM patent and SVG--->RCA patent. In 7/2009 pt was admitted for an ACS and had DALY placed in prox LAD and distal RCA.  Since that time, he has been CP free and denies any LUCAS. LVEF is 55% with diastolic dysfunction and apical HK.      -Pt with TIA in 2-2012 and new onset afib. Now on coumadin- declined NOACs due to cost. CHADS 4- was followed by EP. Was on Multaq initially -discontinued due to cost issues and it was decided to continue rate control strategy with anticoagulation.  SPECT on 03/09/2012 was normal.    -At our initial clinic visit on 4/13/2018, Mr. James reported no chest pain shortness of breath, lower extremity edema call palpitations, TIA symptoms, or syncope.  He is tearful and very depressed because his wife is not doing well clinically.  She is currently residing in a nursing home and suffering with recurrent episodes of decompensated heart failure.  States he's taking all medications as prescribed.  Blood pressure today is 120/60.  Plan: Continue current medication regimen.  Mr. James is very depressed due to his wife's failing health.  He does not want to be evaluated by a psychiatrist, but would rather talk with me on a regular basis.  Will see him monthly to assist with coping, given his wife's clinical status.    -At 5/23/2018 clinic visit, Mr. James state he feels tired and depressed.  Wife "doing a little better".  Feels " "stressed and blood pressure elevated today at 170/80.   Plan: Continue current medication regimen.  Pt to keep log of blood pressure/heart rate and bring in next visit for review.     -On 7/3/2018, pt admitted at [facility] with fall and found to be hypothermic in the ED initially on admission- no further episodes of hypothermia throughout hospital stay. No episodes of syncope, lightheadedness, negative orthostatic vitals and BP normal throughout rest of stay after volume resuscitation. No ectopy on telemetry, afib on ekg, no elevations in troponin, blood cx/lactate/lytes wnl. unclear cause of event.    HPI:  Mr. James reports having "absolutely no energy" since hospital discharge.  Feels tired all the time.  Pt is well compensated on exam today.  Blood pressure 160/70 with pulse of 64 bpm.      Past Medical History:   Diagnosis Date    *Atrial fibrillation     Anxiety     Atrial fibrillation 7/5/2012    Blood transfusion     CAD (coronary artery disease) 7/10/2012    Cancer     skin    Cataract     removed from both    Clotting disorder     Coronary artery disease     Depression     HTN (hypertension) 7/10/2012    Hx-TIA (transient ischemic attack) 7/10/2012    Hyperlipidemia     Hypertension     Myocardial infarction     Renal calculus, left 8/18/2017    S/P CABG (coronary artery bypass graft) 1/8/2013    Stroke 2/12    TIA    Urinary tract infection      Past Surgical History:   Procedure Laterality Date    CATARACT EXTRACTION W/  INTRAOCULAR LENS IMPLANT Right 03/27/2008    CATARACT EXTRACTION W/  INTRAOCULAR LENS IMPLANT Left 12/06/2007    CORONARY ARTERY BYPASS GRAFT      triple bypass       Social History     Social History    Marital status:      Spouse name: N/A    Number of children: N/A    Years of education: N/A     Occupational History    Not on file.     Social History Main Topics    Smoking status: Never Smoker    Smokeless tobacco: Never Used    " Alcohol use No    Drug use: No    Sexual activity: No     Other Topics Concern    Not on file     Social History Narrative    No narrative on file     Family History   Problem Relation Age of Onset    Heart attack Father     Heart disease Father     Melanoma Neg Hx     Psoriasis Neg Hx     Lupus Neg Hx     Eczema Neg Hx     Acne Neg Hx     Prostate cancer Neg Hx     Kidney disease Neg Hx        Review of patient's allergies indicates:   Allergen Reactions    No known drug allergies        Medication List with Changes/Refills   Current Medications    ASPIRIN 81 MG TAB    Take 1 tablet by mouth Daily.    ATORVASTATIN (LIPITOR) 40 MG TABLET    Take 1 tablet (40 mg total) by mouth once daily.    CARTIA  MG 24 HR CAPSULE    Take 180 mg by mouth once daily.    CARVEDILOL (COREG) 12.5 MG TABLET    Take 1 tablet (12.5 mg total) by mouth 2 (two) times daily with meals.    DUTASTERIDE-TAMSULOSIN 0.5-0.4 MG CM24    Take 1 capsule by mouth once daily.    FLUTICASONE (FLONASE) 50 MCG/ACTUATION NASAL SPRAY    2 sprays (100 mcg total) by Each Nare route once daily.    LOSARTAN (COZAAR) 50 MG TABLET    Take 1 tablet (50 mg total) by mouth once daily.    NITROGLYCERIN (NITROSTAT) 0.4 MG SL TABLET    Place 1 tablet (0.4 mg total) under the tongue every 5 (five) minutes as needed for Chest pain.    WARFARIN (COUMADIN) 5 MG TABLET    Take 1 tablet (5mg.) by mouth daily, except 1/2 tablet (2.5mg.) on Tuesdays and Fridays,  Or as directed by Coumadin Clinic       Review of Systems  Constitution: Positive for fatigue.  HENT: Denies headaches or blurry vision.  Cardiovascular: Denies chest pain or irregular heart beat.  Respiratory: Denies cough or shortness of breath.  Gastrointestinal: Denies abdominal pain, nausea, or vomiting.  Musculoskeletal: Denies muscle cramps.  Neurological: Denies dizziness or focal weakness.  Psychiatric/Behavioral: Appears depressed.  Hematologic/Lymphatic: Denies bleeding problem or easy  "bruising/bleeding.  Skin: Denies rash or suspicious lesions    Physical Examination  BP (!) 160/70 (BP Location: Right arm, Patient Position: Sitting, BP Method: Large (Manual))   Pulse 64   Ht 5' 9" (1.753 m)   Wt 82.5 kg (181 lb 12.3 oz)   SpO2 97%   BMI 26.84 kg/m²     Constitutional: No acute distress, conversant  HEENT: Sclera anicteric, Pupils equal, round and reactive to light, extraocular motions intact, Oropharynx clear  Neck: No JVD, no carotid bruits  Cardiovascular: Irregularly irregular, no murmur, rubs or gallops  Pulmonary: Clear to auscultation bilaterally  Abdominal: Abdomen soft, nontender, nondistended, positive bowel sounds  Extremities: No lower extremity edema,   Pulses:  Carotid pulses are 2+ on the right side, and 2+ on the left side.  Radial pulses are 2+ on the right side, and 2+ on the left side.   Femoral pulses are 2+ on the right side, and 2+ on the left side.  Skin: No ecchymosis, erythema, or ulcers  Psych: Alert and oriented x 3, appropriate affect  Neuro: CNII-XII intact, no focal deficits    Labs:  Most Recent Data  CBC:   Lab Results   Component Value Date    WBC 8.69 07/05/2018    HGB 12.8 (L) 07/05/2018    HCT 38.4 (L) 07/05/2018     07/05/2018     (H) 07/05/2018    RDW 13.2 07/05/2018     BMP:   Lab Results   Component Value Date     07/05/2018    K 4.1 07/05/2018     07/05/2018    CO2 26 07/05/2018    BUN 17 07/05/2018    CREATININE 0.9 07/05/2018    GLU 94 07/05/2018    CALCIUM 8.5 (L) 07/05/2018    MG 1.8 07/05/2018    PHOS 2.2 (L) 07/05/2018     LFTS;   Lab Results   Component Value Date    PROT 7.7 07/03/2018    ALBUMIN 4.0 07/03/2018    BILITOT 1.0 07/03/2018    AST 22 07/03/2018    ALKPHOS 67 07/03/2018    ALT 28 07/03/2018     COAGS:   Lab Results   Component Value Date    INR 3.6 (H) 07/05/2018     FLP:   Lab Results   Component Value Date    CHOL 104 (L) 04/10/2018    HDL 41 04/10/2018    LDLCALC 54.6 (L) 04/10/2018    TRIG 42 04/10/2018 "    CHOLHDL 39.4 04/10/2018     CARDIAC:   Lab Results   Component Value Date    TROPONINI <0.006 07/04/2018     (H) 06/15/2010       EKG 4/9/2018:  Atrial fibrillation with slow ventricular response  Nonspecific T wave abnormality    Echo 4/24/2015:  CONCLUSIONS     1 - Enlarged ascending aorta.     2 - Normal left ventricular systolic function (EF 55-60%).     3 - Trivial mitral regurgitation.     4 - Mild pulmonic regurgitation.     5 - Low normal to mildly depressed right ventricular systolic function .     6 - Trivial tricuspid regurgitation.      Assessment/Plan:  Eliazar James is a 77 y.o. male with a past medical history of HTN, HLD, CAD s/p CABG in 1997 and is s/p PCI/DALY in 7/2009 (prox LAD and distal RCA), chronic Afib (on rate control and coumadin), who presents for a follow up appointment after recent hospital dishcarge.      1. CAD s/p CABG- No angina currently.  Continue ASA, beta blocker, statin, ARB.      2. Chronic Afib- Continue rate control and coumadin.  INR 3.6 on 7/5/2018.  Pt to report to lab for INR check today.      3. HTN- Elevated on exam today.  Continue current medication regimen.  Pt to bring in log of blood pressure/heart rate.  Adjustments to his antihypertensive regimen will be made accordingly.      4. HLD- Continue atorvastatin 40 mg daily.     5. Depression- Mr. James is very depressed due to his wife's failing health.  He does not want to be evaluated by a psychiatrist, but would rather talk with me on a regular basis.  Will see him monthly to assist with coping, given his wife's clinical status.    6. Syncope-  Pt admitted with syncope episodes.  Etiology unclear.  I recommend placing a 30 day event monitor to evaluate further, but Mr. James does not wish to do this.  Continue to monitor.      Follow up in 2 months    Total duration of face to face visit time 30 minutes.  Total time spent counseling greater than fifty percent of total visit time.  Counseling  included discussion regarding imaging findings, diagnosis, possibilities, treatment options, risks and benefits.  The patient had many questions regarding the options and long-term effects.    Macario Valencia MD, PhD  Interventional Cardiology

## 2018-07-13 NOTE — PATIENT INSTRUCTIONS
Assessment/Plan:  Eliazar James is a 77 y.o. male with a past medical history of HTN, HLD, CAD s/p CABG in 1997 and is s/p PCI/DALY in 7/2009 (prox LAD and distal RCA), chronic Afib (on rate control and coumadin), who presents for a follow up appointment after recent hospital dishcarge.      1. CAD s/p CABG- No angina currently.  Continue ASA, beta blocker, statin, ARB.      2. Chronic Afib- Continue rate control and coumadin.  INR 3.6 on 7/5/2018.  Pt to report to lab for INR check today.      3. HTN- Elevated on exam today.  Continue current medication regimen.  Pt to bring in log of blood pressure/heart rate.  Adjustments to his antihypertensive regimen will be made accordingly.      4. HLD- Continue atorvastatin 40 mg daily.     5. Depression- Mr. James is very depressed due to his wife's failing health.  He does not want to be evaluated by a psychiatrist, but would rather talk with me on a regular basis.  Will see him monthly to assist with coping, given his wife's clinical status.    6. Syncope-  Pt admitted with syncope episodes.  Etiology unclear.  I recommend placing a 30 day event monitor to evaluate further, but Mr. James does not wish to do this.  Continue to monitor.      Follow up in 2 months

## 2018-07-17 ENCOUNTER — ANTI-COAG VISIT (OUTPATIENT)
Dept: CARDIOLOGY | Facility: CLINIC | Age: 78
End: 2018-07-17

## 2018-07-17 DIAGNOSIS — Z79.01 LONG TERM CURRENT USE OF ANTICOAGULANT THERAPY: ICD-10-CM

## 2018-07-17 DIAGNOSIS — I48.20 CHRONIC ATRIAL FIBRILLATION: ICD-10-CM

## 2018-07-17 NOTE — PROGRESS NOTES
Patient admitted 7/3-7/5 after a fall. His INR was 3.6 upon d/c. He had cardiology follow up last week who instructed to get INR due to high INR at d/c. He went on Saturday and it was good. We will repeat INR this week for safe measure due to recent health changes and recent high INR.

## 2018-08-02 ENCOUNTER — ANTI-COAG VISIT (OUTPATIENT)
Dept: CARDIOLOGY | Facility: CLINIC | Age: 78
End: 2018-08-02

## 2018-08-02 DIAGNOSIS — Z79.01 LONG TERM CURRENT USE OF ANTICOAGULANT THERAPY: ICD-10-CM

## 2018-08-02 DIAGNOSIS — I48.20 CHRONIC ATRIAL FIBRILLATION: ICD-10-CM

## 2018-08-30 ENCOUNTER — ANTI-COAG VISIT (OUTPATIENT)
Dept: CARDIOLOGY | Facility: CLINIC | Age: 78
End: 2018-08-30

## 2018-08-30 DIAGNOSIS — Z79.01 LONG TERM CURRENT USE OF ANTICOAGULANT THERAPY: ICD-10-CM

## 2018-08-30 DIAGNOSIS — I48.20 CHRONIC ATRIAL FIBRILLATION: ICD-10-CM

## 2018-09-14 ENCOUNTER — OFFICE VISIT (OUTPATIENT)
Dept: CARDIOLOGY | Facility: CLINIC | Age: 78
End: 2018-09-14
Payer: MEDICARE

## 2018-09-14 VITALS
HEART RATE: 56 BPM | WEIGHT: 180.19 LBS | SYSTOLIC BLOOD PRESSURE: 138 MMHG | OXYGEN SATURATION: 97 % | HEIGHT: 69 IN | BODY MASS INDEX: 26.69 KG/M2 | DIASTOLIC BLOOD PRESSURE: 60 MMHG

## 2018-09-14 DIAGNOSIS — Z79.01 LONG TERM (CURRENT) USE OF ANTICOAGULANTS: ICD-10-CM

## 2018-09-14 DIAGNOSIS — Z79.01 LONG TERM CURRENT USE OF ANTICOAGULANT THERAPY: ICD-10-CM

## 2018-09-14 DIAGNOSIS — R53.83 OTHER FATIGUE: Primary | ICD-10-CM

## 2018-09-14 DIAGNOSIS — I25.10 CORONARY ARTERY DISEASE INVOLVING NATIVE CORONARY ARTERY OF NATIVE HEART WITHOUT ANGINA PECTORIS: Chronic | ICD-10-CM

## 2018-09-14 DIAGNOSIS — Z95.1 S/P CABG (CORONARY ARTERY BYPASS GRAFT): Chronic | ICD-10-CM

## 2018-09-14 DIAGNOSIS — I10 ESSENTIAL HYPERTENSION: Chronic | ICD-10-CM

## 2018-09-14 DIAGNOSIS — E78.5 HYPERLIPIDEMIA, UNSPECIFIED HYPERLIPIDEMIA TYPE: Chronic | ICD-10-CM

## 2018-09-14 PROCEDURE — 3075F SYST BP GE 130 - 139MM HG: CPT | Mod: CPTII,,, | Performed by: INTERNAL MEDICINE

## 2018-09-14 PROCEDURE — 99215 OFFICE O/P EST HI 40 MIN: CPT | Mod: S$PBB,,, | Performed by: INTERNAL MEDICINE

## 2018-09-14 PROCEDURE — 99999 PR PBB SHADOW E&M-EST. PATIENT-LVL III: CPT | Mod: PBBFAC,,, | Performed by: INTERNAL MEDICINE

## 2018-09-14 PROCEDURE — 1101F PT FALLS ASSESS-DOCD LE1/YR: CPT | Mod: CPTII,,, | Performed by: INTERNAL MEDICINE

## 2018-09-14 PROCEDURE — 99213 OFFICE O/P EST LOW 20 MIN: CPT | Mod: PBBFAC,PN | Performed by: INTERNAL MEDICINE

## 2018-09-14 PROCEDURE — 3078F DIAST BP <80 MM HG: CPT | Mod: CPTII,,, | Performed by: INTERNAL MEDICINE

## 2018-09-14 RX ORDER — ATORVASTATIN CALCIUM 40 MG/1
40 TABLET, FILM COATED ORAL DAILY
Qty: 90 TABLET | Refills: 3 | Status: SHIPPED | OUTPATIENT
Start: 2018-09-14

## 2018-09-14 RX ORDER — LOSARTAN POTASSIUM 100 MG/1
100 TABLET ORAL DAILY
Qty: 90 TABLET | Refills: 3 | Status: SHIPPED | OUTPATIENT
Start: 2018-09-14 | End: 2018-11-27 | Stop reason: SDUPTHER

## 2018-09-14 RX ORDER — WARFARIN SODIUM 5 MG/1
TABLET ORAL
Qty: 90 TABLET | Refills: 2 | Status: SHIPPED | OUTPATIENT
Start: 2018-09-14 | End: 2018-11-27 | Stop reason: SDUPTHER

## 2018-09-14 RX ORDER — CARVEDILOL 12.5 MG/1
12.5 TABLET ORAL 2 TIMES DAILY WITH MEALS
Qty: 180 TABLET | Refills: 3 | Status: ON HOLD | OUTPATIENT
Start: 2018-09-14 | End: 2020-01-22 | Stop reason: HOSPADM

## 2018-09-14 RX ORDER — DILTIAZEM HYDROCHLORIDE 180 MG/1
180 CAPSULE, EXTENDED RELEASE ORAL DAILY
Qty: 90 CAPSULE | Refills: 3 | Status: ON HOLD | OUTPATIENT
Start: 2018-09-14 | End: 2022-01-31 | Stop reason: HOSPADM

## 2018-09-14 NOTE — PATIENT INSTRUCTIONS
Assessment/Plan:  Eliazar James is a 77 y.o. male with a past medical history of HTN, HLD, CAD s/p CABG in 1997 and is s/p PCI/DALY in 7/2009 (prox LAD and distal RCA), chronic Afib (on rate control and coumadin), who presents for a follow up appointment.      1. Fatigue- Pt complains of ongoing fatigue.  Given significant CAD history, check PET stress test to evaluate further.  Continue ASA, beta blocker, statin, ARB.      2. Chronic Afib- Continue rate control and coumadin.  INR 3.6 on 7/5/2018.  Pt to report to lab for INR check today.      3. HTN- Increase losartan to 100 mg daily.  Pt to continue keeping log of blood pressure/heart rate.      4. HLD- Continue atorvastatin 40 mg daily.     5. Depression- Mr. James is very depressed due to his wife's failing health.  He does not want to be evaluated by a psychiatrist, but would rather talk with me on a regular basis.  Will see him monthly to assist with coping, given his wife's clinical status.    6. Syncope-  Pt previously admitted with syncope episodes.  Etiology unclear.  I recommend placing a 30 day event monitor to evaluate further, but Mr. James does not wish to do this.  Continue to monitor.      Follow up in 6 weeks

## 2018-09-14 NOTE — PROGRESS NOTES
"[facility]    CC:   Chief Complaint   Patient presents with    Follow-up       Patient ID: Eliazar James is a 77 y.o. male with a past medical history of HTN, HLD, CAD s/p CABG in 1997 and is s/p PCI/DALY in 7/2009 (prox LAD and distal RCA), chronic Afib (on rate control and coumadin), who presents for a follow up appointment.  Pertinent history/events are as follows:    -Pt followed by Cardiology at [facility] in the past.     -Anatomy as of 7/2009 was LIMA-->LAD which is atretic, SVG--->OM patent and SVG--->RCA patent. In 7/2009 pt was admitted for an ACS and had DALY placed in prox LAD and distal RCA.  Since that time, he has been CP free and denies any LUCAS. LVEF is 55% with diastolic dysfunction and apical HK.      -Pt with TIA in 2-2012 and new onset afib. Now on coumadin- declined NOACs due to cost. CHADS 4- was followed by EP. Was on Multaq initially -discontinued due to cost issues and it was decided to continue rate control strategy with anticoagulation.  SPECT on 03/09/2012 was normal.    -At our initial clinic visit on 4/13/2018, Mr. James reported no chest pain shortness of breath, lower extremity edema, palpitations, TIA symptoms, or syncope.  He is tearful and very depressed because his wife is not doing well clinically.  She is currently residing in a nursing home and suffering with recurrent episodes of decompensated heart failure.  States he's taking all medications as prescribed.  Blood pressure today is 120/60.  Plan: Continue current medication regimen.  Mr. James is very depressed due to his wife's failing health.  He does not want to be evaluated by a psychiatrist, but would rather talk with me on a regular basis.  Will see him monthly to assist with coping, given his wife's clinical status.    -At 5/23/2018 clinic visit, Mr. James state he feels tired and depressed.  Wife "doing a little better".  Feels stressed and blood pressure elevated today at 170/80.   Plan: " "Continue current medication regimen.  Pt to keep log of blood pressure/heart rate and bring in next visit for review.     -On 7/3/2018, pt admitted at [facility] with fall and found to be hypothermic in the ED initially on admission- no further episodes of hypothermia throughout hospital stay. No episodes of syncope, lightheadedness, negative orthostatic vitals and BP normal throughout rest of stay after volume resuscitation. No ectopy on telemetry, afib on ekg, no elevations in troponin, blood cx/lactate/lytes wnl. unclear cause of event.    -At clinic 7/13/2018 clinic visit, Mr. James reported having "absolutely no energy" since hospital discharge.  Feels tired all the time.  Pt is well compensated on exam today.  Blood pressure 160/70 with pulse of 64 bpm.   Plan: Blood pressure elevated on exam today.  Continue current medication regimen.  Pt to bring in log of blood pressure/heart rate.  Adjustments to his antihypertensive regimen will be made accordingly.      HPI:  Mr. James reports continued fatigue and "no energy at all".  States when he "gets down on the ground to work in the ground, I can't get up".  Also has trouble getting out of bed.  He has no chest pain, SOB, LE edema, TIA symptoms or syncope.  States he's very stressed out because his wife is not doing well.       Past Medical History:   Diagnosis Date    *Atrial fibrillation     Anxiety     Atrial fibrillation 7/5/2012    Blood transfusion     CAD (coronary artery disease) 7/10/2012    Cancer     skin    Cataract     removed from both    Clotting disorder     Coronary artery disease     Depression     HTN (hypertension) 7/10/2012    Hx-TIA (transient ischemic attack) 7/10/2012    Hyperlipidemia     Hypertension     Myocardial infarction     Renal calculus, left 8/18/2017    S/P CABG (coronary artery bypass graft) 1/8/2013    Stroke 2/12    TIA    Urinary tract infection      Past Surgical History:   Procedure " Laterality Date    CATARACT EXTRACTION W/  INTRAOCULAR LENS IMPLANT Right 03/27/2008    CATARACT EXTRACTION W/  INTRAOCULAR LENS IMPLANT Left 12/06/2007    CORONARY ARTERY BYPASS GRAFT      triple bypass       Social History     Socioeconomic History    Marital status:      Spouse name: Not on file    Number of children: Not on file    Years of education: Not on file    Highest education level: Not on file   Social Needs    Financial resource strain: Not on file    Food insecurity - worry: Not on file    Food insecurity - inability: Not on file    Transportation needs - medical: Not on file    Transportation needs - non-medical: Not on file   Occupational History    Not on file   Tobacco Use    Smoking status: Never Smoker    Smokeless tobacco: Never Used   Substance and Sexual Activity    Alcohol use: No    Drug use: No    Sexual activity: No   Other Topics Concern    Not on file   Social History Narrative    Not on file     Family History   Problem Relation Age of Onset    Heart attack Father     Heart disease Father     Melanoma Neg Hx     Psoriasis Neg Hx     Lupus Neg Hx     Eczema Neg Hx     Acne Neg Hx     Prostate cancer Neg Hx     Kidney disease Neg Hx        Review of patient's allergies indicates:   Allergen Reactions    No known drug allergies           Medication List           Accurate as of 9/14/18  9:40 AM. If you have any questions, ask your nurse or doctor.               CONTINUE taking these medications    aspirin 81 mg Tab     atorvastatin 40 MG tablet  Commonly known as:  LIPITOR  Take 1 tablet (40 mg total) by mouth once daily.     CARTIA  MG 24 hr capsule  Generic drug:  diltiaZEM     carvedilol 12.5 MG tablet  Commonly known as:  COREG  Take 1 tablet (12.5 mg total) by mouth 2 (two) times daily with meals.     dutasteride-tamsulosin 0.5-0.4 mg Cm24  Take 1 capsule by mouth once daily.     fluticasone 50 mcg/actuation nasal spray  Commonly known as:   "FLONASE  2 sprays (100 mcg total) by Each Nare route once daily.     losartan 50 MG tablet  Commonly known as:  COZAAR  Take 1 tablet (50 mg total) by mouth once daily.     nitroGLYCERIN 0.4 MG SL tablet  Commonly known as:  NITROSTAT  Place 1 tablet (0.4 mg total) under the tongue every 5 (five) minutes as needed for Chest pain.     warfarin 5 MG tablet  Commonly known as:  COUMADIN  Take 1 tablet (5mg.) by mouth daily, except 1/2 tablet (2.5mg.) on Tuesdays and Fridays,  Or as directed by Coumadin Clinic            Review of Systems  Constitution: Positive for fatigue.  HENT: Denies headaches or blurry vision.  Cardiovascular: Denies chest pain or irregular heart beat.  Respiratory: Denies cough or shortness of breath.  Gastrointestinal: Denies abdominal pain, nausea, or vomiting.  Musculoskeletal: Denies muscle cramps.  Neurological: Denies dizziness or focal weakness.  Psychiatric/Behavioral: Appears depressed.  Hematologic/Lymphatic: Denies bleeding problem or easy bruising/bleeding.  Skin: Denies rash or suspicious lesions    Physical Examination  /60   Pulse (!) 56   Ht 5' 9" (1.753 m)   Wt 81.7 kg (180 lb 3.2 oz)   SpO2 97%   BMI 26.61 kg/m²     Constitutional: No acute distress, conversant  HEENT: Sclera anicteric, Pupils equal, round and reactive to light, extraocular motions intact, Oropharynx clear  Neck: No JVD, no carotid bruits  Cardiovascular: Irregularly irregular, no murmur, rubs or gallops  Pulmonary: Clear to auscultation bilaterally  Abdominal: Abdomen soft, nontender, nondistended, positive bowel sounds  Extremities: No lower extremity edema,   Pulses:  Carotid pulses are 2+ on the right side, and 2+ on the left side.  Radial pulses are 2+ on the right side, and 2+ on the left side.   Femoral pulses are 2+ on the right side, and 2+ on the left side.  Skin: No ecchymosis, erythema, or ulcers  Psych: Alert and oriented x 3, appropriate affect  Neuro: CNII-XII intact, no focal " deficits    Labs:  Most Recent Data  CBC:   Lab Results   Component Value Date    WBC 8.69 07/05/2018    HGB 12.8 (L) 07/05/2018    HCT 38.4 (L) 07/05/2018     07/05/2018     (H) 07/05/2018    RDW 13.2 07/05/2018     BMP:   Lab Results   Component Value Date     07/05/2018    K 4.1 07/05/2018     07/05/2018    CO2 26 07/05/2018    BUN 17 07/05/2018    CREATININE 0.9 07/05/2018    GLU 94 07/05/2018    CALCIUM 8.5 (L) 07/05/2018    MG 1.8 07/05/2018    PHOS 2.2 (L) 07/05/2018     LFTS;   Lab Results   Component Value Date    PROT 7.7 07/03/2018    ALBUMIN 4.0 07/03/2018    BILITOT 1.0 07/03/2018    AST 22 07/03/2018    ALKPHOS 67 07/03/2018    ALT 28 07/03/2018     COAGS:   Lab Results   Component Value Date    INR 2.8 (H) 08/30/2018     FLP:   Lab Results   Component Value Date    CHOL 104 (L) 04/10/2018    HDL 41 04/10/2018    LDLCALC 54.6 (L) 04/10/2018    TRIG 42 04/10/2018    CHOLHDL 39.4 04/10/2018     CARDIAC:   Lab Results   Component Value Date    TROPONINI <0.006 07/04/2018     (H) 06/15/2010       EKG 4/9/2018:  Atrial fibrillation with slow ventricular response  Nonspecific T wave abnormality    Echo 4/24/2015:  CONCLUSIONS     1 - Enlarged ascending aorta.     2 - Normal left ventricular systolic function (EF 55-60%).     3 - Trivial mitral regurgitation.     4 - Mild pulmonic regurgitation.     5 - Low normal to mildly depressed right ventricular systolic function .     6 - Trivial tricuspid regurgitation.      Assessment/Plan:  Eliazar James is a 77 y.o. male with a past medical history of HTN, HLD, CAD s/p CABG in 1997 and is s/p PCI/DALY in 7/2009 (prox LAD and distal RCA), chronic Afib (on rate control and coumadin), who presents for a follow up appointment.      1. Fatigue- Pt complains of ongoing fatigue.  Given significant CAD history, check PET stress test to evaluate further.  Continue ASA, beta blocker, statin, ARB.      2. Chronic Afib- Continue rate control  and coumadin.  INR 3.6 on 7/5/2018.  Pt to report to lab for INR check today.      3. HTN- Increase losartan to 100 mg daily.  Pt to continue keeping log of blood pressure/heart rate.      4. HLD- Continue atorvastatin 40 mg daily.     5. Depression- Mr. James is very depressed due to his wife's failing health.  He does not want to be evaluated by a psychiatrist, but would rather talk with me on a regular basis.  Will see him monthly to assist with coping, given his wife's clinical status.    6. Syncope-  Pt previously admitted with syncope episodes.  Etiology unclear.  I recommend placing a 30 day event monitor to evaluate further, but Mr. James does not wish to do this.  Continue to monitor.      Follow up in 6 weeks    Total duration of face to face visit time 30 minutes.  Total time spent counseling greater than fifty percent of total visit time.  Counseling included discussion regarding imaging findings, diagnosis, possibilities, treatment options, risks and benefits.  The patient had many questions regarding the options and long-term effects.    Macario Valencia MD, PhD  Interventional Cardiology

## 2018-10-03 ENCOUNTER — ANTI-COAG VISIT (OUTPATIENT)
Dept: CARDIOLOGY | Facility: CLINIC | Age: 78
End: 2018-10-03

## 2018-10-03 DIAGNOSIS — I48.20 CHRONIC ATRIAL FIBRILLATION: ICD-10-CM

## 2018-10-03 DIAGNOSIS — Z79.01 LONG TERM CURRENT USE OF ANTICOAGULANT THERAPY: ICD-10-CM

## 2018-10-03 NOTE — PROGRESS NOTES
INR supratherapeutic today and has been therapeutic in the past on this same dose.  He denies any changes or bleeding.  Chart review reveals no changes as well.  Will hold today and resume usual dose.

## 2018-10-17 ENCOUNTER — TELEPHONE (OUTPATIENT)
Dept: FAMILY MEDICINE | Facility: CLINIC | Age: 78
End: 2018-10-17

## 2018-10-17 DIAGNOSIS — E78.2 HYPERLIPIDEMIA, MIXED: ICD-10-CM

## 2018-10-17 DIAGNOSIS — I10 ESSENTIAL HYPERTENSION: Primary | ICD-10-CM

## 2018-10-17 NOTE — TELEPHONE ENCOUNTER
----- Message from Angeli Lazcano sent at 10/17/2018 11:17 AM CDT -----  Contact: [phone]/ self   Pt its requesting lab work orders for a follow up  . Please advise

## 2018-10-17 NOTE — TELEPHONE ENCOUNTER
Patient would like to get his labs done before coming to see you for his follow up appt. Please advise

## 2018-10-17 NOTE — TELEPHONE ENCOUNTER
I can order labs, but other labs may be needed and he will not get results until his appointment.

## 2018-10-19 ENCOUNTER — CLINICAL SUPPORT (OUTPATIENT)
Dept: CARDIOLOGY | Facility: CLINIC | Age: 78
End: 2018-10-19
Attending: INTERNAL MEDICINE
Payer: MEDICARE

## 2018-10-19 DIAGNOSIS — I25.10 CORONARY ARTERY DISEASE INVOLVING NATIVE CORONARY ARTERY OF NATIVE HEART WITHOUT ANGINA PECTORIS: Chronic | ICD-10-CM

## 2018-10-19 DIAGNOSIS — E78.5 HYPERLIPIDEMIA, UNSPECIFIED HYPERLIPIDEMIA TYPE: Chronic | ICD-10-CM

## 2018-10-19 DIAGNOSIS — Z95.1 S/P CABG (CORONARY ARTERY BYPASS GRAFT): Chronic | ICD-10-CM

## 2018-10-19 DIAGNOSIS — R53.83 OTHER FATIGUE: ICD-10-CM

## 2018-10-19 DIAGNOSIS — I10 ESSENTIAL HYPERTENSION: Chronic | ICD-10-CM

## 2018-10-19 DIAGNOSIS — Z79.01 LONG TERM (CURRENT) USE OF ANTICOAGULANTS: ICD-10-CM

## 2018-10-19 LAB — DIASTOLIC DYSFUNCTION: NO

## 2018-10-19 PROCEDURE — 93018 CV STRESS TEST I&R ONLY: CPT | Mod: S$PBB,,, | Performed by: INTERNAL MEDICINE

## 2018-10-19 PROCEDURE — 93017 CV STRESS TEST TRACING ONLY: CPT | Mod: PBBFAC | Performed by: INTERNAL MEDICINE

## 2018-10-19 PROCEDURE — 93016 CV STRESS TEST SUPVJ ONLY: CPT | Mod: S$PBB,,, | Performed by: INTERNAL MEDICINE

## 2018-10-19 PROCEDURE — 78492 MYOCRD IMG PET MLT RST&STRS: CPT | Mod: 26,S$PBB,, | Performed by: INTERNAL MEDICINE

## 2018-10-22 ENCOUNTER — ANTI-COAG VISIT (OUTPATIENT)
Dept: CARDIOLOGY | Facility: CLINIC | Age: 78
End: 2018-10-22

## 2018-10-22 DIAGNOSIS — I48.20 CHRONIC ATRIAL FIBRILLATION: ICD-10-CM

## 2018-10-22 DIAGNOSIS — Z79.01 LONG TERM CURRENT USE OF ANTICOAGULANT THERAPY: ICD-10-CM

## 2018-10-22 NOTE — PROGRESS NOTES
Patient advised to take coumadin: (2.5mg) MWF; (5mg) ALL other days. Also to re test INR on 10/30. Patient verbalized understanding.

## 2018-10-26 ENCOUNTER — OFFICE VISIT (OUTPATIENT)
Dept: CARDIOLOGY | Facility: CLINIC | Age: 78
End: 2018-10-26
Payer: MEDICARE

## 2018-10-26 VITALS
TEMPERATURE: 98 F | OXYGEN SATURATION: 96 % | SYSTOLIC BLOOD PRESSURE: 122 MMHG | WEIGHT: 181.38 LBS | BODY MASS INDEX: 26.79 KG/M2 | HEART RATE: 65 BPM | DIASTOLIC BLOOD PRESSURE: 72 MMHG

## 2018-10-26 DIAGNOSIS — R53.83 OTHER FATIGUE: ICD-10-CM

## 2018-10-26 DIAGNOSIS — E78.2 MIXED HYPERLIPIDEMIA: Chronic | ICD-10-CM

## 2018-10-26 DIAGNOSIS — I10 ESSENTIAL HYPERTENSION: ICD-10-CM

## 2018-10-26 DIAGNOSIS — Z95.1 S/P CABG (CORONARY ARTERY BYPASS GRAFT): Chronic | ICD-10-CM

## 2018-10-26 DIAGNOSIS — I48.20 CHRONIC ATRIAL FIBRILLATION: ICD-10-CM

## 2018-10-26 DIAGNOSIS — I25.10 CORONARY ARTERY DISEASE INVOLVING NATIVE CORONARY ARTERY OF NATIVE HEART WITHOUT ANGINA PECTORIS: Primary | Chronic | ICD-10-CM

## 2018-10-26 PROCEDURE — 99213 OFFICE O/P EST LOW 20 MIN: CPT | Mod: PBBFAC,PN | Performed by: INTERNAL MEDICINE

## 2018-10-26 PROCEDURE — 1101F PT FALLS ASSESS-DOCD LE1/YR: CPT | Mod: CPTII,,, | Performed by: INTERNAL MEDICINE

## 2018-10-26 PROCEDURE — 99999 PR PBB SHADOW E&M-EST. PATIENT-LVL III: CPT | Mod: PBBFAC,,, | Performed by: INTERNAL MEDICINE

## 2018-10-26 PROCEDURE — 99215 OFFICE O/P EST HI 40 MIN: CPT | Mod: S$PBB,,, | Performed by: INTERNAL MEDICINE

## 2018-10-26 PROCEDURE — 3074F SYST BP LT 130 MM HG: CPT | Mod: CPTII,,, | Performed by: INTERNAL MEDICINE

## 2018-10-26 PROCEDURE — 3078F DIAST BP <80 MM HG: CPT | Mod: CPTII,,, | Performed by: INTERNAL MEDICINE

## 2018-10-26 NOTE — PROGRESS NOTES
"[facility]    Chief Complaint   Patient presents with    Follow-up     fatigue and to review tests       Patient ID: Eliazar James is a 77 y.o. male with a past medical history of HTN, HLD, CAD s/p CABG in 1997 and is s/p PCI/DALY in 7/2009 (prox LAD and distal RCA), chronic Afib (on rate control and coumadin), who presents for a follow up appointment.  Pertinent history/events are as follows:    -Pt followed by [facility]iology at [facility] in the past.     -Anatomy as of 7/2009 was LIMA-->LAD which is atretic, SVG--->OM patent and SVG--->RCA patent. In 7/2009 pt was admitted for an ACS and had DALY placed in prox LAD and distal RCA.  Since that time, he has been CP free and denies any LUCAS. LVEF is 55% with diastolic dysfunction and apical HK.      -Pt with TIA in 2-2012 and new onset afib. Now on coumadin- declined NOACs due to cost. CHADS 4- was followed by EP. Was on Multaq initially -discontinued due to cost issues and it was decided to continue rate control strategy with anticoagulation.  SPECT on 03/09/2012 was normal.    -At our initial clinic visit on 4/13/2018, Mr. James reported no chest pain shortness of breath, lower extremity edema, palpitations, TIA symptoms, or syncope.  He is tearful and very depressed because his wife is not doing well clinically.  She is currently residing in a nursing home and suffering with recurrent episodes of decompensated heart failure.  States he's taking all medications as prescribed.  Blood pressure today is 120/60.  Plan: Continue current medication regimen.  Mr. James is very depressed due to his wife's failing health.  He does not want to be evaluated by a psychiatrist, but would rather talk with me on a regular basis.  Will see him monthly to assist with coping, given his wife's clinical status.    -At 5/23/2018 clinic visit, Mr. James state he feels tired and depressed.  Wife "doing a little better".  Feels stressed and blood pressure elevated " "today at 170/80.   Plan: Continue current medication regimen.  Pt to keep log of blood pressure/heart rate and bring in next visit for review.     -On 7/3/2018, pt admitted at [facility] with fall and found to be hypothermic in the ED initially on admission- no further episodes of hypothermia throughout hospital stay. No episodes of syncope, lightheadedness, negative orthostatic vitals and BP normal throughout rest of stay after volume resuscitation. No ectopy on telemetry, afib on ekg, no elevations in troponin, blood cx/lactate/lytes wnl. unclear cause of event.    -At clinic 7/13/2018 clinic visit, Mr. James reported having "absolutely no energy" since hospital discharge.  Feels tired all the time.  Pt is well compensated on exam today.  Blood pressure 160/70 with pulse of 64 bpm.   Plan: Blood pressure elevated on exam today.  Continue current medication regimen.  Pt to bring in log of blood pressure/heart rate.  Adjustments to his antihypertensive regimen will be made accordingly.      -At clinic visit on 9/14/2018, Mr. James reported continued fatigue and "no energy at all".  States when he "gets down on the ground to work in the ground, I can't get up".  Also has trouble getting out of bed.  He has no chest pain, SOB, LE edema, TIA symptoms or syncope.  States he's very stressed out because his wife is not doing well.    Plan: Pt complains of ongoing fatigue.  Given significant CAD history, check PET stress test to evaluate further.  Continue ASA, beta blocker, statin, ARB.    HPI:  Mr. James reports some improvement in his fatigue since our visit on 9/14/2018.  He has no chest pain, SOB, or LE edema.  PET stress test from 10/19/2018 shows no evidence of a discrete hemodynamically significant coronary stenosis.    Past Medical History:   Diagnosis Date    *Atrial fibrillation     Anxiety     Atrial fibrillation 7/5/2012    Blood transfusion     CAD (coronary artery disease) 7/10/2012 "    Cancer     skin    Cataract     removed from both    Clotting disorder     Coronary artery disease     Depression     HTN (hypertension) 7/10/2012    Hx-TIA (transient ischemic attack) 7/10/2012    Hyperlipidemia     Hypertension     Myocardial infarction     Renal calculus, left 8/18/2017    S/P CABG (coronary artery bypass graft) 1/8/2013    Stroke 2/12    TIA    Urinary tract infection      Past Surgical History:   Procedure Laterality Date    CATARACT EXTRACTION W/  INTRAOCULAR LENS IMPLANT Right 03/27/2008    CATARACT EXTRACTION W/  INTRAOCULAR LENS IMPLANT Left 12/06/2007    CORONARY ARTERY BYPASS GRAFT      triple bypass       Social History     Socioeconomic History    Marital status:      Spouse name: Not on file    Number of children: Not on file    Years of education: Not on file    Highest education level: Not on file   Social Needs    Financial resource strain: Not on file    Food insecurity - worry: Not on file    Food insecurity - inability: Not on file    Transportation needs - medical: Not on file    Transportation needs - non-medical: Not on file   Occupational History    Not on file   Tobacco Use    Smoking status: Never Smoker    Smokeless tobacco: Never Used   Substance and Sexual Activity    Alcohol use: No    Drug use: No    Sexual activity: No   Other Topics Concern    Not on file   Social History Narrative    Not on file     Family History   Problem Relation Age of Onset    Heart attack Father     Heart disease Father     Melanoma Neg Hx     Psoriasis Neg Hx     Lupus Neg Hx     Eczema Neg Hx     Acne Neg Hx     Prostate cancer Neg Hx     Kidney disease Neg Hx        Review of patient's allergies indicates:   Allergen Reactions    No known drug allergies           Medication List           Accurate as of 10/26/18  1:34 PM. If you have any questions, ask your nurse or doctor.               CONTINUE taking these medications    aspirin 81  mg Tab     atorvastatin 40 MG tablet  Commonly known as:  LIPITOR  Take 1 tablet (40 mg total) by mouth once daily.     CARTIA  MG 24 hr capsule  Generic drug:  diltiaZEM  Take 1 capsule (180 mg total) by mouth once daily.     carvedilol 12.5 MG tablet  Commonly known as:  COREG  Take 1 tablet (12.5 mg total) by mouth 2 (two) times daily with meals.     dutasteride-tamsulosin 0.5-0.4 mg Cm24  Take 1 capsule by mouth once daily.     fluticasone 50 mcg/actuation nasal spray  Commonly known as:  FLONASE  2 sprays (100 mcg total) by Each Nare route once daily.     losartan 100 MG tablet  Commonly known as:  COZAAR  Take 1 tablet (100 mg total) by mouth once daily.     nitroGLYCERIN 0.4 MG SL tablet  Commonly known as:  NITROSTAT  Place 1 tablet (0.4 mg total) under the tongue every 5 (five) minutes as needed for Chest pain.     warfarin 5 MG tablet  Commonly known as:  COUMADIN  Take 1 tablet (5mg.) by mouth daily, except 1/2 tablet (2.5mg.) on Tuesdays and Fridays,  Or as directed by Coumadin Clinic            Review of Systems  Constitution: Positive for fatigue.  HENT: Denies headaches or blurry vision.  Cardiovascular: Denies chest pain or irregular heart beat.  Respiratory: Denies cough or shortness of breath.  Gastrointestinal: Denies abdominal pain, nausea, or vomiting.  Musculoskeletal: Denies muscle cramps.  Neurological: Denies dizziness or focal weakness.  Psychiatric/Behavioral: Appears depressed.  Hematologic/Lymphatic: Denies bleeding problem or easy bruising/bleeding.  Skin: Denies rash or suspicious lesions    Physical Examination  /72   Pulse 65   Temp 98.1 °F (36.7 °C)   Wt 82.3 kg (181 lb 6.4 oz)   SpO2 96%   BMI 26.79 kg/m²     Constitutional: No acute distress, conversant  HEENT: Sclera anicteric, Pupils equal, round and reactive to light, extraocular motions intact, Oropharynx clear  Neck: No JVD, no carotid bruits  Cardiovascular: Irregularly irregular, no murmur, rubs or  gallops  Pulmonary: Clear to auscultation bilaterally  Abdominal: Abdomen soft, nontender, nondistended, positive bowel sounds  Extremities: No lower extremity edema,   Pulses:  Carotid pulses are 2+ on the right side, and 2+ on the left side.  Radial pulses are 2+ on the right side, and 2+ on the left side.   Femoral pulses are 2+ on the right side, and 2+ on the left side.  Skin: No ecchymosis, erythema, or ulcers  Psych: Alert and oriented x 3, appropriate affect  Neuro: CNII-XII intact, no focal deficits    Labs:  Most Recent Data  CBC:   Lab Results   Component Value Date    WBC 7.46 10/22/2018    HGB 13.6 (L) 10/22/2018    HCT 40.8 10/22/2018     10/22/2018    MCV 99 (H) 10/22/2018    RDW 13.0 10/22/2018     BMP:   Lab Results   Component Value Date     10/22/2018    K 4.1 10/22/2018     10/22/2018    CO2 24 10/22/2018    BUN 20 10/22/2018    CREATININE 0.99 10/22/2018     10/22/2018    CALCIUM 8.8 10/22/2018    MG 1.8 07/05/2018    PHOS 2.2 (L) 07/05/2018     LFTS;   Lab Results   Component Value Date    PROT 7.2 10/22/2018    ALBUMIN 3.8 10/22/2018    BILITOT 1.0 10/22/2018    AST 22 10/22/2018    ALKPHOS 68 10/22/2018    ALT 28 10/22/2018     COAGS:   Lab Results   Component Value Date    INR 3.2 (H) 10/22/2018     FLP:   Lab Results   Component Value Date    CHOL 96 (L) 10/22/2018    HDL 39 (L) 10/22/2018    LDLCALC 48.2 (L) 10/22/2018    TRIG 44 10/22/2018    CHOLHDL 40.6 10/22/2018     CARDIAC:   Lab Results   Component Value Date    TROPONINI <0.006 07/04/2018     (H) 06/15/2010       EKG 4/9/2018:  Atrial fibrillation with slow ventricular response  Nonspecific T wave abnormality    PET Stress Test 10/19/2018:  Nuclear Quantitative Functional Analysis:   At rest:  LVEF: 64 % (normal is >= 51%)  LVED Volume: 109 ml (normal is <=171)  LVES Volume: 39 ml (normal is <=70)  At stress:  LVEF: 67 % (normal is >= 51%)  LVED Volume: 128 ml (normal is <=171)  LVES Volume: 43 ml  (normal is <=70)    CONCLUSIONS: NO CLINICALLY SIGNIFICANT STRESS INDUCED PERFUSION DEFECTS as assessed with PET perfusion imaging.  1. There is no evidence of a discrete hemodynamically significant coronary stenosis.   2. Although no clinically significant stress defect is seen, there is reduced coronary flow reserve. These results suggest mild endothelial dysfunction due to elevated cholesterol, high blood pressure and diffuse, non-obstructive coronary atherosclerosis   without clinically significant, localized perfusion defects.   3. Resting wall motion is physiologic. Stress wall motion is physiologic.   4. LV function is normal at rest and stress.  (normal is >= 51%)  5. The ventricular volumes are normal at rest and stress.   6. The extracardiac distribution of radioactivity is normal.   7. There was no previous study available to compare.    Echo 4/24/2015:  CONCLUSIONS     1 - Enlarged ascending aorta.     2 - Normal left ventricular systolic function (EF 55-60%).     3 - Trivial mitral regurgitation.     4 - Mild pulmonic regurgitation.     5 - Low normal to mildly depressed right ventricular systolic function .     6 - Trivial tricuspid regurgitation.      Assessment/Plan:  Eliazar James is a 77 y.o. male with a past medical history of HTN, HLD, CAD s/p CABG in 1997 and is s/p PCI/DALY in 7/2009 (prox LAD and distal RCA), chronic Afib (on rate control and coumadin), who presents for a follow up appointment.      1. Fatigue- Mr. James reports some improvement in his fatigue since our visit on 9/14/2018.  He has no chest pain, SOB, or LE edema.  PET stress test from 10/19/2018 shows no evidence of a discrete hemodynamically significant coronary stenosis.  Continue ASA, beta blocker, statin, ARB.      2. Chronic Afib- Continue rate control and coumadin.       3. HTN- Continue current antihypertensive medication regimen.  Pt to continue keeping log of blood pressure/heart rate.      4. HLD- Continue  atorvastatin 40 mg daily.     5. Depression- Mr. James is very depressed due to his wife's failing health.  He does not want to be evaluated by a psychiatrist, but would rather talk with me on a regular basis.  Will see him monthly to assist with coping, given his wife's clinical status.    6. Syncope-  Pt previously admitted with syncopal episodes.  Etiology unclear.  I recommend placing a 30 day event monitor to evaluate further, but Mr. James does not wish to do this.  Continue to monitor.      Follow up in 6 months    Total duration of face to face visit time 30 minutes.  Total time spent counseling greater than fifty percent of total visit time.  Counseling included discussion regarding imaging findings, diagnosis, possibilities, treatment options, risks and benefits.  The patient had many questions regarding the options and long-term effects.    Macario Valencia MD, PhD  Interventional Cardiology

## 2018-10-26 NOTE — PATIENT INSTRUCTIONS
Assessment/Plan:  Eliazar James is a 77 y.o. male with a past medical history of HTN, HLD, CAD s/p CABG in 1997 and is s/p PCI/DALY in 7/2009 (prox LAD and distal RCA), chronic Afib (on rate control and coumadin), who presents for a follow up appointment.      1. Fatigue- Mr. James reports some improvement in his fatigue since our visit on 9/14/2018.  He has no chest pain, SOB, or LE edema.  PET stress test from 10/19/2018 shows no evidence of a discrete hemodynamically significant coronary stenosis.  Continue ASA, beta blocker, statin, ARB.      2. Chronic Afib- Continue rate control and coumadin.       3. HTN- Continue current antihypertensive medication regimen.  Pt to continue keeping log of blood pressure/heart rate.      4. HLD- Continue atorvastatin 40 mg daily.     5. Depression- Mr. James is very depressed due to his wife's failing health.  He does not want to be evaluated by a psychiatrist, but would rather talk with me on a regular basis.  Will see him monthly to assist with coping, given his wife's clinical status.    6. Syncope-  Pt previously admitted with syncopal episodes.  Etiology unclear.  I recommend placing a 30 day event monitor to evaluate further, but Mr. James does not wish to do this.  Continue to monitor.      Follow up in 6 months

## 2018-10-30 ENCOUNTER — OFFICE VISIT (OUTPATIENT)
Dept: FAMILY MEDICINE | Facility: CLINIC | Age: 78
End: 2018-10-30
Payer: MEDICARE

## 2018-10-30 VITALS
OXYGEN SATURATION: 97 % | WEIGHT: 180.5 LBS | BODY MASS INDEX: 26.73 KG/M2 | DIASTOLIC BLOOD PRESSURE: 62 MMHG | HEIGHT: 69 IN | SYSTOLIC BLOOD PRESSURE: 116 MMHG | RESPIRATION RATE: 16 BRPM | HEART RATE: 48 BPM

## 2018-10-30 DIAGNOSIS — I48.20 CHRONIC ATRIAL FIBRILLATION: ICD-10-CM

## 2018-10-30 DIAGNOSIS — F32.9 REACTIVE DEPRESSION: ICD-10-CM

## 2018-10-30 DIAGNOSIS — E78.2 MIXED HYPERLIPIDEMIA: Chronic | ICD-10-CM

## 2018-10-30 DIAGNOSIS — R35.1 BENIGN PROSTATIC HYPERPLASIA WITH NOCTURIA: ICD-10-CM

## 2018-10-30 DIAGNOSIS — N40.1 BENIGN PROSTATIC HYPERPLASIA WITH NOCTURIA: ICD-10-CM

## 2018-10-30 DIAGNOSIS — I10 ESSENTIAL HYPERTENSION: ICD-10-CM

## 2018-10-30 DIAGNOSIS — I25.10 CORONARY ARTERY DISEASE INVOLVING NATIVE CORONARY ARTERY OF NATIVE HEART WITHOUT ANGINA PECTORIS: Primary | ICD-10-CM

## 2018-10-30 PROCEDURE — 1101F PT FALLS ASSESS-DOCD LE1/YR: CPT | Mod: CPTII,,, | Performed by: INTERNAL MEDICINE

## 2018-10-30 PROCEDURE — 99213 OFFICE O/P EST LOW 20 MIN: CPT | Mod: PBBFAC,PN | Performed by: INTERNAL MEDICINE

## 2018-10-30 PROCEDURE — 3074F SYST BP LT 130 MM HG: CPT | Mod: CPTII,,, | Performed by: INTERNAL MEDICINE

## 2018-10-30 PROCEDURE — 99999 PR PBB SHADOW E&M-EST. PATIENT-LVL III: CPT | Mod: PBBFAC,,, | Performed by: INTERNAL MEDICINE

## 2018-10-30 PROCEDURE — 3078F DIAST BP <80 MM HG: CPT | Mod: CPTII,,, | Performed by: INTERNAL MEDICINE

## 2018-10-30 PROCEDURE — 99214 OFFICE O/P EST MOD 30 MIN: CPT | Mod: S$PBB,,, | Performed by: INTERNAL MEDICINE

## 2018-10-30 RX ORDER — CITALOPRAM 20 MG/1
20 TABLET, FILM COATED ORAL DAILY
Qty: 30 TABLET | Refills: 3 | Status: SHIPPED | OUTPATIENT
Start: 2018-10-30 | End: 2019-02-12

## 2018-10-30 NOTE — PATIENT INSTRUCTIONS
We have reviewed your prescription medications. I have sent you a prescription for a medicine to help with depression -- celexa (citolapram). I would like to recheck you in about a month to make sure it is working well for you. Your labs all looked ok. Continue to follow-up with Dr Alvarez as recommended and with Dr Ramon if needed.

## 2018-11-01 ENCOUNTER — ANTI-COAG VISIT (OUTPATIENT)
Dept: CARDIOLOGY | Facility: CLINIC | Age: 78
End: 2018-11-01

## 2018-11-01 DIAGNOSIS — Z79.01 LONG TERM CURRENT USE OF ANTICOAGULANT THERAPY: ICD-10-CM

## 2018-11-01 DIAGNOSIS — Z79.01 LONG TERM (CURRENT) USE OF ANTICOAGULANTS: ICD-10-CM

## 2018-11-01 DIAGNOSIS — I48.20 CHRONIC ATRIAL FIBRILLATION: ICD-10-CM

## 2018-11-01 NOTE — PROGRESS NOTES
Patient advised to MAINTAIN coumadin. Also to re test INR on 11/15 . Patient verbalized understanding.

## 2018-11-01 NOTE — PROGRESS NOTES
Subjective:       Patient ID: Eliazar James is a 78 y.o. male.    Chief Complaint: Hyperlipidemia     I have reviewed the PMH,  and FH for this patient. He is here for follow-up of chronic conditions. He has PMH significant for Hypertension, hyperlipidemia, cad, atrial fibrillation , BPH, Hx of a stroke. He has seen Dr. Ramon recently because he had an episode of hematuria. They did not find anything. HE also sees cardiology. He has been doing OK, but he feels fatigued all of the time. HE seems to be depressed. HE states that he has been very upset about his wife being in a nursing home for the last year. She is coming home soon. His granddaughter and her kids have moved in with him, so he is going to bring his wife home. She has a lot of medical problems including CHF.       Hyperlipidemia   This is a chronic problem. The current episode started more than 1 year ago. The problem is controlled. He has no history of chronic renal disease, diabetes, hypothyroidism, liver disease, obesity or nephrotic syndrome. There are no known factors aggravating his hyperlipidemia. Pertinent negatives include no chest pain, focal sensory loss, focal weakness, leg pain, myalgias or shortness of breath. Current antihyperlipidemic treatment includes statins. The current treatment provides mild improvement of lipids. There are no compliance problems.  Risk factors for coronary artery disease include male sex and a sedentary lifestyle.     Review of Systems   Respiratory: Negative for shortness of breath.    Cardiovascular: Negative for chest pain.   Musculoskeletal: Negative for myalgias.   Neurological: Negative for focal weakness.       Objective:      Physical Exam    Assessment and Plan:     Problem List Items Addressed This Visit        Psychiatric    Depression       Cardiac/Vascular    CAD (coronary artery disease) - Primary (Chronic)    Chronic atrial fibrillation    Essential hypertension       Renal/    Benign  prostatic hyperplasia with nocturia

## 2018-11-26 ENCOUNTER — ANTI-COAG VISIT (OUTPATIENT)
Dept: CARDIOLOGY | Facility: CLINIC | Age: 78
End: 2018-11-26

## 2018-11-26 DIAGNOSIS — I48.20 CHRONIC ATRIAL FIBRILLATION: ICD-10-CM

## 2018-11-26 DIAGNOSIS — Z79.01 LONG TERM CURRENT USE OF ANTICOAGULANT THERAPY: ICD-10-CM

## 2018-11-26 NOTE — PROGRESS NOTES
Per patient, he did not decrease dose as suggested previously. Since his INR is w/in range we will continue dose patient reports and f/u in 3 weeks.

## 2018-11-27 ENCOUNTER — OFFICE VISIT (OUTPATIENT)
Dept: FAMILY MEDICINE | Facility: CLINIC | Age: 78
End: 2018-11-27
Payer: MEDICARE

## 2018-11-27 VITALS
SYSTOLIC BLOOD PRESSURE: 100 MMHG | WEIGHT: 182 LBS | OXYGEN SATURATION: 97 % | TEMPERATURE: 98 F | DIASTOLIC BLOOD PRESSURE: 70 MMHG | HEIGHT: 69 IN | HEART RATE: 56 BPM | BODY MASS INDEX: 26.96 KG/M2

## 2018-11-27 DIAGNOSIS — Z79.01 LONG TERM (CURRENT) USE OF ANTICOAGULANTS: ICD-10-CM

## 2018-11-27 DIAGNOSIS — F32.9 REACTIVE DEPRESSION: ICD-10-CM

## 2018-11-27 DIAGNOSIS — I10 ESSENTIAL HYPERTENSION: Primary | ICD-10-CM

## 2018-11-27 PROCEDURE — 3078F DIAST BP <80 MM HG: CPT | Mod: CPTII,S$GLB,, | Performed by: INTERNAL MEDICINE

## 2018-11-27 PROCEDURE — 99999 PR PBB SHADOW E&M-EST. PATIENT-LVL IV: CPT | Mod: PBBFAC,,, | Performed by: INTERNAL MEDICINE

## 2018-11-27 PROCEDURE — 1101F PT FALLS ASSESS-DOCD LE1/YR: CPT | Mod: CPTII,S$GLB,, | Performed by: INTERNAL MEDICINE

## 2018-11-27 PROCEDURE — 3074F SYST BP LT 130 MM HG: CPT | Mod: CPTII,S$GLB,, | Performed by: INTERNAL MEDICINE

## 2018-11-27 PROCEDURE — 99213 OFFICE O/P EST LOW 20 MIN: CPT | Mod: S$GLB,,, | Performed by: INTERNAL MEDICINE

## 2018-11-27 RX ORDER — WARFARIN SODIUM 5 MG/1
TABLET ORAL
Qty: 90 TABLET | Refills: 2 | Status: CANCELLED | OUTPATIENT
Start: 2018-11-27

## 2018-11-27 RX ORDER — WARFARIN SODIUM 5 MG/1
2.5-5 TABLET ORAL DAILY
Qty: 90 TABLET | Refills: 2 | Status: SHIPPED | OUTPATIENT
Start: 2018-11-27 | End: 2018-11-28 | Stop reason: SDUPTHER

## 2018-11-27 RX ORDER — LOSARTAN POTASSIUM 100 MG/1
50 TABLET ORAL DAILY
Qty: 45 TABLET | Refills: 3
Start: 2018-11-27 | End: 2019-01-07

## 2018-11-27 NOTE — PROGRESS NOTES
Subjective:       Patient ID: Eliazar James is a 78 y.o. male.    Chief Complaint: Follow-up     I have reviewed the PMH,  and  for this patient. He is here for follow-up after medication changes. I had advised him to reduce his losartan to 50 mg a day because his BP had been low. I also prescribed citalopram to help with his depression and anxiety. He was very stressed last time because his wife had been at a nursing home and was planning to come home. He was anxious to have her home. He reports that they still have some issues to work out, but it is working fairly well. He feels a lot better. His BP is better and he is sleeping OK. He is not having any side effects.       Review of Systems   Constitutional: Negative for chills, fatigue and fever.   HENT: Negative for congestion, ear discharge, ear pain, rhinorrhea and sore throat.    Eyes: Negative for discharge, redness and itching.   Respiratory: Negative for cough, chest tightness, shortness of breath and wheezing.    Cardiovascular: Negative for chest pain, palpitations and leg swelling.   Gastrointestinal: Negative for abdominal pain, constipation, diarrhea, nausea and vomiting.   Endocrine: Negative for cold intolerance and heat intolerance.   Genitourinary: Negative for dysuria, flank pain, frequency and hematuria.   Musculoskeletal: Negative for arthralgias, back pain, joint swelling and myalgias.   Skin: Negative for color change and rash.   Neurological: Negative for dizziness, tremors, numbness and headaches.   Psychiatric/Behavioral: Negative for dysphoric mood and sleep disturbance. The patient is not nervous/anxious.        Objective:      Physical Exam   Constitutional: He appears well-developed and well-nourished.   HENT:   Head: Normocephalic and atraumatic.   Right Ear: External ear normal. Tympanic membrane is not erythematous. No middle ear effusion.   Left Ear: External ear normal. Tympanic membrane is not erythematous.  No middle ear  effusion.   Mouth/Throat: No posterior oropharyngeal edema or posterior oropharyngeal erythema. No tonsillar exudate.   Eyes: Conjunctivae and EOM are normal. Pupils are equal, round, and reactive to light.   Neck: No thyromegaly present.   Cardiovascular: Normal rate, regular rhythm, normal heart sounds and intact distal pulses.   No murmur heard.  Pulmonary/Chest: Effort normal and breath sounds normal. He has no wheezes.   Abdominal: He exhibits no distension. There is no tenderness.   Musculoskeletal: He exhibits no edema or tenderness.   Lymphadenopathy:     He has no cervical adenopathy.   Neurological: He displays normal reflexes. No cranial nerve deficit.   Skin: Skin is warm and dry. No rash noted.   Psychiatric: He has a normal mood and affect. His behavior is normal.       Assessment and Plan:     Problem List Items Addressed This Visit        Psychiatric    Depression - doing better on citalopram. He is happy to have his wife home.        Cardiac/Vascular    Essential hypertension - Primary - bp looks great. We may be able to stop the losartan in the future.       Other Visit Diagnoses     Long term (current) use of anticoagulants    - He needed a refill of warfarin. Stable.

## 2018-11-27 NOTE — PATIENT INSTRUCTIONS
We have reviewed your prescription medications. Your BP is looking good. The citalopram seems to be helping your mood. Continue it for now.

## 2018-11-28 RX ORDER — WARFARIN SODIUM 5 MG/1
5 TABLET ORAL DAILY
Qty: 90 TABLET | Refills: 3 | Status: ON HOLD | OUTPATIENT
Start: 2018-11-28 | End: 2019-04-29 | Stop reason: HOSPADM

## 2018-12-14 ENCOUNTER — TELEPHONE (OUTPATIENT)
Dept: FAMILY MEDICINE | Facility: CLINIC | Age: 78
End: 2018-12-14

## 2018-12-14 NOTE — TELEPHONE ENCOUNTER
----- Message from Shanice Novak sent at 12/14/2018 12:11 PM CST -----  Contact: self [phone]  Patient would like a call back about a recommendation for physical therapist. Please call and advise.

## 2018-12-17 ENCOUNTER — OFFICE VISIT (OUTPATIENT)
Dept: FAMILY MEDICINE | Facility: CLINIC | Age: 78
End: 2018-12-17
Payer: MEDICARE

## 2018-12-17 VITALS
WEIGHT: 182 LBS | TEMPERATURE: 98 F | SYSTOLIC BLOOD PRESSURE: 120 MMHG | HEIGHT: 69 IN | BODY MASS INDEX: 26.96 KG/M2 | OXYGEN SATURATION: 98 % | DIASTOLIC BLOOD PRESSURE: 60 MMHG | HEART RATE: 53 BPM

## 2018-12-17 DIAGNOSIS — R29.898 MUSCULAR DECONDITIONING: Primary | ICD-10-CM

## 2018-12-17 DIAGNOSIS — F32.9 REACTIVE DEPRESSION: ICD-10-CM

## 2018-12-17 DIAGNOSIS — I10 ESSENTIAL HYPERTENSION: ICD-10-CM

## 2018-12-17 PROCEDURE — 3074F SYST BP LT 130 MM HG: CPT | Mod: CPTII,S$GLB,, | Performed by: INTERNAL MEDICINE

## 2018-12-17 PROCEDURE — 99999 PR PBB SHADOW E&M-EST. PATIENT-LVL IV: CPT | Mod: PBBFAC,,, | Performed by: INTERNAL MEDICINE

## 2018-12-17 PROCEDURE — 3078F DIAST BP <80 MM HG: CPT | Mod: CPTII,S$GLB,, | Performed by: INTERNAL MEDICINE

## 2018-12-17 PROCEDURE — 99213 OFFICE O/P EST LOW 20 MIN: CPT | Mod: S$GLB,,, | Performed by: INTERNAL MEDICINE

## 2018-12-17 PROCEDURE — 1101F PT FALLS ASSESS-DOCD LE1/YR: CPT | Mod: CPTII,S$GLB,, | Performed by: INTERNAL MEDICINE

## 2018-12-17 NOTE — PATIENT INSTRUCTIONS
We have reviewed your prescription medications. I have placed an order for physical therapy for strengthening at [facility]. Continue current medications and follow-up in about 3 months.

## 2018-12-18 NOTE — PROGRESS NOTES
Subjective:       Patient ID: Eliazar James is a 78 y.o. male.    Chief Complaint: Personal Problem     I have reviewed the PMH,  and  for this patient. He  has a past medical history of *Atrial fibrillation, Anxiety, Atrial fibrillation (7/5/2012), Blood transfusion, CAD (coronary artery disease) (7/10/2012), Cancer, Cataract, Clotting disorder, Coronary artery disease, Depression, HTN (hypertension) (7/10/2012), TIA (transient ischemic attack) (7/10/2012), Hyperlipidemia, Hypertension, Myocardial infarction, Renal calculus, left (8/18/2017), S/P CABG (coronary artery bypass graft) (1/8/2013), Stroke (2/12), and Urinary tract infection.   HE is here today because he wants a referral to physical therapy. He feels that he is weak and he could benefit from strengthening. His wife came home after being in rehab a couple weeks ago and he feels that he needs his strength to help take care of her. HE reports that he has trouble getting up from a seeated position. His wife has a lift chair. HE is tearful. HE does not want to increase his antidepressant.       Fatigue   This is a chronic problem. The current episode started more than 1 month ago. The problem occurs constantly. The problem has been unchanged. Associated symptoms include fatigue and weakness. Pertinent negatives include no abdominal pain, anorexia, arthralgias, change in bowel habit, chest pain, chills, congestion, coughing, diaphoresis, fever, headaches, joint swelling, myalgias, nausea, neck pain, numbness, rash, sore throat, swollen glands, urinary symptoms, vertigo, visual change or vomiting. Nothing aggravates the symptoms. He has tried nothing for the symptoms. The treatment provided no relief.     Active Ambulatory Problems     Diagnosis Date Noted    Chronic atrial fibrillation 07/05/2012    Long term current use of anticoagulant therapy 07/05/2012    CAD (coronary artery disease) 07/10/2012    Essential hypertension 07/10/2012    Hx-TIA  (transient ischemic attack) 07/10/2012    Hyperlipidemia 12/11/2012    Depression 12/11/2012    S/P CABG (coronary artery bypass graft) 01/08/2013    Hematuria 08/18/2017    Nocturia 08/18/2017    Urinary frequency 08/18/2017    Renal calculus, left 08/18/2017    Bilateral renal cysts 08/18/2017    Benign prostatic hyperplasia with nocturia 09/12/2017    Hypothermia 07/04/2018    Stupor 07/04/2018    SIRS (systemic inflammatory response syndrome) 07/04/2018    Other fatigue 09/14/2018     Resolved Ambulatory Problems     Diagnosis Date Noted    No Resolved Ambulatory Problems     Past Medical History:   Diagnosis Date    *Atrial fibrillation     Anxiety     Atrial fibrillation 7/5/2012    Blood transfusion     CAD (coronary artery disease) 7/10/2012    Cancer     Cataract     Clotting disorder     Coronary artery disease     Depression     HTN (hypertension) 7/10/2012    Hx-TIA (transient ischemic attack) 7/10/2012    Hyperlipidemia     Hypertension     Myocardial infarction     Renal calculus, left 8/18/2017    S/P CABG (coronary artery bypass graft) 1/8/2013    Stroke 2/12    Urinary tract infection        HEALTH MAINTENANCE:   Health Maintenance   Topic Date Due    TETANUS VACCINE  09/17/2018    Lipid Panel  10/22/2023    Zoster Vaccine  Completed    Pneumococcal Vaccine (65+ Low/Medium Risk)  Completed    Influenza Vaccine  Completed       Review of Systems   Constitutional: Positive for fatigue. Negative for chills, diaphoresis and fever.   HENT: Negative for congestion, ear discharge, ear pain, rhinorrhea and sore throat.    Eyes: Negative for discharge, redness and itching.   Respiratory: Negative for cough, chest tightness, shortness of breath and wheezing.    Cardiovascular: Negative for chest pain, palpitations and leg swelling.   Gastrointestinal: Negative for abdominal pain, anorexia, change in bowel habit, constipation, diarrhea, nausea and vomiting.   Endocrine:  Negative for cold intolerance and heat intolerance.   Genitourinary: Negative for dysuria, flank pain, frequency and hematuria.   Musculoskeletal: Negative for arthralgias, back pain, joint swelling, myalgias and neck pain.   Skin: Negative for color change and rash.   Neurological: Positive for weakness. Negative for dizziness, vertigo, tremors, numbness and headaches.   Psychiatric/Behavioral: Negative for dysphoric mood and sleep disturbance. The patient is not nervous/anxious.        Objective:          LABS    CMP  Sodium   Date Value Ref Range Status   10/22/2018 139 136 - 145 mmol/L Final     Potassium   Date Value Ref Range Status   10/22/2018 4.1 3.5 - 5.1 mmol/L Final     Chloride   Date Value Ref Range Status   10/22/2018 109 95 - 110 mmol/L Final     CO2   Date Value Ref Range Status   10/22/2018 24 23 - 29 mmol/L Final     Glucose   Date Value Ref Range Status   10/22/2018 108 70 - 110 mg/dL Final     BUN, Bld   Date Value Ref Range Status   10/22/2018 20 2 - 20 mg/dL Final     Creatinine   Date Value Ref Range Status   10/22/2018 0.99 0.50 - 1.40 mg/dL Final     Calcium   Date Value Ref Range Status   10/22/2018 8.8 8.7 - 10.5 mg/dL Final     Total Protein   Date Value Ref Range Status   10/22/2018 7.2 6.0 - 8.4 g/dL Final     Albumin   Date Value Ref Range Status   10/22/2018 3.8 3.5 - 5.2 g/dL Final     Total Bilirubin   Date Value Ref Range Status   10/22/2018 1.0 0.1 - 1.0 mg/dL Final     Comment:     For infants and newborns, interpretation of results should be based  on gestational age, weight and in agreement with clinical  observations.  Premature Infant recommended reference ranges:  Up to 24 hours.............<8.0 mg/dL  Up to 48 hours............<12.0 mg/dL  3-5 days..................<15.0 mg/dL  6-29 days.................<15.0 mg/dL       Alkaline Phosphatase   Date Value Ref Range Status   10/22/2018 68 38 - 126 U/L Final     AST   Date Value Ref Range Status   10/22/2018 22 15 - 46 U/L  Final     ALT   Date Value Ref Range Status   10/22/2018 28 10 - 44 U/L Final     Anion Gap   Date Value Ref Range Status   10/22/2018 6 (L) 8 - 16 mmol/L Final     eGFR if    Date Value Ref Range Status   10/22/2018 >60.0 >60 mL/min/1.73 m^2 Final     eGFR if non    Date Value Ref Range Status   10/22/2018 >60.0 >60 mL/min/1.73 m^2 Final     Comment:     Calculation used to obtain the estimated glomerular filtration  rate (eGFR) is the CKD-EPI equation.          Lab Results   Component Value Date    WBC 7.46 10/22/2018    HGB 13.6 (L) 10/22/2018    HCT 40.8 10/22/2018    MCV 99 (H) 10/22/2018     10/22/2018       Recent Labs   Lab Result Units 10/22/18  0838   HDL mg/dL 39*   Cholesterol mg/dL 96*   Triglycerides mg/dL 44   LDL Cholesterol mg/dL 48.2*   HDL/Chol Ratio % 40.6   Non-HDL Cholesterol mg/dL 57   Total Cholesterol/HDL Ratio  2.5         A1C:  No results for input(s): HGBA1C in the last 2160 hours.      Physical Exam   Constitutional: He appears well-developed and well-nourished.   HENT:   Head: Normocephalic and atraumatic.   Right Ear: External ear normal. Tympanic membrane is not erythematous. No middle ear effusion.   Left Ear: External ear normal. Tympanic membrane is not erythematous.  No middle ear effusion.   Mouth/Throat: No posterior oropharyngeal edema or posterior oropharyngeal erythema. No tonsillar exudate.   Eyes: Conjunctivae and EOM are normal. Pupils are equal, round, and reactive to light.   Neck: No thyromegaly present.   Cardiovascular: Normal rate, regular rhythm, normal heart sounds and intact distal pulses.   No murmur heard.  Pulmonary/Chest: Effort normal and breath sounds normal. He has no wheezes.   Abdominal: He exhibits no distension. There is no tenderness.   Musculoskeletal: He exhibits no edema or tenderness.   Lymphadenopathy:     He has no cervical adenopathy.   Neurological: He displays normal reflexes. No cranial nerve deficit.    Skin: Skin is warm and dry. No rash noted.   Psychiatric: He has a normal mood and affect. His behavior is normal.             Assessment and Plan:     Problem List Items Addressed This Visit        Psychiatric    Depression - continue cleexa. He did not want to increase the medicine.        Cardiac/Vascular    Essential hypertension - bp looks good. Stable.       Other Visit Diagnoses     Muscular deconditioning    -  Primary - we will order PT. He needs to work on strength.     Relevant Orders    Ambulatory Referral to Physical/Occupational Therapy          Orders Placed This Encounter    Ambulatory Referral to Physical/Occupational Therapy         Follow-up with me in 3 months.

## 2018-12-19 PROBLEM — R26.89 IMPAIRMENT OF BALANCE: Status: ACTIVE | Noted: 2018-12-19

## 2018-12-19 PROBLEM — Z74.09 IMPAIRED FUNCTIONAL MOBILITY AND ACTIVITY TOLERANCE: Status: ACTIVE | Noted: 2018-12-19

## 2019-01-07 ENCOUNTER — ANTI-COAG VISIT (OUTPATIENT)
Dept: CARDIOLOGY | Facility: CLINIC | Age: 79
End: 2019-01-07

## 2019-01-07 DIAGNOSIS — Z79.01 LONG TERM CURRENT USE OF ANTICOAGULANT THERAPY: ICD-10-CM

## 2019-01-07 DIAGNOSIS — I48.20 CHRONIC ATRIAL FIBRILLATION: ICD-10-CM

## 2019-01-07 RX ORDER — IRBESARTAN 75 MG/1
75 TABLET ORAL NIGHTLY
Qty: 30 TABLET | Refills: 3 | Status: ON HOLD | OUTPATIENT
Start: 2019-01-07 | End: 2019-04-29 | Stop reason: HOSPADM

## 2019-02-11 ENCOUNTER — TELEPHONE (OUTPATIENT)
Dept: FAMILY MEDICINE | Facility: CLINIC | Age: 79
End: 2019-02-11

## 2019-02-11 NOTE — TELEPHONE ENCOUNTER
----- Message from Francis Bernabe sent at 2/11/2019  3:11 PM CST -----  Contact: [phone]/self  Patient requesting to be seen for his hospital follow-up appointment before 2/22/2019

## 2019-02-12 ENCOUNTER — OFFICE VISIT (OUTPATIENT)
Dept: CARDIOLOGY | Facility: CLINIC | Age: 79
End: 2019-02-12
Payer: MEDICARE

## 2019-02-12 ENCOUNTER — OFFICE VISIT (OUTPATIENT)
Dept: FAMILY MEDICINE | Facility: CLINIC | Age: 79
End: 2019-02-12
Payer: MEDICARE

## 2019-02-12 VITALS
WEIGHT: 178.19 LBS | HEART RATE: 57 BPM | OXYGEN SATURATION: 96 % | BODY MASS INDEX: 26.39 KG/M2 | DIASTOLIC BLOOD PRESSURE: 75 MMHG | HEIGHT: 69 IN | SYSTOLIC BLOOD PRESSURE: 124 MMHG

## 2019-02-12 VITALS
BODY MASS INDEX: 26.39 KG/M2 | DIASTOLIC BLOOD PRESSURE: 52 MMHG | WEIGHT: 178.19 LBS | HEIGHT: 69 IN | TEMPERATURE: 98 F | SYSTOLIC BLOOD PRESSURE: 128 MMHG | HEART RATE: 48 BPM | OXYGEN SATURATION: 97 %

## 2019-02-12 DIAGNOSIS — I48.20 CHRONIC ATRIAL FIBRILLATION: ICD-10-CM

## 2019-02-12 DIAGNOSIS — I10 ESSENTIAL HYPERTENSION: Primary | ICD-10-CM

## 2019-02-12 DIAGNOSIS — I25.10 CORONARY ARTERY DISEASE INVOLVING NATIVE CORONARY ARTERY OF NATIVE HEART WITHOUT ANGINA PECTORIS: Primary | Chronic | ICD-10-CM

## 2019-02-12 DIAGNOSIS — F33.2 SEVERE EPISODE OF RECURRENT MAJOR DEPRESSIVE DISORDER, WITHOUT PSYCHOTIC FEATURES: ICD-10-CM

## 2019-02-12 DIAGNOSIS — I10 ESSENTIAL HYPERTENSION: ICD-10-CM

## 2019-02-12 DIAGNOSIS — Z95.1 S/P CABG (CORONARY ARTERY BYPASS GRAFT): Chronic | ICD-10-CM

## 2019-02-12 DIAGNOSIS — Z79.01 LONG TERM CURRENT USE OF ANTICOAGULANT THERAPY: ICD-10-CM

## 2019-02-12 DIAGNOSIS — E78.2 MIXED HYPERLIPIDEMIA: Chronic | ICD-10-CM

## 2019-02-12 DIAGNOSIS — F32.0 CURRENT MILD EPISODE OF MAJOR DEPRESSIVE DISORDER WITHOUT PRIOR EPISODE: ICD-10-CM

## 2019-02-12 DIAGNOSIS — N30.01 ACUTE CYSTITIS WITH HEMATURIA: ICD-10-CM

## 2019-02-12 DIAGNOSIS — R00.1 BRADYCARDIA: ICD-10-CM

## 2019-02-12 PROBLEM — R65.10 SIRS (SYSTEMIC INFLAMMATORY RESPONSE SYNDROME): Status: RESOLVED | Noted: 2018-07-04 | Resolved: 2019-02-12

## 2019-02-12 PROCEDURE — 3074F PR MOST RECENT SYSTOLIC BLOOD PRESSURE < 130 MM HG: ICD-10-PCS | Mod: CPTII,S$GLB,, | Performed by: INTERNAL MEDICINE

## 2019-02-12 PROCEDURE — 3078F DIAST BP <80 MM HG: CPT | Mod: CPTII,S$GLB,, | Performed by: INTERNAL MEDICINE

## 2019-02-12 PROCEDURE — 1101F PT FALLS ASSESS-DOCD LE1/YR: CPT | Mod: CPTII,S$GLB,, | Performed by: INTERNAL MEDICINE

## 2019-02-12 PROCEDURE — 99999 PR PBB SHADOW E&M-EST. PATIENT-LVL IV: ICD-10-PCS | Mod: PBBFAC,,, | Performed by: INTERNAL MEDICINE

## 2019-02-12 PROCEDURE — 1101F PR PT FALLS ASSESS DOC 0-1 FALLS W/OUT INJ PAST YR: ICD-10-PCS | Mod: CPTII,S$GLB,, | Performed by: INTERNAL MEDICINE

## 2019-02-12 PROCEDURE — 99999 PR PBB SHADOW E&M-EST. PATIENT-LVL III: CPT | Mod: PBBFAC,,, | Performed by: INTERNAL MEDICINE

## 2019-02-12 PROCEDURE — 99215 PR OFFICE/OUTPT VISIT, EST, LEVL V, 40-54 MIN: ICD-10-PCS | Mod: S$GLB,,, | Performed by: INTERNAL MEDICINE

## 2019-02-12 PROCEDURE — 99213 OFFICE O/P EST LOW 20 MIN: CPT | Mod: S$GLB,,, | Performed by: INTERNAL MEDICINE

## 2019-02-12 PROCEDURE — 1100F PR PT FALLS ASSESS DOC 2+ FALLS/FALL W/INJURY/YR: ICD-10-PCS | Mod: CPTII,S$GLB,, | Performed by: INTERNAL MEDICINE

## 2019-02-12 PROCEDURE — 99999 PR PBB SHADOW E&M-EST. PATIENT-LVL IV: CPT | Mod: PBBFAC,,, | Performed by: INTERNAL MEDICINE

## 2019-02-12 PROCEDURE — 99999 PR PBB SHADOW E&M-EST. PATIENT-LVL III: ICD-10-PCS | Mod: PBBFAC,,, | Performed by: INTERNAL MEDICINE

## 2019-02-12 PROCEDURE — 3078F PR MOST RECENT DIASTOLIC BLOOD PRESSURE < 80 MM HG: ICD-10-PCS | Mod: CPTII,S$GLB,, | Performed by: INTERNAL MEDICINE

## 2019-02-12 PROCEDURE — 3288F PR FALLS RISK ASSESSMENT DOCUMENTED: ICD-10-PCS | Mod: CPTII,S$GLB,, | Performed by: INTERNAL MEDICINE

## 2019-02-12 PROCEDURE — 1100F PTFALLS ASSESS-DOCD GE2>/YR: CPT | Mod: CPTII,S$GLB,, | Performed by: INTERNAL MEDICINE

## 2019-02-12 PROCEDURE — 3074F SYST BP LT 130 MM HG: CPT | Mod: CPTII,S$GLB,, | Performed by: INTERNAL MEDICINE

## 2019-02-12 PROCEDURE — 99213 PR OFFICE/OUTPT VISIT, EST, LEVL III, 20-29 MIN: ICD-10-PCS | Mod: S$GLB,,, | Performed by: INTERNAL MEDICINE

## 2019-02-12 PROCEDURE — 3288F FALL RISK ASSESSMENT DOCD: CPT | Mod: CPTII,S$GLB,, | Performed by: INTERNAL MEDICINE

## 2019-02-12 PROCEDURE — 99499 RISK ADDL DX/OHS AUDIT: ICD-10-PCS | Mod: S$GLB,,, | Performed by: INTERNAL MEDICINE

## 2019-02-12 PROCEDURE — 99499 UNLISTED E&M SERVICE: CPT | Mod: S$GLB,,, | Performed by: INTERNAL MEDICINE

## 2019-02-12 PROCEDURE — 99215 OFFICE O/P EST HI 40 MIN: CPT | Mod: S$GLB,,, | Performed by: INTERNAL MEDICINE

## 2019-02-12 RX ORDER — ESCITALOPRAM OXALATE 10 MG/1
TABLET ORAL
Refills: 0 | Status: ON HOLD | COMMUNITY
Start: 2019-02-06 | End: 2019-04-29 | Stop reason: HOSPADM

## 2019-02-12 RX ORDER — VALSARTAN 80 MG/1
TABLET ORAL
Refills: 0 | COMMUNITY
Start: 2019-02-06 | End: 2019-02-14

## 2019-02-12 RX ORDER — WARFARIN 7.5 MG/1
TABLET ORAL
Refills: 0 | COMMUNITY
Start: 2019-02-06 | End: 2019-03-13 | Stop reason: CLARIF

## 2019-02-12 RX ORDER — CEPHALEXIN 500 MG/1
CAPSULE ORAL
Refills: 0 | COMMUNITY
Start: 2019-02-06 | End: 2019-03-18 | Stop reason: ALTCHOICE

## 2019-02-12 NOTE — PROGRESS NOTES
[facility]    Chief Complaint   Patient presents with    Hospital Follow Up     CAD       Patient ID: Eliazar James is a 77 y.o. male with a past medical history of HTN, HLD, CAD s/p CABG in 1997 and is s/p PCI/DALY in 7/2009 (prox LAD and distal RCA), chronic Afib (on rate control and coumadin), who presents for a follow up appointment.  Pertinent history/events are as follows:    -Pt followed by Cardiology at [facility] in the past.     -Anatomy as of 7/2009 was LIMA-->LAD which is atretic, SVG--->OM patent and SVG--->RCA patent. In 7/2009 pt was admitted for an ACS and had DALY placed in prox LAD and distal RCA.  Since that time, he has been CP free and denies any LUCAS. LVEF is 55% with diastolic dysfunction and apical HK.      -Pt with TIA in 2-2012 and new onset afib. Now on coumadin- declined NOACs due to cost. CHADS 4- was followed by EP. Was on Multaq initially -discontinued due to cost issues and it was decided to continue rate control strategy with anticoagulation.  SPECT on 03/09/2012 was normal.    -At our initial clinic visit on 4/13/2018, Mr. James reported no chest pain shortness of breath, lower extremity edema, palpitations, TIA symptoms, or syncope.  He is tearful and very depressed because his wife is not doing well clinically.  She is currently residing in a nursing home and suffering with recurrent episodes of decompensated heart failure.  States he's taking all medications as prescribed.  Blood pressure today is 120/60.  Plan: Continue current medication regimen.  Mr. James is very depressed due to his wife's failing health.  He does not want to be evaluated by a psychiatrist, but would rather talk with me on a regular basis.  Will see him monthly to assist with coping, given his wife's clinical status.    Pt previously admitted with syncopal episodes.  Etiology unclear.  I recommend placing a 30 day event monitor to evaluate further, but Mr. James does not wish to do  "this.  Continue to monitor.     -At 5/23/2018 clinic visit, Mr. James state he feels tired and depressed.  Wife "doing a little better".  Feels stressed and blood pressure elevated today at 170/80.   Plan: Continue current medication regimen.  Pt to keep log of blood pressure/heart rate and bring in next visit for review.     -On 7/3/2018, pt admitted at [facility] with fall and found to be hypothermic in the ED initially on admission- no further episodes of hypothermia throughout hospital stay. No episodes of syncope, lightheadedness, negative orthostatic vitals and BP normal throughout rest of stay after volume resuscitation. No ectopy on telemetry, afib on ekg, no elevations in troponin, blood cx/lactate/lytes wnl. unclear cause of event.    -At clinic 7/13/2018 clinic visit, Mr. James reported having "absolutely no energy" since hospital discharge.  Feels tired all the time.  Pt is well compensated on exam today.  Blood pressure 160/70 with pulse of 64 bpm.   Plan: Blood pressure elevated on exam today.  Continue current medication regimen.  Pt to bring in log of blood pressure/heart rate.  Adjustments to his antihypertensive regimen will be made accordingly.      -At clinic visit on 9/14/2018, Mr. James reported continued fatigue and "no energy at all".  States when he "gets down on the ground to work in the ground, I can't get up".  Also has trouble getting out of bed.  He has no chest pain, SOB, LE edema, TIA symptoms or syncope.  States he's very stressed out because his wife is not doing well.    Plan: Pt complains of ongoing fatigue.  Given significant CAD history, check PET stress test to evaluate further.  Continue ASA, beta blocker, statin, ARB.    -At follow up clinic visit on 10/26/2018, Mr. James reports some improvement in his fatigue since our visit on 9/14/2018.  He has no chest pain, SOB, or LE edema.  PET stress test from 10/19/2018 shows no evidence of a discrete " hemodynamically significant coronary stenosis.  Plan:  PET stress test from 10/19/2018 shows no evidence of a discrete hemodynamically significant coronary stenosis.  Continue ASA, beta blocker, statin, ARB.    Regarding chronic Afib, continue rate control and coumadin.    HPI:  Mr. James reports doing well with no chest pain, SOB, LE edema, TIA symptoms or syncope.  Reports recently spending 8 days in [facility] for suicidal ideation.  Reports no thoughts of suicide today.     Past Medical History:   Diagnosis Date    *Atrial fibrillation     Anxiety     Atrial fibrillation 7/5/2012    Blood transfusion     CAD (coronary artery disease) 7/10/2012    Cancer     skin    Cataract     removed from both    Clotting disorder     Coronary artery disease     Depression     HTN (hypertension) 7/10/2012    Hx-TIA (transient ischemic attack) 7/10/2012    Hyperlipidemia     Hypertension     Myocardial infarction     Renal calculus, left 8/18/2017    S/P CABG (coronary artery bypass graft) 1/8/2013    Stroke 2/12    TIA    Urinary tract infection      Past Surgical History:   Procedure Laterality Date    CATARACT EXTRACTION W/  INTRAOCULAR LENS IMPLANT Right 03/27/2008    CATARACT EXTRACTION W/  INTRAOCULAR LENS IMPLANT Left 12/06/2007    CORONARY ARTERY BYPASS GRAFT      triple bypass       Social History     Socioeconomic History    Marital status:      Spouse name: Not on file    Number of children: Not on file    Years of education: Not on file    Highest education level: Not on file   Social Needs    Financial resource strain: Not on file    Food insecurity - worry: Not on file    Food insecurity - inability: Not on file    Transportation needs - medical: Not on file    Transportation needs - non-medical: Not on file   Occupational History    Not on file   Tobacco Use    Smoking status: Never Smoker    Smokeless tobacco: Never Used   Substance and Sexual Activity    Alcohol  use: No    Drug use: No    Sexual activity: No   Other Topics Concern    Not on file   Social History Narrative    Not on file     Family History   Problem Relation Age of Onset    Heart attack Father     Heart disease Father     Melanoma Neg Hx     Psoriasis Neg Hx     Lupus Neg Hx     Eczema Neg Hx     Acne Neg Hx     Prostate cancer Neg Hx     Kidney disease Neg Hx        Review of patient's allergies indicates:   Allergen Reactions    No known drug allergies        Medication List with Changes/Refills   Current Medications    ASPIRIN 81 MG TAB    Take 1 tablet by mouth Daily.    ATORVASTATIN (LIPITOR) 40 MG TABLET    Take 1 tablet (40 mg total) by mouth once daily.    CARTIA  MG 24 HR CAPSULE    Take 1 capsule (180 mg total) by mouth once daily.    CARVEDILOL (COREG) 12.5 MG TABLET    Take 1 tablet (12.5 mg total) by mouth 2 (two) times daily with meals.    CEPHALEXIN (KEFLEX) 500 MG CAPSULE        CITALOPRAM (CELEXA) 20 MG TABLET    Take 1 tablet (20 mg total) by mouth once daily.    ESCITALOPRAM OXALATE (LEXAPRO) 10 MG TABLET        FLUTICASONE (FLONASE) 50 MCG/ACTUATION NASAL SPRAY    2 sprays (100 mcg total) by Each Nare route once daily.    IRBESARTAN (AVAPRO) 75 MG TABLET    Take 1 tablet (75 mg total) by mouth every evening.    NITROGLYCERIN (NITROSTAT) 0.4 MG SL TABLET    Place 1 tablet (0.4 mg total) under the tongue every 5 (five) minutes as needed for Chest pain.    VALSARTAN (DIOVAN) 80 MG TABLET        WARFARIN (COUMADIN) 5 MG TABLET    Take 1 tablet (5 mg total) by mouth Daily. Current Dose ( 2.5 mg every Tue, Fri; 5 mg all other days) or as directed by coumadin clinic.    WARFARIN (COUMADIN) 7.5 MG TABLET           Review of Systems  Constitution: Positive for fatigue.  HENT: Denies headaches or blurry vision.  Cardiovascular: Denies chest pain or irregular heart beat.  Respiratory: Denies cough or shortness of breath.  Gastrointestinal: Denies abdominal pain, nausea, or  "vomiting.  Musculoskeletal: Denies muscle cramps.  Neurological: Denies dizziness or focal weakness.  Psychiatric/Behavioral: Appears depressed.  Hematologic/Lymphatic: Denies bleeding problem or easy bruising/bleeding.  Skin: Denies rash or suspicious lesions    Physical Examination  /75 (BP Location: Left arm, Patient Position: Sitting, BP Method: Large (Automatic))   Pulse (!) 57   Ht 5' 9" (1.753 m)   Wt 80.8 kg (178 lb 3.2 oz)   SpO2 96%   BMI 26.32 kg/m²     Constitutional: No acute distress, conversant  HEENT: Sclera anicteric, Pupils equal, round and reactive to light, extraocular motions intact, Oropharynx clear  Neck: No JVD, no carotid bruits  Cardiovascular: Irregularly irregular, no murmur, rubs or gallops  Pulmonary: Clear to auscultation bilaterally  Abdominal: Abdomen soft, nontender, nondistended, positive bowel sounds  Extremities: No lower extremity edema,   Pulses:  Carotid pulses are 2+ on the right side, and 2+ on the left side.  Radial pulses are 2+ on the right side, and 2+ on the left side.   Femoral pulses are 2+ on the right side, and 2+ on the left side.  Skin: No ecchymosis, erythema, or ulcers  Psych: Alert and oriented x 3, appropriate affect  Neuro: CNII-XII intact, no focal deficits    Labs:  Most Recent Data  CBC:   Lab Results   Component Value Date    WBC 12.09 01/29/2019    HGB 13.3 (L) 01/29/2019    HCT 38.7 (L) 01/29/2019     01/29/2019    MCV 99 (H) 01/29/2019    RDW 13.5 01/29/2019     BMP:   Lab Results   Component Value Date     01/29/2019    K 4.2 01/29/2019     01/29/2019    CO2 26 01/29/2019    BUN 20 01/29/2019    CREATININE 1.12 01/29/2019    GLU 98 01/29/2019    CALCIUM 9.1 01/29/2019    MG 1.8 07/05/2018    PHOS 2.2 (L) 07/05/2018     LFTS;   Lab Results   Component Value Date    PROT 7.6 01/29/2019    ALBUMIN 3.8 01/29/2019    BILITOT 1.4 (H) 01/29/2019    AST 25 01/29/2019    ALKPHOS 85 01/29/2019    ALT 25 01/29/2019     COAGS:   Lab " Results   Component Value Date    INR 2.7 (H) 01/29/2019     FLP:   Lab Results   Component Value Date    CHOL 96 (L) 10/22/2018    HDL 39 (L) 10/22/2018    LDLCALC 48.2 (L) 10/22/2018    TRIG 44 10/22/2018    CHOLHDL 40.6 10/22/2018     CARDIAC:   Lab Results   Component Value Date    TROPONINI <0.006 07/04/2018     (H) 06/15/2010       EKG 4/9/2018:  Atrial fibrillation with slow ventricular response  Nonspecific T wave abnormality    PET Stress Test 10/19/2018:  Nuclear Quantitative Functional Analysis:   At rest:  LVEF: 64 % (normal is >= 51%)  LVED Volume: 109 ml (normal is <=171)  LVES Volume: 39 ml (normal is <=70)  At stress:  LVEF: 67 % (normal is >= 51%)  LVED Volume: 128 ml (normal is <=171)  LVES Volume: 43 ml (normal is <=70)    CONCLUSIONS: NO CLINICALLY SIGNIFICANT STRESS INDUCED PERFUSION DEFECTS as assessed with PET perfusion imaging.  1. There is no evidence of a discrete hemodynamically significant coronary stenosis.   2. Although no clinically significant stress defect is seen, there is reduced coronary flow reserve. These results suggest mild endothelial dysfunction due to elevated cholesterol, high blood pressure and diffuse, non-obstructive coronary atherosclerosis   without clinically significant, localized perfusion defects.   3. Resting wall motion is physiologic. Stress wall motion is physiologic.   4. LV function is normal at rest and stress.  (normal is >= 51%)  5. The ventricular volumes are normal at rest and stress.   6. The extracardiac distribution of radioactivity is normal.   7. There was no previous study available to compare.    Echo 4/24/2015:  CONCLUSIONS     1 - Enlarged ascending aorta.     2 - Normal left ventricular systolic function (EF 55-60%).     3 - Trivial mitral regurgitation.     4 - Mild pulmonic regurgitation.     5 - Low normal to mildly depressed right ventricular systolic function .     6 - Trivial tricuspid regurgitation.      Assessment/Plan:  Eliazar ROMEO  Jacob is a 77 y.o. male with a past medical history of HTN, HLD, CAD s/p CABG in 1997 and is s/p PCI/DALY in 7/2009 (prox LAD and distal RCA), chronic Afib (on rate control and coumadin), who presents for a follow up appointment.      1. CAD s/p CABG in 1997 and is s/p PCI/DALY in 7/2009 (prox LAD and distal RCA)- No anginal symptoms currently.  PET stress test from 10/19/2018 shows no evidence of a discrete hemodynamically significant coronary stenosis.  Continue ASA, beta blocker, statin, ARB.      2. Chronic Afib- Continue rate control and coumadin.       3. HTN- Continue current antihypertensive medication regimen.  Pt to continue keeping log of blood pressure/heart rate.      4. HLD- Continue atorvastatin 40 mg daily.     5. Depression- Mr. James states he's feeling much better now that his wife is home with him.     6. Syncope-  Pt with no recent episodes of syncope.  Continue to monitor.      Follow up in 6 months    Total duration of face to face visit time 30 minutes.  Total time spent counseling greater than fifty percent of total visit time.  Counseling included discussion regarding imaging findings, diagnosis, possibilities, treatment options, risks and benefits.  The patient had many questions regarding the options and long-term effects.    Macario Valencia MD, PhD  Interventional Cardiology

## 2019-02-12 NOTE — PATIENT INSTRUCTIONS
We have reviewed your prescription medications. Continue current dose of lexapro and follow-up with psychiatry. BP looks good on repeat.

## 2019-02-12 NOTE — PATIENT INSTRUCTIONS
Assessment/Plan:  Eliazar James is a 77 y.o. male with a past medical history of HTN, HLD, CAD s/p CABG in 1997 and is s/p PCI/DALY in 7/2009 (prox LAD and distal RCA), chronic Afib (on rate control and coumadin), who presents for a follow up appointment.      1. CAD s/p CABG in 1997 and is s/p PCI/DALY in 7/2009 (prox LAD and distal RCA)- No anginal symptoms currently.  PET stress test from 10/19/2018 shows no evidence of a discrete hemodynamically significant coronary stenosis.  Continue ASA, beta blocker, statin, ARB.      2. Chronic Afib- Continue rate control and coumadin.       3. HTN- Continue current antihypertensive medication regimen.  Pt to continue keeping log of blood pressure/heart rate.      4. HLD- Continue atorvastatin 40 mg daily.     5. Depression- Mr. James states he's feeling much better now that his wife is home with him.     6. Syncope-  Pt with no recent episodes of syncope.  Continue to monitor.      Follow up in 6 months

## 2019-02-13 NOTE — PROGRESS NOTES
Patient called to reschedule 2/25/19 lab appointment to 2/14/19, reports he was just discharged from the hospital, appointment booked

## 2019-02-14 ENCOUNTER — ANTI-COAG VISIT (OUTPATIENT)
Dept: CARDIOLOGY | Facility: CLINIC | Age: 79
End: 2019-02-14

## 2019-02-14 DIAGNOSIS — I48.20 CHRONIC ATRIAL FIBRILLATION: ICD-10-CM

## 2019-02-14 DIAGNOSIS — Z79.01 LONG TERM CURRENT USE OF ANTICOAGULANT THERAPY: ICD-10-CM

## 2019-02-15 NOTE — PROGRESS NOTES
Subjective:       Patient ID: Eliazar James is a 78 y.o. male.    Chief Complaint: Follow-up     I have reviewed the PMH,  and  for this patient. He  has a past medical history of *Atrial fibrillation, Anxiety, Atrial fibrillation (7/5/2012), Blood transfusion, CAD (coronary artery disease) (7/10/2012), Cancer, Cataract, Clotting disorder, Coronary artery disease, Depression, HTN (hypertension) (7/10/2012), TIA (transient ischemic attack) (7/10/2012), Hyperlipidemia, Hypertension, Myocardial infarction, Renal calculus, left (8/18/2017), S/P CABG (coronary artery bypass graft) (1/8/2013), Stroke (2/12), and Urinary tract infection.  He was recently hospitalized for suicidal ideation. He was kept inpatient for about a week. They did adjust his medication. They changed his celexa to lexapro. He states that he is feeling better. His granddaughter and her children have moved in and are helping take care of his wife. His BP looks good. He reports that he is going to counseling. And he feels better.       Active Ambulatory Problems     Diagnosis Date Noted    Chronic atrial fibrillation 07/05/2012    Long term current use of anticoagulant therapy 07/05/2012    CAD (coronary artery disease) 07/10/2012    Essential hypertension 07/10/2012    Hx-TIA (transient ischemic attack) 07/10/2012    Hyperlipidemia 12/11/2012    Depression 12/11/2012    S/P CABG (coronary artery bypass graft) 01/08/2013    Hematuria 08/18/2017    Nocturia 08/18/2017    Urinary frequency 08/18/2017    Renal calculus, left 08/18/2017    Bilateral renal cysts 08/18/2017    Benign prostatic hyperplasia with nocturia 09/12/2017    Hypothermia 07/04/2018    Stupor 07/04/2018    Other fatigue 09/14/2018    Impaired functional mobility and activity tolerance 12/19/2018    Impairment of balance 12/19/2018    Severe episode of recurrent major depressive disorder, without psychotic features 02/12/2019    Bradycardia 02/12/2019      Resolved Ambulatory Problems     Diagnosis Date Noted    SIRS (systemic inflammatory response syndrome) 07/04/2018     Past Medical History:   Diagnosis Date    *Atrial fibrillation     Anxiety     Atrial fibrillation 7/5/2012    Blood transfusion     CAD (coronary artery disease) 7/10/2012    Cancer     Cataract     Clotting disorder     Coronary artery disease     Depression     HTN (hypertension) 7/10/2012    Hx-TIA (transient ischemic attack) 7/10/2012    Hyperlipidemia     Hypertension     Myocardial infarction     Renal calculus, left 8/18/2017    S/P CABG (coronary artery bypass graft) 1/8/2013    Stroke 2/12    Urinary tract infection        HEALTH MAINTENANCE:   Health Maintenance   Topic Date Due    TETANUS VACCINE  09/17/2018    Lipid Panel  10/22/2023    Zoster Vaccine  Completed    Pneumococcal Vaccine (65+ Low/Medium Risk)  Completed    Influenza Vaccine  Completed       Review of Systems   Constitutional: Negative for chills, fatigue and fever.   HENT: Negative for congestion, ear discharge, ear pain, rhinorrhea and sore throat.    Eyes: Negative for discharge, redness and itching.   Respiratory: Negative for cough, chest tightness, shortness of breath and wheezing.    Cardiovascular: Negative for chest pain, palpitations and leg swelling.   Gastrointestinal: Negative for abdominal pain, constipation, diarrhea, nausea and vomiting.   Endocrine: Negative for cold intolerance and heat intolerance.   Genitourinary: Negative for dysuria, flank pain, frequency and hematuria.   Musculoskeletal: Negative for arthralgias, back pain, joint swelling and myalgias.   Skin: Negative for color change and rash.   Neurological: Negative for dizziness, tremors, numbness and headaches.   Psychiatric/Behavioral: Negative for dysphoric mood and sleep disturbance. The patient is not nervous/anxious.        Objective:          LABS    CMP  Sodium   Date Value Ref Range Status   01/29/2019 139  136 - 145 mmol/L Final     Potassium   Date Value Ref Range Status   01/29/2019 4.2 3.5 - 5.1 mmol/L Final     Chloride   Date Value Ref Range Status   01/29/2019 106 95 - 110 mmol/L Final     CO2   Date Value Ref Range Status   01/29/2019 26 23 - 29 mmol/L Final     Glucose   Date Value Ref Range Status   01/29/2019 98 70 - 110 mg/dL Final     BUN, Bld   Date Value Ref Range Status   01/29/2019 20 2 - 20 mg/dL Final     Creatinine   Date Value Ref Range Status   01/29/2019 1.12 0.50 - 1.40 mg/dL Final     Calcium   Date Value Ref Range Status   01/29/2019 9.1 8.7 - 10.5 mg/dL Final     Total Protein   Date Value Ref Range Status   01/29/2019 7.6 6.0 - 8.4 g/dL Final     Albumin   Date Value Ref Range Status   01/29/2019 3.8 3.5 - 5.2 g/dL Final     Total Bilirubin   Date Value Ref Range Status   01/29/2019 1.4 (H) 0.1 - 1.0 mg/dL Final     Comment:     For infants and newborns, interpretation of results should be based  on gestational age, weight and in agreement with clinical  observations.  Premature Infant recommended reference ranges:  Up to 24 hours.............<8.0 mg/dL  Up to 48 hours............<12.0 mg/dL  3-5 days..................<15.0 mg/dL  6-29 days.................<15.0 mg/dL       Alkaline Phosphatase   Date Value Ref Range Status   01/29/2019 85 38 - 126 U/L Final     AST   Date Value Ref Range Status   01/29/2019 25 15 - 46 U/L Final     ALT   Date Value Ref Range Status   01/29/2019 25 10 - 44 U/L Final     Anion Gap   Date Value Ref Range Status   01/29/2019 7 (L) 8 - 16 mmol/L Final     eGFR if    Date Value Ref Range Status   01/29/2019 >60.0 >60 mL/min/1.73 m^2 Final     eGFR if non    Date Value Ref Range Status   01/29/2019 >60.0 >60 mL/min/1.73 m^2 Final     Comment:     Calculation used to obtain the estimated glomerular filtration  rate (eGFR) is the CKD-EPI equation.          Lab Results   Component Value Date    WBC 12.09 01/29/2019    HGB 13.3 (L)  01/29/2019    HCT 38.7 (L) 01/29/2019    MCV 99 (H) 01/29/2019     01/29/2019       Recent Labs   Lab Result Units 01/29/19  1517   TSH uIU/mL 2.110         A1C:  No results for input(s): HGBA1C in the last 2160 hours.      Physical Exam   Constitutional: He appears well-developed and well-nourished.   HENT:   Head: Normocephalic and atraumatic.   Right Ear: External ear normal. Tympanic membrane is not erythematous. No middle ear effusion.   Left Ear: External ear normal. Tympanic membrane is not erythematous.  No middle ear effusion.   Mouth/Throat: No posterior oropharyngeal edema or posterior oropharyngeal erythema. No tonsillar exudate.   Eyes: Conjunctivae and EOM are normal. Pupils are equal, round, and reactive to light.   Neck: No thyromegaly present.   Cardiovascular: Normal rate, regular rhythm, normal heart sounds and intact distal pulses.   No murmur heard.  Pulmonary/Chest: Effort normal and breath sounds normal. He has no wheezes.   Abdominal: He exhibits no distension. There is no tenderness.   Musculoskeletal: He exhibits no edema or tenderness.   Lymphadenopathy:     He has no cervical adenopathy.   Neurological: He displays normal reflexes. No cranial nerve deficit.   Skin: Skin is warm and dry. No rash noted.   Psychiatric: He has a normal mood and affect. His behavior is normal.             Assessment and Plan:     Problem List Items Addressed This Visit        Psychiatric    Severe episode of recurrent major depressive disorder, without psychotic features - he is going to counseling. He was changed to lexapro. HE states that he is feeling better.        Cardiac/Vascular    Chronic atrial fibrillation - chronic stable. He is on carvedilol and coumadin.       Essential hypertension - Primary - bp looks good.       Bradycardia - he is on carvedilol and it causes bradycardia.       Other Visit Diagnoses     Acute cystitis with hematuria    - his urine symptoms have resolved.                   Follow-up with me in 3 months.

## 2019-03-13 ENCOUNTER — ANTI-COAG VISIT (OUTPATIENT)
Dept: CARDIOLOGY | Facility: CLINIC | Age: 79
End: 2019-03-13

## 2019-03-13 DIAGNOSIS — Z79.01 LONG TERM CURRENT USE OF ANTICOAGULANT THERAPY: ICD-10-CM

## 2019-03-13 DIAGNOSIS — I48.20 CHRONIC ATRIAL FIBRILLATION: ICD-10-CM

## 2019-03-18 ENCOUNTER — OFFICE VISIT (OUTPATIENT)
Dept: FAMILY MEDICINE | Facility: CLINIC | Age: 79
End: 2019-03-18
Payer: MEDICARE

## 2019-03-18 VITALS
BODY MASS INDEX: 26.86 KG/M2 | TEMPERATURE: 98 F | SYSTOLIC BLOOD PRESSURE: 140 MMHG | HEART RATE: 56 BPM | HEIGHT: 69 IN | RESPIRATION RATE: 16 BRPM | DIASTOLIC BLOOD PRESSURE: 79 MMHG | WEIGHT: 181.38 LBS | OXYGEN SATURATION: 98 %

## 2019-03-18 DIAGNOSIS — F33.2 SEVERE EPISODE OF RECURRENT MAJOR DEPRESSIVE DISORDER, WITHOUT PSYCHOTIC FEATURES: Primary | ICD-10-CM

## 2019-03-18 DIAGNOSIS — I10 ESSENTIAL HYPERTENSION: ICD-10-CM

## 2019-03-18 DIAGNOSIS — R00.1 BRADYCARDIA: ICD-10-CM

## 2019-03-18 PROCEDURE — 99213 PR OFFICE/OUTPT VISIT, EST, LEVL III, 20-29 MIN: ICD-10-PCS | Mod: S$GLB,,, | Performed by: INTERNAL MEDICINE

## 2019-03-18 PROCEDURE — 99213 OFFICE O/P EST LOW 20 MIN: CPT | Mod: S$GLB,,, | Performed by: INTERNAL MEDICINE

## 2019-03-18 PROCEDURE — 99999 PR PBB SHADOW E&M-EST. PATIENT-LVL IV: ICD-10-PCS | Mod: PBBFAC,,, | Performed by: INTERNAL MEDICINE

## 2019-03-18 PROCEDURE — 1101F PT FALLS ASSESS-DOCD LE1/YR: CPT | Mod: CPTII,S$GLB,, | Performed by: INTERNAL MEDICINE

## 2019-03-18 PROCEDURE — 99999 PR PBB SHADOW E&M-EST. PATIENT-LVL IV: CPT | Mod: PBBFAC,,, | Performed by: INTERNAL MEDICINE

## 2019-03-18 PROCEDURE — 3078F PR MOST RECENT DIASTOLIC BLOOD PRESSURE < 80 MM HG: ICD-10-PCS | Mod: CPTII,S$GLB,, | Performed by: INTERNAL MEDICINE

## 2019-03-18 PROCEDURE — 3077F PR MOST RECENT SYSTOLIC BLOOD PRESSURE >= 140 MM HG: ICD-10-PCS | Mod: CPTII,S$GLB,, | Performed by: INTERNAL MEDICINE

## 2019-03-18 PROCEDURE — 3078F DIAST BP <80 MM HG: CPT | Mod: CPTII,S$GLB,, | Performed by: INTERNAL MEDICINE

## 2019-03-18 PROCEDURE — 1101F PR PT FALLS ASSESS DOC 0-1 FALLS W/OUT INJ PAST YR: ICD-10-PCS | Mod: CPTII,S$GLB,, | Performed by: INTERNAL MEDICINE

## 2019-03-18 PROCEDURE — 3077F SYST BP >= 140 MM HG: CPT | Mod: CPTII,S$GLB,, | Performed by: INTERNAL MEDICINE

## 2019-03-18 RX ORDER — TRAZODONE HYDROCHLORIDE 50 MG/1
50 TABLET ORAL NIGHTLY
Qty: 30 TABLET | Refills: 3 | Status: ON HOLD | OUTPATIENT
Start: 2019-03-18 | End: 2019-04-29 | Stop reason: HOSPADM

## 2019-03-18 NOTE — PROGRESS NOTES
"Subjective:       Patient ID: Eliazar James is a 78 y.o. male.    Chief Complaint: Medication Problem (pt would not ellaborate)     I have reviewed the PMH, SH and FH for this patient. He is here today to folow-up on depression. HE feels that his lexapro is not helping him enough. He feels that it wears off in the afternoons. He is happy to have his wife home. His granddaughter and her two sons are living with them. He has some irritation with the teenage boys, but he is trying to deal with it because he wants his wife at home. HE had been seeing [facility], but he is not happy with them. He feels that they do not listen to him. He is trying to find someone else. He is has tried doubling the lexapro and that makes him feel "wacked out". He even feels weird when he takes one and a half.       Depression   Visit Type: follow-up  Patient presents with the following symptoms: anhedonia, depressed mood, excessive worry, feelings of hopelessness, irritability, nervousness/anxiety and panic.  Patient is not experiencing: chest pain, choking sensation, compulsions, confusion, decreased concentration, dizziness, dry mouth, fatigue, feelings of worthlessness, hypersomnia, hyperventilation, impotence, insomnia, malaise, memory impairment, muscle tension, nausea, obsessions, palpitations, psychomotor agitation, psychomotor retardation, restlessness, shortness of breath, suicidal ideas, suicidal planning, thoughts of death, weight gain and weight loss.  Frequency of symptoms: most days   Severity: moderate   Sleep quality: fair  Nighttime awakenings: several  Compliance with medications:  %          Active Ambulatory Problems     Diagnosis Date Noted    Chronic atrial fibrillation 07/05/2012    Long term current use of anticoagulant therapy 07/05/2012    CAD (coronary artery disease) 07/10/2012    Essential hypertension 07/10/2012    Hx-TIA (transient ischemic attack) 07/10/2012    Hyperlipidemia 12/11/2012 "    Depression 12/11/2012    S/P CABG (coronary artery bypass graft) 01/08/2013    Hematuria 08/18/2017    Nocturia 08/18/2017    Urinary frequency 08/18/2017    Renal calculus, left 08/18/2017    Bilateral renal cysts 08/18/2017    Benign prostatic hyperplasia with nocturia 09/12/2017    Hypothermia 07/04/2018    Stupor 07/04/2018    Other fatigue 09/14/2018    Impaired functional mobility and activity tolerance 12/19/2018    Impairment of balance 12/19/2018    Severe episode of recurrent major depressive disorder, without psychotic features 02/12/2019    Bradycardia 02/12/2019     Resolved Ambulatory Problems     Diagnosis Date Noted    SIRS (systemic inflammatory response syndrome) 07/04/2018     Past Medical History:   Diagnosis Date    *Atrial fibrillation     Anxiety     Atrial fibrillation 7/5/2012    Blood transfusion     CAD (coronary artery disease) 7/10/2012    Cancer     Cataract     Clotting disorder     Coronary artery disease     Depression     HTN (hypertension) 7/10/2012    Hx-TIA (transient ischemic attack) 7/10/2012    Hyperlipidemia     Hypertension     Myocardial infarction     Renal calculus, left 8/18/2017    S/P CABG (coronary artery bypass graft) 1/8/2013    Stroke 2/12    Urinary tract infection        HEALTH MAINTENANCE:   Health Maintenance   Topic Date Due    TETANUS VACCINE  09/17/2018    Lipid Panel  10/22/2023    Zoster Vaccine  Completed    Pneumococcal Vaccine (65+ Low/Medium Risk)  Completed    Influenza Vaccine  Completed       Review of Systems   Constitutional: Positive for irritability. Negative for chills, fatigue, fever, weight gain and weight loss.   HENT: Negative for congestion, ear discharge, ear pain, rhinorrhea and sore throat.    Eyes: Negative for discharge, redness and itching.   Respiratory: Negative for cough, choking, chest tightness, shortness of breath and wheezing.    Cardiovascular: Negative for chest pain, palpitations  and leg swelling.   Gastrointestinal: Negative for abdominal pain, constipation, diarrhea, nausea and vomiting.   Endocrine: Negative for cold intolerance and heat intolerance.   Genitourinary: Negative for dysuria, flank pain, frequency, hematuria and impotence.   Musculoskeletal: Negative for arthralgias, back pain, joint swelling and myalgias.   Skin: Negative for color change and rash.   Neurological: Negative for dizziness, tremors, numbness and headaches.   Psychiatric/Behavioral: Positive for depression. Negative for confusion, decreased concentration, dysphoric mood, sleep disturbance and suicidal ideas. The patient is nervous/anxious. The patient does not have insomnia.        Objective:          LABS    CMP  Sodium   Date Value Ref Range Status   01/29/2019 139 136 - 145 mmol/L Final     Potassium   Date Value Ref Range Status   01/29/2019 4.2 3.5 - 5.1 mmol/L Final     Chloride   Date Value Ref Range Status   01/29/2019 106 95 - 110 mmol/L Final     CO2   Date Value Ref Range Status   01/29/2019 26 23 - 29 mmol/L Final     Glucose   Date Value Ref Range Status   01/29/2019 98 70 - 110 mg/dL Final     BUN, Bld   Date Value Ref Range Status   01/29/2019 20 2 - 20 mg/dL Final     Creatinine   Date Value Ref Range Status   01/29/2019 1.12 0.50 - 1.40 mg/dL Final     Calcium   Date Value Ref Range Status   01/29/2019 9.1 8.7 - 10.5 mg/dL Final     Total Protein   Date Value Ref Range Status   01/29/2019 7.6 6.0 - 8.4 g/dL Final     Albumin   Date Value Ref Range Status   01/29/2019 3.8 3.5 - 5.2 g/dL Final     Total Bilirubin   Date Value Ref Range Status   01/29/2019 1.4 (H) 0.1 - 1.0 mg/dL Final     Comment:     For infants and newborns, interpretation of results should be based  on gestational age, weight and in agreement with clinical  observations.  Premature Infant recommended reference ranges:  Up to 24 hours.............<8.0 mg/dL  Up to 48 hours............<12.0 mg/dL  3-5 days..................<15.0  mg/dL  6-29 days.................<15.0 mg/dL       Alkaline Phosphatase   Date Value Ref Range Status   01/29/2019 85 38 - 126 U/L Final     AST   Date Value Ref Range Status   01/29/2019 25 15 - 46 U/L Final     ALT   Date Value Ref Range Status   01/29/2019 25 10 - 44 U/L Final     Anion Gap   Date Value Ref Range Status   01/29/2019 7 (L) 8 - 16 mmol/L Final     eGFR if    Date Value Ref Range Status   01/29/2019 >60.0 >60 mL/min/1.73 m^2 Final     eGFR if non    Date Value Ref Range Status   01/29/2019 >60.0 >60 mL/min/1.73 m^2 Final     Comment:     Calculation used to obtain the estimated glomerular filtration  rate (eGFR) is the CKD-EPI equation.          Lab Results   Component Value Date    WBC 12.09 01/29/2019    HGB 13.3 (L) 01/29/2019    HCT 38.7 (L) 01/29/2019    MCV 99 (H) 01/29/2019     01/29/2019       Recent Labs   Lab Result Units 01/29/19  1517   TSH uIU/mL 2.110         A1C:  No results for input(s): HGBA1C in the last 2160 hours.      Physical Exam   Constitutional: He appears well-developed and well-nourished. He does not have a sickly appearance.   HENT:   Head: Normocephalic and atraumatic.   Right Ear: External ear normal. Tympanic membrane is not erythematous. No middle ear effusion.   Left Ear: External ear normal. Tympanic membrane is not erythematous.  No middle ear effusion.   Nose: No rhinorrhea. Right sinus exhibits no maxillary sinus tenderness and no frontal sinus tenderness. Left sinus exhibits no maxillary sinus tenderness and no frontal sinus tenderness.   Mouth/Throat: No posterior oropharyngeal edema or posterior oropharyngeal erythema. No tonsillar exudate.   Eyes: Conjunctivae and EOM are normal. Pupils are equal, round, and reactive to light. Right eye exhibits no discharge. Left eye exhibits no discharge. Right conjunctiva is not injected. Left conjunctiva is not injected.   Neck: No thyromegaly present.   Cardiovascular: Regular  rhythm, normal heart sounds and intact distal pulses. Bradycardia present.   No murmur heard.  Pulmonary/Chest: Effort normal and breath sounds normal. He has no wheezes. He has no rhonchi. He has no rales.   Abdominal: Bowel sounds are normal. He exhibits no distension. There is no tenderness.   Musculoskeletal: He exhibits no edema or tenderness.   Lymphadenopathy:     He has no cervical adenopathy.   Neurological: He displays normal reflexes. No cranial nerve deficit.   Skin: Skin is warm and dry. No lesion and no rash noted.   Psychiatric: He has a normal mood and affect. His behavior is normal. His mood appears not anxious. His speech is not rapid and/or pressured. He is not agitated, not aggressive and not actively hallucinating. He does not exhibit a depressed mood. He expresses no suicidal ideation. He expresses no suicidal plans. He is attentive.             Assessment and Plan:     Problem List Items Addressed This Visit        Psychiatric    Severe episode of recurrent major depressive disorder, without psychotic features - Primary - let's try adding trazodone in the afternoon. HE may need just a little more help. I will consider something like buspar if this does not help.        Cardiac/Vascular    Essential hypertension - bp is ok today. Stable.       Bradycardia- hr is low. Chronic.           Orders Placed This Encounter    traZODone (DESYREL) 50 MG tablet         Follow-up with me in 1 month.

## 2019-03-18 NOTE — PATIENT INSTRUCTIONS
We have reviewed your prescription medications. Continue lexapro for now. I am adding trazodone in the evening. Try to find a counselor that you like. Follow-up with me in about a month.

## 2019-04-09 ENCOUNTER — ANTI-COAG VISIT (OUTPATIENT)
Dept: CARDIOLOGY | Facility: CLINIC | Age: 79
End: 2019-04-09

## 2019-04-09 DIAGNOSIS — Z79.01 LONG TERM CURRENT USE OF ANTICOAGULANT THERAPY: ICD-10-CM

## 2019-04-09 DIAGNOSIS — I48.20 CHRONIC ATRIAL FIBRILLATION: ICD-10-CM

## 2019-04-22 ENCOUNTER — OFFICE VISIT (OUTPATIENT)
Dept: FAMILY MEDICINE | Facility: CLINIC | Age: 79
End: 2019-04-22
Payer: MEDICARE

## 2019-04-22 VITALS
BODY MASS INDEX: 26.29 KG/M2 | SYSTOLIC BLOOD PRESSURE: 130 MMHG | TEMPERATURE: 98 F | RESPIRATION RATE: 18 BRPM | WEIGHT: 177.5 LBS | HEART RATE: 66 BPM | HEIGHT: 69 IN | OXYGEN SATURATION: 96 % | DIASTOLIC BLOOD PRESSURE: 62 MMHG

## 2019-04-22 DIAGNOSIS — R45.851 SUICIDAL IDEATION: Primary | ICD-10-CM

## 2019-04-22 DIAGNOSIS — R26.9 GAIT ABNORMALITY: ICD-10-CM

## 2019-04-22 DIAGNOSIS — I10 ESSENTIAL HYPERTENSION: ICD-10-CM

## 2019-04-22 DIAGNOSIS — F33.2 SEVERE EPISODE OF RECURRENT MAJOR DEPRESSIVE DISORDER, WITHOUT PSYCHOTIC FEATURES: ICD-10-CM

## 2019-04-22 DIAGNOSIS — I48.20 CHRONIC ATRIAL FIBRILLATION: ICD-10-CM

## 2019-04-22 PROBLEM — F32.9 MAJOR DEPRESSION: Status: ACTIVE | Noted: 2019-04-22

## 2019-04-22 PROCEDURE — 3078F PR MOST RECENT DIASTOLIC BLOOD PRESSURE < 80 MM HG: ICD-10-PCS | Mod: CPTII,S$GLB,, | Performed by: INTERNAL MEDICINE

## 2019-04-22 PROCEDURE — 1101F PR PT FALLS ASSESS DOC 0-1 FALLS W/OUT INJ PAST YR: ICD-10-PCS | Mod: CPTII,S$GLB,, | Performed by: INTERNAL MEDICINE

## 2019-04-22 PROCEDURE — 99999 PR PBB SHADOW E&M-EST. PATIENT-LVL IV: CPT | Mod: PBBFAC,,, | Performed by: INTERNAL MEDICINE

## 2019-04-22 PROCEDURE — 3078F DIAST BP <80 MM HG: CPT | Mod: CPTII,S$GLB,, | Performed by: INTERNAL MEDICINE

## 2019-04-22 PROCEDURE — 99999 PR PBB SHADOW E&M-EST. PATIENT-LVL IV: ICD-10-PCS | Mod: PBBFAC,,, | Performed by: INTERNAL MEDICINE

## 2019-04-22 PROCEDURE — 99215 OFFICE O/P EST HI 40 MIN: CPT | Mod: S$GLB,,, | Performed by: INTERNAL MEDICINE

## 2019-04-22 PROCEDURE — 1101F PT FALLS ASSESS-DOCD LE1/YR: CPT | Mod: CPTII,S$GLB,, | Performed by: INTERNAL MEDICINE

## 2019-04-22 PROCEDURE — 99499 UNLISTED E&M SERVICE: CPT | Mod: S$GLB,,, | Performed by: INTERNAL MEDICINE

## 2019-04-22 PROCEDURE — 99499 RISK ADDL DX/OHS AUDIT: ICD-10-PCS | Mod: S$GLB,,, | Performed by: INTERNAL MEDICINE

## 2019-04-22 PROCEDURE — 99215 PR OFFICE/OUTPT VISIT, EST, LEVL V, 40-54 MIN: ICD-10-PCS | Mod: S$GLB,,, | Performed by: INTERNAL MEDICINE

## 2019-04-22 PROCEDURE — 3075F SYST BP GE 130 - 139MM HG: CPT | Mod: CPTII,S$GLB,, | Performed by: INTERNAL MEDICINE

## 2019-04-22 PROCEDURE — 3075F PR MOST RECENT SYSTOLIC BLOOD PRESS GE 130-139MM HG: ICD-10-PCS | Mod: CPTII,S$GLB,, | Performed by: INTERNAL MEDICINE

## 2019-04-22 RX ORDER — LOSARTAN POTASSIUM 100 MG/1
1 TABLET ORAL DAILY
Refills: 3 | COMMUNITY
Start: 2019-03-27 | End: 2019-08-16

## 2019-04-22 NOTE — PROGRESS NOTES
Subjective:       Patient ID: Eliazar James is a 78 y.o. male.    Chief Complaint: Follow-up (4 week follow up) and Tetanus Vaccine (pt due)     I have reviewed the PMH, SH and FH for this patient. The patient presents today for follow-up of depression. He has been on leaxapro and trazodone and he still feels depressed. Today, he states that he wants to harm himself and he has a plan to do it. He feels hopeless. He has attempted suicide in the past by taking a medication overdose. At that time, he was treated at [facility] in [place]. He is frustrated because he feels that his children have interfered with his life. His wife was in a nursing home and they brought her home and moved in his granddaughter and her 2 children into the patient's home. He cannot handle having the teenagers around and he feels that his wife needs better care than she is getting. He would like for both of them to be in a nursing home together.     Depression   Visit Type: follow-up  Patient presents with the following symptoms: anhedonia, depressed mood, excessive worry, feelings of hopelessness, feelings of worthlessness, hypersomnia, irritability, memory impairment, nervousness/anxiety, suicidal ideas, suicidal planning and thoughts of death.  Patient is not experiencing: chest pain, choking sensation, compulsions, confusion, decreased concentration, dizziness, dry mouth, fatigue, hyperventilation, impotence, insomnia, malaise, muscle tension, nausea, obsessions, palpitations, panic, psychomotor agitation, psychomotor retardation, restlessness, shortness of breath, weight gain and weight loss.  Frequency of symptoms: constantly   Severity: severe   Sleep quality: non-restorative        Active Ambulatory Problems     Diagnosis Date Noted    Chronic atrial fibrillation 07/05/2012    Long term current use of anticoagulant therapy 07/05/2012    CAD (coronary artery disease) 07/10/2012    Essential hypertension 07/10/2012    Hx-TIA  (transient ischemic attack) 07/10/2012    Hyperlipidemia 12/11/2012    Depression 12/11/2012    S/P CABG (coronary artery bypass graft) 01/08/2013    Hematuria 08/18/2017    Nocturia 08/18/2017    Urinary frequency 08/18/2017    Renal calculus, left 08/18/2017    Bilateral renal cysts 08/18/2017    Benign prostatic hyperplasia with nocturia 09/12/2017    Hypothermia 07/04/2018    Stupor 07/04/2018    Other fatigue 09/14/2018    Impaired functional mobility and activity tolerance 12/19/2018    Impairment of balance 12/19/2018    Severe episode of recurrent major depressive disorder, without psychotic features 02/12/2019    Bradycardia 02/12/2019     Resolved Ambulatory Problems     Diagnosis Date Noted    SIRS (systemic inflammatory response syndrome) 07/04/2018     Past Medical History:   Diagnosis Date    *Atrial fibrillation     Anxiety     Atrial fibrillation 7/5/2012    Blood transfusion     CAD (coronary artery disease) 7/10/2012    Cancer     Cataract     Clotting disorder     Coronary artery disease     Depression     HTN (hypertension) 7/10/2012    Hx-TIA (transient ischemic attack) 7/10/2012    Hyperlipidemia     Hypertension     Myocardial infarction     Renal calculus, left 8/18/2017    S/P CABG (coronary artery bypass graft) 1/8/2013    Stroke 2/12    Urinary tract infection          MEDICATIONS:  Current Outpatient Medications:     aspirin 81 mg Tab, Take 1 tablet by mouth Daily., Disp: , Rfl:     atorvastatin (LIPITOR) 40 MG tablet, Take 1 tablet (40 mg total) by mouth once daily., Disp: 90 tablet, Rfl: 3    CARTIA  mg 24 hr capsule, Take 1 capsule (180 mg total) by mouth once daily., Disp: 90 capsule, Rfl: 3    carvedilol (COREG) 12.5 MG tablet, Take 1 tablet (12.5 mg total) by mouth 2 (two) times daily with meals., Disp: 180 tablet, Rfl: 3    escitalopram oxalate (LEXAPRO) 10 MG tablet, , Disp: , Rfl: 0    fluticasone (FLONASE) 50 mcg/actuation nasal  spray, 2 sprays (100 mcg total) by Each Nare route once daily., Disp: 16 g, Rfl: 5    irbesartan (AVAPRO) 75 MG tablet, Take 1 tablet (75 mg total) by mouth every evening., Disp: 30 tablet, Rfl: 3    losartan (COZAAR) 100 MG tablet, Take 1 tablet by mouth once daily., Disp: , Rfl: 3    nitroGLYCERIN (NITROSTAT) 0.4 MG SL tablet, Place 1 tablet (0.4 mg total) under the tongue every 5 (five) minutes as needed for Chest pain., Disp: 30 tablet, Rfl: 6    traZODone (DESYREL) 50 MG tablet, Take 1 tablet (50 mg total) by mouth every evening., Disp: 30 tablet, Rfl: 3    warfarin (COUMADIN) 5 MG tablet, Take 1 tablet (5 mg total) by mouth Daily. Current Dose ( 2.5 mg every Tue, Fri; 5 mg all other days) or as directed by coumadin clinic., Disp: 90 tablet, Rfl: 3      HEALTH MAINTENANCE:   Health Maintenance   Topic Date Due    TETANUS VACCINE  09/17/2018    Lipid Panel  10/22/2023    Zoster Vaccine  Completed    Pneumococcal Vaccine (65+ Low/Medium Risk)  Completed    Influenza Vaccine  Completed       Review of Systems   Constitutional: Positive for irritability. Negative for chills, fatigue, fever, weight gain and weight loss.   HENT: Negative for congestion, ear discharge, ear pain, rhinorrhea and sore throat.    Eyes: Negative for discharge, redness and itching.   Respiratory: Negative for cough, choking, chest tightness, shortness of breath and wheezing.    Cardiovascular: Negative for chest pain, palpitations and leg swelling.   Gastrointestinal: Negative for abdominal pain, constipation, diarrhea, nausea and vomiting.   Endocrine: Negative for cold intolerance and heat intolerance.   Genitourinary: Negative for dysuria, flank pain, frequency, hematuria and impotence.   Musculoskeletal: Negative for arthralgias, back pain, joint swelling and myalgias.   Skin: Negative for color change and rash.   Neurological: Negative for dizziness, tremors, numbness and headaches.   Psychiatric/Behavioral: Positive for  depression and suicidal ideas. Negative for confusion, decreased concentration, dysphoric mood and sleep disturbance. The patient is nervous/anxious. The patient does not have insomnia.        Objective:          LABS    CMP  Sodium   Date Value Ref Range Status   01/29/2019 139 136 - 145 mmol/L Final     Potassium   Date Value Ref Range Status   01/29/2019 4.2 3.5 - 5.1 mmol/L Final     Chloride   Date Value Ref Range Status   01/29/2019 106 95 - 110 mmol/L Final     CO2   Date Value Ref Range Status   01/29/2019 26 23 - 29 mmol/L Final     Glucose   Date Value Ref Range Status   01/29/2019 98 70 - 110 mg/dL Final     BUN, Bld   Date Value Ref Range Status   01/29/2019 20 2 - 20 mg/dL Final     Creatinine   Date Value Ref Range Status   01/29/2019 1.12 0.50 - 1.40 mg/dL Final     Calcium   Date Value Ref Range Status   01/29/2019 9.1 8.7 - 10.5 mg/dL Final     Total Protein   Date Value Ref Range Status   01/29/2019 7.6 6.0 - 8.4 g/dL Final     Albumin   Date Value Ref Range Status   01/29/2019 3.8 3.5 - 5.2 g/dL Final     Total Bilirubin   Date Value Ref Range Status   01/29/2019 1.4 (H) 0.1 - 1.0 mg/dL Final     Comment:     For infants and newborns, interpretation of results should be based  on gestational age, weight and in agreement with clinical  observations.  Premature Infant recommended reference ranges:  Up to 24 hours.............<8.0 mg/dL  Up to 48 hours............<12.0 mg/dL  3-5 days..................<15.0 mg/dL  6-29 days.................<15.0 mg/dL       Alkaline Phosphatase   Date Value Ref Range Status   01/29/2019 85 38 - 126 U/L Final     AST   Date Value Ref Range Status   01/29/2019 25 15 - 46 U/L Final     ALT   Date Value Ref Range Status   01/29/2019 25 10 - 44 U/L Final     Anion Gap   Date Value Ref Range Status   01/29/2019 7 (L) 8 - 16 mmol/L Final     eGFR if    Date Value Ref Range Status   01/29/2019 >60.0 >60 mL/min/1.73 m^2 Final     eGFR if non African American    Date Value Ref Range Status   01/29/2019 >60.0 >60 mL/min/1.73 m^2 Final     Comment:     Calculation used to obtain the estimated glomerular filtration  rate (eGFR) is the CKD-EPI equation.          Lab Results   Component Value Date    WBC 12.09 01/29/2019    HGB 13.3 (L) 01/29/2019    HCT 38.7 (L) 01/29/2019    MCV 99 (H) 01/29/2019     01/29/2019       Recent Labs   Lab Result Units 01/29/19  1517   TSH uIU/mL 2.110         A1C:  No results for input(s): HGBA1C in the last 2160 hours.      Physical Exam   Constitutional: He appears well-developed and well-nourished. He does not have a sickly appearance.   HENT:   Head: Normocephalic and atraumatic.   Right Ear: External ear normal. Tympanic membrane is not erythematous. No middle ear effusion.   Left Ear: External ear normal. Tympanic membrane is not erythematous.  No middle ear effusion.   Nose: No rhinorrhea. Right sinus exhibits no maxillary sinus tenderness and no frontal sinus tenderness. Left sinus exhibits no maxillary sinus tenderness and no frontal sinus tenderness.   Mouth/Throat: No posterior oropharyngeal edema or posterior oropharyngeal erythema. No tonsillar exudate.   Eyes: Pupils are equal, round, and reactive to light. Conjunctivae and EOM are normal. Right eye exhibits no discharge. Left eye exhibits no discharge. Right conjunctiva is not injected. Left conjunctiva is not injected.   Neck: No thyromegaly present.   Cardiovascular: Normal rate, normal heart sounds and intact distal pulses. An irregularly irregular rhythm present.   No murmur heard.  Pulmonary/Chest: Effort normal and breath sounds normal. He has no wheezes. He has no rhonchi. He has no rales.   Abdominal: Bowel sounds are normal. He exhibits no distension. There is no tenderness.   Musculoskeletal: He exhibits no edema or tenderness.   Lymphadenopathy:     He has no cervical adenopathy.   Neurological: He displays normal reflexes. No cranial nerve deficit.   Shuffling  gait. Trouble rising from seated position. Flat affect   Skin: Skin is warm and dry. No lesion and no rash noted.   Psychiatric: He has a normal mood and affect. His behavior is normal. His mood appears not anxious. His speech is not rapid and/or pressured. He is not agitated and not aggressive. He does not exhibit a depressed mood.             Assessment and Plan:     Problem List Items Addressed This Visit        Psychiatric    Severe episode of recurrent major depressive disorder, without psychotic features - he is on lexapro and trazodone. He has not followed up with counseling because he did not feel that they were helping.        Cardiac/Vascular    Chronic atrial fibrillation - chronic. Stable.     Essential hypertension - stable. bp looks good.       Other Visit Diagnoses     Suicidal ideation    -  Primary - we discussed with Dr Templeton. She said he was past the age limit to be admitted here. She suggested that we transfer him to the ER for protocol for suicide precautions. I gave report to the ER Physician and we escorted him to ER along with security. He also needs a  to help assist with home situation.       Gait abnormality    - He has a shuffling gait and a flat affect. He needs to be evaluated for Parkinson's disease. I will place referral to neurology.                  Follow-up with me in 1 month.

## 2019-04-22 NOTE — PATIENT INSTRUCTIONS
We have reviewed your prescription medications. We will send him to the ER to get into a place where he can be safely treated and prevented from hurting himself. He also needs a social work consult and a neurology referral.

## 2019-04-23 PROBLEM — Z91.81 AT RISK FOR FALLS: Status: ACTIVE | Noted: 2019-04-23

## 2019-04-23 NOTE — PROGRESS NOTES
Received a fax from [facility] stating PTS admit into [facility] for depression; INR drawn on 4/22 and 4/23

## 2019-05-10 ENCOUNTER — OFFICE VISIT (OUTPATIENT)
Dept: FAMILY MEDICINE | Facility: CLINIC | Age: 79
End: 2019-05-10
Payer: MEDICARE

## 2019-05-10 VITALS
BODY MASS INDEX: 27.13 KG/M2 | HEART RATE: 49 BPM | HEIGHT: 69 IN | DIASTOLIC BLOOD PRESSURE: 56 MMHG | OXYGEN SATURATION: 96 % | SYSTOLIC BLOOD PRESSURE: 128 MMHG | TEMPERATURE: 98 F | WEIGHT: 183.19 LBS

## 2019-05-10 DIAGNOSIS — M79.89 SWELLING OF LOWER EXTREMITY: ICD-10-CM

## 2019-05-10 DIAGNOSIS — I48.20 CHRONIC ATRIAL FIBRILLATION: Primary | ICD-10-CM

## 2019-05-10 DIAGNOSIS — F33.2 SEVERE EPISODE OF RECURRENT MAJOR DEPRESSIVE DISORDER, WITHOUT PSYCHOTIC FEATURES: ICD-10-CM

## 2019-05-10 DIAGNOSIS — R00.1 BRADYCARDIA: ICD-10-CM

## 2019-05-10 DIAGNOSIS — Z79.01 LONG TERM CURRENT USE OF ANTICOAGULANT THERAPY: ICD-10-CM

## 2019-05-10 PROCEDURE — 1101F PR PT FALLS ASSESS DOC 0-1 FALLS W/OUT INJ PAST YR: ICD-10-PCS | Mod: CPTII,S$GLB,, | Performed by: INTERNAL MEDICINE

## 2019-05-10 PROCEDURE — 99999 PR PBB SHADOW E&M-EST. PATIENT-LVL III: ICD-10-PCS | Mod: PBBFAC,,, | Performed by: INTERNAL MEDICINE

## 2019-05-10 PROCEDURE — 99214 OFFICE O/P EST MOD 30 MIN: CPT | Mod: S$GLB,,, | Performed by: INTERNAL MEDICINE

## 2019-05-10 PROCEDURE — 99499 RISK ADDL DX/OHS AUDIT: ICD-10-PCS | Mod: S$GLB,,, | Performed by: INTERNAL MEDICINE

## 2019-05-10 PROCEDURE — 3078F DIAST BP <80 MM HG: CPT | Mod: CPTII,S$GLB,, | Performed by: INTERNAL MEDICINE

## 2019-05-10 PROCEDURE — 99214 PR OFFICE/OUTPT VISIT, EST, LEVL IV, 30-39 MIN: ICD-10-PCS | Mod: S$GLB,,, | Performed by: INTERNAL MEDICINE

## 2019-05-10 PROCEDURE — 1101F PT FALLS ASSESS-DOCD LE1/YR: CPT | Mod: CPTII,S$GLB,, | Performed by: INTERNAL MEDICINE

## 2019-05-10 PROCEDURE — 99999 PR PBB SHADOW E&M-EST. PATIENT-LVL III: CPT | Mod: PBBFAC,,, | Performed by: INTERNAL MEDICINE

## 2019-05-10 PROCEDURE — 99499 UNLISTED E&M SERVICE: CPT | Mod: S$GLB,,, | Performed by: INTERNAL MEDICINE

## 2019-05-10 PROCEDURE — 3074F PR MOST RECENT SYSTOLIC BLOOD PRESSURE < 130 MM HG: ICD-10-PCS | Mod: CPTII,S$GLB,, | Performed by: INTERNAL MEDICINE

## 2019-05-10 PROCEDURE — 3074F SYST BP LT 130 MM HG: CPT | Mod: CPTII,S$GLB,, | Performed by: INTERNAL MEDICINE

## 2019-05-10 PROCEDURE — 3078F PR MOST RECENT DIASTOLIC BLOOD PRESSURE < 80 MM HG: ICD-10-PCS | Mod: CPTII,S$GLB,, | Performed by: INTERNAL MEDICINE

## 2019-05-10 NOTE — PROGRESS NOTES
Subjective:       Patient ID: Eliazar James is a 78 y.o. male.    Chief Complaint: Follow-up     I have reviewed the PMH,  and  for this patient. He  has a past medical history of *Atrial fibrillation, Anxiety, Atrial fibrillation (7/5/2012), Blood transfusion, CAD (coronary artery disease) (7/10/2012), Cancer, Cataract, Clotting disorder, Coronary artery disease, Depression, HTN (hypertension) (7/10/2012), TIA (transient ischemic attack) (7/10/2012), Hyperlipidemia, Hypertension, Myocardial infarction, Renal calculus, left (8/18/2017), S/P CABG (coronary artery bypass graft) (1/8/2013), Stroke (2/12), and Urinary tract infection.  He presents today for follow-up of depression.  The last time he was here we sent him to the emergency room for suicidal ideation.  He reports that he was admitted at [facility] in [facility].  He is somewhat disappointed that he only saw  2 times in the week that he was there.  However, he states that he is feeling better.  The medicine that they put him on seems to be helping.  He is still not happy with his home life but he reports that it is easier to tolerate.  I have advised him to try to find a support group so that he can compare stories with other people his age.  He has an appointment for counseling on May 29th.  He reports that he is sleeping well.  His heart rate is a little slow today at 49.  He is taking car TF for atrial fibrillation.  He had a possibility of having an abnormal INR when he was at the psychiatric hospital, but when it was repeated it was okay.  He is not having any active bleeding today.    His legs have been swelling a little bit today.  He reports that they were okay yesterday.  He does not recall eating anything overly salty, but he is not sure what he ate yesterday.  He has not missed any of his blood pressure medicines.  He does not have a prescription for Lasix.  He sees Dr. Valencia for Cardiology.  He is not having chest pain or  shortness of breath.    Active Ambulatory Problems     Diagnosis Date Noted    Chronic atrial fibrillation 07/05/2012    Long term current use of anticoagulant therapy 07/05/2012    CAD (coronary artery disease) 07/10/2012    Essential hypertension 07/10/2012    Hx-TIA (transient ischemic attack) 07/10/2012    Hyperlipidemia 12/11/2012    Depression 12/11/2012    S/P CABG (coronary artery bypass graft) 01/08/2013    Hematuria 08/18/2017    Nocturia 08/18/2017    Urinary frequency 08/18/2017    Renal calculus, left 08/18/2017    Bilateral renal cysts 08/18/2017    Benign prostatic hyperplasia with nocturia 09/12/2017    Hypothermia 07/04/2018    Stupor 07/04/2018    Other fatigue 09/14/2018    Impaired functional mobility and activity tolerance 12/19/2018    Impairment of balance 12/19/2018    Severe episode of recurrent major depressive disorder, without psychotic features 02/12/2019    Bradycardia 02/12/2019    Major depression 04/22/2019    At risk for falls 04/23/2019     Resolved Ambulatory Problems     Diagnosis Date Noted    SIRS (systemic inflammatory response syndrome) 07/04/2018     Past Medical History:   Diagnosis Date    *Atrial fibrillation     Anxiety     Atrial fibrillation 7/5/2012    Blood transfusion     CAD (coronary artery disease) 7/10/2012    Cancer     Cataract     Clotting disorder     Coronary artery disease     Depression     HTN (hypertension) 7/10/2012    Hx-TIA (transient ischemic attack) 7/10/2012    Hyperlipidemia     Hypertension     Myocardial infarction     Renal calculus, left 8/18/2017    S/P CABG (coronary artery bypass graft) 1/8/2013    Stroke 2/12    Urinary tract infection          MEDICATIONS:  Current Outpatient Medications:     aspirin 81 mg Tab, Take 1 tablet by mouth Daily., Disp: , Rfl:     atorvastatin (LIPITOR) 40 MG tablet, Take 1 tablet (40 mg total) by mouth once daily., Disp: 90 tablet, Rfl: 3    buPROPion (WELLBUTRIN  SR) 100 MG TBSR 12 hr tablet, Take 1 tablet (100 mg total) by mouth once daily., Disp: 30 tablet, Rfl: 0    CARTIA  mg 24 hr capsule, Take 1 capsule (180 mg total) by mouth once daily., Disp: 90 capsule, Rfl: 3    carvedilol (COREG) 12.5 MG tablet, Take 1 tablet (12.5 mg total) by mouth 2 (two) times daily with meals., Disp: 180 tablet, Rfl: 3    fluticasone (FLONASE) 50 mcg/actuation nasal spray, 2 sprays (100 mcg total) by Each Nare route once daily., Disp: 16 g, Rfl: 5    losartan (COZAAR) 100 MG tablet, Take 1 tablet by mouth once daily., Disp: , Rfl: 3    mirtazapine (REMERON) 7.5 MG Tab, Take 1 tablet (7.5 mg total) by mouth every evening., Disp: 30 tablet, Rfl: 0    sertraline (ZOLOFT) 50 MG tablet, Take 1 tablet (50 mg total) by mouth once daily., Disp: 30 tablet, Rfl: 0    warfarin (COUMADIN) 2.5 MG tablet, Take 1 tablet (2.5 mg total) by mouth every Tues, Fri., Disp: 8 tablet, Rfl: 0    warfarin (COUMADIN) 5 MG tablet, Take 1 tablet (5 mg total) by mouth every Sat, Sun., Disp: 8 tablet, Rfl: 0    warfarin (COUMADIN) 5 MG tablet, Take 1 tablet (5 mg total) by mouth every Thursday., Disp: 4 tablet, Rfl: 0    warfarin (COUMADIN) 5 MG tablet, Take 1 tablet (5 mg total) by mouth every Wednesday., Disp: 4 tablet, Rfl: 0      HEALTH MAINTENANCE:   Health Maintenance   Topic Date Due    TETANUS VACCINE  09/17/2018    Influenza Vaccine  08/01/2019    Lipid Panel  10/22/2023    Pneumococcal Vaccine (65+ Low/Medium Risk)  Completed       Review of Systems   Constitutional: Negative for chills, fatigue and fever.   HENT: Negative for congestion, ear discharge, ear pain, rhinorrhea and sore throat.    Eyes: Negative for discharge, redness and itching.   Respiratory: Negative for cough, chest tightness, shortness of breath and wheezing.    Cardiovascular: Negative for chest pain, palpitations and leg swelling.   Gastrointestinal: Negative for abdominal pain, constipation, diarrhea, nausea and  vomiting.   Endocrine: Negative for cold intolerance and heat intolerance.   Genitourinary: Negative for dysuria, flank pain, frequency and hematuria.   Musculoskeletal: Negative for arthralgias, back pain, joint swelling and myalgias.   Skin: Negative for color change and rash.   Neurological: Negative for dizziness, tremors, numbness and headaches.   Psychiatric/Behavioral: Positive for dysphoric mood. Negative for sleep disturbance. The patient is not nervous/anxious.        Objective:          LABS    CMP  Sodium   Date Value Ref Range Status   04/22/2019 143 136 - 145 mmol/L Final     Potassium   Date Value Ref Range Status   04/22/2019 4.3 3.5 - 5.1 mmol/L Final     Chloride   Date Value Ref Range Status   04/22/2019 107 95 - 110 mmol/L Final     CO2   Date Value Ref Range Status   04/22/2019 25 23 - 29 mmol/L Final     Glucose   Date Value Ref Range Status   04/22/2019 116 (H) 70 - 110 mg/dL Final     BUN, Bld   Date Value Ref Range Status   04/22/2019 17 2 - 20 mg/dL Final     Creatinine   Date Value Ref Range Status   04/22/2019 0.91 0.50 - 1.40 mg/dL Final     Calcium   Date Value Ref Range Status   04/22/2019 9.3 8.7 - 10.5 mg/dL Final     Total Protein   Date Value Ref Range Status   04/22/2019 7.9 6.0 - 8.4 g/dL Final     Albumin   Date Value Ref Range Status   04/22/2019 4.1 3.5 - 5.2 g/dL Final     Total Bilirubin   Date Value Ref Range Status   04/22/2019 1.6 (H) 0.1 - 1.0 mg/dL Final     Comment:     For infants and newborns, interpretation of results should be based  on gestational age, weight and in agreement with clinical  observations.  Premature Infant recommended reference ranges:  Up to 24 hours.............<8.0 mg/dL  Up to 48 hours............<12.0 mg/dL  3-5 days..................<15.0 mg/dL  6-29 days.................<15.0 mg/dL       Alkaline Phosphatase   Date Value Ref Range Status   04/22/2019 114 38 - 126 U/L Final     AST   Date Value Ref Range Status   04/22/2019 24 15 - 46 U/L  Final     ALT   Date Value Ref Range Status   04/22/2019 28 10 - 44 U/L Final     Anion Gap   Date Value Ref Range Status   04/22/2019 11 8 - 16 mmol/L Final     eGFR if    Date Value Ref Range Status   04/22/2019 >60.0 >60 mL/min/1.73 m^2 Final     eGFR if non    Date Value Ref Range Status   04/22/2019 >60.0 >60 mL/min/1.73 m^2 Final     Comment:     Calculation used to obtain the estimated glomerular filtration  rate (eGFR) is the CKD-EPI equation.          Lab Results   Component Value Date    WBC 8.28 04/23/2019    HGB 13.3 (L) 04/23/2019    HCT 40.0 04/23/2019    MCV 99 (H) 04/23/2019     04/23/2019       Recent Labs   Lab Result Units 04/22/19  0936   TSH uIU/mL 2.500         A1C:  No results for input(s): HGBA1C in the last 2160 hours.      Physical Exam   Constitutional: He appears well-developed and well-nourished. He does not have a sickly appearance.   HENT:   Head: Normocephalic and atraumatic.   Right Ear: External ear normal. Tympanic membrane is not erythematous. No middle ear effusion.   Left Ear: External ear normal. Tympanic membrane is not erythematous.  No middle ear effusion.   Nose: No rhinorrhea. Right sinus exhibits no maxillary sinus tenderness and no frontal sinus tenderness. Left sinus exhibits no maxillary sinus tenderness and no frontal sinus tenderness.   Mouth/Throat: No posterior oropharyngeal edema or posterior oropharyngeal erythema. No tonsillar exudate.   Eyes: Pupils are equal, round, and reactive to light. Conjunctivae and EOM are normal. Right eye exhibits no discharge. Left eye exhibits no discharge. Right conjunctiva is not injected. Left conjunctiva is not injected.   Neck: No thyromegaly present.   Cardiovascular: Normal rate, regular rhythm, normal heart sounds and intact distal pulses.   No murmur heard.  Pulmonary/Chest: Effort normal and breath sounds normal. He has no wheezes. He has no rhonchi. He has no rales.   Abdominal:  Bowel sounds are normal. He exhibits no distension. There is no tenderness.   Musculoskeletal: He exhibits no edema or tenderness.        Right ankle: He exhibits swelling. He exhibits normal range of motion.        Left ankle: He exhibits swelling. He exhibits normal range of motion.   Lymphadenopathy:     He has no cervical adenopathy.   Neurological: He displays normal reflexes. No cranial nerve deficit.   Skin: Skin is warm and dry. No lesion and no rash noted.   Psychiatric: His behavior is normal. His mood appears not anxious. His speech is not rapid and/or pressured. He is not agitated and not aggressive. He exhibits a depressed mood. He expresses no suicidal plans and no homicidal plans.             Assessment and Plan:     Problem List Items Addressed This Visit        Psychiatric    Severe episode of recurrent major depressive disorder, without psychotic features - he seems to be doing better on wellbutrin and remeron. He seems calmer and he is not feeling suicidal. Follow-up with counseling on May 29th. He should also try to find a support group.        Cardiac/Vascular    Chronic atrial fibrillation - Primary - chronic. On cartai. HR slow. Also on anticoagulant.       Bradycardia - due to medications. Stable.        Hematology    Long term current use of anticoagulant therapy - he is followed by coumadin clinic. Stable.       Other Visit Diagnoses     Swelling of lower extremity     - decrease salt. Elevate legs.                  Follow-up with me in 1 month.

## 2019-05-10 NOTE — PATIENT INSTRUCTIONS
We have reviewed your prescription medications. You seem to be doing better. Continue current medications and follow-up with Counselor on May 29th. Ask them if there are geriatric support groups that you could attend. Watch your salt intake and elevate legs for a few days to help with swelling. I would like to recheck you in about a month.

## 2019-05-22 ENCOUNTER — ANTI-COAG VISIT (OUTPATIENT)
Dept: CARDIOLOGY | Facility: CLINIC | Age: 79
End: 2019-05-22
Payer: MEDICARE

## 2019-05-22 DIAGNOSIS — Z79.01 LONG TERM CURRENT USE OF ANTICOAGULANT THERAPY: ICD-10-CM

## 2019-05-22 DIAGNOSIS — I48.20 CHRONIC ATRIAL FIBRILLATION: ICD-10-CM

## 2019-05-22 PROCEDURE — 93793 ANTICOAG MGMT PT WARFARIN: CPT | Mod: S$GLB,,,

## 2019-05-22 PROCEDURE — 93793 PR ANTICOAGULANT MGMT FOR PT TAKING WARFARIN: ICD-10-PCS | Mod: S$GLB,,,

## 2019-05-22 RX ORDER — WARFARIN SODIUM 5 MG/1
2.5-5 TABLET ORAL DAILY
Status: ON HOLD | COMMUNITY
End: 2019-07-01 | Stop reason: SDUPTHER

## 2019-05-30 ENCOUNTER — OFFICE VISIT (OUTPATIENT)
Dept: FAMILY MEDICINE | Facility: CLINIC | Age: 79
End: 2019-05-30
Payer: MEDICARE

## 2019-05-30 VITALS
HEIGHT: 69 IN | OXYGEN SATURATION: 98 % | RESPIRATION RATE: 16 BRPM | SYSTOLIC BLOOD PRESSURE: 148 MMHG | BODY MASS INDEX: 26.33 KG/M2 | WEIGHT: 177.81 LBS | DIASTOLIC BLOOD PRESSURE: 72 MMHG | HEART RATE: 57 BPM

## 2019-05-30 DIAGNOSIS — I10 ESSENTIAL HYPERTENSION: ICD-10-CM

## 2019-05-30 DIAGNOSIS — F33.2 SEVERE EPISODE OF RECURRENT MAJOR DEPRESSIVE DISORDER, WITHOUT PSYCHOTIC FEATURES: Primary | ICD-10-CM

## 2019-05-30 DIAGNOSIS — E78.2 HYPERLIPIDEMIA, MIXED: ICD-10-CM

## 2019-05-30 DIAGNOSIS — I25.10 CORONARY ARTERY DISEASE INVOLVING NATIVE CORONARY ARTERY OF NATIVE HEART WITHOUT ANGINA PECTORIS: Chronic | ICD-10-CM

## 2019-05-30 DIAGNOSIS — I48.20 CHRONIC ATRIAL FIBRILLATION: ICD-10-CM

## 2019-05-30 PROCEDURE — 1101F PT FALLS ASSESS-DOCD LE1/YR: CPT | Mod: CPTII,S$GLB,, | Performed by: INTERNAL MEDICINE

## 2019-05-30 PROCEDURE — 3078F DIAST BP <80 MM HG: CPT | Mod: CPTII,S$GLB,, | Performed by: INTERNAL MEDICINE

## 2019-05-30 PROCEDURE — 99213 OFFICE O/P EST LOW 20 MIN: CPT | Mod: S$GLB,,, | Performed by: INTERNAL MEDICINE

## 2019-05-30 PROCEDURE — 3078F PR MOST RECENT DIASTOLIC BLOOD PRESSURE < 80 MM HG: ICD-10-PCS | Mod: CPTII,S$GLB,, | Performed by: INTERNAL MEDICINE

## 2019-05-30 PROCEDURE — 99999 PR PBB SHADOW E&M-EST. PATIENT-LVL IV: ICD-10-PCS | Mod: PBBFAC,,, | Performed by: INTERNAL MEDICINE

## 2019-05-30 PROCEDURE — 99213 PR OFFICE/OUTPT VISIT, EST, LEVL III, 20-29 MIN: ICD-10-PCS | Mod: S$GLB,,, | Performed by: INTERNAL MEDICINE

## 2019-05-30 PROCEDURE — 3077F SYST BP >= 140 MM HG: CPT | Mod: CPTII,S$GLB,, | Performed by: INTERNAL MEDICINE

## 2019-05-30 PROCEDURE — 3077F PR MOST RECENT SYSTOLIC BLOOD PRESSURE >= 140 MM HG: ICD-10-PCS | Mod: CPTII,S$GLB,, | Performed by: INTERNAL MEDICINE

## 2019-05-30 PROCEDURE — 1101F PR PT FALLS ASSESS DOC 0-1 FALLS W/OUT INJ PAST YR: ICD-10-PCS | Mod: CPTII,S$GLB,, | Performed by: INTERNAL MEDICINE

## 2019-05-30 PROCEDURE — 99999 PR PBB SHADOW E&M-EST. PATIENT-LVL IV: CPT | Mod: PBBFAC,,, | Performed by: INTERNAL MEDICINE

## 2019-05-30 RX ORDER — BUPROPION HYDROCHLORIDE 100 MG/1
100 TABLET, EXTENDED RELEASE ORAL DAILY
Qty: 30 TABLET | Refills: 3 | Status: SHIPPED | OUTPATIENT
Start: 2019-05-30 | End: 2019-09-12 | Stop reason: SDUPTHER

## 2019-05-30 RX ORDER — BUPROPION HYDROCHLORIDE 100 MG/1
100 TABLET, EXTENDED RELEASE ORAL DAILY
Qty: 30 TABLET | Refills: 3 | Status: SHIPPED | OUTPATIENT
Start: 2019-05-30 | End: 2019-05-30 | Stop reason: SDUPTHER

## 2019-05-30 RX ORDER — SERTRALINE HYDROCHLORIDE 50 MG/1
50 TABLET, FILM COATED ORAL DAILY
Qty: 30 TABLET | Refills: 3 | Status: ON HOLD | OUTPATIENT
Start: 2019-05-30 | End: 2019-07-01 | Stop reason: SDUPTHER

## 2019-05-30 RX ORDER — SERTRALINE HYDROCHLORIDE 50 MG/1
50 TABLET, FILM COATED ORAL DAILY
Qty: 30 TABLET | Refills: 3 | Status: SHIPPED | OUTPATIENT
Start: 2019-05-30 | End: 2019-05-30 | Stop reason: SDUPTHER

## 2019-05-30 RX ORDER — MIRTAZAPINE 7.5 MG/1
15 TABLET, FILM COATED ORAL NIGHTLY
Qty: 30 TABLET | Refills: 3 | Status: SHIPPED | OUTPATIENT
Start: 2019-05-30 | End: 2019-06-21 | Stop reason: SDUPTHER

## 2019-05-30 RX ORDER — MIRTAZAPINE 7.5 MG/1
7.5 TABLET, FILM COATED ORAL NIGHTLY
Qty: 30 TABLET | Refills: 3 | Status: SHIPPED | OUTPATIENT
Start: 2019-05-30 | End: 2019-05-30 | Stop reason: SDUPTHER

## 2019-05-30 NOTE — PATIENT INSTRUCTIONS
We have reviewed your prescription medications. You can get the lipid panel drawn when you get coumadin drawn on June 5th. Try to find a support group for help with issues. I increased the mirtazapine to 15 mg at night to help with sleep.

## 2019-06-02 NOTE — PROGRESS NOTES
Subjective:       Patient ID: Eliazar James is a 78 y.o. male.    Chief Complaint: Hospital Follow Up     I have reviewed the PMH,  and  for this patient. He  has a past medical history of *Atrial fibrillation, Anxiety, Atrial fibrillation (7/5/2012), Blood transfusion, CAD (coronary artery disease) (7/10/2012), Cancer, Cataract, Clotting disorder, Coronary artery disease, Depression, HTN (hypertension) (7/10/2012), TIA (transient ischemic attack) (7/10/2012), Hyperlipidemia, Hypertension, Myocardial infarction, Renal calculus, left (8/18/2017), S/P CABG (coronary artery bypass graft) (1/8/2013), Stroke (2/12), and Urinary tract infection.  He is accompanied today by his grandson. He is currently on a heart monitor for irregular heart beat. He has been taking his antidepressants. He feels helpless because he wants for him and his wife to go to a nursing home, but his family has decided that they should stay at home and have the grandson help at the house. HE is going to counseling but it is not helping much. He does seem like he would be interested in a support group because these helped him when he was in the mental hospital. His last labs were good.     He is not sleeping, though. He has a prescription for mirtazapine.     Active Ambulatory Problems     Diagnosis Date Noted    Chronic atrial fibrillation 07/05/2012    Long term current use of anticoagulant therapy 07/05/2012    CAD (coronary artery disease) 07/10/2012    Essential hypertension 07/10/2012    Hx-TIA (transient ischemic attack) 07/10/2012    Hyperlipidemia 12/11/2012    Depression 12/11/2012    S/P CABG (coronary artery bypass graft) 01/08/2013    Hematuria 08/18/2017    Nocturia 08/18/2017    Urinary frequency 08/18/2017    Renal calculus, left 08/18/2017    Bilateral renal cysts 08/18/2017    Benign prostatic hyperplasia with nocturia 09/12/2017    Hypothermia 07/04/2018    Stupor 07/04/2018    Other fatigue 09/14/2018     Impaired functional mobility and activity tolerance 12/19/2018    Impairment of balance 12/19/2018    Severe episode of recurrent major depressive disorder, without psychotic features 02/12/2019    Bradycardia 02/12/2019    Major depression 04/22/2019    At risk for falls 04/23/2019     Resolved Ambulatory Problems     Diagnosis Date Noted    SIRS (systemic inflammatory response syndrome) 07/04/2018     Past Medical History:   Diagnosis Date    *Atrial fibrillation     Anxiety     Atrial fibrillation 7/5/2012    Blood transfusion     CAD (coronary artery disease) 7/10/2012    Cancer     Cataract     Clotting disorder     Coronary artery disease     Depression     HTN (hypertension) 7/10/2012    Hx-TIA (transient ischemic attack) 7/10/2012    Hyperlipidemia     Hypertension     Myocardial infarction     Renal calculus, left 8/18/2017    S/P CABG (coronary artery bypass graft) 1/8/2013    Stroke 2/12    Urinary tract infection          MEDICATIONS:  Current Outpatient Medications:     aspirin 81 mg Tab, Take 1 tablet by mouth Daily., Disp: , Rfl:     atorvastatin (LIPITOR) 40 MG tablet, Take 1 tablet (40 mg total) by mouth once daily., Disp: 90 tablet, Rfl: 3    buPROPion (WELLBUTRIN SR) 100 MG TBSR 12 hr tablet, Take 1 tablet (100 mg total) by mouth once daily., Disp: 30 tablet, Rfl: 3    CARTIA  mg 24 hr capsule, Take 1 capsule (180 mg total) by mouth once daily., Disp: 90 capsule, Rfl: 3    carvedilol (COREG) 12.5 MG tablet, Take 1 tablet (12.5 mg total) by mouth 2 (two) times daily with meals., Disp: 180 tablet, Rfl: 3    fluticasone (FLONASE) 50 mcg/actuation nasal spray, 2 sprays (100 mcg total) by Each Nare route once daily., Disp: 16 g, Rfl: 5    losartan (COZAAR) 100 MG tablet, Take 1 tablet by mouth once daily., Disp: , Rfl: 3    mirtazapine (REMERON) 7.5 MG Tab, Take 2 tablets (15 mg total) by mouth every evening., Disp: 30 tablet, Rfl: 3    sertraline (ZOLOFT) 50 MG  tablet, Take 1 tablet (50 mg total) by mouth once daily., Disp: 30 tablet, Rfl: 3    warfarin (COUMADIN) 5 MG tablet, Take 2.5-5 mg by mouth Daily. As directed by coumadin clinic, Disp: , Rfl:       HEALTH MAINTENANCE:   Health Maintenance   Topic Date Due    TETANUS VACCINE  09/17/2018    Influenza Vaccine  08/01/2019    Lipid Panel  10/22/2023    Pneumococcal Vaccine (65+ Low/Medium Risk)  Completed       Review of Systems   Constitutional: Negative for chills, fatigue and fever.   HENT: Negative for congestion, ear discharge, ear pain, rhinorrhea and sore throat.    Eyes: Negative for discharge, redness and itching.   Respiratory: Negative for cough, chest tightness, shortness of breath and wheezing.    Cardiovascular: Negative for chest pain, palpitations and leg swelling.   Gastrointestinal: Negative for abdominal pain, constipation, diarrhea, nausea and vomiting.   Endocrine: Negative for cold intolerance and heat intolerance.   Genitourinary: Negative for dysuria, flank pain, frequency and hematuria.   Musculoskeletal: Negative for arthralgias, back pain, joint swelling and myalgias.   Skin: Negative for color change and rash.   Neurological: Negative for dizziness, tremors, numbness and headaches.   Psychiatric/Behavioral: Negative for dysphoric mood and sleep disturbance. The patient is not nervous/anxious.        Objective:          LABS    CMP  Sodium   Date Value Ref Range Status   04/22/2019 143 136 - 145 mmol/L Final     Potassium   Date Value Ref Range Status   04/22/2019 4.3 3.5 - 5.1 mmol/L Final     Chloride   Date Value Ref Range Status   04/22/2019 107 95 - 110 mmol/L Final     CO2   Date Value Ref Range Status   04/22/2019 25 23 - 29 mmol/L Final     Glucose   Date Value Ref Range Status   04/22/2019 116 (H) 70 - 110 mg/dL Final     BUN, Bld   Date Value Ref Range Status   04/22/2019 17 2 - 20 mg/dL Final     Creatinine   Date Value Ref Range Status   04/22/2019 0.91 0.50 - 1.40 mg/dL Final      Calcium   Date Value Ref Range Status   04/22/2019 9.3 8.7 - 10.5 mg/dL Final     Total Protein   Date Value Ref Range Status   04/22/2019 7.9 6.0 - 8.4 g/dL Final     Albumin   Date Value Ref Range Status   04/22/2019 4.1 3.5 - 5.2 g/dL Final     Total Bilirubin   Date Value Ref Range Status   04/22/2019 1.6 (H) 0.1 - 1.0 mg/dL Final     Comment:     For infants and newborns, interpretation of results should be based  on gestational age, weight and in agreement with clinical  observations.  Premature Infant recommended reference ranges:  Up to 24 hours.............<8.0 mg/dL  Up to 48 hours............<12.0 mg/dL  3-5 days..................<15.0 mg/dL  6-29 days.................<15.0 mg/dL       Alkaline Phosphatase   Date Value Ref Range Status   04/22/2019 114 38 - 126 U/L Final     AST   Date Value Ref Range Status   04/22/2019 24 15 - 46 U/L Final     ALT   Date Value Ref Range Status   04/22/2019 28 10 - 44 U/L Final     Anion Gap   Date Value Ref Range Status   04/22/2019 11 8 - 16 mmol/L Final     eGFR if    Date Value Ref Range Status   04/22/2019 >60.0 >60 mL/min/1.73 m^2 Final     eGFR if non    Date Value Ref Range Status   04/22/2019 >60.0 >60 mL/min/1.73 m^2 Final     Comment:     Calculation used to obtain the estimated glomerular filtration  rate (eGFR) is the CKD-EPI equation.          Lab Results   Component Value Date    WBC 8.28 04/23/2019    HGB 13.3 (L) 04/23/2019    HCT 40.0 04/23/2019    MCV 99 (H) 04/23/2019     04/23/2019       Recent Labs   Lab Result Units 04/22/19  0936   TSH uIU/mL 2.500         A1C:  No results for input(s): HGBA1C in the last 2160 hours.      Physical Exam   Constitutional: He appears well-developed and well-nourished. He does not have a sickly appearance.   HENT:   Head: Normocephalic and atraumatic.   Right Ear: External ear normal. Tympanic membrane is not erythematous. No middle ear effusion.   Left Ear: External ear  normal. Tympanic membrane is not erythematous.  No middle ear effusion.   Nose: No rhinorrhea. Right sinus exhibits no maxillary sinus tenderness and no frontal sinus tenderness. Left sinus exhibits no maxillary sinus tenderness and no frontal sinus tenderness.   Mouth/Throat: No posterior oropharyngeal edema or posterior oropharyngeal erythema. No tonsillar exudate.   Eyes: Pupils are equal, round, and reactive to light. Conjunctivae and EOM are normal. Right eye exhibits no discharge. Left eye exhibits no discharge. Right conjunctiva is not injected. Left conjunctiva is not injected.   Neck: No thyromegaly present.   Cardiovascular: Normal rate, regular rhythm, normal heart sounds and intact distal pulses.   No murmur heard.  Pulmonary/Chest: Effort normal and breath sounds normal. He has no wheezes. He has no rhonchi. He has no rales.   Abdominal: Bowel sounds are normal. He exhibits no distension. There is no tenderness.   Musculoskeletal: He exhibits no edema or tenderness.   Lymphadenopathy:     He has no cervical adenopathy.   Neurological: He displays normal reflexes. No cranial nerve deficit.   Skin: Skin is warm and dry. No lesion and no rash noted.   Psychiatric: He has a normal mood and affect. His behavior is normal. His mood appears not anxious. His speech is not rapid and/or pressured. He is not agitated and not aggressive. He does not exhibit a depressed mood.             Assessment and Plan:     Problem List Items Addressed This Visit        Psychiatric    Severe episode of recurrent major depressive disorder, without psychotic features - Primary - he needs to get a support group. HE has been referred to counseling but it doesn't seem to help.        Cardiac/Vascular    CAD (coronary artery disease) (Chronic) - follow-up with [facility].       Chronic atrial fibrillation - he is on coumadin and carvedilol. Wearing heart monitor.       Essential hypertension - bp is a little high today. Continue  current medications.       Other Visit Diagnoses     Hyperlipidemia, mixed    - he is on atorvastatin. Check labs.     Relevant Orders    Lipid panel          Orders Placed This Encounter    Lipid panel    mirtazapine (REMERON) 7.5 MG Tab    sertraline (ZOLOFT) 50 MG tablet    buPROPion (WELLBUTRIN SR) 100 MG TBSR 12 hr tablet         Follow-up with me in 3 months.

## 2019-06-06 NOTE — PROGRESS NOTES
Spoke to pt to reschedule appt. The patient want to be scheduled with his wife Mrs. James. Both of them were scheduled on the same day.

## 2019-06-12 ENCOUNTER — PATIENT MESSAGE (OUTPATIENT)
Dept: NEUROLOGY | Facility: CLINIC | Age: 79
End: 2019-06-12

## 2019-06-13 ENCOUNTER — ANTI-COAG VISIT (OUTPATIENT)
Dept: CARDIOLOGY | Facility: CLINIC | Age: 79
End: 2019-06-13
Payer: MEDICARE

## 2019-06-13 DIAGNOSIS — I48.20 CHRONIC ATRIAL FIBRILLATION: Primary | ICD-10-CM

## 2019-06-13 DIAGNOSIS — Z79.01 LONG TERM CURRENT USE OF ANTICOAGULANT THERAPY: ICD-10-CM

## 2019-06-13 PROCEDURE — 93793 PR ANTICOAGULANT MGMT FOR PT TAKING WARFARIN: ICD-10-PCS | Mod: S$GLB,,,

## 2019-06-13 PROCEDURE — 93793 ANTICOAG MGMT PT WARFARIN: CPT | Mod: S$GLB,,,

## 2019-06-13 NOTE — PROGRESS NOTES
INR high. Denies pertinent changes. Dose previously stable. Adjust dose for today then resume maintenance dose. Recheck INR in 2 weeks

## 2019-06-14 ENCOUNTER — OFFICE VISIT (OUTPATIENT)
Dept: NEUROLOGY | Facility: CLINIC | Age: 79
End: 2019-06-14
Payer: MEDICARE

## 2019-06-14 VITALS
SYSTOLIC BLOOD PRESSURE: 168 MMHG | HEIGHT: 69 IN | WEIGHT: 177.13 LBS | BODY MASS INDEX: 26.23 KG/M2 | HEART RATE: 67 BPM | DIASTOLIC BLOOD PRESSURE: 79 MMHG

## 2019-06-14 DIAGNOSIS — I48.20 CHRONIC ATRIAL FIBRILLATION: ICD-10-CM

## 2019-06-14 DIAGNOSIS — R26.9 GAIT DISORDER: Primary | ICD-10-CM

## 2019-06-14 DIAGNOSIS — I25.10 CORONARY ARTERY DISEASE INVOLVING NATIVE CORONARY ARTERY OF NATIVE HEART WITHOUT ANGINA PECTORIS: Chronic | ICD-10-CM

## 2019-06-14 DIAGNOSIS — Z86.73 HX-TIA (TRANSIENT ISCHEMIC ATTACK): Chronic | ICD-10-CM

## 2019-06-14 DIAGNOSIS — I10 ESSENTIAL HYPERTENSION: ICD-10-CM

## 2019-06-14 DIAGNOSIS — F33.1 MODERATE EPISODE OF RECURRENT MAJOR DEPRESSIVE DISORDER: ICD-10-CM

## 2019-06-14 PROCEDURE — 3077F PR MOST RECENT SYSTOLIC BLOOD PRESSURE >= 140 MM HG: ICD-10-PCS | Mod: CPTII,S$GLB,, | Performed by: PSYCHIATRY & NEUROLOGY

## 2019-06-14 PROCEDURE — 99999 PR PBB SHADOW E&M-EST. PATIENT-LVL III: ICD-10-PCS | Mod: PBBFAC,,, | Performed by: PSYCHIATRY & NEUROLOGY

## 2019-06-14 PROCEDURE — 1101F PT FALLS ASSESS-DOCD LE1/YR: CPT | Mod: CPTII,S$GLB,, | Performed by: PSYCHIATRY & NEUROLOGY

## 2019-06-14 PROCEDURE — 1101F PR PT FALLS ASSESS DOC 0-1 FALLS W/OUT INJ PAST YR: ICD-10-PCS | Mod: CPTII,S$GLB,, | Performed by: PSYCHIATRY & NEUROLOGY

## 2019-06-14 PROCEDURE — 99204 PR OFFICE/OUTPT VISIT, NEW, LEVL IV, 45-59 MIN: ICD-10-PCS | Mod: S$GLB,,, | Performed by: PSYCHIATRY & NEUROLOGY

## 2019-06-14 PROCEDURE — 99999 PR PBB SHADOW E&M-EST. PATIENT-LVL III: CPT | Mod: PBBFAC,,, | Performed by: PSYCHIATRY & NEUROLOGY

## 2019-06-14 PROCEDURE — 99204 OFFICE O/P NEW MOD 45 MIN: CPT | Mod: S$GLB,,, | Performed by: PSYCHIATRY & NEUROLOGY

## 2019-06-14 PROCEDURE — 3078F DIAST BP <80 MM HG: CPT | Mod: CPTII,S$GLB,, | Performed by: PSYCHIATRY & NEUROLOGY

## 2019-06-14 PROCEDURE — 3078F PR MOST RECENT DIASTOLIC BLOOD PRESSURE < 80 MM HG: ICD-10-PCS | Mod: CPTII,S$GLB,, | Performed by: PSYCHIATRY & NEUROLOGY

## 2019-06-14 PROCEDURE — 3077F SYST BP >= 140 MM HG: CPT | Mod: CPTII,S$GLB,, | Performed by: PSYCHIATRY & NEUROLOGY

## 2019-06-14 NOTE — PATIENT INSTRUCTIONS
Discussed with patient and daughter.  He has gait difficulty that is intermittent may be related to his medications and usually occurs in the mornings after he takes medication.  He can ambulate without assistance though did does use a cane or a walker outside of house such as to avoid falls.  There is no evidence of parkinsonian syndrome.  The possibility of cerebrovascular disease contributing to his gait instability needs to be considered given his multiple vascular risk factors including atrial fibrillation, coronary artery disease, hypertension and hyperlipidemia.  Will get a CT scan head, noncontrast.  Cannot get an MRI scan as he has metal wiring in his sternum related to the CABG that was done in the 1990s.  If the CT scan is unremarkable will follow up in 3 months.  Have advised daughter to switch the Zoloft from morning to the bedtime, to be taken with the Remeron.  In the future consideration to increase the Remeron dosage and taper off the Zoloft might be in order if gait instability continues.  Will contact daughter with results of the CT scan.

## 2019-06-14 NOTE — PROGRESS NOTES
Subjective:       Patient ID: Eliazar James is a 78 y.o. male.    Chief Complaint:  Gait Problem      History of Present Illness  HPI   This is a 78-year-old male who was referred for evaluation intermittent gait difficulty that has been going on for about a year.  He was accompanied by his daughter who collaborated with the history.  It is reported that he has had intermittent problem with his balance and coordination and has had an occasional fall when he tripped.  His last fall was a few days ago when he reports that he was sitting in a chair and putting on his socks when he lost his balance and fell fall words however did not hurt himself.  On another occasion he reported that he got up from the bed and felt off balance and turn around to get hold of the bed when he lost his balance and fell to the ground however he denied loss of consciousness.  He reports that his feeling of off balance usually comes on after he takes his morning medications.  He reports that today he did not take his morning medicines before his appointment and he had no problem with his balance.  He denies any tremors and has had no neck or back problems.  He has history of chronic depression, recurrent, with several admissions to the psychiatric facility in [facility].  He is presently on Wellbutrin and Zoloft in the morning, and Remeron at bedtime.  Depressive symptoms have improved.  His medicines are being managed by his primary care physician.    Other medical problems include history of coronary artery disease, status post CABG, atrial fibrillation on Coumadin, essential hypertension and hyperlipidemia.  He does report occasional difficulty with recalling recent information such as appointments when he may not remember exact dates however he is otherwise able to take care of his day-to-day needs at home without any problems including managing his finances.  He does admit that he has curtailed his driving because of anxiety as he does  not like to drive very fast and he has stopped driving when it is dark.  He denies any shortness of breath.  He has had no spine problems.  He does have a history of having had a left visual field deficit related to a transient ischemic attack in the past with good recovery.  This occurred prior to being on the Coumadin.  He denies any history of significant head trauma and is not hearing impaired.       Review of Systems  Review of Systems   Constitutional: Negative.    HENT: Negative.  Negative for hearing loss.    Eyes: Negative.  Negative for visual disturbance.   Respiratory: Negative.  Negative for shortness of breath.    Cardiovascular: Negative.  Negative for chest pain and palpitations.   Gastrointestinal: Negative.    Endocrine: Negative.    Genitourinary: Negative.    Musculoskeletal: Positive for gait problem. Negative for back pain.   Skin: Negative.    Allergic/Immunologic: Negative.    Neurological: Negative for dizziness, tremors, seizures, syncope, speech difficulty, weakness, numbness and headaches.   Hematological: Negative.    Psychiatric/Behavioral: Positive for decreased concentration, dysphoric mood and sleep disturbance.       Objective:      Neurologic Exam     Mental Status   Oriented to person, place, and time.   Registration: recalls 3 of 3 objects. Follows 3 step commands.   Attention: normal. Concentration: normal.   Speech: speech is normal   Level of consciousness: alert  Knowledge: good.   Able to name object. Able to read. Able to repeat. Able to write. Normal comprehension.   Patient is alert, verbal and coherent and in no distress.  He is able to give an adequate recent and past medical history however tendency to repeat himself and processing information is a little slow.  Affect is appropriate and mood is even.     Cranial Nerves   Cranial nerves II through XII intact.     CN II   Visual fields full to confrontation.     CN III, IV, VI   Pupils are equal, round, and reactive to  light.  Extraocular motions are normal.   Right pupil: Size: 3 mm. Shape: regular. Reactivity: brisk. Consensual response: intact. Accommodation: intact.   Left pupil: Size: 3 mm. Shape: regular. Reactivity: brisk. Consensual response: intact. Accommodation: intact.   CN III: no CN III palsy  CN VI: no CN VI palsy  Nystagmus: none   Diplopia: none  Ophthalmoparesis: none    CN V   Facial sensation intact.     CN VII   Facial expression full, symmetric.     CN VIII   CN VIII normal.     CN IX, X   CN IX normal.     CN XI   CN XI normal.     CN XII   CN XII normal.   Fundus examination:  Sharp disc margins     Motor Exam   Muscle bulk: normal  Overall muscle tone: normal    Strength   Strength 5/5 throughout.   Examination of extremities revealed normal tone and power in both upper and lower extremities with no focal weakness, involuntary movements or atrophy.     Sensory Exam   Light touch normal.   Proprioception normal.   Pinprick normal.     Gait, Coordination, and Reflexes     Gait  Gait: normal    Coordination   Romberg: negative  Finger to nose coordination: normal    Tremor   Resting tremor: absent  Intention tremor: absent  Action tremor: absent    Reflexes   Right brachioradialis: 1+  Left brachioradialis: 1+  Right biceps: 1+  Left biceps: 1+  Right triceps: 1+  Left triceps: 1+  Right patellar: 1+  Left patellar: 1+  Right achilles: 1+  Left achilles: 1+  Right plantar: normal  Left plantar: normal  Gait is stable, with good arm swing and no evidence of bradykinesia or dyskinesia.  No difficulty with turns.       Physical Exam   Constitutional: He is oriented to person, place, and time. He appears well-developed and well-nourished.   Right-handed   HENT:   Head: Normocephalic and atraumatic.   Eyes: Pupils are equal, round, and reactive to light. EOM are normal.   Neck: Normal range of motion. Neck supple. Carotid bruit is not present.   Cardiovascular: Normal rate and regular rhythm.   Neurological: He is  oriented to person, place, and time. He has normal strength. He has a normal Finger-Nose-Finger Test and a normal Romberg Test. Gait normal.   Reflex Scores:       Tricep reflexes are 1+ on the right side and 1+ on the left side.       Bicep reflexes are 1+ on the right side and 1+ on the left side.       Brachioradialis reflexes are 1+ on the right side and 1+ on the left side.       Patellar reflexes are 1+ on the right side and 1+ on the left side.       Achilles reflexes are 1+ on the right side and 1+ on the left side.  Psychiatric: His speech is normal.         Assessment:        1. Gait disorder    2. Chronic atrial fibrillation    3. Hx-TIA (transient ischemic attack)    4. Moderate episode of recurrent major depressive disorder    5. Essential hypertension    6. Coronary artery disease involving native coronary artery of native heart without angina pectoris            Plan:       Discussed with patient and daughter.  He has gait difficulty that is intermittent may be related to his medications and usually occurs in the mornings after he takes medication.  He can ambulate without assistance though did does use a cane or a walker outside of house such as to avoid falls.  There is no evidence of parkinsonian syndrome.  The possibility of cerebrovascular disease contributing to his gait instability needs to be considered given his multiple vascular risk factors including atrial fibrillation, coronary artery disease, hypertension and hyperlipidemia.  Will get a CT scan head, noncontrast.  Cannot get an MRI scan as he has metal wiring in his sternum related to the CABG that was done in the 1990s.  If the CT scan is unremarkable will follow up in 3 months.  Have advised daughter to switch the Zoloft from morning to the bedtime, to be taken with the Remeron.  In the future consideration to increase the Remeron dosage and taper off the Zoloft might be in order if gait instability continues.  Will contact daughter  with results of the CT scan.

## 2019-06-17 PROBLEM — R47.1 DYSARTHRIA: Status: ACTIVE | Noted: 2019-06-17

## 2019-06-18 ENCOUNTER — TELEPHONE (OUTPATIENT)
Dept: NEUROLOGY | Facility: CLINIC | Age: 79
End: 2019-06-18

## 2019-06-18 NOTE — TELEPHONE ENCOUNTER
Discussed with daughter regarding recent ED visit and the CTA that was done did not reveal any acute ischemic event or evidence of any stenosis or vascular anomalies.  He was also seen by vascular Neurology during that visit and it was felt that these symptoms were not related to any stroke or vascular event.  He is already on Coumadin for his atrial fibrillation.  She will keep me updated in the next 2-4 weeks.

## 2019-06-18 NOTE — TELEPHONE ENCOUNTER
Patient was seen in the ER, and had two CT Scans done. Please advise of the results. Will cancel CT scheduled on 6-21-19.

## 2019-06-18 NOTE — TELEPHONE ENCOUNTER
----- Message from Wandy Schwartz sent at 6/18/2019  9:35 AM CDT -----  Contact: aleksandr bliss [phone]  Caller requested to speak with the nurse about the patient upcoming CT Scan. Please call.

## 2019-06-20 ENCOUNTER — OFFICE VISIT (OUTPATIENT)
Dept: FAMILY MEDICINE | Facility: CLINIC | Age: 79
End: 2019-06-20
Payer: MEDICARE

## 2019-06-20 VITALS
DIASTOLIC BLOOD PRESSURE: 66 MMHG | TEMPERATURE: 98 F | SYSTOLIC BLOOD PRESSURE: 130 MMHG | BODY MASS INDEX: 26.33 KG/M2 | HEIGHT: 69 IN | OXYGEN SATURATION: 97 % | HEART RATE: 68 BPM | WEIGHT: 177.81 LBS

## 2019-06-20 DIAGNOSIS — F33.2 SEVERE EPISODE OF RECURRENT MAJOR DEPRESSIVE DISORDER, WITHOUT PSYCHOTIC FEATURES: Primary | ICD-10-CM

## 2019-06-20 DIAGNOSIS — I48.20 CHRONIC ATRIAL FIBRILLATION: ICD-10-CM

## 2019-06-20 DIAGNOSIS — I25.10 CORONARY ARTERY DISEASE INVOLVING NATIVE CORONARY ARTERY OF NATIVE HEART WITHOUT ANGINA PECTORIS: Chronic | ICD-10-CM

## 2019-06-20 DIAGNOSIS — Z74.09 IMPAIRED FUNCTIONAL MOBILITY AND ACTIVITY TOLERANCE: ICD-10-CM

## 2019-06-20 DIAGNOSIS — I10 ESSENTIAL HYPERTENSION: ICD-10-CM

## 2019-06-20 DIAGNOSIS — Z86.73 HX-TIA (TRANSIENT ISCHEMIC ATTACK): Chronic | ICD-10-CM

## 2019-06-20 DIAGNOSIS — R26.9 GAIT DISORDER: ICD-10-CM

## 2019-06-20 PROCEDURE — 99214 PR OFFICE/OUTPT VISIT, EST, LEVL IV, 30-39 MIN: ICD-10-PCS | Mod: S$GLB,,, | Performed by: INTERNAL MEDICINE

## 2019-06-20 PROCEDURE — 99999 PR PBB SHADOW E&M-EST. PATIENT-LVL III: CPT | Mod: PBBFAC,,, | Performed by: INTERNAL MEDICINE

## 2019-06-20 PROCEDURE — 1101F PR PT FALLS ASSESS DOC 0-1 FALLS W/OUT INJ PAST YR: ICD-10-PCS | Mod: CPTII,S$GLB,, | Performed by: INTERNAL MEDICINE

## 2019-06-20 PROCEDURE — 99214 OFFICE O/P EST MOD 30 MIN: CPT | Mod: S$GLB,,, | Performed by: INTERNAL MEDICINE

## 2019-06-20 PROCEDURE — 3078F PR MOST RECENT DIASTOLIC BLOOD PRESSURE < 80 MM HG: ICD-10-PCS | Mod: CPTII,S$GLB,, | Performed by: INTERNAL MEDICINE

## 2019-06-20 PROCEDURE — 99999 PR PBB SHADOW E&M-EST. PATIENT-LVL III: ICD-10-PCS | Mod: PBBFAC,,, | Performed by: INTERNAL MEDICINE

## 2019-06-20 PROCEDURE — 1101F PT FALLS ASSESS-DOCD LE1/YR: CPT | Mod: CPTII,S$GLB,, | Performed by: INTERNAL MEDICINE

## 2019-06-20 PROCEDURE — 3075F PR MOST RECENT SYSTOLIC BLOOD PRESS GE 130-139MM HG: ICD-10-PCS | Mod: CPTII,S$GLB,, | Performed by: INTERNAL MEDICINE

## 2019-06-20 PROCEDURE — 3078F DIAST BP <80 MM HG: CPT | Mod: CPTII,S$GLB,, | Performed by: INTERNAL MEDICINE

## 2019-06-20 PROCEDURE — 3075F SYST BP GE 130 - 139MM HG: CPT | Mod: CPTII,S$GLB,, | Performed by: INTERNAL MEDICINE

## 2019-06-20 NOTE — PATIENT INSTRUCTIONS
We have reviewed your prescription medications. He has been cleared medically. His issues are mostly mental. He is stressed and anxious because he does not want the family living with him at his house and he wants to go to a nursing home with his wife. She does not want to go to a nursing home, however. I have advised him to call his insurance see if he can be admitted for inpatient rehab.

## 2019-06-21 ENCOUNTER — TELEPHONE (OUTPATIENT)
Dept: NEUROLOGY | Facility: CLINIC | Age: 79
End: 2019-06-21

## 2019-06-21 DIAGNOSIS — F32.0 CURRENT MILD EPISODE OF MAJOR DEPRESSIVE DISORDER WITHOUT PRIOR EPISODE: Primary | ICD-10-CM

## 2019-06-21 RX ORDER — MIRTAZAPINE 15 MG/1
15 TABLET, FILM COATED ORAL NIGHTLY
Qty: 30 TABLET | Refills: 5 | Status: SHIPPED | OUTPATIENT
Start: 2019-06-21 | End: 2019-08-16

## 2019-06-21 NOTE — TELEPHONE ENCOUNTER
Discussed with daughter.  Advised gradual taper off the Zoloft over 1 week and increase Remeron to 15 mg at bedtime.  She will keep me updated in a couple of weeks.

## 2019-06-21 NOTE — TELEPHONE ENCOUNTER
----- Message from Brandin Osei sent at 6/21/2019  1:32 PM CDT -----  Contact: Kathy (daughter)  Name of Who is Calling: Kathy (daughter)      What is the request in detail: Would like to speak with staff in regards to switching up medication. Would like to get patient off of Zoloft and upping another medication. Please advise      Can the clinic reply by [facility]: no      What Number to Call Back if not in [facility]: [phone]

## 2019-06-23 PROBLEM — T68.XXXA HYPOTHERMIA: Status: RESOLVED | Noted: 2018-07-04 | Resolved: 2019-06-23

## 2019-06-23 NOTE — PROGRESS NOTES
Subjective:       Patient ID: Eliazar James is a 78 y.o. male.    Chief Complaint: Gait Problem and Hospital Follow Up     I have reviewed the PMH,  and  for this patient. He  has a past medical history of *Atrial fibrillation, Anxiety, Atrial fibrillation (7/5/2012), Blood transfusion, CAD (coronary artery disease) (7/10/2012), Cancer, Cataract, Clotting disorder, Coronary artery disease, Depression, HTN (hypertension) (7/10/2012), TIA (transient ischemic attack) (7/10/2012), Hyperlipidemia, Hypertension, Myocardial infarction, Renal calculus, left (8/18/2017), S/P CABG (coronary artery bypass graft) (1/8/2013), Stroke (2/12), and Urinary tract infection. He has been to the hospital again recently. He has been having increasing trouble walking. He has seen neurology recently to see if he has Parknison's disease. The neurologist did not feel that he has it.He is accompanied today by his daughter who has moved in with her children to his home. She has moved in to Help them, but he does not want the help. He wants to go to a nursing home and he wants his wife to move with him to a nursing home. The daughter states that they cannot move out. I advised her to call the insurance company and see if he could be admitted for inpatient rehab. He is very deconditioned at this point. He has gone to the hospital almost every week for the last few weeks.     Active Ambulatory Problems     Diagnosis Date Noted    Chronic atrial fibrillation 07/05/2012    Long term current use of anticoagulant therapy 07/05/2012    CAD (coronary artery disease) 07/10/2012    Essential hypertension 07/10/2012    Hx-TIA (transient ischemic attack) 07/10/2012    Hyperlipidemia 12/11/2012    Depression 12/11/2012    S/P CABG (coronary artery bypass graft) 01/08/2013    Hematuria 08/18/2017    Nocturia 08/18/2017    Urinary frequency 08/18/2017    Renal calculus, left 08/18/2017    Bilateral renal cysts 08/18/2017    Benign  prostatic hyperplasia with nocturia 09/12/2017    Stupor 07/04/2018    Other fatigue 09/14/2018    Impaired functional mobility and activity tolerance 12/19/2018    Impairment of balance 12/19/2018    Severe episode of recurrent major depressive disorder, without psychotic features 02/12/2019    Bradycardia 02/12/2019    Major depression 04/22/2019    At risk for falls 04/23/2019    Gait disorder 06/14/2019    Dysarthria 06/17/2019     Resolved Ambulatory Problems     Diagnosis Date Noted    Hypothermia 07/04/2018    SIRS (systemic inflammatory response syndrome) 07/04/2018     Past Medical History:   Diagnosis Date    *Atrial fibrillation     Anxiety     Atrial fibrillation 7/5/2012    Blood transfusion     CAD (coronary artery disease) 7/10/2012    Cancer     Cataract     Clotting disorder     Coronary artery disease     Depression     HTN (hypertension) 7/10/2012    Hx-TIA (transient ischemic attack) 7/10/2012    Hyperlipidemia     Hypertension     Myocardial infarction     Renal calculus, left 8/18/2017    S/P CABG (coronary artery bypass graft) 1/8/2013    Stroke 2/12    Urinary tract infection          MEDICATIONS:  Current Outpatient Medications:     aspirin 81 mg Tab, Take 1 tablet by mouth Daily., Disp: , Rfl:     atorvastatin (LIPITOR) 40 MG tablet, Take 1 tablet (40 mg total) by mouth once daily., Disp: 90 tablet, Rfl: 3    buPROPion (WELLBUTRIN SR) 100 MG TBSR 12 hr tablet, Take 1 tablet (100 mg total) by mouth once daily., Disp: 30 tablet, Rfl: 3    CARTIA  mg 24 hr capsule, Take 1 capsule (180 mg total) by mouth once daily., Disp: 90 capsule, Rfl: 3    carvedilol (COREG) 12.5 MG tablet, Take 1 tablet (12.5 mg total) by mouth 2 (two) times daily with meals., Disp: 180 tablet, Rfl: 3    fluticasone (FLONASE) 50 mcg/actuation nasal spray, 2 sprays (100 mcg total) by Each Nare route once daily., Disp: 16 g, Rfl: 5    losartan (COZAAR) 100 MG tablet, Take 1 tablet  by mouth once daily., Disp: , Rfl: 3    sertraline (ZOLOFT) 50 MG tablet, Take 1 tablet (50 mg total) by mouth once daily., Disp: 30 tablet, Rfl: 3    warfarin (COUMADIN) 5 MG tablet, Take 2.5-5 mg by mouth Daily. As directed by coumadin clinic, Disp: , Rfl:     mirtazapine (REMERON) 15 MG tablet, Take 1 tablet (15 mg total) by mouth every evening., Disp: 30 tablet, Rfl: 5      HEALTH MAINTENANCE:   Health Maintenance   Topic Date Due    TETANUS VACCINE  09/17/2018    Influenza Vaccine  08/01/2019    Lipid Panel  06/17/2024    Pneumococcal Vaccine (65+ Low/Medium Risk)  Completed       Review of Systems   Constitutional: Negative for chills, fatigue and fever.   HENT: Negative for congestion, ear discharge, ear pain, rhinorrhea and sore throat.    Eyes: Negative for discharge, redness and itching.   Respiratory: Negative for cough, chest tightness, shortness of breath and wheezing.    Cardiovascular: Negative for chest pain, palpitations and leg swelling.   Gastrointestinal: Negative for abdominal pain, constipation, diarrhea, nausea and vomiting.   Endocrine: Negative for cold intolerance and heat intolerance.   Genitourinary: Negative for dysuria, flank pain, frequency and hematuria.   Musculoskeletal: Negative for arthralgias, back pain, joint swelling and myalgias.   Skin: Negative for color change and rash.   Neurological: Negative for dizziness, tremors, numbness and headaches.   Psychiatric/Behavioral: Negative for dysphoric mood and sleep disturbance. The patient is not nervous/anxious.        Objective:          A1C:      CBC:  Recent Labs   Lab 03/07/17  0756 07/29/17  1826 04/10/18  0846 07/03/18  2230 07/04/18  1055 07/05/18  0640 10/22/18  0838 01/29/19  1517 04/22/19  0936 04/23/19  1246 06/17/19  1510   WBC 6.63 7.15 6.51 15.39 H 10.66 8.69 7.46 12.09 8.29 8.28 7.00   RBC 4.39 L 4.24 L 4.23 L 4.27 L 4.04 L 3.77 L 4.12 L 3.93 L 4.26 L 4.06 L 4.10 L   Hemoglobin 14.2 14.1 14.0 14.6 13.8 L 12.8 L  13.6 L 13.3 L 14.1 13.3 L 13.5 L   Hematocrit 43.0 41.2 41.5 42.4 40.7 38.4 L 40.8 38.7 L 41.5 40.0 40.4   Platelets 185 201 181 202 192 178 191 231 254 222 228   Mean Corpuscular Volume 98 97 98 99 H 101 H 102 H 99 H 99 H 97 99 H 99 H   Mean Corpuscular Hemoglobin 32.3 H 33.3 H 33.1 H 34.2 H 34.2 H 34.0 H 33.0 H 33.8 H 33.1 H 32.8 H 32.9 H   Mean Corpuscular Hemoglobin Conc 33.0 34.2 33.7 34.4 33.9 33.3 33.3 34.4 34.0 33.3 33.4     CMP:  Recent Labs   Lab 08/02/16  0733 03/07/17  0756 03/21/17  0804 07/29/17  1826 04/10/18  0846 07/03/18  2230  10/22/18  0838 01/29/19  1517 04/22/19  0936 06/17/19  1510   Glucose 106 117 H 100 112 H 95 163 H   < > 108 98 116 H 109   Calcium 9.2 9.0 9.0 9.1 8.8 9.2   < > 8.8 9.1 9.3 9.4   Albumin 3.7 3.6 3.6 3.7 3.7 4.0  --  3.8 3.8 4.1 4.2   Total Protein 6.9 6.9 7.2 7.0 7.1 7.7  --  7.2 7.6 7.9 8.2   Sodium 141 140 140 139 140 139   < > 139 139 143 144   Potassium 4.3 3.9 4.4 4.1 4.4 4.1   < > 4.1 4.2 4.3 3.9   CO2 27 29 28 26 26 23   < > 24 26 25 29   Chloride 107 106 107 103 106 106   < > 109 106 107 105   BUN, Bld 15 16 23 23 H 19 20   < > 20 20 17 23 H   Alkaline Phosphatase 58 57 55 67 61 67  --  68 85 114 98   ALT 25 23 25 40 36 28  --  28 25 28 38   AST 19 16 19 26 26 22  --  22 25 24 29   Total Bilirubin 1.0 0.8 1.2 H 0.9 0.8 1.0  --  1.0 1.4 H 1.6 H 1.0    < > = values in this interval not displayed.     LIPIDS:  Recent Labs   Lab 08/02/16  0733 03/21/17  0804 04/10/18  0846 07/04/18  0728 10/22/18  0838 01/29/19  1517 04/22/19  0936 06/13/19  0945 06/17/19  1510   TSH  --   --   --  1.343  --  2.110 2.500  --  3.900   HDL 46 41 41  --  39 L  --   --  40 43   Cholesterol 103 L 108 L 104 L  --  96 L  --   --  97 L 106 L   Triglycerides 50 56 42  --  44  --   --  53 71   LDL Cholesterol 47.0 L 55.8 L 54.6 L  --  48.2 L  --   --  46.4 L 48.8 L   Hdl/Cholesterol Ratio 44.7 38.0 39.4  --  40.6  --   --  41.2 40.6   Non-HDL Cholesterol 57 67 63  --  57  --   --  57 63   Total  Cholesterol/HDL Ratio 2.2 2.6 2.5  --  2.5  --   --  2.4 2.5     TSH:  Recent Labs   Lab 07/04/18  0728 01/29/19  1517 04/22/19  0936 06/17/19  1510   TSH 1.343 2.110 2.500 3.900           Physical Exam   Constitutional: He appears well-developed and well-nourished. He does not have a sickly appearance.   HENT:   Head: Normocephalic and atraumatic.   Right Ear: External ear normal. Tympanic membrane is not erythematous. No middle ear effusion.   Left Ear: External ear normal. Tympanic membrane is not erythematous.  No middle ear effusion.   Nose: No rhinorrhea. Right sinus exhibits no maxillary sinus tenderness and no frontal sinus tenderness. Left sinus exhibits no maxillary sinus tenderness and no frontal sinus tenderness.   Mouth/Throat: No posterior oropharyngeal edema or posterior oropharyngeal erythema. No tonsillar exudate.   Eyes: Pupils are equal, round, and reactive to light. Conjunctivae and EOM are normal. Right eye exhibits no discharge. Left eye exhibits no discharge. Right conjunctiva is not injected. Left conjunctiva is not injected.   Neck: No thyromegaly present.   Cardiovascular: Normal rate, regular rhythm, normal heart sounds and intact distal pulses.   No murmur heard.  Pulmonary/Chest: Effort normal and breath sounds normal. He has no wheezes. He has no rhonchi. He has no rales.   Abdominal: Bowel sounds are normal. He exhibits no distension. There is no tenderness.   Musculoskeletal: He exhibits no edema or tenderness.   Lymphadenopathy:     He has no cervical adenopathy.   Neurological: He displays normal reflexes. No cranial nerve deficit.   Skin: Skin is warm and dry. No lesion and no rash noted.   Psychiatric: He has a normal mood and affect. His behavior is normal. His mood appears not anxious. His speech is not rapid and/or pressured. He is not agitated and not aggressive. He does not exhibit a depressed mood.             Assessment and Plan:     Problem List Items Addressed This Visit         Neuro    Hx-TIA (transient ischemic attack) (Chronic) - on aspirin . Controlling cholesterol.        Psychiatric    Severe episode of recurrent major depressive disorder, without psychotic features - Primary - He is on sertraline. The neurologist thought it might need to be changed but he has been on celexa and other meds.        Cardiac/Vascular    CAD (coronary artery disease) (Chronic) - on lipitor and aspirin.       Chronic atrial fibrillation - on warfarin.       Essential hypertension- BP looks good.        Other    Impaired functional mobility and activity tolerance - Uncertain cause. I suspect he is having complications of depression.       Gait disorder - I recommend that we try to get him into [facility].                  Follow-up with me in 1 month.

## 2019-06-24 ENCOUNTER — TELEPHONE (OUTPATIENT)
Dept: FAMILY MEDICINE | Facility: CLINIC | Age: 79
End: 2019-06-24

## 2019-06-24 NOTE — TELEPHONE ENCOUNTER
"Patient wants "something" for him to go to the nursing home so that he can inpatient therapy. Patient states he's not doing well and can't do it at home. Patient states he needs some paperwork before finding someplace to take him. Phone was disconnected before could ask which nursing home he would be going to.  "

## 2019-06-24 NOTE — TELEPHONE ENCOUNTER
We can send him orders if they will accept it. I had advised him to find out from his insurance what he needed to do to get inpatient pt.

## 2019-06-24 NOTE — TELEPHONE ENCOUNTER
----- Message from Francis Bernabe sent at 6/24/2019  3:02 PM CDT -----  Contact: [phone]/self  Patient requesting to speak with you concerning paperwork and physical therapy orders.  Please call back to assist at [phone]

## 2019-06-25 ENCOUNTER — TELEPHONE (OUTPATIENT)
Dept: FAMILY MEDICINE | Facility: CLINIC | Age: 79
End: 2019-06-25

## 2019-06-25 ENCOUNTER — TELEPHONE (OUTPATIENT)
Dept: CARDIOLOGY | Facility: CLINIC | Age: 79
End: 2019-06-25

## 2019-06-25 NOTE — TELEPHONE ENCOUNTER
"Patient states he can't walk anymore and that he is getting worse every day. He stated that Dr. Domingo told him that she would admit him into the hospital then send him to the nursing home. He states "I feel worse and worse something has to be done. I need therapy and I'm not getting it. If I have to call the ambulance myself then I will" patient is requesting that Dr. Domingo calls him. Asked patient if he talked his insurance company to see if they would cover inpatient therapy and he stated "I don't need to call them I know they will". Patient requested to speak to Dr. Domingo again and informed I would send her the message.   "

## 2019-06-25 NOTE — TELEPHONE ENCOUNTER
----- Message from Melody Rodas sent at 6/25/2019  9:32 AM CDT -----  Contact: Esther Calling from [employer] [phone]  Patient is calling to get inpatient physical therapy orders and would like to be sent to [facility] and they will also needs a medical necessity form and recent chart notes faxed to [phone].

## 2019-06-25 NOTE — TELEPHONE ENCOUNTER
Spoke with Joyce and informed that Sathish had faxed the required paperwork that was requested and was waiting for a response and that we had spoken to the patient. Waiting for a response

## 2019-06-25 NOTE — TELEPHONE ENCOUNTER
Spoke to patient, notified him that we sent everything off to [facility] for him to be admitted into. I will follow up with [facility] to see what is going on

## 2019-06-25 NOTE — TELEPHONE ENCOUNTER
----- Message from Any Myrick sent at 6/25/2019  2:40 PM CDT -----  Joyce with [facility] called.  [phone]     What is the status of inpatient rehab or skilled nursing facility.    Please call.

## 2019-06-25 NOTE — TELEPHONE ENCOUNTER
----- Message from Farida Stock sent at 6/24/2019  4:49 PM CDT -----  Contact: [phone]/self  Patient requesting a callback states its very important, no details given. Please call [phone]/self

## 2019-06-26 ENCOUNTER — TELEPHONE (OUTPATIENT)
Dept: FAMILY MEDICINE | Facility: CLINIC | Age: 79
End: 2019-06-26

## 2019-06-26 DIAGNOSIS — F33.2 SEVERE EPISODE OF RECURRENT MAJOR DEPRESSIVE DISORDER, WITHOUT PSYCHOTIC FEATURES: Primary | ICD-10-CM

## 2019-06-26 NOTE — TELEPHONE ENCOUNTER
Spoke to patients granddaughter, she provided me with a name of a facility that is trying to admit the pt. [facility]. Contact arabella Page [phone] fax number [phone]. MNF was submitted to [facility] to [facility] so the original request may have to be resubmitted.        ----- Message from Sheila Mortensen sent at 6/26/2019  1:02 PM CDT -----  Contact: kirsten akers   Please call Summer in reference to inpatient rehab facility for patient . She needs certain documentation . Call [phone]

## 2019-06-27 PROBLEM — Z60.9 HIGH RISK SOCIAL SITUATIONS: Status: ACTIVE | Noted: 2019-06-27

## 2019-06-27 PROBLEM — R53.1 GENERALIZED WEAKNESS: Status: ACTIVE | Noted: 2019-06-27

## 2019-06-27 PROBLEM — R62.7 FAILURE TO THRIVE IN ADULT: Status: ACTIVE | Noted: 2019-06-27

## 2019-06-27 NOTE — TELEPHONE ENCOUNTER
I spoke to Akanksha and Joyce [phone] from [employer]. Both explained that the request was denied due to pt did not meet medicare /CMS guidelines to be admitted to a SNF. Joyce will contact pts granddaughter to explain the denial and what the next step is.     Dr. zuleta recommends maybe case management can step in to help the patient.

## 2019-06-27 NOTE — TELEPHONE ENCOUNTER
"Notified pt that we are still in the process of getting him into a inpatient rehab facility. It takes time. Camilo said pt can call him with any further questions. Patient states he is in pain all over his body and he is immobile. Pt said "maybe I need pain medicine." I advised patient that that would require an appointment with Dr. Domingo. Pt says he will wait to see what happens.     ----- Message from Leonor Elizondo sent at 6/27/2019 11:23 AM CDT -----  Contact: Self/ [phone]  Patient asked to speak with you. He stated it's important. He is not getting better and only getting worst.    Please call.     "

## 2019-06-27 NOTE — TELEPHONE ENCOUNTER
I have placed a referral to case management. This patient is in a living arrangement that he is not happy about. His grandchildren have moved in to help take care of his wife and he feels that they are putting him in a bad position. He would prefer nursing home placement for himself and his wife, but his family has outvoted him. This has caused him to have severe depression and frequent admissions to the psychiatric department for suicidal ideation.

## 2019-06-27 NOTE — TELEPHONE ENCOUNTER
Notified camilo that all requested information was faxed today.  Ask pts granddaughter requested.       ----- Message from Any Myrick sent at 6/27/2019 11:56 AM CDT -----  Camilo with [facility] called.   No. [phone]   Please fax an order to evaluate and treat.  Also fax any progress notes, labs, and medications.   Fax no. [phone]

## 2019-06-27 NOTE — TELEPHONE ENCOUNTER
----- Message from Melody Rodas sent at 6/27/2019  1:26 PM CDT -----  Contact: Akanksha Calling from [facility]  [phone]  Calling to talk to nurse in regards to to patients physical therapy was denied.

## 2019-06-27 NOTE — TELEPHONE ENCOUNTER
----- Message from Any Myrick sent at 6/27/2019 12:47 PM CDT -----  No. [phone]    Patient called earlier regarding rehab.  Please call today.

## 2019-07-02 ENCOUNTER — OUTSIDE PLACE OF SERVICE (OUTPATIENT)
Dept: FAMILY MEDICINE | Facility: CLINIC | Age: 79
End: 2019-07-02
Payer: MEDICARE

## 2019-07-02 PROCEDURE — 99305 1ST NF CARE MODERATE MDM 35: CPT | Mod: ,,, | Performed by: FAMILY MEDICINE

## 2019-07-02 PROCEDURE — 99305 PR NURSING FACILITY CARE, INIT, MOD SEVERITY: ICD-10-PCS | Mod: ,,, | Performed by: FAMILY MEDICINE

## 2019-07-09 ENCOUNTER — SSC ENCOUNTER (OUTPATIENT)
Dept: ADMINISTRATIVE | Facility: OTHER | Age: 79
End: 2019-07-09

## 2019-07-09 NOTE — PROGRESS NOTES
Please note the following patient's information was forwarded to [facility] for case management and/or  on 07/09/2019.    Please see the media section of patient's chart for additional details.    Please contact Ext. [phone] with any questions.    Thank you,    Kathy Delgado, [facility] Mg[facility]  [phone]

## 2019-07-22 ENCOUNTER — TELEPHONE (OUTPATIENT)
Dept: INTERNAL MEDICINE | Facility: CLINIC | Age: 79
End: 2019-07-22

## 2019-07-22 PROCEDURE — G0180 PR HOME HEALTH MD CERTIFICATION: ICD-10-PCS | Mod: ,,, | Performed by: FAMILY MEDICINE

## 2019-07-22 PROCEDURE — G0180 MD CERTIFICATION HHA PATIENT: HCPCS | Mod: ,,, | Performed by: FAMILY MEDICINE

## 2019-07-22 NOTE — TELEPHONE ENCOUNTER
----- Message from Any Myrick sent at 7/22/2019  8:43 AM CDT -----  No. [phone]    Patient is out of the nursing home and out of therapy.   Please call.  Patient asked that I send the message urgent.

## 2019-07-22 NOTE — TELEPHONE ENCOUNTER
----- Message from Leonor Elizondo sent at 7/22/2019  8:47 AM CDT -----  Contact: Dick watt/ [facility]/ [phone]  Nurse called in asking if a walker can be requested for patient thru [facility].    Please call.

## 2019-07-22 NOTE — TELEPHONE ENCOUNTER
----- Message from Leonor Elizondo sent at 7/22/2019  8:47 AM CDT -----  Contact: Dick watt/ [facility]/ [phone]  Nurse called in asking if a walker can be requested for patient thru [facility].    Please call.

## 2019-07-24 ENCOUNTER — PATIENT OUTREACH (OUTPATIENT)
Dept: ADMINISTRATIVE | Facility: OTHER | Age: 79
End: 2019-07-24

## 2019-07-24 PROBLEM — N30.01 ACUTE CYSTITIS WITH HEMATURIA: Status: ACTIVE | Noted: 2019-07-24

## 2019-07-24 PROBLEM — Z51.81 SUBTHERAPEUTIC ANTICOAGULATION: Status: ACTIVE | Noted: 2019-07-24

## 2019-07-24 PROBLEM — Z79.01 SUBTHERAPEUTIC ANTICOAGULATION: Status: ACTIVE | Noted: 2019-07-24

## 2019-07-24 PROBLEM — D72.829 LEUKOCYTOSIS: Status: ACTIVE | Noted: 2019-07-24

## 2019-07-30 NOTE — PROGRESS NOTES
Fidelina with [facility] called to report the Patient was in observation for a UTI and discharged 7/26, Patient was admitted to home health service -7/27, inquiring as to when to get the INR drawn, Fidelina's call back # [phone]

## 2019-07-30 NOTE — PROGRESS NOTES
Spoke to Fidelina who will draw INR 7/31 (orders faxed and standing order updated). Patient is on levofloxacin through ~8/1. Will adjust if needed based on INR result tomorrow.

## 2019-07-31 ENCOUNTER — EXTERNAL HOME HEALTH (OUTPATIENT)
Dept: HOME HEALTH SERVICES | Facility: HOSPITAL | Age: 79
End: 2019-07-31
Payer: MEDICARE

## 2019-07-31 ENCOUNTER — ANTI-COAG VISIT (OUTPATIENT)
Dept: CARDIOLOGY | Facility: CLINIC | Age: 79
End: 2019-07-31
Payer: MEDICARE

## 2019-07-31 DIAGNOSIS — I48.20 CHRONIC ATRIAL FIBRILLATION: ICD-10-CM

## 2019-07-31 DIAGNOSIS — Z79.01 LONG TERM CURRENT USE OF ANTICOAGULANT THERAPY: ICD-10-CM

## 2019-07-31 PROCEDURE — 93793 PR ANTICOAGULANT MGMT FOR PT TAKING WARFARIN: ICD-10-PCS | Mod: S$GLB,,,

## 2019-07-31 PROCEDURE — 93793 ANTICOAG MGMT PT WARFARIN: CPT | Mod: S$GLB,,,

## 2019-07-31 NOTE — PROGRESS NOTES
INR good today. Dose was stable prior to hospital stay and may bee good today on higher dose due to missed doses last week. We will now resume his old dose. Do not see interaction with levaquin. Recheck INR in 1 week

## 2019-08-02 ENCOUNTER — TELEPHONE (OUTPATIENT)
Dept: HOME HEALTH SERVICES | Facility: HOSPITAL | Age: 79
End: 2019-08-02
Payer: MEDICARE

## 2019-08-02 ENCOUNTER — PATIENT OUTREACH (OUTPATIENT)
Dept: ADMINISTRATIVE | Facility: HOSPITAL | Age: 79
End: 2019-08-02

## 2019-08-06 ENCOUNTER — TELEPHONE (OUTPATIENT)
Dept: HOME HEALTH SERVICES | Facility: HOSPITAL | Age: 79
End: 2019-08-06
Payer: MEDICARE

## 2019-08-07 ENCOUNTER — ANTI-COAG VISIT (OUTPATIENT)
Dept: CARDIOLOGY | Facility: CLINIC | Age: 79
End: 2019-08-07
Payer: MEDICARE

## 2019-08-07 DIAGNOSIS — Z79.01 LONG TERM CURRENT USE OF ANTICOAGULANT THERAPY: ICD-10-CM

## 2019-08-07 DIAGNOSIS — I48.20 CHRONIC ATRIAL FIBRILLATION: ICD-10-CM

## 2019-08-07 PROCEDURE — 93793 ANTICOAG MGMT PT WARFARIN: CPT | Mod: S$GLB,,,

## 2019-08-07 PROCEDURE — 93793 PR ANTICOAGULANT MGMT FOR PT TAKING WARFARIN: ICD-10-PCS | Mod: S$GLB,,,

## 2019-08-07 NOTE — PROGRESS NOTES
INR low due to taking incorrect lower dose. I do not know where he got a 3mg tablet but he took 3mg daily since last week. Maintenance dose is much higher. Nevertheless, low INR today. Will increase to his prescribed dose.     Patient should only be getting coumadin instructions from 1 provider. He should have inquired about dosing if someone else gave instructions. If he self-adjusted on his own, this was a mistake and he again he should call about dosing questions/concerns.

## 2019-08-12 RX ORDER — LOSARTAN POTASSIUM 100 MG/1
100 TABLET ORAL DAILY
Qty: 90 TABLET | Refills: 3 | Status: ON HOLD | OUTPATIENT
Start: 2019-08-12 | End: 2019-11-27 | Stop reason: HOSPADM

## 2019-08-14 ENCOUNTER — ANTI-COAG VISIT (OUTPATIENT)
Dept: CARDIOLOGY | Facility: CLINIC | Age: 79
End: 2019-08-14
Payer: MEDICARE

## 2019-08-14 DIAGNOSIS — Z79.01 LONG TERM CURRENT USE OF ANTICOAGULANT THERAPY: ICD-10-CM

## 2019-08-14 DIAGNOSIS — I48.20 CHRONIC ATRIAL FIBRILLATION: ICD-10-CM

## 2019-08-14 PROCEDURE — 93793 PR ANTICOAGULANT MGMT FOR PT TAKING WARFARIN: ICD-10-PCS | Mod: S$GLB,,,

## 2019-08-14 PROCEDURE — 93793 ANTICOAG MGMT PT WARFARIN: CPT | Mod: S$GLB,,,

## 2019-08-15 ENCOUNTER — TELEPHONE (OUTPATIENT)
Dept: HOME HEALTH SERVICES | Facility: HOSPITAL | Age: 79
End: 2019-08-15

## 2019-08-15 PROBLEM — R31.9 HEMATURIA: Status: RESOLVED | Noted: 2017-08-18 | Resolved: 2019-08-15

## 2019-08-15 PROBLEM — R47.1 DYSARTHRIA: Status: RESOLVED | Noted: 2019-06-17 | Resolved: 2019-08-15

## 2019-08-15 PROBLEM — Z79.01 SUBTHERAPEUTIC ANTICOAGULATION: Status: RESOLVED | Noted: 2019-07-24 | Resolved: 2019-08-15

## 2019-08-15 PROBLEM — R35.1 NOCTURIA: Status: RESOLVED | Noted: 2017-08-18 | Resolved: 2019-08-15

## 2019-08-15 PROBLEM — Z51.81 SUBTHERAPEUTIC ANTICOAGULATION: Status: RESOLVED | Noted: 2019-07-24 | Resolved: 2019-08-15

## 2019-08-15 PROBLEM — R35.0 URINARY FREQUENCY: Status: RESOLVED | Noted: 2017-08-18 | Resolved: 2019-08-15

## 2019-08-15 PROBLEM — N30.01 ACUTE CYSTITIS WITH HEMATURIA: Status: RESOLVED | Noted: 2019-07-24 | Resolved: 2019-08-15

## 2019-08-15 PROBLEM — R40.1 STUPOR: Status: RESOLVED | Noted: 2018-07-04 | Resolved: 2019-08-15

## 2019-08-15 PROBLEM — F32.9 MAJOR DEPRESSION: Status: RESOLVED | Noted: 2019-04-22 | Resolved: 2019-08-15

## 2019-08-15 PROBLEM — D72.829 LEUKOCYTOSIS: Status: RESOLVED | Noted: 2019-07-24 | Resolved: 2019-08-15

## 2019-08-15 PROBLEM — R53.83 OTHER FATIGUE: Status: RESOLVED | Noted: 2018-09-14 | Resolved: 2019-08-15

## 2019-08-15 NOTE — ASSESSMENT & PLAN NOTE
Lab Results   Component Value Date    LDLCALC 48.8 (L) 06/17/2019     - well controlled  - continue current medications

## 2019-08-15 NOTE — PROGRESS NOTES
"FAMILY MEDICINE    Patient Active Problem List   Diagnosis    Chronic atrial fibrillation    Long term current use of anticoagulant therapy    CAD (coronary artery disease)    Essential hypertension    Hyperlipidemia    S/P CABG (coronary artery bypass graft)    Renal calculus, left    Bilateral renal cysts    Benign prostatic hyperplasia with nocturia    Impaired functional mobility and activity tolerance    Impairment of balance    Moderate episode of recurrent major depressive disorder    At risk for falls    Gait disorder    History of CVA (cerebrovascular accident)    Mild cognitive impairment    Other mixed anxiety disorders       CC:   Chief Complaint   Patient presents with    Establish Care     pt states about 3 weeks ago he was able to walk but he got a UTI and Ecoli in his blood and ever since he has'nt been keyla walk     Medication Problem     pt states the zoloft is not helping        HPI: Eliazar James is a 78 y.o. male  - with Afib on OAC (warfarin), CAD (s/p CABG ), hypertension, severe depression, BPH, hyperlipidemia, history of CVA with old lacunar infarcts note on imaging and no residual deficits, renal cysts, history of nephrolithiasis, and severe anxiety is here to establish care. Last PCP Dr. Domingo    Of note he was recently admitted to [facility] from 7/24/19 to 7/26/19 for acute cystitis.      Hospitalization Summary:  "Patient is a 78-year-old male with a known PMHx of HTN, HLD, CAD, TIA, Atrial Fibrillation (on coumadin) and BPH that presents to the hospital due to fall at home, weakness, and dysuria.  Patient was recently admitted for mostly social issues due to weakness, unstable gait, and frequent falls. He was discharged on 07/01/19 to [facility] for rehabilitation and responded very well to the rehabilitation regimen there with PT/OT. He was discharged roughly 5 days ago back to his home. He reports that roughly 7 days ago he started to have dysuria " "but did not mention it to anyone as he thought it'd go away on its own. Last night, he rolled out of bed and fell to the ground. He denies passing out or hitting his head but reports he was too weak to lift himself back to his feet or to his bed, so he stayed on the ground till he was found by family this morning. It was at this time that patient reported to his family that he was having dysuria as well and he was brought to the ER. Patient found to have leukocytosis of 20K and UA consistent with UTI. Patient admitted to observation for further evaluation and management.   Patient admitted and started on IV Rocephin empirically.  Urine and blood cultures still pending.  Patient reports subjective fevers overnight but otherwise feeling well.  PT/OT consulted for assistance in rehabilitation and general weakness.  Leukocytosis improving.  07/26/19: Patient's leukocytosis has resolved. Urine growing GNR. No fever or chills overnight. PT/OT worked with patient and are recommending SNF. Patient states he does not wish to return to SNF at this time and would prefer to return home with home health PT/OT. Discussed risks and benefits of this with patient and he is still adamant about not going to SNF at this time. Will transition patient to oral ciprofloxacin. Patient clinically improved and medically stable for discharge home with instructions to follow-up with PCP in 1-2 weeks.  Patient and/or family was seen on day of discharge by myself and was examined. They were stable for discharge. Discharge plan, follow up instructions, and contacts in case of further needs were discussed in detail."    Today he presents with daughter Kathy and reports that he is still very anxious and continues to have issues with his gait. Home health with [facility] and continuing therapies. He lives with his wife who is disabled and recently was also discharged from nursing home and returned home. She is mostly chair bound.     He has been " "declining this year and recent hospital admission:   4/22/19 depression and suicidal ideations  6/27/19-7/1/19 recurrent falls, generalized weakness, deconditioning and discharged to   - reports that he had done well there and improved but after discharged he developed at UTI "and it has been down hill." "I went backwards and I feel worse again." frustrated and tearful  7/24/19-7/26/19 Urosepsis    1. Depression and Anxiety  - admitted to [facility] 4/22/19 for suicidal ideations  Age diagnosed: 1 year 76 yo    Prior treatments: Escitalopram 10 mg daily ( started 2/2019)  Side effects of prior treatment: not effective    Current treatment:   buPROPion (WELLBUTRIN SR) 100 MG TBSR 12 hr tablet, Take 1 tablet (100 mg total) by mouth once daily., Disp: 30 tablet, Rfl: 3  mirtazapine (REMERON) 15 MG tablet, Take 1 tablet (15 mg total) by mouth every evening., Disp: 30 tablet, Rfl: 5  sertraline (ZOLOFT) 50 MG tablet, Take 0.5 tablets (25 mg total) by mouth once daily., Disp: 15 tablet, Rfl: 0    Side effects of current treatment: not effective    Symptoms related: anxious and reports that anxiety worsens his gait "I am fine in the morning and get worse as I get more anxious"  Poor appetite, denies sleep issues  Tearful and frustrated with his decline recently  Tearful and reports "my wife is sicker than me and I am just worried that she could do anytime." his Wife Lissa James is also a patient of mine  Panic attacks: yes    Hopelessness: yes  Sleep: none  Suicidal thoughts: denies  Thoughts of self harm:denies  Thoughts of harm to others: denies  History of suicide attempts: none but +SI 4/2019 admitted to psychiatry unit  Family history of suicide:denies    Support system: family, granddaughter lives with him and wife to assist with medications and support. Daughters supportive  Counselor: none. Had seen psychiatry on admission 4/2019 and [facility] but refused to " "return    2. Unstable shuffling gait  - started about 1 year ago with weakness and recurrent falls   - no fall since discharge per daughter  - pt reports that he seems to get worse as the day progress and uses a 2 wheeled walker  - daughter reports a shuffling gait and notes that at times when he stopped he is still take small in-place steps with his feet. "his feet just continue to go up and down though he has stopped"    Was evaluated by Dr. Velasquez (who unfortunately passed away recently) 6/14/19 with the following recommendation:  "Discussed with patient and daughter. He has gait difficulty that is intermittent may be related to his medications and usually occurs in the mornings after he takes medication. He can ambulate without assistance though did does use a cane or a walker outside of house such as to avoid falls. There is no evidence of parkinsonian syndrome. The possibility of cerebrovascular disease contributing to his gait instability needs to be considered given his multiple vascular risk factors including atrial fibrillation, coronary artery disease, hypertension and hyperlipidemia. Will get a CT scan head, noncontrast. Cannot get an MRI scan as he has metal wiring in his sternum related to the CABG that was done in the 1990s. If the CT scan is unremarkable will follow up in 3 months. Have advised daughter to switch the Zoloft from morning to the bedtime, to be taken with the Remeron. In the future consideration to increase the Remeron dosage and taper off the Zoloft might be in order if gait instability continues. Will contact daughter with results of the CT scan."  6/18/19 he called daughter and spoke with her:  "Discussed with daughter regarding recent ED visit and the CTA that was done did not reveal any acute ischemic event or evidence of any stenosis or vascular anomalies. He was also seen by vascular Neurology during that visit and it was felt that these symptoms were not related to any stroke " "or vascular event. He is already on Coumadin for his atrial fibrillation. She will keep me updated in the next 2-4 weeks."    Daughter and pt report no improvement with adjust zoloft to bedtime          HEALTH MAINTENANCE:   Health Maintenance   Topic Date Due    TETANUS VACCINE  09/17/2018    Lipid Panel  06/17/2024    Pneumococcal Vaccine (65+ Low/Medium Risk)  Completed       ROS: Review of Systems   Constitutional: Negative for activity change, appetite change, chills, fatigue, fever and unexpected weight change.   HENT: Negative.  Negative for nosebleeds.    Eyes: Negative for photophobia and visual disturbance.   Respiratory: Negative for cough, chest tightness and shortness of breath.    Cardiovascular: Negative for chest pain, palpitations and leg swelling.   Gastrointestinal: Negative for abdominal distention, abdominal pain, anal bleeding, blood in stool, constipation, diarrhea, nausea and vomiting.   Endocrine: Negative for cold intolerance, heat intolerance, polydipsia, polyphagia and polyuria.   Genitourinary: Negative for decreased urine volume, difficulty urinating, frequency, hematuria and urgency.   Musculoskeletal: Positive for gait problem. Negative for arthralgias, back pain, myalgias, neck pain and neck stiffness.   Skin: Negative for rash.   Allergic/Immunologic: Negative.    Neurological: Negative for dizziness, light-headedness and headaches.   Hematological: Bruises/bleeds easily.   Psychiatric/Behavioral: Positive for agitation, behavioral problems and dysphoric mood. Negative for confusion, hallucinations, self-injury, sleep disturbance and suicidal ideas. The patient is nervous/anxious.        ALLERGIES:   Review of patient's allergies indicates:   Allergen Reactions    No known drug allergies        MEDS:     Current Outpatient Medications:     aspirin 81 mg Tab, Take 1 tablet by mouth Daily., Disp: , Rfl:     atorvastatin (LIPITOR) 40 MG tablet, Take 1 tablet (40 mg total) by " mouth once daily., Disp: 90 tablet, Rfl: 3    buPROPion (WELLBUTRIN SR) 100 MG TBSR 12 hr tablet, Take 1 tablet (100 mg total) by mouth once daily., Disp: 30 tablet, Rfl: 3    CARTIA  mg 24 hr capsule, Take 1 capsule (180 mg total) by mouth once daily., Disp: 90 capsule, Rfl: 3    carvedilol (COREG) 12.5 MG tablet, Take 1 tablet (12.5 mg total) by mouth 2 (two) times daily with meals., Disp: 180 tablet, Rfl: 3    fluticasone (FLONASE) 50 mcg/actuation nasal spray, 2 sprays (100 mcg total) by Each Nare route once daily., Disp: 16 g, Rfl: 5    losartan (COZAAR) 100 MG tablet, TAKE 1 TABLET (100 MG TOTAL) BY MOUTH ONCE DAILY., Disp: 90 tablet, Rfl: 3    mirtazapine (REMERON) 30 MG tablet, Take 1 tablet (30 mg total) by mouth every evening., Disp: 30 tablet, Rfl: 0    warfarin (COUMADIN) 5 MG tablet, Take 1 tablet (5 mg total) by mouth Daily., Disp: 30 tablet, Rfl: 2    Past Medical History:   Diagnosis Date    *Atrial fibrillation     Anxiety     Atrial fibrillation 7/5/2012    Blood transfusion     CAD (coronary artery disease) 7/10/2012    Cancer     skin    Cataract     removed from both    Clotting disorder     Coronary artery disease     Depression     HTN (hypertension) 7/10/2012    Hx-TIA (transient ischemic attack) 7/10/2012    Hyperlipidemia     Hypertension     Myocardial infarction     Renal calculus, left 8/18/2017    S/P CABG (coronary artery bypass graft) 1/8/2013    Stroke 2/12    TIA    Urinary tract infection        Past Surgical History:   Procedure Laterality Date    CATARACT EXTRACTION W/  INTRAOCULAR LENS IMPLANT Right 03/27/2008    CATARACT EXTRACTION W/  INTRAOCULAR LENS IMPLANT Left 12/06/2007    CORONARY ARTERY BYPASS GRAFT      triple bypass         Family History   Problem Relation Age of Onset    Heart attack Father     Heart disease Father     Stroke Father     Alcohol abuse Father     Stroke Mother     Dementia Mother     Hypertension Mother      "Diabetes Mother     Melanoma Neg Hx     Psoriasis Neg Hx     Lupus Neg Hx     Eczema Neg Hx     Acne Neg Hx     Prostate cancer Neg Hx     Kidney disease Neg Hx        Social History     Tobacco Use    Smoking status: Never Smoker    Smokeless tobacco: Never Used   Substance Use Topics    Alcohol use: No    Drug use: No       Social History     Social History Narrative    Not on file       OBJECTIVE:   Vitals:    08/16/19 0819   BP: 130/84   BP Location: Left arm   Patient Position: Sitting   BP Method: Large (Manual)   Pulse: 87   Resp: 18   Temp: 97.7 °F (36.5 °C)   TempSrc: Oral   SpO2: 96%   Weight: 75.3 kg (166 lb)   Height: 5' 9" (1.753 m)     Body mass index is 24.51 kg/m².    Physical Exam   Constitutional: No distress.   Eyes: Conjunctivae are normal. No scleral icterus.   Neck: Neck supple. No JVD present. No thyromegaly present.   Cardiovascular: Normal rate, normal heart sounds and intact distal pulses. An irregularly irregular rhythm present. Exam reveals no gallop and no friction rub.   No murmur heard.  Pulmonary/Chest: Effort normal and breath sounds normal. He has no decreased breath sounds. He has no wheezes. He has no rhonchi. He has no rales.   Abdominal: Soft. Bowel sounds are normal. He exhibits no distension. There is no tenderness.   Musculoskeletal: He exhibits no edema.   Neurological: He is alert.   Skin: Skin is warm. Capillary refill takes less than 2 seconds.   Psychiatric: His speech is not rapid and/or pressured, not delayed and not slurred. He expresses no suicidal ideation. He expresses no suicidal plans.   Mood "frustrated"  Affect anxious and tearful but consolable         Depression Patient Health Questionnaire 8/16/2019 8/16/2019 6/20/2019 6/14/2019 5/10/2019 4/22/2019 2/12/2019   Over the last two weeks how often have you been bothered by little interest or pleasure in doing things 2 2 1 0 1 3 0   Over the last two weeks how often have you been bothered by feeling " "down, depressed or hopeless 2 2 1 0 0 3 0   PHQ-2 Total Score 4 4 2 0 1 6 0   Over the last two weeks how often have you been bothered by trouble falling or staying asleep, or sleeping too much - 2 - - - 0 -   Over the last two weeks how often have you been bothered by feeling tired or having little energy - 2 - - - 1 -   Over the last two weeks how often have you been bothered by a poor appetite or overeating - 2 - - - 3 -   Over the last two weeks how often have you been bothered by feeling bad about yourself - or that you are a failure or have let yourself or your family down - 2 - - - 3 -   Over the last two weeks how often have you been bothered by trouble concentrating on things, such as reading the newspaper or watching television - 2 - - - 2 -   Over the last two weeks how often have you been bothered by moving or speaking so slowly that other people could have noticed. Or the opposite - being so fidgety or restless that you have been moving around a lot more than usual. - 2 - - - 2 -   Over the last two weeks how often have you been bothered by thoughts that you would be better off dead, or of hurting yourself - 0 - - - 3 -   If you checked off any problems, how difficult have these problems made it for you to do your work, take care of things at home or get along with other people? - Extremely difficult - - - Extremely difficult -   Total Score - 16 - - - 20 -     LUCERO-7 Score: 16  Interpretation: Severe Anxiety    MMSE 8/16/2019   What is the (year), (season), (date), (day), (month)? 2   Where are we (state), (country), (town or city), (hospital), (floor)? 5   Name 3 common objects (eg. "apple", "table", "aditya"). Take 1 second to say each. Then ask the patient to repeat all 3. Give 1 point for each correct answer. Then repeat them until he/she learns all 3. Count trials and record. 3   Serial 7's backwards. Stop after 5 answers. (100,93,86,79,72) or alternatively  spell "WORLD" backwards. (D..L..R..O..W). " "The score is the number of letters in correct order. 2   Ask for the 3 common objects named earlier in the exam. Give 1 point for each correct answer. 3   Name a "pencil" and "watch." 2   Repeat the following: "No ifs, ands, or buts." 1   Follow a 3-stage command: "Take a paper in your right hand, fold it in half, & put it on the floor." 3   Read and obey the following: (see paper exam) 1   Write a sentence. 1   Copy the following design: (see paper exam) 0   Total MMSE Score 23   Some recent data might be hidden           PERTINENT RESULTS:   6/28/2019       Narrative     EXAMINATION:  MRI BRAIN WITHOUT CONTRAST    CLINICAL HISTORY:  Neuro deficit(s), subacute;Focal neuro deficit, new, fixed or worsening, >6 hours;    TECHNIQUE:  Multiplanar multisequence MR imaging of the brain was performed without contrast.    COMPARISON:  CT scan of the head dated 06/17/2019.    FINDINGS:  The craniocervical junction is within normal limits.  The midline structures are unremarkable.  The intracranial flow voids are within normal limits.    No diffusion-weighted signal abnormality is identified.  There are extensive T2/FLAIR signal hyperintensities within the periventricular and subcortical white matter.  There is an old lacunar type infarction in the left cerebellum.  Old lacunar type infarctions are identified in the basal ganglia, thalamus, and in the mid brain.  The ventricles and sulci are prominent, consistent with cerebral volume loss.  There are no extra-axial fluid collections.  There is no evidence of intracranial hemorrhage.  There is no evidence of mass effect.    There are postoperative changes in the orbits.  The paranasal sinuses are within normal limits.  The mastoid air cells are clear.  The calvarium is intact.      Impression       No acute intracranial process.    Changes of old lacunar type infarctions in the supratentorial and infratentorial brain.    Changes of marked cerebral volume loss. "         7/24/2019       Narrative     EXAMINATION:  CT HEAD WITHOUT CONTRAST; CT CERVICAL SPINE WITHOUT CONTRAST    CLINICAL HISTORY:  Head trauma, mental status change;; C-spine trauma, NEXUS/CCR positive, +risk factor(s);    TECHNIQUE:  Low dose axial CT images obtained throughout the head and cervical spine without intravenous contrast.  Axial, sagittal and coronal reconstructions were performed.    COMPARISON:  MRI brain 06/28/2019, CTA head 06/17/2019    FINDINGS:  Head:    Prominence of the ventricles and sulci compatible with moderate generalized cerebral volume loss.  No hydrocephalus.    No extra-axial blood or fluid collections.    Mild chronic microvascular ischemic change.  Small remote left inferior cerebellar infarct.  No new parenchymal mass, hemorrhage, edema or major vascular distribution infarct.    No fracture. Mastoid air cells and paranasal sinuses are essentially clear.    Spine:    The vertebral bodies are stable in height and morphology without evidence of fracture.    Stable subtle anterolisthesis at C4-5.  No new spondylolisthesis.    Mild cervical spondylosis with subtle loss of disc height and posterior disc osteophyte complex at C5-6.  Scattered facet arthropathy with associated neural foraminal narrowing on the right at C3-4 and C4-5.  No bony spinal canal stenosis.    Limited evaluation of the intraspinal contents demonstrates no evidence of hematoma.    The paraspinal soft tissue structures exhibit no acute abnormalities.      Impression       No evidence of acute intracranial pathology.    Moderate generalized cerebral volume loss.  Mild chronic microvascular ischemic changes small remote left inferior cerebellar infarct.    Mild cervical spondylosis without evidence of fracture or traumatic malalignment.     7/24/2019       Narrative     EXAMINATION:  CT HEAD WITHOUT CONTRAST; CT CERVICAL SPINE WITHOUT CONTRAST    CLINICAL HISTORY:  Head trauma, mental status change;; C-spine  trauma, NEXUS/CCR positive, +risk factor(s);    TECHNIQUE:  Low dose axial CT images obtained throughout the head and cervical spine without intravenous contrast.  Axial, sagittal and coronal reconstructions were performed.    COMPARISON:  MRI brain 06/28/2019, CTA head 06/17/2019    FINDINGS:  Head:    Prominence of the ventricles and sulci compatible with moderate generalized cerebral volume loss.  No hydrocephalus.    No extra-axial blood or fluid collections.    Mild chronic microvascular ischemic change.  Small remote left inferior cerebellar infarct.  No new parenchymal mass, hemorrhage, edema or major vascular distribution infarct.    No fracture. Mastoid air cells and paranasal sinuses are essentially clear.    Spine:    The vertebral bodies are stable in height and morphology without evidence of fracture.    Stable subtle anterolisthesis at C4-5.  No new spondylolisthesis.    Mild cervical spondylosis with subtle loss of disc height and posterior disc osteophyte complex at C5-6.  Scattered facet arthropathy with associated neural foraminal narrowing on the right at C3-4 and C4-5.  No bony spinal canal stenosis.    Limited evaluation of the intraspinal contents demonstrates no evidence of hematoma.    The paraspinal soft tissue structures exhibit no acute abnormalities.      Impression       No evidence of acute intracranial pathology.    Moderate generalized cerebral volume loss.  Mild chronic microvascular ischemic changes small remote left inferior cerebellar infarct.    Mild cervical spondylosis without evidence of fracture or traumatic malalignment.     Blood Culture #1 **CANNOT BE ORDERED STAT**   Order: [id number]   Status:  Preliminary result   Visible to patient:  No (Not Released)   Next appt:  07/31/2019 at 08:00 AM in Family Medicine (Jewell Ely DO)   Specimen Information: Peripheral, Forearm, Left; Blood        (important suggestion)  Newer results are available. Click to view them now.    Component 5d ago   Blood Culture, Routine No Growth to date P   Blood Culture, Routine No Growth to date P   Blood Culture, Routine No Growth to date P   Blood Culture, Routine No Growth to date P   Blood Culture, Routine No Growth to date P           Urine culture   Order: [id number]   Status:  Final result   Visible to patient:  Yes (Patient Portal)   Next appt:  07/31/2019 at 08:00 AM in Family Medicine (Jewell Ely DO)   Specimen Information: Urine        Component 5d ago   Urine Culture, Routine Abnormal   PROTEUS MIRABILIS ESBL   50,000 - 99,999 cfu/ml     Resulting Agency [facility]   Susceptibility      Proteus mirabilis esbl     CULTURE, URINE     Amox/K Clav'ate <=8/4 mcg/mL Sensitive     Amp/Sulbactam <=8/4 mcg/mL Sensitive     Ampicillin <=8 mcg/mL Resistant     Cefazolin <=8 mcg/mL Resistant     Cefepime <=8 mcg/mL Resistant     Ceftriaxone <=8 mcg/mL Resistant     Ciprofloxacin 2 mcg/mL Intermediate     Ertapenem <=2 mcg/mL Sensitive     Gentamicin <=4 mcg/mL Sensitive     Levofloxacin <=2 mcg/mL Sensitive     Meropenem <=4 mcg/mL Sensitive     Nitrofurantoin >64 mcg/mL Resistant     Piperacillin/Tazo <=16 mcg/mL Sensitive     Tetracycline >8 mcg/mL Resistant     Tobramycin <=4 mcg/mL Sensitive     Trimeth/Sulfa <=2/38 mcg/mL Sensitive                      Lab Results   Component Value Date    CHOL 106 (L) 06/17/2019    CHOL 97 (L) 06/13/2019    CHOL 96 (L) 10/22/2018     Lab Results   Component Value Date    HDL 43 06/17/2019    HDL 40 06/13/2019    HDL 39 (L) 10/22/2018     Lab Results   Component Value Date    LDLCALC 48.8 (L) 06/17/2019    LDLCALC 46.4 (L) 06/13/2019    LDLCALC 48.2 (L) 10/22/2018     Lab Results   Component Value Date    TRIG 71 06/17/2019    TRIG 53 06/13/2019    TRIG 44 10/22/2018     Lab Results   Component Value Date    CHOLHDL 40.6 06/17/2019    CHOLHDL 41.2 06/13/2019    CHOLHDL 40.6 10/22/2018     CMP  Sodium   Date Value Ref Range Status   07/26/2019 138 136 - 145 mmol/L Final      Potassium   Date Value Ref Range Status   07/26/2019 3.7 3.5 - 5.1 mmol/L Final     Chloride   Date Value Ref Range Status   07/26/2019 108 95 - 110 mmol/L Final     CO2   Date Value Ref Range Status   07/26/2019 25 23 - 29 mmol/L Final     Glucose   Date Value Ref Range Status   07/26/2019 104 70 - 110 mg/dL Final     BUN, Bld   Date Value Ref Range Status   07/26/2019 22 (H) 2 - 20 mg/dL Final     Creatinine   Date Value Ref Range Status   07/26/2019 0.98 0.50 - 1.40 mg/dL Final     Calcium   Date Value Ref Range Status   07/26/2019 8.4 (L) 8.7 - 10.5 mg/dL Final     Total Protein   Date Value Ref Range Status   07/26/2019 6.8 6.0 - 8.4 g/dL Final     Albumin   Date Value Ref Range Status   07/26/2019 3.3 (L) 3.5 - 5.2 g/dL Final     Total Bilirubin   Date Value Ref Range Status   07/26/2019 0.7 0.1 - 1.0 mg/dL Final     Comment:     For infants and newborns, interpretation of results should be based  on gestational age, weight and in agreement with clinical  observations.  Premature Infant recommended reference ranges:  Up to 24 hours.............<8.0 mg/dL  Up to 48 hours............<12.0 mg/dL  3-5 days..................<15.0 mg/dL  6-29 days.................<15.0 mg/dL       Alkaline Phosphatase   Date Value Ref Range Status   07/26/2019 80 38 - 126 U/L Final     AST   Date Value Ref Range Status   07/26/2019 38 15 - 46 U/L Final     ALT   Date Value Ref Range Status   07/26/2019 41 10 - 44 U/L Final     Anion Gap   Date Value Ref Range Status   07/26/2019 5 (L) 8 - 16 mmol/L Final     eGFR if    Date Value Ref Range Status   07/26/2019 >60.0 >60 mL/min/1.73 m^2 Final     eGFR if non    Date Value Ref Range Status   07/26/2019 >60.0 >60 mL/min/1.73 m^2 Final     Comment:     Calculation used to obtain the estimated glomerular filtration  rate (eGFR) is the CKD-EPI equation.          ASSESSMENT/PLAN:  Problem List Items Addressed This Visit        Neuro    History of CVA  (cerebrovascular accident)    Overview     - goal LDL <70  - BP goal < 130/80  - ASA         Mild cognitive impairment    Overview     - 8/16/19 MMSE 23         Current Assessment & Plan     - will continue to monitor and can be affected by anxiety and depression  Lab Results   Component Value Date    TSH 3.970 06/28/2019            Relevant Orders    Ambulatory Referral to Neuropsychology       Psychiatric    Moderate episode of recurrent major depressive disorder - Primary    Current Assessment & Plan     - denies current thoughts of self harm or suicide  - good family support  - okay to stop Sertraline since on lowest dose  - increase Remeron to 30 mg night  - continue Wellbutrin  mg BID         Relevant Medications    mirtazapine (REMERON) 30 MG tablet    Other Relevant Orders    Ambulatory Referral to Neuropsychology    Other mixed anxiety disorders    Current Assessment & Plan     - recommend psychiatry due to complexity and family will continue to encourage but reports that he has refused  - okay to stop Sertraline  - recommend increased Remeron  - monitor closely         Relevant Medications    mirtazapine (REMERON) 30 MG tablet    Other Relevant Orders    Ambulatory Referral to Neuropsychology       Cardiac/Vascular    Hyperlipidemia (Chronic)    Overview     - LDL goal <70         Current Assessment & Plan     Lab Results   Component Value Date    LDLCALC 48.8 (L) 06/17/2019     - well controlled  - continue current medications         S/P CABG (coronary artery bypass graft) (Chronic)    Overview     - goal LDL <70  - BP goal < 130/80  - ASA         Chronic atrial fibrillation    Overview     - rate controlled   - OAC Warfarin         Essential hypertension    Current Assessment & Plan     - well controlled  - continue current medications            Hematology    Long term current use of anticoagulant therapy    Current Assessment & Plan     - followed by Coumadin clinic   - last INR subtherapeutic  at 1.5             Other    Gait disorder    Current Assessment & Plan     - evaluated by Neurology and no signs of Parkinson's at this time  - MRI showed chronic lacunar infarct  - recommend Neuropsychiatry evaluation  - thought that may be related with Sertraline and Neurology had recommend considering weaning off and increasing Remeron which I recommended today  - fall risk reduction  - continue home PT               ORDERS:   Orders Placed This Encounter    Ambulatory Referral to Neuropsychology    mirtazapine (REMERON) 30 MG tablet     Total duration visit time 40 minutes.  Total time spent counseling greater than fifty percent of total visit time.  Reviewed prior note, evaluation, recommendation and imaging  Counseling included discussion regarding imaging findings, diagnosis, possibilities, treatment options, risks and benefits.  Questions elicited and answered    Follow-up in 2 weeks.     Dr. Jewell Ely D.O.   Family Medicine

## 2019-08-16 ENCOUNTER — OFFICE VISIT (OUTPATIENT)
Dept: FAMILY MEDICINE | Facility: CLINIC | Age: 79
End: 2019-08-16
Payer: MEDICARE

## 2019-08-16 VITALS
TEMPERATURE: 98 F | DIASTOLIC BLOOD PRESSURE: 84 MMHG | SYSTOLIC BLOOD PRESSURE: 130 MMHG | HEIGHT: 69 IN | RESPIRATION RATE: 18 BRPM | HEART RATE: 87 BPM | OXYGEN SATURATION: 96 % | BODY MASS INDEX: 24.59 KG/M2 | WEIGHT: 166 LBS

## 2019-08-16 DIAGNOSIS — G31.84 MILD COGNITIVE IMPAIRMENT: ICD-10-CM

## 2019-08-16 DIAGNOSIS — F33.2 SEVERE EPISODE OF RECURRENT MAJOR DEPRESSIVE DISORDER, WITHOUT PSYCHOTIC FEATURES: Primary | ICD-10-CM

## 2019-08-16 DIAGNOSIS — Z79.01 LONG TERM CURRENT USE OF ANTICOAGULANT THERAPY: ICD-10-CM

## 2019-08-16 DIAGNOSIS — F33.1 MODERATE EPISODE OF RECURRENT MAJOR DEPRESSIVE DISORDER: ICD-10-CM

## 2019-08-16 DIAGNOSIS — Z86.73 HISTORY OF CVA (CEREBROVASCULAR ACCIDENT): ICD-10-CM

## 2019-08-16 DIAGNOSIS — I48.20 CHRONIC ATRIAL FIBRILLATION: ICD-10-CM

## 2019-08-16 DIAGNOSIS — I10 ESSENTIAL HYPERTENSION: ICD-10-CM

## 2019-08-16 DIAGNOSIS — R26.9 GAIT DISORDER: ICD-10-CM

## 2019-08-16 DIAGNOSIS — F41.3 OTHER MIXED ANXIETY DISORDERS: ICD-10-CM

## 2019-08-16 DIAGNOSIS — Z95.1 S/P CABG (CORONARY ARTERY BYPASS GRAFT): Chronic | ICD-10-CM

## 2019-08-16 DIAGNOSIS — E78.2 MIXED HYPERLIPIDEMIA: Chronic | ICD-10-CM

## 2019-08-16 PROBLEM — R53.1 GENERALIZED WEAKNESS: Status: RESOLVED | Noted: 2019-06-27 | Resolved: 2019-08-16

## 2019-08-16 PROBLEM — R00.1 BRADYCARDIA: Status: RESOLVED | Noted: 2019-02-12 | Resolved: 2019-08-16

## 2019-08-16 PROBLEM — R62.7 FAILURE TO THRIVE IN ADULT: Status: RESOLVED | Noted: 2019-06-27 | Resolved: 2019-08-16

## 2019-08-16 PROBLEM — Z60.9 HIGH RISK SOCIAL SITUATIONS: Status: RESOLVED | Noted: 2019-06-27 | Resolved: 2019-08-16

## 2019-08-16 PROCEDURE — 1101F PT FALLS ASSESS-DOCD LE1/YR: CPT | Mod: CPTII,S$GLB,, | Performed by: FAMILY MEDICINE

## 2019-08-16 PROCEDURE — 3079F PR MOST RECENT DIASTOLIC BLOOD PRESSURE 80-89 MM HG: ICD-10-PCS | Mod: CPTII,S$GLB,, | Performed by: FAMILY MEDICINE

## 2019-08-16 PROCEDURE — 3079F DIAST BP 80-89 MM HG: CPT | Mod: CPTII,S$GLB,, | Performed by: FAMILY MEDICINE

## 2019-08-16 PROCEDURE — 3075F SYST BP GE 130 - 139MM HG: CPT | Mod: CPTII,S$GLB,, | Performed by: FAMILY MEDICINE

## 2019-08-16 PROCEDURE — 3075F PR MOST RECENT SYSTOLIC BLOOD PRESS GE 130-139MM HG: ICD-10-PCS | Mod: CPTII,S$GLB,, | Performed by: FAMILY MEDICINE

## 2019-08-16 PROCEDURE — 1101F PR PT FALLS ASSESS DOC 0-1 FALLS W/OUT INJ PAST YR: ICD-10-PCS | Mod: CPTII,S$GLB,, | Performed by: FAMILY MEDICINE

## 2019-08-16 PROCEDURE — 99999 PR PBB SHADOW E&M-EST. PATIENT-LVL IV: CPT | Mod: PBBFAC,,, | Performed by: FAMILY MEDICINE

## 2019-08-16 PROCEDURE — 99215 OFFICE O/P EST HI 40 MIN: CPT | Mod: S$GLB,,, | Performed by: FAMILY MEDICINE

## 2019-08-16 PROCEDURE — 99999 PR PBB SHADOW E&M-EST. PATIENT-LVL IV: ICD-10-PCS | Mod: PBBFAC,,, | Performed by: FAMILY MEDICINE

## 2019-08-16 PROCEDURE — 99215 PR OFFICE/OUTPT VISIT, EST, LEVL V, 40-54 MIN: ICD-10-PCS | Mod: S$GLB,,, | Performed by: FAMILY MEDICINE

## 2019-08-16 RX ORDER — MIRTAZAPINE 30 MG/1
30 TABLET, FILM COATED ORAL NIGHTLY
Qty: 30 TABLET | Refills: 0 | Status: ON HOLD | OUTPATIENT
Start: 2019-08-16 | End: 2022-01-31 | Stop reason: HOSPADM

## 2019-08-16 NOTE — ASSESSMENT & PLAN NOTE
- recommend psychiatry due to complexity and family will continue to encourage but reports that he has refused  - okay to stop Sertraline  - recommend increased Remeron  - monitor closely

## 2019-08-16 NOTE — ASSESSMENT & PLAN NOTE
- denies current thoughts of self harm or suicide  - good family support  - okay to stop Sertraline since on lowest dose  - increase Remeron to 30 mg night  - continue Wellbutrin  mg BID

## 2019-08-16 NOTE — PATIENT INSTRUCTIONS
1. Taper off of Zoloft (sertraline) and increased Remeron  Week 1: Stop Sertraline and increase Remeron (mirtazepine) to 30 mg daily  Week 2: Continue Remeron 30 mg daily and Wellbutrin  mg twice day  - and see me

## 2019-08-16 NOTE — ASSESSMENT & PLAN NOTE
- evaluated by Neurology and no signs of Parkinson's at this time  - MRI showed chronic lacunar infarct  - recommend Neuropsychiatry evaluation  - thought that may be related with Sertraline and Neurology had recommend considering weaning off and increasing Remeron which I recommended today  - fall risk reduction  - continue home PT

## 2019-08-16 NOTE — ASSESSMENT & PLAN NOTE
- will continue to monitor and can be affected by anxiety and depression  Lab Results   Component Value Date    TSH 3.970 06/28/2019

## 2019-08-19 ENCOUNTER — TELEPHONE (OUTPATIENT)
Dept: FAMILY MEDICINE | Facility: CLINIC | Age: 79
End: 2019-08-19

## 2019-08-19 DIAGNOSIS — Z74.09 IMPAIRED FUNCTIONAL MOBILITY AND ACTIVITY TOLERANCE: Primary | ICD-10-CM

## 2019-08-19 DIAGNOSIS — R26.89 IMPAIRMENT OF BALANCE: ICD-10-CM

## 2019-08-19 DIAGNOSIS — R26.9 GAIT DISORDER: ICD-10-CM

## 2019-08-19 NOTE — TELEPHONE ENCOUNTER
----- Message from Evelia Rubio sent at 8/19/2019  9:17 AM CDT -----  Contact: [phone]/self  Patient is requesting a call back. He was sick and lost everything. He needs to start PT all over. Thanks

## 2019-08-19 NOTE — TELEPHONE ENCOUNTER
Called and spoke to patient he states that the insurance company told him to call and have Dr. Ely send him to inpatient therapy. Patient states that Dr. Ely needs to write the order and send him to get it, he already spoke to the insurance. Patient stated he just finished 20 days and he wants another 20 days. Advised I would forward to the doctor.

## 2019-08-21 ENCOUNTER — TELEPHONE (OUTPATIENT)
Dept: FAMILY MEDICINE | Facility: CLINIC | Age: 79
End: 2019-08-21

## 2019-08-21 NOTE — PROGRESS NOTES
Jo with [facility] called to report that the Patient is refusing home health services, INR is due today 8/21 and patient is not answering his phone so he's being discharged from home health services

## 2019-08-21 NOTE — TELEPHONE ENCOUNTER
"Spoke with [place] and she states that she contacted the  agency and patient has been refusing do therapy or he has not been home when they have gone to do therapy therefore [facility] will not approve for him to receive SNF services. I called patient and notified him and he stated "that's okay, I guess I'll let them come" advised that it would help him get authorization for SNF and told him we could try again after he's completed a few sessions, he stated "no that's okay I'll do them for now" advised patient if he needed anything to call us.   "

## 2019-08-22 ENCOUNTER — ANTI-COAG VISIT (OUTPATIENT)
Dept: CARDIOLOGY | Facility: CLINIC | Age: 79
End: 2019-08-22
Payer: MEDICARE

## 2019-08-22 ENCOUNTER — PATIENT OUTREACH (OUTPATIENT)
Dept: ADMINISTRATIVE | Facility: OTHER | Age: 79
End: 2019-08-22

## 2019-08-22 DIAGNOSIS — Z79.01 LONG TERM CURRENT USE OF ANTICOAGULANT THERAPY: ICD-10-CM

## 2019-08-22 DIAGNOSIS — I48.20 CHRONIC ATRIAL FIBRILLATION: ICD-10-CM

## 2019-08-22 PROCEDURE — 93793 PR ANTICOAGULANT MGMT FOR PT TAKING WARFARIN: ICD-10-PCS | Mod: S$GLB,,,

## 2019-08-22 PROCEDURE — 93793 ANTICOAG MGMT PT WARFARIN: CPT | Mod: S$GLB,,,

## 2019-08-23 ENCOUNTER — OFFICE VISIT (OUTPATIENT)
Dept: CARDIOLOGY | Facility: CLINIC | Age: 79
End: 2019-08-23
Payer: MEDICARE

## 2019-08-23 VITALS
WEIGHT: 178.5 LBS | RESPIRATION RATE: 18 BRPM | HEART RATE: 62 BPM | HEIGHT: 69 IN | BODY MASS INDEX: 26.44 KG/M2 | SYSTOLIC BLOOD PRESSURE: 130 MMHG | DIASTOLIC BLOOD PRESSURE: 82 MMHG | OXYGEN SATURATION: 97 %

## 2019-08-23 DIAGNOSIS — I10 ESSENTIAL HYPERTENSION: ICD-10-CM

## 2019-08-23 DIAGNOSIS — Z91.81 AT RISK FOR FALLS: ICD-10-CM

## 2019-08-23 DIAGNOSIS — G31.84 MILD COGNITIVE IMPAIRMENT: ICD-10-CM

## 2019-08-23 DIAGNOSIS — Z95.1 S/P CABG (CORONARY ARTERY BYPASS GRAFT): Chronic | ICD-10-CM

## 2019-08-23 DIAGNOSIS — F32.0 CURRENT MILD EPISODE OF MAJOR DEPRESSIVE DISORDER WITHOUT PRIOR EPISODE: ICD-10-CM

## 2019-08-23 DIAGNOSIS — R26.89 IMPAIRMENT OF BALANCE: ICD-10-CM

## 2019-08-23 DIAGNOSIS — I48.20 CHRONIC ATRIAL FIBRILLATION: ICD-10-CM

## 2019-08-23 DIAGNOSIS — Z86.73 HISTORY OF CVA (CEREBROVASCULAR ACCIDENT): Primary | ICD-10-CM

## 2019-08-23 DIAGNOSIS — E78.2 MIXED HYPERLIPIDEMIA: Chronic | ICD-10-CM

## 2019-08-23 DIAGNOSIS — Z74.09 IMPAIRED FUNCTIONAL MOBILITY AND ACTIVITY TOLERANCE: ICD-10-CM

## 2019-08-23 DIAGNOSIS — R26.9 GAIT DISORDER: ICD-10-CM

## 2019-08-23 DIAGNOSIS — F33.1 MODERATE EPISODE OF RECURRENT MAJOR DEPRESSIVE DISORDER: ICD-10-CM

## 2019-08-23 DIAGNOSIS — I25.10 CORONARY ARTERY DISEASE INVOLVING NATIVE CORONARY ARTERY OF NATIVE HEART WITHOUT ANGINA PECTORIS: Chronic | ICD-10-CM

## 2019-08-23 PROCEDURE — 3079F DIAST BP 80-89 MM HG: CPT | Mod: CPTII,S$GLB,, | Performed by: INTERNAL MEDICINE

## 2019-08-23 PROCEDURE — 3075F PR MOST RECENT SYSTOLIC BLOOD PRESS GE 130-139MM HG: ICD-10-PCS | Mod: CPTII,S$GLB,, | Performed by: INTERNAL MEDICINE

## 2019-08-23 PROCEDURE — 1100F PR PT FALLS ASSESS DOC 2+ FALLS/FALL W/INJURY/YR: ICD-10-PCS | Mod: CPTII,S$GLB,, | Performed by: INTERNAL MEDICINE

## 2019-08-23 PROCEDURE — 3288F FALL RISK ASSESSMENT DOCD: CPT | Mod: CPTII,S$GLB,, | Performed by: INTERNAL MEDICINE

## 2019-08-23 PROCEDURE — 99999 PR PBB SHADOW E&M-EST. PATIENT-LVL III: ICD-10-PCS | Mod: PBBFAC,,, | Performed by: INTERNAL MEDICINE

## 2019-08-23 PROCEDURE — 3288F PR FALLS RISK ASSESSMENT DOCUMENTED: ICD-10-PCS | Mod: CPTII,S$GLB,, | Performed by: INTERNAL MEDICINE

## 2019-08-23 PROCEDURE — 99999 PR PBB SHADOW E&M-EST. PATIENT-LVL III: CPT | Mod: PBBFAC,,, | Performed by: INTERNAL MEDICINE

## 2019-08-23 PROCEDURE — 99215 PR OFFICE/OUTPT VISIT, EST, LEVL V, 40-54 MIN: ICD-10-PCS | Mod: S$GLB,,, | Performed by: INTERNAL MEDICINE

## 2019-08-23 PROCEDURE — 1100F PTFALLS ASSESS-DOCD GE2>/YR: CPT | Mod: CPTII,S$GLB,, | Performed by: INTERNAL MEDICINE

## 2019-08-23 PROCEDURE — 99215 OFFICE O/P EST HI 40 MIN: CPT | Mod: S$GLB,,, | Performed by: INTERNAL MEDICINE

## 2019-08-23 PROCEDURE — 3079F PR MOST RECENT DIASTOLIC BLOOD PRESSURE 80-89 MM HG: ICD-10-PCS | Mod: CPTII,S$GLB,, | Performed by: INTERNAL MEDICINE

## 2019-08-23 PROCEDURE — 3075F SYST BP GE 130 - 139MM HG: CPT | Mod: CPTII,S$GLB,, | Performed by: INTERNAL MEDICINE

## 2019-08-23 NOTE — PATIENT INSTRUCTIONS
Assessment/Plan:  Eliazar James is a 77 y.o. male with a past medical history of HTN, HLD, CAD s/p CABG in 1997 and is s/p PCI/DALY in 7/2009 (prox LAD and distal RCA), chronic Afib (on rate control and coumadin), who presents for a follow up appointment.      1. CAD s/p CABG in 1997 and is s/p PCI/DALY in 7/2009 (prox LAD and distal RCA)- No anginal symptoms currently.  PET stress test from 10/19/2018 shows no evidence of a discrete hemodynamically significant coronary stenosis.  Continue ASA, beta blocker, statin, ARB.      2. Chronic Afib- Continue rate control and coumadin.       3. HTN- Continue current antihypertensive medication regimen.  Pt to continue keeping log of blood pressure/heart rate.      4. HLD- Continue atorvastatin 40 mg daily.     5. Depression- Mr. James states he's feeling much better now that his wife is home with him.     6. Syncope-  Pt with no recent episodes of syncope.  Continue to monitor.      7. Parkinson's- Pt has upcoming appointment with neuropsychology.      Follow up in 3 months

## 2019-08-23 NOTE — PROGRESS NOTES
[facility]    Chief Complaint   Patient presents with    Follow-up     6 month eval    Fatigue    Shortness of Breath       Patient ID: Eliazar James is a 77 y.o. male with a past medical history of HTN, HLD, CAD s/p CABG in 1997 and is s/p PCI/DALY in 7/2009 (prox LAD and distal RCA), chronic Afib (on rate control and coumadin), who presents for a follow up appointment.  Pertinent history/events are as follows:    -Pt followed by Cardiology at [facility] in the past.     -Anatomy as of 7/2009 was LIMA-->LAD which is atretic, SVG--->OM patent and SVG--->RCA patent. In 7/2009 pt was admitted for an ACS and had DALY placed in prox LAD and distal RCA.  Since that time, he has been CP free and denies any LUCAS. LVEF is 55% with diastolic dysfunction and apical HK.      -Pt with TIA in 2-2012 and new onset afib. Now on coumadin- declined NOACs due to cost. CHADS 4- was followed by EP. Was on Multaq initially -discontinued due to cost issues and it was decided to continue rate control strategy with anticoagulation.  SPECT on 03/09/2012 was normal.    -At our initial clinic visit on 4/13/2018, Mr. James reported no chest pain shortness of breath, lower extremity edema, palpitations, TIA symptoms, or syncope.  He is tearful and very depressed because his wife is not doing well clinically.  She is currently residing in a nursing home and suffering with recurrent episodes of decompensated heart failure.  States he's taking all medications as prescribed.  Blood pressure today is 120/60.  Plan: Continue current medication regimen.  Mr. James is very depressed due to his wife's failing health.  He does not want to be evaluated by a psychiatrist, but would rather talk with me on a regular basis.  Will see him monthly to assist with coping, given his wife's clinical status.    Pt previously admitted with syncopal episodes.  Etiology unclear.  I recommend placing a 30 day event monitor to evaluate further,  "but Mr. James does not wish to do this.  Continue to monitor.     -At 5/23/2018 clinic visit, Mr. James state he feels tired and depressed.  Wife "doing a little better".  Feels stressed and blood pressure elevated today at 170/80.   Plan: Continue current medication regimen.  Pt to keep log of blood pressure/heart rate and bring in next visit for review.     -On 7/3/2018, pt admitted at [facility] with fall and found to be hypothermic in the ED initially on admission- no further episodes of hypothermia throughout hospital stay. No episodes of syncope, lightheadedness, negative orthostatic vitals and BP normal throughout rest of stay after volume resuscitation. No ectopy on telemetry, afib on ekg, no elevations in troponin, blood cx/lactate/lytes wnl. unclear cause of event.    -At clinic 7/13/2018 clinic visit, Mr. James reported having "absolutely no energy" since hospital discharge.  Feels tired all the time.  Pt is well compensated on exam today.  Blood pressure 160/70 with pulse of 64 bpm.   Plan: Blood pressure elevated on exam today.  Continue current medication regimen.  Pt to bring in log of blood pressure/heart rate.  Adjustments to his antihypertensive regimen will be made accordingly.      -At clinic visit on 9/14/2018, Mr. James reported continued fatigue and "no energy at all".  States when he "gets down on the ground to work in the ground, I can't get up".  Also has trouble getting out of bed.  He has no chest pain, SOB, LE edema, TIA symptoms or syncope.  States he's very stressed out because his wife is not doing well.    Plan: Pt complains of ongoing fatigue.  Given significant CAD history, check PET stress test to evaluate further.  Continue ASA, beta blocker, statin, ARB.    -At follow up clinic visit on 10/26/2018, Mr. James reports some improvement in his fatigue since our visit on 9/14/2018.  He has no chest pain, SOB, or LE edema.  PET stress test from 10/19/2018 shows " no evidence of a discrete hemodynamically significant coronary stenosis.  Plan:  PET stress test from 10/19/2018 shows no evidence of a discrete hemodynamically significant coronary stenosis.  Continue ASA, beta blocker, statin, ARB.    Regarding chronic Afib, continue rate control and coumadin.    -At clinic visit on 2/12/2019, Mr. James reported doing well with no chest pain, SOB, LE edema, TIA symptoms or syncope.  Reports recently spending 8 days in [facility] for suicidal ideation.  Reports no thoughts of suicide today.   Plan:   CAD s/p CABG in 1997 and is s/p PCI/DALY in 7/2009 (prox LAD and distal RCA)- No anginal symptoms currently.  PET stress test from 10/19/2018 shows no evidence of a discrete hemodynamically significant coronary stenosis.  Continue ASA, beta blocker, statin, ARB.    Chronic Afib- Continue rate control and coumadin.     HTN- Continue current antihypertensive medication regimen.  Pt to continue keeping log of blood pressure/heart rate.    HLD- Continue atorvastatin 40 mg daily.   Depression- Mr. James states he's feeling much better now that his wife is home with him.   Syncope-  Pt with no recent episodes of syncope.  Continue to monitor.    HPI:  Mr. James is accompanied by his daughter today.  He reports no chest pain, SOB, or LE edema.  He reports being diagnosed with possible Parkinson's.Exam within normal limits today.        Past Medical History:   Diagnosis Date    *Atrial fibrillation     Anxiety     Atrial fibrillation 7/5/2012    Blood transfusion     CAD (coronary artery disease) 7/10/2012    Cancer     skin    Cataract     removed from both    Clotting disorder     Coronary artery disease     Depression     HTN (hypertension) 7/10/2012    Hx-TIA (transient ischemic attack) 7/10/2012    Hyperlipidemia     Hypertension     Myocardial infarction     Renal calculus, left 8/18/2017    S/P CABG (coronary artery bypass graft) 1/8/2013    Stroke 2/12     TIA    Urinary tract infection      Past Surgical History:   Procedure Laterality Date    CATARACT EXTRACTION W/  INTRAOCULAR LENS IMPLANT Right 03/27/2008    CATARACT EXTRACTION W/  INTRAOCULAR LENS IMPLANT Left 12/06/2007    CORONARY ARTERY BYPASS GRAFT      triple bypass       Social History     Socioeconomic History    Marital status:      Spouse name: Not on file    Number of children: Not on file    Years of education: Not on file    Highest education level: Not on file   Occupational History    Not on file   Social Needs    Financial resource strain: Not on file    Food insecurity:     Worry: Not on file     Inability: Not on file    Transportation needs:     Medical: Not on file     Non-medical: Not on file   Tobacco Use    Smoking status: Never Smoker    Smokeless tobacco: Never Used   Substance and Sexual Activity    Alcohol use: No    Drug use: No    Sexual activity: Never   Lifestyle    Physical activity:     Days per week: Not on file     Minutes per session: Not on file    Stress: Not on file   Relationships    Social connections:     Talks on phone: Not on file     Gets together: Not on file     Attends Judaism service: Not on file     Active member of club or organization: Not on file     Attends meetings of clubs or organizations: Not on file     Relationship status: Not on file   Other Topics Concern    Patient feels they ought to cut down on drinking/drug use Not Asked    Patient annoyed by others criticizing their drinking/drug use Not Asked    Patient has felt bad or guilty about drinking/drug use Not Asked    Patient has had a drink/used drugs as an eye opener in the AM Not Asked   Social History Narrative    Not on file     Family History   Problem Relation Age of Onset    Heart attack Father     Heart disease Father     Stroke Father     Alcohol abuse Father     Stroke Mother     Dementia Mother     Hypertension Mother     Diabetes Mother      "Melanoma Neg Hx     Psoriasis Neg Hx     Lupus Neg Hx     Eczema Neg Hx     Acne Neg Hx     Prostate cancer Neg Hx     Kidney disease Neg Hx        Review of patient's allergies indicates:   Allergen Reactions    No known drug allergies        Medication List with Changes/Refills   Current Medications    ASPIRIN 81 MG TAB    Take 1 tablet by mouth Daily.    ATORVASTATIN (LIPITOR) 40 MG TABLET    Take 1 tablet (40 mg total) by mouth once daily.    BUPROPION (WELLBUTRIN SR) 100 MG TBSR 12 HR TABLET    Take 1 tablet (100 mg total) by mouth once daily.    CARTIA  MG 24 HR CAPSULE    Take 1 capsule (180 mg total) by mouth once daily.    CARVEDILOL (COREG) 12.5 MG TABLET    Take 1 tablet (12.5 mg total) by mouth 2 (two) times daily with meals.    FLUTICASONE (FLONASE) 50 MCG/ACTUATION NASAL SPRAY    2 sprays (100 mcg total) by Each Nare route once daily.    LOSARTAN (COZAAR) 100 MG TABLET    TAKE 1 TABLET (100 MG TOTAL) BY MOUTH ONCE DAILY.    MIRTAZAPINE (REMERON) 30 MG TABLET    Take 1 tablet (30 mg total) by mouth every evening.    WARFARIN (COUMADIN) 5 MG TABLET    Take 1 tablet (5 mg total) by mouth Daily.       Review of Systems  Constitution: Positive for fatigue.  HENT: Denies headaches or blurry vision.  Cardiovascular: Denies chest pain or irregular heart beat.  Respiratory: Denies cough or shortness of breath.  Gastrointestinal: Denies abdominal pain, nausea, or vomiting.  Musculoskeletal: Denies muscle cramps.  Neurological: Denies dizziness or focal weakness.  Psychiatric/Behavioral: Appears depressed.  Hematologic/Lymphatic: Denies bleeding problem or easy bruising/bleeding.  Skin: Denies rash or suspicious lesions    Physical Examination  /82 (BP Location: Left arm, Patient Position: Sitting, BP Method: X-Large (Manual))   Pulse 62   Resp 18   Ht 5' 9" (1.753 m)   Wt 81 kg (178 lb 8 oz)   SpO2 97%   BMI 26.36 kg/m²     Constitutional: No acute distress, conversant  HEENT: Sclera " anicteric, Pupils equal, round and reactive to light, extraocular motions intact, Oropharynx clear  Neck: No JVD, no carotid bruits  Cardiovascular: Irregularly irregular, no murmur, rubs or gallops  Pulmonary: Clear to auscultation bilaterally  Abdominal: Abdomen soft, nontender, nondistended, positive bowel sounds  Extremities: No lower extremity edema,   Pulses:  Carotid pulses are 2+ on the right side, and 2+ on the left side.  Radial pulses are 2+ on the right side, and 2+ on the left side.   Femoral pulses are 2+ on the right side, and 2+ on the left side.  Skin: No ecchymosis, erythema, or ulcers  Psych: Alert and oriented x 3, appropriate affect  Neuro: CNII-XII intact, no focal deficits    Labs:  Most Recent Data  CBC:   Lab Results   Component Value Date    WBC 9.63 07/26/2019    HGB 12.0 (L) 07/26/2019    HCT 36.4 (L) 07/26/2019     07/26/2019    MCV 98 07/26/2019    RDW 14.3 07/26/2019     BMP:   Lab Results   Component Value Date     07/26/2019    K 3.7 07/26/2019     07/26/2019    CO2 25 07/26/2019    BUN 22 (H) 07/26/2019    CREATININE 0.98 07/26/2019     07/26/2019    CALCIUM 8.4 (L) 07/26/2019    MG 1.8 07/05/2018    PHOS 2.2 (L) 07/05/2018     LFTS;   Lab Results   Component Value Date    PROT 6.8 07/26/2019    ALBUMIN 3.3 (L) 07/26/2019    BILITOT 0.7 07/26/2019    AST 38 07/26/2019    ALKPHOS 80 07/26/2019    ALT 41 07/26/2019     COAGS:   Lab Results   Component Value Date    INR 2.4 (H) 08/22/2019     FLP:   Lab Results   Component Value Date    CHOL 106 (L) 06/17/2019    HDL 43 06/17/2019    LDLCALC 48.8 (L) 06/17/2019    TRIG 71 06/17/2019    CHOLHDL 40.6 06/17/2019     CARDIAC:   Lab Results   Component Value Date    TROPONINI <0.006 07/04/2018     (H) 06/15/2010       EKG 4/9/2018:  Atrial fibrillation with slow ventricular response  Nonspecific T wave abnormality    PET Stress Test 10/19/2018:  Nuclear Quantitative Functional Analysis:   At rest:  LVEF: 64  % (normal is >= 51%)  LVED Volume: 109 ml (normal is <=171)  LVES Volume: 39 ml (normal is <=70)  At stress:  LVEF: 67 % (normal is >= 51%)  LVED Volume: 128 ml (normal is <=171)  LVES Volume: 43 ml (normal is <=70)    CONCLUSIONS: NO CLINICALLY SIGNIFICANT STRESS INDUCED PERFUSION DEFECTS as assessed with PET perfusion imaging.  1. There is no evidence of a discrete hemodynamically significant coronary stenosis.   2. Although no clinically significant stress defect is seen, there is reduced coronary flow reserve. These results suggest mild endothelial dysfunction due to elevated cholesterol, high blood pressure and diffuse, non-obstructive coronary atherosclerosis   without clinically significant, localized perfusion defects.   3. Resting wall motion is physiologic. Stress wall motion is physiologic.   4. LV function is normal at rest and stress.  (normal is >= 51%)  5. The ventricular volumes are normal at rest and stress.   6. The extracardiac distribution of radioactivity is normal.   7. There was no previous study available to compare.    Echo 4/24/2015:  CONCLUSIONS     1 - Enlarged ascending aorta.     2 - Normal left ventricular systolic function (EF 55-60%).     3 - Trivial mitral regurgitation.     4 - Mild pulmonic regurgitation.     5 - Low normal to mildly depressed right ventricular systolic function .     6 - Trivial tricuspid regurgitation.      Assessment/Plan:  Eliazar James is a 77 y.o. male with a past medical history of HTN, HLD, CAD s/p CABG in 1997 and is s/p PCI/DALY in 7/2009 (prox LAD and distal RCA), chronic Afib (on rate control and coumadin), who presents for a follow up appointment.      1. CAD s/p CABG in 1997 and is s/p PCI/DALY in 7/2009 (prox LAD and distal RCA)- No anginal symptoms currently.  PET stress test from 10/19/2018 shows no evidence of a discrete hemodynamically significant coronary stenosis.  Continue ASA, beta blocker, statin, ARB.      2. Chronic Afib- Continue rate  control and coumadin.       3. HTN- Continue current antihypertensive medication regimen.  Pt to continue keeping log of blood pressure/heart rate.      4. HLD- Continue atorvastatin 40 mg daily.     5. Depression- Mr. James states he's feeling much better now that his wife is home with him.     6. Syncope-  Pt with no recent episodes of syncope.  Continue to monitor.      7. Parkinson's- Pt has upcoming appointment with neuropsychology.      Follow up in 3 months    Total duration of face to face visit time 30 minutes.  Total time spent counseling greater than fifty percent of total visit time.  Counseling included discussion regarding imaging findings, diagnosis, possibilities, treatment options, risks and benefits.  The patient had many questions regarding the options and long-term effects.    Macario Valencia MD, PhD  Interventional Cardiology

## 2019-08-26 NOTE — PROGRESS NOTES
"FAMILY MEDICINE    Patient Active Problem List   Diagnosis    Chronic atrial fibrillation    Long term current use of anticoagulant therapy    CAD (coronary artery disease)    Essential hypertension    Hyperlipidemia    S/P CABG (coronary artery bypass graft)    Renal calculus, left    Bilateral renal cysts    Benign prostatic hyperplasia with nocturia    Impaired functional mobility and activity tolerance    Impairment of balance    Moderate episode of recurrent major depressive disorder    At risk for falls    Gait disorder    History of CVA (cerebrovascular accident)    Mild cognitive impairment    Other mixed anxiety disorders       CC: No chief complaint on file.      SUBJECTIVE:  Eliazar James   is a 78 y.o. male  - with Afib on OAC (warfarin), CAD (s/p CABG ), hypertension, severe depression, BPH, hyperlipidemia, history of CVA with old lacunar infarcts note on imaging and no residual deficits, renal cysts, history of nephrolithiasis, and severe anxiety presents to follow-up and would like to admitted to a nursing home due to inability to care for him self and worsening function over the last 6 months.     He was last seen 8/16/19 following hospitalization for acute cystitis and prior to that had just been discharged from SN[facility].     Today he presents with daughter Kathy and reports that he is still very anxious and continues to have issues with his gait. Home health with [facility] and continuing therapies. He lives with his wife who is disabled and recently was also discharged from nursing home and returned home. She is mostly chair bound.      He has been declining this year and recent hospital admission:   4/22/19 depression and suicidal ideations  6/27/19-7/1/19 recurrent falls, generalized weakness, deconditioning and discharged to [facility]  - reports that he had done well there and improved but after discharged he developed at UTI "and it has been down hill." "I went " "backwards and I feel worse again." frustrated and tearful  7/24/19-7/26/19 Urosepsis     1. Depression and Anxiety  - admitted to In Psych 4/22/19 for suicidal ideations  Age diagnosed: 1 year 78 yo     Prior treatments: Escitalopram 10 mg daily ( started 2/2019)  Side effects of prior treatment: not effective     Current treatment:   buPROPion (WELLBUTRIN SR) 100 MG TBSR 12 hr tablet, Take 1 tablet (100 mg total) by mouth once daily. (Patient taking differently: Take 100 mg by mouth 2 (two) times daily. ), Disp: 30 tablet, Rfl: 3  mirtazapine (REMERON) 30 MG tablet, Take 1 tablet (30 mg total) by mouth every evening., Disp: 30 tablet, Rfl: 0    Side effects of current treatment: not effective     Symptoms related: tearful and anxious  Poor appetite, denies sleep issues  Panic attacks: yes     Hopelessness: yes  Sleep: none  Suicidal thoughts: denies  Thoughts of self harm:denies  Thoughts of harm to others: denies  History of suicide attempts: none but +SI 4/2019 admitted to psychiatry unit  Family history of suicide:denies     Support system: family, granddaughter lives with him and wife to assist with medications and support. Daughters supportive  Counselor: none. Had seen psychiatry on admission 4/2019 and was discharge to the [facility] nearby but refused to return     2. Hypertension    Current medication treatment:   CARTIA  mg 24 hr capsule, Take 1 capsule (180 mg total) by mouth once daily., Disp: 90 capsule, Rfl: 3  carvedilol (COREG) 12.5 MG tablet, Take 1 tablet (12.5 mg total) by mouth 2 (two) times daily with meals., Disp: 180 tablet, Rfl: 3  losartan (COZAAR) 100 MG tablet, TAKE 1 TABLET (100 MG TOTAL) BY MOUTH ONCE DAILY., Disp: 90 tablet, Rfl: 3    Medication side effects: denies            ROS: Review of Systems   Constitutional: Negative for activity change, appetite change, fatigue and unexpected weight change.   HENT: Negative.    Eyes: Negative for visual disturbance. "   Respiratory: Negative for cough, chest tightness, shortness of breath and wheezing.    Cardiovascular: Negative for chest pain, palpitations and leg swelling.   Gastrointestinal: Negative for abdominal pain, constipation, diarrhea, nausea and vomiting.   Endocrine: Negative for cold intolerance, heat intolerance, polydipsia, polyphagia and polyuria.   Genitourinary: Negative for difficulty urinating, frequency, hematuria and urgency.   Musculoskeletal: Positive for arthralgias and gait problem. Negative for myalgias.   Skin: Negative for rash.   Allergic/Immunologic: Negative.    Neurological: Negative for dizziness, light-headedness and headaches.   Hematological: Bruises/bleeds easily.   Psychiatric/Behavioral: Positive for confusion, decreased concentration and dysphoric mood. Negative for sleep disturbance. The patient is nervous/anxious.        Past Medical History:   Diagnosis Date    *Atrial fibrillation     Anxiety     Atrial fibrillation 7/5/2012    Blood transfusion     CAD (coronary artery disease) 7/10/2012    Cancer     skin    Cataract     removed from both    Clotting disorder     Coronary artery disease     Depression     HTN (hypertension) 7/10/2012    Hx-TIA (transient ischemic attack) 7/10/2012    Hyperlipidemia     Hypertension     Myocardial infarction     Renal calculus, left 8/18/2017    S/P CABG (coronary artery bypass graft) 1/8/2013    Stroke 2/12    TIA    Urinary tract infection        Past Surgical History:   Procedure Laterality Date    CATARACT EXTRACTION W/  INTRAOCULAR LENS IMPLANT Right 03/27/2008    CATARACT EXTRACTION W/  INTRAOCULAR LENS IMPLANT Left 12/06/2007    CORONARY ARTERY BYPASS GRAFT      triple bypass         Family History   Problem Relation Age of Onset    Heart attack Father     Heart disease Father     Stroke Father     Alcohol abuse Father     Stroke Mother     Dementia Mother     Hypertension Mother     Diabetes Mother      Melanoma Neg Hx     Psoriasis Neg Hx     Lupus Neg Hx     Eczema Neg Hx     Acne Neg Hx     Prostate cancer Neg Hx     Kidney disease Neg Hx        Social History     Tobacco Use    Smoking status: Never Smoker    Smokeless tobacco: Never Used   Substance Use Topics    Alcohol use: No    Drug use: No       Social History     Social History Narrative    Not on file       ALLERGIES:   Review of patient's allergies indicates:   Allergen Reactions    No known drug allergies        MEDS:   Current Outpatient Medications:     aspirin 81 mg Tab, Take 1 tablet by mouth Daily., Disp: , Rfl:     atorvastatin (LIPITOR) 40 MG tablet, Take 1 tablet (40 mg total) by mouth once daily., Disp: 90 tablet, Rfl: 3    buPROPion (WELLBUTRIN SR) 100 MG TBSR 12 hr tablet, Take 1 tablet (100 mg total) by mouth once daily. (Patient taking differently: Take 100 mg by mouth 2 (two) times daily. ), Disp: 30 tablet, Rfl: 3    CARTIA  mg 24 hr capsule, Take 1 capsule (180 mg total) by mouth once daily., Disp: 90 capsule, Rfl: 3    carvedilol (COREG) 12.5 MG tablet, Take 1 tablet (12.5 mg total) by mouth 2 (two) times daily with meals., Disp: 180 tablet, Rfl: 3    fluticasone (FLONASE) 50 mcg/actuation nasal spray, 2 sprays (100 mcg total) by Each Nare route once daily., Disp: 16 g, Rfl: 5    losartan (COZAAR) 100 MG tablet, TAKE 1 TABLET (100 MG TOTAL) BY MOUTH ONCE DAILY., Disp: 90 tablet, Rfl: 3    mirtazapine (REMERON) 30 MG tablet, Take 1 tablet (30 mg total) by mouth every evening., Disp: 30 tablet, Rfl: 0    warfarin (COUMADIN) 5 MG tablet, Take 1 tablet (5 mg total) by mouth Daily., Disp: 30 tablet, Rfl: 2    DIPH,PERTUSS,ACEL,,TET VAC,PF, ADULT (ADACEL) 2 Lf-(2.5-5-3-5 mcg)-5Lf/0.5 mL Syrg, Inject 0.5 mLs into the muscle once. for 1 dose, Disp: 0.5 mL, Rfl: 0    varicella-zoster gE-AS01B, PF, (SHINGRIX, PF,) 50 mcg/0.5 mL injection, Inject 0.5mL IM at 0 and 2-6 months, Disp: 0.5 mL, Rfl: 1    OBJECTIVE:  "  Vitals:    08/27/19 1132   BP: 122/74   BP Location: Left arm   Patient Position: Sitting   BP Method: Medium (Manual)   Pulse: 64   Resp: 16   Temp: 98.4 °F (36.9 °C)   TempSrc: Oral   SpO2: 98%   Weight: 79.5 kg (175 lb 4.8 oz)   Height: 5' 9" (1.753 m)     Body mass index is 25.89 kg/m².    Physical Exam   Constitutional: No distress.   Neck: Neck supple.   Cardiovascular: Normal rate, regular rhythm, normal heart sounds and intact distal pulses. Exam reveals no gallop and no friction rub.   No murmur heard.  Pulmonary/Chest: Effort normal and breath sounds normal. He has no decreased breath sounds. He has no wheezes. He has no rhonchi. He has no rales.   Abdominal: Soft. Bowel sounds are normal.   Musculoskeletal: He exhibits edema (trace lower extremities).   Neurological: He is alert.   Skin: Skin is warm.   Psychiatric: His speech is normal.   Mood: "anxious"  Affect: improved since last visit          PERTINENT RESULTS:   7/24/2019       Narrative     EXAMINATION:  XR CHEST AP PORTABLE    CLINICAL HISTORY:  Sepsis;    TECHNIQUE:  Frontal view of the chest was performed.    COMPARISON:  06/28/2019    FINDINGS:  Prior median sternotomy.  The cardiomediastinal silhouette is normal in size and midline. Pulmonary vascularity appears within normal limits.    The lungs appear clear without confluent pulmonary parenchymal opacity. No pleural fluid or pneumothorax.      Impression       Stable exam.  No focal consolidation identified.         ASSESSMENT/PLAN:  Problem List Items Addressed This Visit        Neuro    History of CVA (cerebrovascular accident)    Overview     - goal LDL <70  - BP goal < 130/80  - ASA         Mild cognitive impairment    Overview     - 8/16/19 MMSE 23            Psychiatric    Moderate episode of recurrent major depressive disorder - Primary    Current Assessment & Plan     - denies current thoughts of self harm or suicide  - good family support  - continue Remeron to 30 mg nighty  - " continue Wellbutrin  mg BID         Other mixed anxiety disorders    Current Assessment & Plan     - reports has appt with Psychiatry  - continue Remeron  - monitor closely            Cardiac/Vascular    CAD (coronary artery disease) (Chronic)    Overview     - goal LDL <70  - BP goal < 130/80  - ASA         Hyperlipidemia (Chronic)    Overview     - LDL goal <70         Current Assessment & Plan     Lab Results   Component Value Date    LDLCALC 48.8 (L) 06/17/2019     - well controlled  - continue current medications         Chronic atrial fibrillation    Overview     - rate controlled   - OAC Warfarin         Essential hypertension    Current Assessment & Plan     - well controlled  - continue current medications            Other    Impaired functional mobility and activity tolerance    Current Assessment & Plan     - recommend PT/OT           Other Visit Diagnoses     Screening-pulmonary TB        Relevant Orders    X-Ray Chest PA And Lateral    POCT TB Skin Test Read          ORDERS:   Orders Placed This Encounter    X-Ray Chest PA And Lateral    POCT TB Skin Test Read    DIPH,PERTUSS,ACEL,,TET VAC,PF, ADULT (ADACEL) 2 Lf-(2.5-5-3-5 mcg)-5Lf/0.5 mL Syrg    varicella-zoster gE-AS01B, PF, (SHINGRIX, PF,) 50 mcg/0.5 mL injection     CXR and PPD for nursing home placement. Paperwork completed and faxed to [facility]. He will be following outpatient with me until he can get Medicaid and accepted by physician at nursing home    Vaccines recommended: Shingle and Tdap and recommend flu when available    Follow-up in 1 month    Dr. Jewell Ely D.O.   Family Medicine

## 2019-08-27 ENCOUNTER — OFFICE VISIT (OUTPATIENT)
Dept: FAMILY MEDICINE | Facility: CLINIC | Age: 79
End: 2019-08-27
Payer: MEDICARE

## 2019-08-27 VITALS
OXYGEN SATURATION: 98 % | RESPIRATION RATE: 16 BRPM | SYSTOLIC BLOOD PRESSURE: 122 MMHG | DIASTOLIC BLOOD PRESSURE: 74 MMHG | TEMPERATURE: 98 F | WEIGHT: 175.31 LBS | HEIGHT: 69 IN | HEART RATE: 64 BPM | BODY MASS INDEX: 25.97 KG/M2

## 2019-08-27 DIAGNOSIS — F33.1 MODERATE EPISODE OF RECURRENT MAJOR DEPRESSIVE DISORDER: Primary | ICD-10-CM

## 2019-08-27 DIAGNOSIS — I25.10 CORONARY ARTERY DISEASE INVOLVING NATIVE CORONARY ARTERY OF NATIVE HEART WITHOUT ANGINA PECTORIS: Chronic | ICD-10-CM

## 2019-08-27 DIAGNOSIS — Z74.09 IMPAIRED FUNCTIONAL MOBILITY AND ACTIVITY TOLERANCE: ICD-10-CM

## 2019-08-27 DIAGNOSIS — Z23 NEED FOR TUBERCULOSIS VACCINATION: Primary | ICD-10-CM

## 2019-08-27 DIAGNOSIS — Z86.73 HISTORY OF CVA (CEREBROVASCULAR ACCIDENT): ICD-10-CM

## 2019-08-27 DIAGNOSIS — E78.2 MIXED HYPERLIPIDEMIA: Chronic | ICD-10-CM

## 2019-08-27 DIAGNOSIS — G31.84 MILD COGNITIVE IMPAIRMENT: ICD-10-CM

## 2019-08-27 DIAGNOSIS — I48.20 CHRONIC ATRIAL FIBRILLATION: ICD-10-CM

## 2019-08-27 DIAGNOSIS — Z11.1 SCREENING-PULMONARY TB: ICD-10-CM

## 2019-08-27 DIAGNOSIS — I10 ESSENTIAL HYPERTENSION: ICD-10-CM

## 2019-08-27 DIAGNOSIS — F41.3 OTHER MIXED ANXIETY DISORDERS: ICD-10-CM

## 2019-08-27 PROCEDURE — 99499 UNLISTED E&M SERVICE: CPT | Mod: S$GLB,,, | Performed by: FAMILY MEDICINE

## 2019-08-27 PROCEDURE — 99214 PR OFFICE/OUTPT VISIT, EST, LEVL IV, 30-39 MIN: ICD-10-PCS | Mod: S$GLB,,, | Performed by: FAMILY MEDICINE

## 2019-08-27 PROCEDURE — 3074F PR MOST RECENT SYSTOLIC BLOOD PRESSURE < 130 MM HG: ICD-10-PCS | Mod: CPTII,S$GLB,, | Performed by: FAMILY MEDICINE

## 2019-08-27 PROCEDURE — 3078F PR MOST RECENT DIASTOLIC BLOOD PRESSURE < 80 MM HG: ICD-10-PCS | Mod: CPTII,S$GLB,, | Performed by: FAMILY MEDICINE

## 2019-08-27 PROCEDURE — 1101F PT FALLS ASSESS-DOCD LE1/YR: CPT | Mod: CPTII,S$GLB,, | Performed by: FAMILY MEDICINE

## 2019-08-27 PROCEDURE — 99214 OFFICE O/P EST MOD 30 MIN: CPT | Mod: S$GLB,,, | Performed by: FAMILY MEDICINE

## 2019-08-27 PROCEDURE — 99999 PR PBB SHADOW E&M-EST. PATIENT-LVL IV: CPT | Mod: PBBFAC,,, | Performed by: FAMILY MEDICINE

## 2019-08-27 PROCEDURE — 99499 RISK ADDL DX/OHS AUDIT: ICD-10-PCS | Mod: S$GLB,,, | Performed by: FAMILY MEDICINE

## 2019-08-27 PROCEDURE — 3078F DIAST BP <80 MM HG: CPT | Mod: CPTII,S$GLB,, | Performed by: FAMILY MEDICINE

## 2019-08-27 PROCEDURE — 1101F PR PT FALLS ASSESS DOC 0-1 FALLS W/OUT INJ PAST YR: ICD-10-PCS | Mod: CPTII,S$GLB,, | Performed by: FAMILY MEDICINE

## 2019-08-27 PROCEDURE — 3074F SYST BP LT 130 MM HG: CPT | Mod: CPTII,S$GLB,, | Performed by: FAMILY MEDICINE

## 2019-08-27 PROCEDURE — 99999 PR PBB SHADOW E&M-EST. PATIENT-LVL IV: ICD-10-PCS | Mod: PBBFAC,,, | Performed by: FAMILY MEDICINE

## 2019-08-27 NOTE — ASSESSMENT & PLAN NOTE
- denies current thoughts of self harm or suicide  - good family support  - continue Remeron to 30 mg nighty  - continue Wellbutrin  mg BID

## 2019-09-03 ENCOUNTER — PATIENT MESSAGE (OUTPATIENT)
Dept: FAMILY MEDICINE | Facility: CLINIC | Age: 79
End: 2019-09-03

## 2019-09-12 ENCOUNTER — PATIENT MESSAGE (OUTPATIENT)
Dept: FAMILY MEDICINE | Facility: CLINIC | Age: 79
End: 2019-09-12

## 2019-09-12 RX ORDER — BUPROPION HYDROCHLORIDE 100 MG/1
100 TABLET, EXTENDED RELEASE ORAL 2 TIMES DAILY
Qty: 60 TABLET | Refills: 2 | Status: SHIPPED | OUTPATIENT
Start: 2019-09-12 | End: 2021-07-13

## 2019-09-12 RX ORDER — BUPROPION HYDROCHLORIDE 100 MG/1
100 TABLET, EXTENDED RELEASE ORAL 2 TIMES DAILY
Qty: 60 TABLET | Refills: 2 | Status: ON HOLD | OUTPATIENT
Start: 2019-09-12 | End: 2019-09-24 | Stop reason: HOSPADM

## 2019-09-13 ENCOUNTER — PATIENT MESSAGE (OUTPATIENT)
Dept: FAMILY MEDICINE | Facility: CLINIC | Age: 79
End: 2019-09-13

## 2019-09-13 ENCOUNTER — ANTI-COAG VISIT (OUTPATIENT)
Dept: CARDIOLOGY | Facility: CLINIC | Age: 79
End: 2019-09-13
Payer: MEDICARE

## 2019-09-13 DIAGNOSIS — I48.20 CHRONIC ATRIAL FIBRILLATION: ICD-10-CM

## 2019-09-13 DIAGNOSIS — Z79.01 LONG TERM CURRENT USE OF ANTICOAGULANT THERAPY: ICD-10-CM

## 2019-09-13 PROCEDURE — 93793 ANTICOAG MGMT PT WARFARIN: CPT | Mod: S$GLB,,,

## 2019-09-13 PROCEDURE — 93793 PR ANTICOAGULANT MGMT FOR PT TAKING WARFARIN: ICD-10-PCS | Mod: S$GLB,,,

## 2019-09-13 NOTE — PROGRESS NOTES
INR high and he reports not taking the last 2 days. Will adjust dose and watch closely. INR due next week. Noted plans with PCP regarding home life changes and plans for NH admit. We will ask them to let us know if/when this changes.

## 2019-09-20 ENCOUNTER — ANTI-COAG VISIT (OUTPATIENT)
Dept: CARDIOLOGY | Facility: CLINIC | Age: 79
End: 2019-09-20
Payer: MEDICARE

## 2019-09-20 DIAGNOSIS — Z79.01 LONG TERM CURRENT USE OF ANTICOAGULANT THERAPY: ICD-10-CM

## 2019-09-20 DIAGNOSIS — I48.20 CHRONIC ATRIAL FIBRILLATION: ICD-10-CM

## 2019-09-20 PROCEDURE — 93793 PR ANTICOAGULANT MGMT FOR PT TAKING WARFARIN: ICD-10-PCS | Mod: S$GLB,,,

## 2019-09-20 PROCEDURE — 93793 ANTICOAG MGMT PT WARFARIN: CPT | Mod: S$GLB,,,

## 2019-09-20 NOTE — PROGRESS NOTES
INR low due to missing multiple doses. Repeat INR next week. INR was high last week and has been missing doses. Will need to see a full week of new dose plan before making any changes.

## 2019-09-20 NOTE — PROGRESS NOTES
Called and left a message on the patient;s wife phone. Spoke to Ms. Chavez and related the message to her regarding the patient dosages and f/u date. She stated she will give the message to the patient and his wife.

## 2019-09-21 PROBLEM — N39.0 URINARY TRACT INFECTION WITHOUT HEMATURIA: Status: ACTIVE | Noted: 2019-09-21

## 2019-09-22 PROBLEM — Z71.89 GOALS OF CARE, COUNSELING/DISCUSSION: Status: ACTIVE | Noted: 2019-09-22

## 2019-09-25 ENCOUNTER — ANTI-COAG VISIT (OUTPATIENT)
Dept: CARDIOLOGY | Facility: CLINIC | Age: 79
End: 2019-09-25
Payer: MEDICARE

## 2019-09-25 DIAGNOSIS — Z79.01 LONG TERM CURRENT USE OF ANTICOAGULANT THERAPY: ICD-10-CM

## 2019-09-25 DIAGNOSIS — I48.20 CHRONIC ATRIAL FIBRILLATION: ICD-10-CM

## 2019-09-25 PROCEDURE — 93793 PR ANTICOAGULANT MGMT FOR PT TAKING WARFARIN: ICD-10-PCS | Mod: S$GLB,,,

## 2019-09-25 PROCEDURE — 93793 ANTICOAG MGMT PT WARFARIN: CPT | Mod: S$GLB,,,

## 2019-09-25 NOTE — PROGRESS NOTES
INR low. Patient admitted 9/21-9/24 for weakness & UTI. He is now being transitioned to [facility] tomorrow. We will give dose plan for tonight. D/c from clinic unless something changes with inpatient care. Currently maintenance dose is not established due to recent health changes. He is now on bactrim which is a DDI and will need close INR monitoring

## 2019-11-25 PROBLEM — J06.9 UPPER RESPIRATORY INFECTION: Status: ACTIVE | Noted: 2019-11-25

## 2019-11-25 PROBLEM — I95.1 ORTHOSTATIC HYPOTENSION: Status: ACTIVE | Noted: 2019-11-25

## 2019-11-26 ENCOUNTER — PATIENT OUTREACH (OUTPATIENT)
Dept: ADMINISTRATIVE | Facility: OTHER | Age: 79
End: 2019-11-26

## 2019-11-27 ENCOUNTER — OFFICE VISIT (OUTPATIENT)
Dept: UROLOGY | Facility: CLINIC | Age: 79
End: 2019-11-27
Payer: MEDICARE

## 2019-11-27 ENCOUNTER — TELEPHONE (OUTPATIENT)
Dept: INTERNAL MEDICINE | Facility: CLINIC | Age: 79
End: 2019-11-27

## 2019-11-27 ENCOUNTER — TELEPHONE (OUTPATIENT)
Dept: UROLOGY | Facility: CLINIC | Age: 79
End: 2019-11-27

## 2019-11-27 VITALS
HEIGHT: 69 IN | BODY MASS INDEX: 25.77 KG/M2 | DIASTOLIC BLOOD PRESSURE: 62 MMHG | WEIGHT: 174 LBS | OXYGEN SATURATION: 97 % | SYSTOLIC BLOOD PRESSURE: 120 MMHG | HEART RATE: 78 BPM | RESPIRATION RATE: 17 BRPM

## 2019-11-27 DIAGNOSIS — R97.20 ELEVATED PSA, LESS THAN 10 NG/ML: ICD-10-CM

## 2019-11-27 DIAGNOSIS — R82.90 MALODOROUS URINE: ICD-10-CM

## 2019-11-27 DIAGNOSIS — Z87.440 HISTORY OF RECURRENT UTI (URINARY TRACT INFECTION): ICD-10-CM

## 2019-11-27 DIAGNOSIS — N30.01 ACUTE CYSTITIS WITH HEMATURIA: Primary | ICD-10-CM

## 2019-11-27 DIAGNOSIS — R82.90 CLOUDY URINE: ICD-10-CM

## 2019-11-27 DIAGNOSIS — R32 ENURESIS: ICD-10-CM

## 2019-11-27 PROBLEM — I95.1 ORTHOSTATIC HYPOTENSION: Status: RESOLVED | Noted: 2019-11-25 | Resolved: 2019-11-27

## 2019-11-27 PROCEDURE — 99999 PR PBB SHADOW E&M-EST. PATIENT-LVL V: CPT | Mod: PBBFAC,,, | Performed by: NURSE PRACTITIONER

## 2019-11-27 PROCEDURE — 1126F AMNT PAIN NOTED NONE PRSNT: CPT | Mod: ,,, | Performed by: NURSE PRACTITIONER

## 2019-11-27 PROCEDURE — 1159F MED LIST DOCD IN RCRD: CPT | Mod: ,,, | Performed by: NURSE PRACTITIONER

## 2019-11-27 PROCEDURE — 1126F PR PAIN SEVERITY QUANTIFIED, NO PAIN PRESENT: ICD-10-PCS | Mod: ,,, | Performed by: NURSE PRACTITIONER

## 2019-11-27 PROCEDURE — 99214 OFFICE O/P EST MOD 30 MIN: CPT | Mod: S$PBB,,, | Performed by: NURSE PRACTITIONER

## 2019-11-27 PROCEDURE — 99999 PR PBB SHADOW E&M-EST. PATIENT-LVL V: ICD-10-PCS | Mod: PBBFAC,,, | Performed by: NURSE PRACTITIONER

## 2019-11-27 PROCEDURE — 99214 PR OFFICE/OUTPT VISIT, EST, LEVL IV, 30-39 MIN: ICD-10-PCS | Mod: S$PBB,,, | Performed by: NURSE PRACTITIONER

## 2019-11-27 PROCEDURE — 99215 OFFICE O/P EST HI 40 MIN: CPT | Mod: PBBFAC,PO | Performed by: NURSE PRACTITIONER

## 2019-11-27 PROCEDURE — 1159F PR MEDICATION LIST DOCUMENTED IN MEDICAL RECORD: ICD-10-PCS | Mod: ,,, | Performed by: NURSE PRACTITIONER

## 2019-11-27 NOTE — PATIENT INSTRUCTIONS
1. U/A & Urine cx (nursing home collect; please fax results to our clinic at [phone])  2. U/S retroperitoneal complete at [facility] on 12/3/19 at 2:30 PM.  3. PSA, total and free at [facility] on 12/3/19 at 3:30 PM.  4. Schedule in-clinic cystoscopy with Dr. Ramon for recurrent UTIs on 12/24/19 at 1:30 PM.  5. Random bladder scan  6. Follow-up pending urine cx, PSA, and U/S results.

## 2019-11-27 NOTE — PROGRESS NOTES
Subjective:       Patient ID: Eliazar James is a 79 y.o. male.    Chief Complaint: Urinary Tract Infection (recurrent uti) and Hospital Follow Up    Patient is new to me. He is a 78 yo WM who is here today for a hospital f/u for his UTI. Patient also reports a hx of recurrent UTIs and an elevated PSA reading. His last PSA was 6.63 ng/mL on 11/22/19. Patient is a resident at [facility] for the past 2 months.     Urinary Tract Infection    This is a recurrent problem. The problem has been waxing and waning. The quality of the pain is described as burning. The pain is at a severity of 3/10. The pain is mild. There has been no fever. The fever has been present for less than 1 day. He is not sexually active. There is no history of pyelonephritis. Associated symptoms include frequency and urgency (with leakage). Pertinent negatives include no behavior changes, chills, discharge, flank pain, hematuria, hesitancy, nausea, sweats, vomiting, weight loss, bubble bath use, constipation, rash or withholding. He has tried antibiotics for the symptoms. The treatment provided moderate relief. His past medical history is significant for hypertension and recurrent UTIs. There is no history of diabetes mellitus or a single kidney.     Review of Systems   Constitutional: Positive for fatigue. Negative for appetite change, chills, fever and weight loss.   Gastrointestinal: Negative for abdominal pain, constipation, diarrhea, nausea and vomiting.   Genitourinary: Positive for difficulty urinating (straining), dysuria, enuresis, frequency and urgency (with leakage). Negative for decreased urine volume, discharge, flank pain, hematuria, hesitancy, penile pain, penile swelling, scrotal swelling and testicular pain.   Skin: Negative for rash.   Neurological: Positive for weakness. Negative for dizziness and headaches.   Psychiatric/Behavioral: Negative.        Objective:      Physical Exam   Constitutional: He is oriented to  person, place, and time. He appears well-developed and well-nourished. No distress.   HENT:   Head: Normocephalic and atraumatic.   Eyes: Pupils are equal, round, and reactive to light. EOM are normal.   Neck: Normal range of motion.   Cardiovascular: Normal rate.   Pulmonary/Chest: Effort normal. No respiratory distress.   Abdominal: Soft. There is no tenderness.   Genitourinary:   Genitourinary Comments: Random bladder scan = 59 mL   Musculoskeletal: Normal range of motion. He exhibits no edema.   Currently in wheelchair.    Neurological: He is alert and oriented to person, place, and time. Coordination normal.   Skin: Skin is warm and dry.   Psychiatric: He has a normal mood and affect. His behavior is normal. Judgment and thought content normal.   Nursing note and vitals reviewed.      Assessment:       1. Acute cystitis with hematuria    2. History of recurrent UTI (urinary tract infection)    3. Malodorous urine    4. Cloudy urine    5. Enuresis    6. Elevated PSA, less than 10 ng/ml        Plan:       Eliazar was seen today for urinary tract infection and hospital follow up.    Diagnoses and all orders for this visit:    Acute cystitis with hematuria  -     Urinalysis; Future  -     Urine culture; Future    History of recurrent UTI (urinary tract infection)  -     US Retroperitoneal Complete (Kidney and; Future  -     Urinalysis; Future  -     Urine culture; Future    Malodorous urine  -     Urinalysis; Future  -     Urine culture; Future    Cloudy urine  -     Urinalysis; Future  -     Urine culture; Future    Enuresis  -     Urinalysis; Future  -     Urine culture; Future    Elevated PSA, less than 10 ng/ml  -     Urinalysis; Future  -     Urine culture; Future  -     PSA, total and free; Future    Other orders  1. U/A & Urine cx (nursing home collect; please fax results to our clinic at [phone])  2. U/S retroperitoneal complete at [facility] on 12/3/19 at 2:30 PM.  3. PSA, total and free at [facility] on 12/3/19 at  3:30 PM.  4. Schedule in-clinic cystoscopy with Dr. Ramon for recurrent UTIs on 12/24/19 at 1:30 PM.  5. Random bladder scan    Follow-up pending urine cx, PSA, and U/S results.     Valeria Antunez, NP

## 2019-11-27 NOTE — TELEPHONE ENCOUNTER
Spoke with daughter to schedule appointment daughter stated I had to call the nursing home since they bring him. Spoke wit nursing home they stated he has a PCP at the nursing home and they will be handling his care.

## 2019-11-27 NOTE — TELEPHONE ENCOUNTER
----- Message from Rylie Oliva sent at 11/27/2019  8:17 AM CST -----  Contact: Daughter Kathy  - [phone]  Needs a 2 week Hospital from today. Please advise

## 2019-11-27 NOTE — TELEPHONE ENCOUNTER
----- Message from Scarlett Villalpando sent at 11/27/2019  9:31 AM CST -----  Contact: Melissa watt/ [facility]  Patient needs to be seen sooner than the next available appointment for a hospital f/u - within 1-2 weeks from today. Please call patient and advise.

## 2019-12-03 ENCOUNTER — PATIENT MESSAGE (OUTPATIENT)
Dept: UROLOGY | Facility: CLINIC | Age: 79
End: 2019-12-03

## 2019-12-27 ENCOUNTER — TELEPHONE (OUTPATIENT)
Dept: UROLOGY | Facility: CLINIC | Age: 79
End: 2019-12-27

## 2019-12-27 NOTE — TELEPHONE ENCOUNTER
----- Message from Valeria Antunez NP sent at 12/19/2019  8:10 AM CST -----  Regarding: urine cx result  Good morning Ms. Prince. When time permits can you contact [facility] in regards to Mr. James urine cx. I still have not received his results.

## 2020-01-14 ENCOUNTER — PROCEDURE VISIT (OUTPATIENT)
Dept: UROLOGY | Facility: CLINIC | Age: 80
End: 2020-01-14
Payer: MEDICARE

## 2020-01-14 VITALS
TEMPERATURE: 98 F | HEART RATE: 74 BPM | RESPIRATION RATE: 17 BRPM | HEIGHT: 69 IN | OXYGEN SATURATION: 97 % | BODY MASS INDEX: 25.77 KG/M2 | DIASTOLIC BLOOD PRESSURE: 62 MMHG | WEIGHT: 174 LBS | SYSTOLIC BLOOD PRESSURE: 120 MMHG

## 2020-01-14 DIAGNOSIS — N40.1 BENIGN PROSTATIC HYPERPLASIA WITH NOCTURIA: Primary | ICD-10-CM

## 2020-01-14 DIAGNOSIS — N39.41 URGE URINARY INCONTINENCE: ICD-10-CM

## 2020-01-14 DIAGNOSIS — R35.1 BENIGN PROSTATIC HYPERPLASIA WITH NOCTURIA: Primary | ICD-10-CM

## 2020-01-14 DIAGNOSIS — R33.9 URINARY RETENTION: ICD-10-CM

## 2020-01-14 DIAGNOSIS — N39.0 RECURRENT UTI: ICD-10-CM

## 2020-01-14 DIAGNOSIS — N30.00 ACUTE CYSTITIS WITHOUT HEMATURIA: ICD-10-CM

## 2020-01-14 PROCEDURE — 52000 CYSTOSCOPY: ICD-10-PCS | Mod: S$PBB,,, | Performed by: UROLOGY

## 2020-01-14 PROCEDURE — 52000 CYSTOURETHROSCOPY: CPT | Mod: PBBFAC,PO | Performed by: UROLOGY

## 2020-01-14 RX ORDER — LIDOCAINE HYDROCHLORIDE 20 MG/ML
JELLY TOPICAL ONCE
Status: CANCELLED | OUTPATIENT
Start: 2020-01-14 | End: 2020-01-14

## 2020-01-14 RX ORDER — PHENAZOPYRIDINE HYDROCHLORIDE 100 MG/1
TABLET, FILM COATED ORAL
COMMUNITY
Start: 2019-11-20 | End: 2020-10-15

## 2020-01-14 RX ORDER — CIPROFLOXACIN 2 MG/ML
400 INJECTION, SOLUTION INTRAVENOUS
Status: CANCELLED | OUTPATIENT
Start: 2020-01-14

## 2020-01-14 RX ORDER — SODIUM CHLORIDE 9 MG/ML
INJECTION, SOLUTION INTRAVENOUS CONTINUOUS
Status: CANCELLED | OUTPATIENT
Start: 2020-01-14

## 2020-01-14 RX ORDER — CIPROFLOXACIN 500 MG/1
500 TABLET ORAL 2 TIMES DAILY
Qty: 10 TABLET | Refills: 0 | Status: SHIPPED | OUTPATIENT
Start: 2020-01-14 | End: 2020-01-20

## 2020-01-14 NOTE — H&P
Subjective:       Patient ID: Eliazar James is a 79 y.o. male.    Chief Complaint: TONEY due to BPH with retention, urge incontinence and recurrent UTI.    Past Medical History:   Diagnosis Date    *Atrial fibrillation     Anxiety     Atrial fibrillation 7/5/2012    Blood transfusion     CAD (coronary artery disease) 7/10/2012    Cancer     skin    Cataract     removed from both    Clotting disorder     Coronary artery disease     Depression     HTN (hypertension) 7/10/2012    Hx-TIA (transient ischemic attack) 7/10/2012    Hyperlipidemia     Hypertension     Myocardial infarction     Renal calculus, left 8/18/2017    S/P CABG (coronary artery bypass graft) 1/8/2013    Stroke 2/12    TIA    Urinary tract infection      Past Surgical History:   Procedure Laterality Date    CATARACT EXTRACTION W/  INTRAOCULAR LENS IMPLANT Right 03/27/2008    CATARACT EXTRACTION W/  INTRAOCULAR LENS IMPLANT Left 12/06/2007    CORONARY ARTERY BYPASS GRAFT      triple bypass       Family History   Problem Relation Age of Onset    Heart attack Father     Heart disease Father     Stroke Father     Alcohol abuse Father     Stroke Mother     Dementia Mother     Hypertension Mother     Diabetes Mother     Melanoma Neg Hx     Psoriasis Neg Hx     Lupus Neg Hx     Eczema Neg Hx     Acne Neg Hx     Prostate cancer Neg Hx     Kidney disease Neg Hx      Social History     Socioeconomic History    Marital status:      Spouse name: Not on file    Number of children: Not on file    Years of education: Not on file    Highest education level: Not on file   Occupational History    Not on file   Social Needs    Financial resource strain: Not on file    Food insecurity:     Worry: Not on file     Inability: Not on file    Transportation needs:     Medical: Not on file     Non-medical: Not on file   Tobacco Use    Smoking status: Never Smoker    Smokeless tobacco: Never Used   Substance and Sexual  Activity    Alcohol use: No    Drug use: No    Sexual activity: Never   Lifestyle    Physical activity:     Days per week: Not on file     Minutes per session: Not on file    Stress: Not on file   Relationships    Social connections:     Talks on phone: Not on file     Gets together: Not on file     Attends Yarsanism service: Not on file     Active member of club or organization: Not on file     Attends meetings of clubs or organizations: Not on file     Relationship status: Not on file   Other Topics Concern    Patient feels they ought to cut down on drinking/drug use Not Asked    Patient annoyed by others criticizing their drinking/drug use Not Asked    Patient has felt bad or guilty about drinking/drug use Not Asked    Patient has had a drink/used drugs as an eye opener in the AM Not Asked   Social History Narrative    Not on file       Current Outpatient Medications   Medication Sig Dispense Refill    aspirin 81 mg Tab Take 1 tablet by mouth Daily.      atorvastatin (LIPITOR) 40 MG tablet Take 1 tablet (40 mg total) by mouth once daily. 90 tablet 3    buPROPion (WELLBUTRIN SR) 100 MG TBSR 12 hr tablet Take 1 tablet (100 mg total) by mouth 2 (two) times daily. 60 tablet 2    CARTIA  mg 24 hr capsule Take 1 capsule (180 mg total) by mouth once daily. 90 capsule 3    carvedilol (COREG) 12.5 MG tablet Take 1 tablet (12.5 mg total) by mouth 2 (two) times daily with meals. 180 tablet 3    fluticasone (FLONASE) 50 mcg/actuation nasal spray 2 sprays (100 mcg total) by Each Nare route once daily. 16 g 5    mirtazapine (REMERON) 30 MG tablet Take 1 tablet (30 mg total) by mouth every evening. 30 tablet 0    olmesartan (BENICAR) 20 MG tablet Take 1 tablet (20 mg total) by mouth once daily. 90 tablet 0    phenazopyridine (PYRIDIUM) 100 MG tablet       warfarin (COUMADIN) 5 MG tablet Take 5 mg by mouth once daily.      ciprofloxacin HCl (CIPRO) 500 MG tablet Take 1 tablet (500 mg total) by mouth 2  "(two) times daily. for 5 days 10 tablet 0     No current facility-administered medications for this visit.      Review of patient's allergies indicates:   Allergen Reactions    No known drug allergies        Review of Systems   Constitutional: Positive for activity change, appetite change and fatigue.   HENT: Negative.    Eyes: Negative.    Respiratory: Negative.    Cardiovascular: Negative.    Gastrointestinal: Negative.    Endocrine: Negative.    Genitourinary: Positive for decreased urine volume, difficulty urinating and frequency.   Musculoskeletal: Positive for arthralgias, back pain, gait problem and myalgias.   Skin: Negative.    Allergic/Immunologic: Negative.    Neurological: Positive for weakness.   Hematological: Negative.    Psychiatric/Behavioral: Negative.        Objective:      Vitals:    01/14/20 0841   BP: 120/62   Pulse: 74   Resp: 17   Temp: 98 °F (36.7 °C)   SpO2: 97%   Weight: 78.9 kg (174 lb)   Height: 5' 9" (1.753 m)     Physical Exam   Constitutional: He is oriented to person, place, and time. He appears well-developed and well-nourished.   HENT:   Head: Normocephalic and atraumatic.   Eyes: Pupils are equal, round, and reactive to light. Conjunctivae and EOM are normal.   Neck: Normal range of motion. Neck supple.   Cardiovascular: Normal rate and regular rhythm.   Pulmonary/Chest: Effort normal and breath sounds normal.   Abdominal: Soft. Bowel sounds are normal.   Genitourinary: Rectum normal and penis normal.   Genitourinary Comments: Prostate enlarged   Musculoskeletal: Normal range of motion.   Neurological: He is alert and oriented to person, place, and time.   Skin: Skin is warm and dry.   Psychiatric: He has a normal mood and affect.          Assessment:       1. Benign prostatic hyperplasia with nocturia    2. Acute cystitis without hematuria    3. Recurrent UTI    4. Urge urinary incontinence    5. Urinary retention        Plan:       Patient Instructions   HOLD Coumadin and " Aspirin starting 1/15/20  Pre-op at hospital  Surgery scheduled 1/20/20

## 2020-01-23 ENCOUNTER — TELEPHONE (OUTPATIENT)
Dept: UROLOGY | Facility: CLINIC | Age: 80
End: 2020-01-23

## 2020-01-23 NOTE — TELEPHONE ENCOUNTER
----- Message from Gisel Lazar sent at 1/23/2020  2:35 PM CST -----  Contact: [facility])  Type:  Needs Medical Advice    Who Called: Sherly  Would the patient rather a call back or a response via [facility]? Call back   Best Call Back Number: [phone]  Additional Information: Wants to know should pt keep appt for tm even though he's coming to see you for a follow up in 3 wks

## 2020-02-14 ENCOUNTER — OFFICE VISIT (OUTPATIENT)
Dept: UROLOGY | Facility: CLINIC | Age: 80
End: 2020-02-14
Payer: MEDICARE

## 2020-02-14 VITALS
OXYGEN SATURATION: 98 % | RESPIRATION RATE: 18 BRPM | WEIGHT: 172 LBS | TEMPERATURE: 98 F | SYSTOLIC BLOOD PRESSURE: 122 MMHG | HEIGHT: 69 IN | BODY MASS INDEX: 25.48 KG/M2 | DIASTOLIC BLOOD PRESSURE: 74 MMHG | HEART RATE: 76 BPM

## 2020-02-14 DIAGNOSIS — N40.1 BENIGN PROSTATIC HYPERPLASIA WITH NOCTURIA: Primary | ICD-10-CM

## 2020-02-14 DIAGNOSIS — N39.0 RECURRENT UTI: ICD-10-CM

## 2020-02-14 DIAGNOSIS — R35.1 BENIGN PROSTATIC HYPERPLASIA WITH NOCTURIA: Primary | ICD-10-CM

## 2020-02-14 DIAGNOSIS — Z98.890 POST-OPERATIVE STATE: ICD-10-CM

## 2020-02-14 PROBLEM — R33.9 URINARY RETENTION: Status: RESOLVED | Noted: 2020-01-14 | Resolved: 2020-02-14

## 2020-02-14 PROCEDURE — 99024 POSTOP FOLLOW-UP VISIT: CPT | Mod: POP,,, | Performed by: UROLOGY

## 2020-02-14 PROCEDURE — 99024 PR POST-OP FOLLOW-UP VISIT: ICD-10-PCS | Mod: POP,,, | Performed by: UROLOGY

## 2020-02-14 PROCEDURE — 99999 PR PBB SHADOW E&M-EST. PATIENT-LVL III: CPT | Mod: PBBFAC,,, | Performed by: UROLOGY

## 2020-02-14 PROCEDURE — 99999 PR PBB SHADOW E&M-EST. PATIENT-LVL III: ICD-10-PCS | Mod: PBBFAC,,, | Performed by: UROLOGY

## 2020-02-14 PROCEDURE — 99213 OFFICE O/P EST LOW 20 MIN: CPT | Mod: PBBFAC,PO | Performed by: UROLOGY

## 2020-03-16 ENCOUNTER — PATIENT OUTREACH (OUTPATIENT)
Dept: ADMINISTRATIVE | Facility: OTHER | Age: 80
End: 2020-03-16

## 2020-03-16 NOTE — PROGRESS NOTES
Chart reviewed.   Immunizations: Triggered Imm Registry     Orders placed: n/a  Upcoming appts to satisfy FRANKIE topics: n/a

## 2020-09-27 ENCOUNTER — LAB VISIT (OUTPATIENT)
Dept: LAB | Facility: HOSPITAL | Age: 80
End: 2020-09-27
Attending: HOSPITALIST
Payer: MEDICARE

## 2020-09-27 DIAGNOSIS — R30.0 DYSURIA: Primary | ICD-10-CM

## 2020-09-27 LAB
BACTERIA #/AREA URNS HPF: ABNORMAL /HPF
BILIRUB UR QL STRIP: NEGATIVE
CLARITY UR: ABNORMAL
COLOR UR: YELLOW
GLUCOSE UR QL STRIP: NEGATIVE
HGB UR QL STRIP: ABNORMAL
HYALINE CASTS #/AREA URNS LPF: 0 /LPF
KETONES UR QL STRIP: NEGATIVE
LEUKOCYTE ESTERASE UR QL STRIP: ABNORMAL
MICROSCOPIC COMMENT: ABNORMAL
NITRITE UR QL STRIP: NEGATIVE
PH UR STRIP: 7 [PH] (ref 5–8)
PROT UR QL STRIP: ABNORMAL
RBC #/AREA URNS HPF: 20 /HPF (ref 0–4)
SP GR UR STRIP: 1.02 (ref 1–1.03)
SQUAMOUS #/AREA URNS HPF: 1 /HPF
URN SPEC COLLECT METH UR: ABNORMAL
UROBILINOGEN UR STRIP-ACNC: NEGATIVE EU/DL
WBC #/AREA URNS HPF: >100 /HPF (ref 0–5)

## 2020-09-27 PROCEDURE — 87088 URINE BACTERIA CULTURE: CPT

## 2020-09-27 PROCEDURE — 81000 URINALYSIS NONAUTO W/SCOPE: CPT

## 2020-09-27 PROCEDURE — 87086 URINE CULTURE/COLONY COUNT: CPT

## 2020-09-27 PROCEDURE — 87186 SC STD MICRODIL/AGAR DIL: CPT

## 2020-09-27 PROCEDURE — 87077 CULTURE AEROBIC IDENTIFY: CPT

## 2020-09-29 ENCOUNTER — LAB VISIT (OUTPATIENT)
Dept: LAB | Facility: HOSPITAL | Age: 80
End: 2020-09-29
Attending: HOSPITALIST
Payer: MEDICARE

## 2020-09-29 DIAGNOSIS — R53.83 FATIGUE: Primary | ICD-10-CM

## 2020-09-29 LAB
ALBUMIN SERPL BCP-MCNC: 3.3 G/DL (ref 3.5–5.2)
ALP SERPL-CCNC: 126 U/L (ref 55–135)
ALT SERPL W/O P-5'-P-CCNC: 34 U/L (ref 10–44)
ANION GAP SERPL CALC-SCNC: 10 MMOL/L (ref 8–16)
AST SERPL-CCNC: 24 U/L (ref 10–40)
BASOPHILS # BLD AUTO: 0.06 K/UL (ref 0–0.2)
BASOPHILS NFR BLD: 0.6 % (ref 0–1.9)
BILIRUB SERPL-MCNC: 0.7 MG/DL (ref 0.1–1)
BUN SERPL-MCNC: 22 MG/DL (ref 8–23)
CALCIUM SERPL-MCNC: 8.7 MG/DL (ref 8.7–10.5)
CHLORIDE SERPL-SCNC: 104 MMOL/L (ref 95–110)
CO2 SERPL-SCNC: 23 MMOL/L (ref 23–29)
CREAT SERPL-MCNC: 1.2 MG/DL (ref 0.5–1.4)
DIFFERENTIAL METHOD: ABNORMAL
EOSINOPHIL # BLD AUTO: 0.5 K/UL (ref 0–0.5)
EOSINOPHIL NFR BLD: 4.5 % (ref 0–8)
ERYTHROCYTE [DISTWIDTH] IN BLOOD BY AUTOMATED COUNT: 13.1 % (ref 11.5–14.5)
EST. GFR  (AFRICAN AMERICAN): >60 ML/MIN/1.73 M^2
EST. GFR  (NON AFRICAN AMERICAN): 57 ML/MIN/1.73 M^2
GLUCOSE SERPL-MCNC: 98 MG/DL (ref 70–110)
HCT VFR BLD AUTO: 40.5 % (ref 40–54)
HGB BLD-MCNC: 13.5 G/DL (ref 14–18)
IMM GRANULOCYTES # BLD AUTO: 0.04 K/UL (ref 0–0.04)
IMM GRANULOCYTES NFR BLD AUTO: 0.4 % (ref 0–0.5)
LYMPHOCYTES # BLD AUTO: 1 K/UL (ref 1–4.8)
LYMPHOCYTES NFR BLD: 9.2 % (ref 18–48)
MCH RBC QN AUTO: 32.6 PG (ref 27–31)
MCHC RBC AUTO-ENTMCNC: 33.3 G/DL (ref 32–36)
MCV RBC AUTO: 98 FL (ref 82–98)
MONOCYTES # BLD AUTO: 1.1 K/UL (ref 0.3–1)
MONOCYTES NFR BLD: 10 % (ref 4–15)
NEUTROPHILS # BLD AUTO: 8 K/UL (ref 1.8–7.7)
NEUTROPHILS NFR BLD: 75.3 % (ref 38–73)
NRBC BLD-RTO: 0 /100 WBC
PLATELET # BLD AUTO: 226 K/UL (ref 150–350)
PMV BLD AUTO: 10.7 FL (ref 9.2–12.9)
POTASSIUM SERPL-SCNC: 4.4 MMOL/L (ref 3.5–5.1)
PROT SERPL-MCNC: 7 G/DL (ref 6–8.4)
RBC # BLD AUTO: 4.14 M/UL (ref 4.6–6.2)
SODIUM SERPL-SCNC: 137 MMOL/L (ref 136–145)
TSH SERPL DL<=0.005 MIU/L-ACNC: 2.92 UIU/ML (ref 0.4–4)
WBC # BLD AUTO: 10.65 K/UL (ref 3.9–12.7)

## 2020-09-29 PROCEDURE — 85025 COMPLETE CBC W/AUTO DIFF WBC: CPT

## 2020-09-29 PROCEDURE — 84443 ASSAY THYROID STIM HORMONE: CPT

## 2020-09-29 PROCEDURE — 80053 COMPREHEN METABOLIC PANEL: CPT

## 2020-09-30 LAB — BACTERIA UR CULT: ABNORMAL

## 2020-10-15 ENCOUNTER — OFFICE VISIT (OUTPATIENT)
Dept: NEUROLOGY | Facility: CLINIC | Age: 80
End: 2020-10-15
Payer: MEDICARE

## 2020-10-15 VITALS
HEART RATE: 70 BPM | BODY MASS INDEX: 23.11 KG/M2 | HEIGHT: 69 IN | TEMPERATURE: 97 F | OXYGEN SATURATION: 99 % | WEIGHT: 156 LBS | RESPIRATION RATE: 16 BRPM

## 2020-10-15 DIAGNOSIS — I48.20 CHRONIC ATRIAL FIBRILLATION: ICD-10-CM

## 2020-10-15 DIAGNOSIS — R41.3 MEMORY LOSS: ICD-10-CM

## 2020-10-15 DIAGNOSIS — G21.4 VASCULAR PARKINSONISM: Primary | ICD-10-CM

## 2020-10-15 DIAGNOSIS — I69.30 LATE EFFECT OF CEREBROVASCULAR ACCIDENT (CVA): ICD-10-CM

## 2020-10-15 PROCEDURE — 99999 PR STA SHADOW: CPT | Mod: PBBFAC,,, | Performed by: PSYCHIATRY & NEUROLOGY

## 2020-10-15 PROCEDURE — 99204 OFFICE O/P NEW MOD 45 MIN: CPT | Mod: S$PBB | Performed by: PSYCHIATRY & NEUROLOGY

## 2020-10-15 PROCEDURE — 99999 PR STA SHADOW: ICD-10-PCS | Mod: PBBFAC,,, | Performed by: PSYCHIATRY & NEUROLOGY

## 2020-10-15 PROCEDURE — 99214 OFFICE O/P EST MOD 30 MIN: CPT | Mod: PBBFAC | Performed by: PSYCHIATRY & NEUROLOGY

## 2020-10-15 PROCEDURE — 99999 PR PBB SHADOW E&M-EST. PATIENT-LVL IV: CPT | Mod: PBBFAC,,, | Performed by: PSYCHIATRY & NEUROLOGY

## 2020-10-15 RX ORDER — MEMANTINE HYDROCHLORIDE 10 MG/1
10 TABLET ORAL 2 TIMES DAILY
COMMUNITY
Start: 2020-10-09

## 2020-10-15 RX ORDER — LORATADINE 10 MG/1
10 TABLET ORAL DAILY
COMMUNITY

## 2020-10-15 RX ORDER — APIXABAN 5 MG/1
5 TABLET, FILM COATED ORAL 2 TIMES DAILY
Status: ON HOLD | COMMUNITY
Start: 2020-10-08 | End: 2022-01-31 | Stop reason: SDUPTHER

## 2020-10-15 RX ORDER — DOCUSATE SODIUM 100 MG/1
100 CAPSULE, LIQUID FILLED ORAL DAILY
COMMUNITY

## 2020-10-15 RX ORDER — LOSARTAN POTASSIUM 50 MG/1
50 TABLET ORAL DAILY
Status: ON HOLD | COMMUNITY
Start: 2020-10-09 | End: 2022-01-31 | Stop reason: HOSPADM

## 2020-10-15 RX ORDER — CARVEDILOL 12.5 MG/1
12.5 TABLET ORAL 2 TIMES DAILY
Status: ON HOLD | COMMUNITY
Start: 2020-10-09 | End: 2022-01-27

## 2020-10-15 RX ORDER — DULOXETIN HYDROCHLORIDE 60 MG/1
60 CAPSULE, DELAYED RELEASE ORAL DAILY
COMMUNITY
Start: 2020-10-09

## 2020-10-15 RX ORDER — CARBIDOPA AND LEVODOPA 25; 100 MG/1; MG/1
TABLET ORAL
Qty: 60 TABLET | Refills: 11 | Status: SHIPPED | OUTPATIENT
Start: 2020-10-15 | End: 2021-03-04

## 2020-10-15 RX ORDER — POLYETHYLENE GLYCOL 3350 17 G/17G
POWDER, FOR SOLUTION ORAL DAILY
COMMUNITY
End: 2023-07-07

## 2020-10-15 RX ORDER — BUSPIRONE HYDROCHLORIDE 10 MG/1
10 TABLET ORAL 2 TIMES DAILY
COMMUNITY
Start: 2020-10-09

## 2020-10-15 RX ORDER — FLUOXETINE 10 MG/1
10 TABLET ORAL DAILY
COMMUNITY
Start: 2020-10-09 | End: 2023-07-07

## 2020-10-15 NOTE — LETTER
October 15, 2020      Timo Conti MD  [address] [place] [address] Dr [address]E [address]           [facility]  [address] [facility] [address]  Phone: [phone]  Fax: [phone]          Patient: Eliazar James   MR Number: [id number]   YOB: 1940   Date of Visit: 10/15/2020       Dear Dr. Timo Conti:    Thank you for referring Eliazar James to me for evaluation. Attached you will find relevant portions of my assessment and plan of care.    If you have questions, please do not hesitate to call me. I look forward to following Eliazar James along with you.    Sincerely,    Nikolas Gallegos MD    Enclosure  CC:  No Recipients    If you would like to receive this communication electronically, please contact [email or web address] or [phone] to request more information on [facility] access.    For providers and/or their staff who would like to refer a patient to [facility], please contact us through our one-stop-shop provider referral line, [facility], at [phone].    If you feel you have received this communication in error or would no longer like to receive these types of communications, please e-mail [email or web address]

## 2020-10-15 NOTE — PROGRESS NOTES
Consult from Dr Conti    HPI: Eliazar James is a 79 y.o. male  Here for evaluation of Parkinsonism: Primary symptoms include bilateral hand tremor.     He is a nursing home resident and states he has lived there due to difficulty with his self care in the past few years  Aide today states he has shaking with drinking, writing    He does not walk on his on.  This has been worsening over time due to falls  He admits to difficulty dragging feet.  No drooling and denies rigidity but admits to forward bent posture.     No difficulty with swallowing     Note the patient saw neurology last year for the same gait complaint.  This was thought to be related to his meds    He has a long history of depresssion. He has been hospitalized for this since [place]       He has a history of TIA prior to use of coumadin for afib now on NOAC    On Namenda for memory but he denies memory problems.     His wife is  and he reports having 2 children.     Review of Systems   Constitutional: Negative for fever.   HENT: Negative for nosebleeds.    Eyes: Negative for double vision.   Respiratory: Negative for hemoptysis.    Cardiovascular: Negative for leg swelling.   Gastrointestinal: Negative for blood in stool.   Genitourinary: Negative for hematuria.   Musculoskeletal: Positive for falls.   Skin: Negative for rash.   Neurological: Positive for tremors.   Psychiatric/Behavioral: Negative for depression and memory loss.        Reports good control of depression         I have reviewed all of this patient's past medical and surgical histories as well as family and social histories and active allergies and medications as documented in the electronic medical record.        Exam:  Gen Appearance, well developed/nourished in no apparent distress  CV: 2+ distal pulses with no edema or swelling  Neuro:  MS: Awake, alert, oriented to place, person, time, situation. Sustains attention. Recent recent recall 2/3 at 3 minutes and 1/3 at  10 minutes /remote memory intact, Language is full to spontaneous speech/repetition/naming/comprehension. Fund of Knowledge is full  CN: Optic discs are flat with normal vasculature, PERRL, Extraoccular movements and visual fields are full. Normal facial sensation and strength, Hearing symmetric, Tongue and Palate are midline and strong. Shoulder Shrug symmetric and strong.  Motor: Normal bulk, tone mildly increased in the legs, tremor noted at rest and with action, moderately severe 5/5 strength bilateral upper/lower extremities with 2+ reflexes and bilateral plantar response  Mild bradykinesia  Sensory: symmetric to light touch, pain, temp, and vibration/proprioception. Romberg negative  Cerebellar: Finger-nose,Heal-shin, Rapid alternating movements intact  Gait: Grave difficulty with rise from chair, Can't propel self forward well. Impaired postural reflexes    Imaging: CT head 2019: Chronic changes are seen intracranially from volume loss and small vessel ischemic changes with small remote lacunar infarcts, unchanged.     Interval development of paranasal sinus disease, worse on the right side, as above.  Correlate with any history of sinus disease.       MRI 2019 brain: No acute intracranial process.     Changes of old lacunar type infarctions in the supratentorial and infratentorial brain.     Changes of marked cerebral volume loss.        Assessment/Plan: Eliazar James is a 79 y.o. male  With prior chronic lacunar infarcts by imaging (history of afib noted) with tremor, difficulty with walking developing over more recent years  Exam shows signs of parkinsonism, possibly vascular in nature  I recommend:   1. Thought to have some contribution of symptoms related to chronic depression meds prior but changing meds have not clearly benefited him. Denies use of antipsychotics   2. Discussed Vascular PD may not always respond to treatment but his symptoms are bothersome, so we will try sinemet per orders unless  side effects  -Order PT/OT at the NH for impaired gait, ADLs  3. Note: CT head 2019/ MRI brain  showed stable remote lacunar infarcts  -He has a history of TIA prior to use of coumadin for afib, now on NOAC  4. On Namenda for memory with mild memory loss noted today. Resides in the NH currently. Monitor    RTC 4 months    CC:   Dr Conti

## 2020-10-20 ENCOUNTER — HOSPITAL ENCOUNTER (EMERGENCY)
Facility: HOSPITAL | Age: 80
Discharge: HOME | End: 2020-10-20
Attending: SURGERY
Payer: MEDICARE

## 2020-10-20 VITALS
HEART RATE: 64 BPM | SYSTOLIC BLOOD PRESSURE: 131 MMHG | WEIGHT: 157 LBS | BODY MASS INDEX: 23.25 KG/M2 | DIASTOLIC BLOOD PRESSURE: 72 MMHG | HEIGHT: 69 IN | TEMPERATURE: 97 F | RESPIRATION RATE: 16 BRPM

## 2020-10-20 DIAGNOSIS — N39.0 URINARY TRACT INFECTION ASSOCIATED WITH CATHETERIZATION OF URINARY TRACT, UNSPECIFIED INDWELLING URINARY CATHETER TYPE, SUBSEQUENT ENCOUNTER: Primary | ICD-10-CM

## 2020-10-20 DIAGNOSIS — T83.511D URINARY TRACT INFECTION ASSOCIATED WITH CATHETERIZATION OF URINARY TRACT, UNSPECIFIED INDWELLING URINARY CATHETER TYPE, SUBSEQUENT ENCOUNTER: Primary | ICD-10-CM

## 2020-10-20 LAB
ALBUMIN SERPL BCP-MCNC: 3.2 G/DL (ref 3.5–5.2)
ALP SERPL-CCNC: 111 U/L (ref 55–135)
ALT SERPL W/O P-5'-P-CCNC: 10 U/L (ref 10–44)
ANION GAP SERPL CALC-SCNC: 8 MMOL/L (ref 8–16)
AST SERPL-CCNC: 15 U/L (ref 10–40)
BACTERIA #/AREA URNS HPF: ABNORMAL /HPF
BASOPHILS # BLD AUTO: 0.03 K/UL (ref 0–0.2)
BASOPHILS NFR BLD: 0.4 % (ref 0–1.9)
BILIRUB SERPL-MCNC: 0.4 MG/DL (ref 0.1–1)
BILIRUB UR QL STRIP: NEGATIVE
BUN SERPL-MCNC: 25 MG/DL (ref 8–23)
CALCIUM SERPL-MCNC: 8.9 MG/DL (ref 8.7–10.5)
CHLORIDE SERPL-SCNC: 104 MMOL/L (ref 95–110)
CLARITY UR: ABNORMAL
CO2 SERPL-SCNC: 27 MMOL/L (ref 23–29)
COLOR UR: YELLOW
CREAT SERPL-MCNC: 1.3 MG/DL (ref 0.5–1.4)
DIFFERENTIAL METHOD: ABNORMAL
EOSINOPHIL # BLD AUTO: 0.3 K/UL (ref 0–0.5)
EOSINOPHIL NFR BLD: 4.2 % (ref 0–8)
ERYTHROCYTE [DISTWIDTH] IN BLOOD BY AUTOMATED COUNT: 13.9 % (ref 11.5–14.5)
EST. GFR  (AFRICAN AMERICAN): 60 ML/MIN/1.73 M^2
EST. GFR  (NON AFRICAN AMERICAN): 52 ML/MIN/1.73 M^2
GLUCOSE SERPL-MCNC: 100 MG/DL (ref 70–110)
GLUCOSE UR QL STRIP: NEGATIVE
HCT VFR BLD AUTO: 41.8 % (ref 40–54)
HGB BLD-MCNC: 13.7 G/DL (ref 14–18)
HGB UR QL STRIP: ABNORMAL
HYALINE CASTS #/AREA URNS LPF: 0 /LPF
IMM GRANULOCYTES # BLD AUTO: 0.02 K/UL (ref 0–0.04)
IMM GRANULOCYTES NFR BLD AUTO: 0.3 % (ref 0–0.5)
KETONES UR QL STRIP: NEGATIVE
LEUKOCYTE ESTERASE UR QL STRIP: ABNORMAL
LYMPHOCYTES # BLD AUTO: 1.1 K/UL (ref 1–4.8)
LYMPHOCYTES NFR BLD: 13.9 % (ref 18–48)
MCH RBC QN AUTO: 32.9 PG (ref 27–31)
MCHC RBC AUTO-ENTMCNC: 32.8 G/DL (ref 32–36)
MCV RBC AUTO: 100 FL (ref 82–98)
MICROSCOPIC COMMENT: ABNORMAL
MONOCYTES # BLD AUTO: 0.9 K/UL (ref 0.3–1)
MONOCYTES NFR BLD: 11.9 % (ref 4–15)
NEUTROPHILS # BLD AUTO: 5.3 K/UL (ref 1.8–7.7)
NEUTROPHILS NFR BLD: 69.3 % (ref 38–73)
NITRITE UR QL STRIP: POSITIVE
NRBC BLD-RTO: 0 /100 WBC
PH UR STRIP: >8 [PH] (ref 5–8)
PLATELET # BLD AUTO: 215 K/UL (ref 150–350)
PMV BLD AUTO: 10.1 FL (ref 9.2–12.9)
POTASSIUM SERPL-SCNC: 4.2 MMOL/L (ref 3.5–5.1)
PROT SERPL-MCNC: 7.1 G/DL (ref 6–8.4)
PROT UR QL STRIP: ABNORMAL
RBC # BLD AUTO: 4.17 M/UL (ref 4.6–6.2)
RBC #/AREA URNS HPF: 30 /HPF (ref 0–4)
SODIUM SERPL-SCNC: 139 MMOL/L (ref 136–145)
SP GR UR STRIP: 1.02 (ref 1–1.03)
SQUAMOUS #/AREA URNS HPF: 6 /HPF
URN SPEC COLLECT METH UR: ABNORMAL
UROBILINOGEN UR STRIP-ACNC: NEGATIVE EU/DL
WBC # BLD AUTO: 7.64 K/UL (ref 3.9–12.7)
WBC #/AREA URNS HPF: >100 /HPF (ref 0–5)

## 2020-10-20 PROCEDURE — 63600175 PHARM REV CODE 636 W HCPCS: Performed by: SURGERY

## 2020-10-20 PROCEDURE — 81000 URINALYSIS NONAUTO W/SCOPE: CPT

## 2020-10-20 PROCEDURE — 99284 EMERGENCY DEPT VISIT MOD MDM: CPT | Mod: 25

## 2020-10-20 PROCEDURE — 96366 THER/PROPH/DIAG IV INF ADDON: CPT

## 2020-10-20 PROCEDURE — 80053 COMPREHEN METABOLIC PANEL: CPT

## 2020-10-20 PROCEDURE — 25000003 PHARM REV CODE 250: Performed by: SURGERY

## 2020-10-20 PROCEDURE — 87040 BLOOD CULTURE FOR BACTERIA: CPT

## 2020-10-20 PROCEDURE — 85025 COMPLETE CBC W/AUTO DIFF WBC: CPT

## 2020-10-20 PROCEDURE — 96365 THER/PROPH/DIAG IV INF INIT: CPT

## 2020-10-20 RX ORDER — VANCOMYCIN HCL IN 5 % DEXTROSE 1G/250ML
1000 PLASTIC BAG, INJECTION (ML) INTRAVENOUS
Status: COMPLETED | OUTPATIENT
Start: 2020-10-20 | End: 2020-10-20

## 2020-10-20 RX ORDER — DOXYCYCLINE 100 MG/1
100 CAPSULE ORAL 2 TIMES DAILY
Qty: 20 CAPSULE | Refills: 0 | Status: SHIPPED | OUTPATIENT
Start: 2020-10-20 | End: 2020-10-30

## 2020-10-20 RX ADMIN — VANCOMYCIN HYDROCHLORIDE 1000 MG: 1 INJECTION, POWDER, LYOPHILIZED, FOR SOLUTION INTRAVENOUS at 03:10

## 2020-10-20 NOTE — ED PROVIDER NOTES
"Encounter Date: 10/20/2020       History     Chief Complaint   Patient presents with    Urinary Tract Infection     79 yr old male to ED per [facility] from [facility]. Nursing home reports "is on Bactrim, but UTI is not getting better." Patient sent to ED for IV antibiotics per .     Patient is 79-year-old white male with recurrent urinary tract infections.  Most recently he has been treated with Bactrim for MRSA culture proven urinary tract infection from 2 weeks ago.  His repeat urinalysis today still showed positive leukocytes bacteria in the urine, indicating only partial treatment of his current urinary tract infection.  He was sent in by primary care specifically for some IV antibiotic therapy.  Checking his most recent culture results shows that he is sensitive to vancomycin IV, and is also sensitive to doxycycline orally.        Review of patient's allergies indicates:   Allergen Reactions    No known drug allergies      Past Medical History:   Diagnosis Date    *Atrial fibrillation     Anxiety     Atrial fibrillation 7/5/2012    Blood transfusion     CAD (coronary artery disease) 7/10/2012    Cancer     skin    Cataract     removed from both    Clotting disorder     Coronary artery disease     Depression     HTN (hypertension) 7/10/2012    Hx-TIA (transient ischemic attack) 7/10/2012    Hyperlipidemia     Hypertension     Myocardial infarction     Renal calculus, left 8/18/2017    S/P CABG (coronary artery bypass graft) 1/8/2013    Stroke 2/12    TIA    Urinary tract infection      Past Surgical History:   Procedure Laterality Date    CATARACT EXTRACTION W/  INTRAOCULAR LENS IMPLANT Right 03/27/2008    CATARACT EXTRACTION W/  INTRAOCULAR LENS IMPLANT Left 12/06/2007    CORONARY ARTERY BYPASS GRAFT      DILATION OF URETHRA N/A 1/20/2020    Procedure: DILATION, URETHRA;  Surgeon: Ge Ramon MD;  [facility];  Service: Urology;  Laterality: N/A;    TRANSURETHRAL " RESECTION OF PROSTATE N/A 1/20/2020    Procedure: TURP (TRANSURETHRAL RESECTION OF PROSTATE);  Surgeon: Ge Ramon MD;  Location: [facility];  Service: Urology;  Laterality: N/A;    triple bypass       Family History   Problem Relation Age of Onset    Heart attack Father     Heart disease Father     Stroke Father     Alcohol abuse Father     Stroke Mother     Dementia Mother     Hypertension Mother     Diabetes Mother     Melanoma Neg Hx     Psoriasis Neg Hx     Lupus Neg Hx     Eczema Neg Hx     Acne Neg Hx     Prostate cancer Neg Hx     Kidney disease Neg Hx      Social History     Tobacco Use    Smoking status: Never Smoker    Smokeless tobacco: Never Used   Substance Use Topics    Alcohol use: No    Drug use: No     Review of Systems   Constitutional: Negative for fever.   HENT: Negative for sore throat.    Respiratory: Negative for shortness of breath.    Cardiovascular: Negative for chest pain.   Gastrointestinal: Negative for nausea.   Genitourinary: Positive for dysuria and urgency.   Musculoskeletal: Negative for back pain.   Skin: Negative for rash.   Neurological: Negative for weakness.   Hematological: Does not bruise/bleed easily.       Physical Exam     Initial Vitals [10/20/20 1403]   BP Pulse Resp Temp SpO2   121/74 (!) 58 18 97 °F (36.1 °C) --      MAP       --         Physical Exam    Nursing note and vitals reviewed.  Constitutional: He appears well-developed and well-nourished.   HENT:   Head: Normocephalic and atraumatic.   Eyes: EOM are normal. Pupils are equal, round, and reactive to light.   Neck: Normal range of motion. Neck supple.   Cardiovascular: Normal rate.   Pulmonary/Chest: Breath sounds normal. No respiratory distress. He has no wheezes.   Abdominal: Soft. He exhibits no distension. There is no abdominal tenderness.   Musculoskeletal: Normal range of motion.   Neurological: He is alert and oriented to person, place, and time. GCS score is 15. GCS eye subscore  is 4. GCS verbal subscore is 5. GCS motor subscore is 6.   Skin: Skin is warm and dry.   Psychiatric: He has a normal mood and affect. Thought content normal.         ED Course   Procedures  Labs Reviewed   URINALYSIS - Abnormal; Notable for the following components:       Result Value    Appearance, UA Hazy (*)     Protein, UA 3+ (*)     Occult Blood UA 1+ (*)     Nitrite, UA Positive (*)     Leukocytes, UA 3+ (*)     All other components within normal limits   URINALYSIS MICROSCOPIC - Abnormal; Notable for the following components:    RBC, UA 30 (*)     WBC, UA >100 (*)     Bacteria Many (*)     All other components within normal limits   CBC W/ AUTO DIFFERENTIAL - Abnormal; Notable for the following components:    RBC 4.17 (*)     Hemoglobin 13.7 (*)     Mean Corpuscular Volume 100 (*)     Mean Corpuscular Hemoglobin 32.9 (*)     Lymph% 13.9 (*)     All other components within normal limits   COMPREHENSIVE METABOLIC PANEL - Abnormal; Notable for the following components:    BUN, Bld 25 (*)     Albumin 3.2 (*)     eGFR if non  52 (*)     All other components within normal limits   CULTURE, BLOOD          Imaging Results    None           WBC Normal, Urine culture shows sensitivity to Doxycycline and Vancomycin.                           Clinical Impression:     ICD-10-CM ICD-9-CM   1. Urinary tract infection associated with catheterization of urinary tract, unspecified indwelling urinary catheter type, subsequent encounter  T83.511D V58.89    N39.0 996.64     599.0                      Disposition:   Disposition: Discharged  Condition: Stable                          Harley Dale Jr., MD  10/20/20 9843

## 2020-10-25 LAB — BACTERIA BLD CULT: NORMAL

## 2020-12-05 ENCOUNTER — APPOINTMENT (OUTPATIENT)
Dept: LAB | Facility: HOSPITAL | Age: 80
End: 2020-12-05
Attending: HOSPITALIST
Payer: MEDICARE

## 2020-12-05 DIAGNOSIS — N39.0 UTI (URINARY TRACT INFECTION): Primary | ICD-10-CM

## 2020-12-05 LAB
BACTERIA #/AREA URNS HPF: ABNORMAL /HPF
BILIRUB UR QL STRIP: NEGATIVE
CLARITY UR: ABNORMAL
COLOR UR: YELLOW
GLUCOSE UR QL STRIP: NEGATIVE
HGB UR QL STRIP: ABNORMAL
HYALINE CASTS #/AREA URNS LPF: 0 /LPF
KETONES UR QL STRIP: NEGATIVE
LEUKOCYTE ESTERASE UR QL STRIP: ABNORMAL
MICROSCOPIC COMMENT: ABNORMAL
NITRITE UR QL STRIP: POSITIVE
PH UR STRIP: 7 [PH] (ref 5–8)
PROT UR QL STRIP: ABNORMAL
RBC #/AREA URNS HPF: 0 /HPF (ref 0–4)
SP GR UR STRIP: 1.02 (ref 1–1.03)
URN SPEC COLLECT METH UR: ABNORMAL
UROBILINOGEN UR STRIP-ACNC: NEGATIVE EU/DL
WBC #/AREA URNS HPF: >100 /HPF (ref 0–5)

## 2020-12-05 PROCEDURE — 87088 URINE BACTERIA CULTURE: CPT

## 2020-12-05 PROCEDURE — 87086 URINE CULTURE/COLONY COUNT: CPT

## 2020-12-05 PROCEDURE — 87077 CULTURE AEROBIC IDENTIFY: CPT

## 2020-12-05 PROCEDURE — 87186 SC STD MICRODIL/AGAR DIL: CPT

## 2020-12-05 PROCEDURE — 81000 URINALYSIS NONAUTO W/SCOPE: CPT

## 2020-12-09 LAB — BACTERIA UR CULT: ABNORMAL

## 2021-01-15 ENCOUNTER — LAB VISIT (OUTPATIENT)
Dept: LAB | Facility: HOSPITAL | Age: 81
End: 2021-01-15
Attending: HOSPITALIST
Payer: MEDICARE

## 2021-01-15 DIAGNOSIS — N39.0 UTI (URINARY TRACT INFECTION): Primary | ICD-10-CM

## 2021-01-15 LAB
BACTERIA #/AREA URNS HPF: ABNORMAL /HPF
BILIRUB UR QL STRIP: NEGATIVE
CLARITY UR: CLEAR
COLOR UR: YELLOW
GLUCOSE UR QL STRIP: NEGATIVE
HGB UR QL STRIP: ABNORMAL
HYALINE CASTS #/AREA URNS LPF: 0 /LPF
KETONES UR QL STRIP: NEGATIVE
LEUKOCYTE ESTERASE UR QL STRIP: ABNORMAL
MICROSCOPIC COMMENT: ABNORMAL
NITRITE UR QL STRIP: NEGATIVE
PH UR STRIP: 6 [PH] (ref 5–8)
PROT UR QL STRIP: ABNORMAL
RBC #/AREA URNS HPF: 75 /HPF (ref 0–4)
SP GR UR STRIP: >=1.03 (ref 1–1.03)
SQUAMOUS #/AREA URNS HPF: 8 /HPF
URN SPEC COLLECT METH UR: ABNORMAL
UROBILINOGEN UR STRIP-ACNC: NEGATIVE EU/DL
WBC #/AREA URNS HPF: >100 /HPF (ref 0–5)

## 2021-01-15 PROCEDURE — 87086 URINE CULTURE/COLONY COUNT: CPT

## 2021-01-15 PROCEDURE — 81000 URINALYSIS NONAUTO W/SCOPE: CPT

## 2021-01-17 LAB — BACTERIA UR CULT: NO GROWTH

## 2021-03-04 ENCOUNTER — OFFICE VISIT (OUTPATIENT)
Dept: NEUROLOGY | Facility: CLINIC | Age: 81
End: 2021-03-04
Payer: MEDICARE

## 2021-03-04 VITALS
TEMPERATURE: 99 F | HEART RATE: 74 BPM | SYSTOLIC BLOOD PRESSURE: 124 MMHG | DIASTOLIC BLOOD PRESSURE: 68 MMHG | HEIGHT: 69 IN | RESPIRATION RATE: 18 BRPM | BODY MASS INDEX: 24.5 KG/M2 | WEIGHT: 165.38 LBS

## 2021-03-04 DIAGNOSIS — I69.30 LATE EFFECT OF CEREBROVASCULAR ACCIDENT (CVA): ICD-10-CM

## 2021-03-04 DIAGNOSIS — G21.4 VASCULAR PARKINSONISM: Primary | ICD-10-CM

## 2021-03-04 DIAGNOSIS — I48.20 CHRONIC ATRIAL FIBRILLATION: ICD-10-CM

## 2021-03-04 PROCEDURE — 99999 PR STA SHADOW: CPT | Mod: PBBFAC,,, | Performed by: PSYCHIATRY & NEUROLOGY

## 2021-03-04 PROCEDURE — 99999 PR PBB SHADOW E&M-EST. PATIENT-LVL V: CPT | Mod: PBBFAC,,, | Performed by: PSYCHIATRY & NEUROLOGY

## 2021-03-04 PROCEDURE — 99214 OFFICE O/P EST MOD 30 MIN: CPT | Mod: S$PBB | Performed by: PSYCHIATRY & NEUROLOGY

## 2021-03-04 PROCEDURE — 99999 PR STA SHADOW: ICD-10-PCS | Mod: PBBFAC,,, | Performed by: PSYCHIATRY & NEUROLOGY

## 2021-03-04 PROCEDURE — 99215 OFFICE O/P EST HI 40 MIN: CPT | Mod: PBBFAC | Performed by: PSYCHIATRY & NEUROLOGY

## 2021-03-04 RX ORDER — AMOXICILLIN AND CLAVULANATE POTASSIUM 500; 125 MG/1; MG/1
TABLET, FILM COATED ORAL
COMMUNITY
Start: 2021-01-06 | End: 2021-07-13 | Stop reason: ALTCHOICE

## 2021-03-04 RX ORDER — CARBIDOPA AND LEVODOPA 25; 100 MG/1; MG/1
1 TABLET ORAL 3 TIMES DAILY
Qty: 90 TABLET | Refills: 11 | Status: SHIPPED | OUTPATIENT
Start: 2021-03-04 | End: 2021-07-13 | Stop reason: SDUPTHER

## 2021-03-04 RX ORDER — GRANULES FOR ORAL 3 G/1
POWDER ORAL
Status: ON HOLD | COMMUNITY
Start: 2021-01-11 | End: 2022-01-31 | Stop reason: HOSPADM

## 2021-03-04 RX ORDER — ASCORBIC ACID 500 MG
1000 TABLET ORAL DAILY
COMMUNITY

## 2021-03-04 RX ORDER — METHENAMINE HIPPURATE 1000 MG/1
1 TABLET ORAL 2 TIMES DAILY
COMMUNITY
Start: 2021-03-03

## 2021-03-04 RX ORDER — TAMSULOSIN HYDROCHLORIDE 0.4 MG/1
0.4 CAPSULE ORAL NIGHTLY
COMMUNITY
Start: 2021-01-06

## 2021-03-04 RX ORDER — LACTULOSE 10 G/15ML
SOLUTION ORAL; RECTAL
Status: ON HOLD | COMMUNITY
Start: 2021-02-01 | End: 2022-01-31 | Stop reason: HOSPADM

## 2021-03-26 ENCOUNTER — LAB VISIT (OUTPATIENT)
Dept: LAB | Facility: HOSPITAL | Age: 81
End: 2021-03-26
Attending: HOSPITALIST
Payer: MEDICARE

## 2021-03-26 DIAGNOSIS — N39.0 UTI (URINARY TRACT INFECTION): Primary | ICD-10-CM

## 2021-03-26 LAB
BACTERIA #/AREA URNS HPF: ABNORMAL /HPF
BILIRUB UR QL STRIP: NEGATIVE
CLARITY UR: CLEAR
COLOR UR: YELLOW
GLUCOSE UR QL STRIP: NEGATIVE
HGB UR QL STRIP: ABNORMAL
KETONES UR QL STRIP: NEGATIVE
LEUKOCYTE ESTERASE UR QL STRIP: ABNORMAL
MICROSCOPIC COMMENT: ABNORMAL
NITRITE UR QL STRIP: NEGATIVE
PH UR STRIP: 6 [PH] (ref 5–8)
PROT UR QL STRIP: NEGATIVE
RBC #/AREA URNS HPF: 15 /HPF (ref 0–4)
SP GR UR STRIP: >=1.03 (ref 1–1.03)
SQUAMOUS #/AREA URNS HPF: 10 /HPF
URN SPEC COLLECT METH UR: ABNORMAL
UROBILINOGEN UR STRIP-ACNC: NEGATIVE EU/DL
WBC #/AREA URNS HPF: 30 /HPF (ref 0–5)

## 2021-03-26 PROCEDURE — 87086 URINE CULTURE/COLONY COUNT: CPT

## 2021-03-26 PROCEDURE — 81000 URINALYSIS NONAUTO W/SCOPE: CPT

## 2021-03-27 LAB — BACTERIA UR CULT: NO GROWTH

## 2021-06-03 NOTE — TELEPHONE ENCOUNTER
----- Message from Eryn Paul sent at 6/25/2019  3:00 PM CDT -----  Contact: [phone] option 4 QVC 2089 Sadia with [facility]  Cesar with [facility] would like to speak with about pt's home health orders. Please advise  
Spoke with Jorge Luis, was asked if patient had a facility in mind and informed that when spoke to patient he wanted to go where there was an opening, he wanted the full 20 days because he states he wasn't doing well. Advised that we had [facility] and [facility] located in [facility], requested that we put [facility] on the medical necessity form and to put admit to skilled facility and refax. Sathish added requested information and refaxed form as per request. Stated that she would forward to nurse Akanksha who would be working on it and can be reached at ext [phone] for any concerns. Called patient to notify but no answer to phone, left message to call for follow up report.   
Yes

## 2021-07-13 ENCOUNTER — OFFICE VISIT (OUTPATIENT)
Dept: NEUROLOGY | Facility: CLINIC | Age: 81
End: 2021-07-13
Payer: MEDICARE

## 2021-07-13 VITALS — DIASTOLIC BLOOD PRESSURE: 62 MMHG | SYSTOLIC BLOOD PRESSURE: 126 MMHG | OXYGEN SATURATION: 96 % | HEART RATE: 63 BPM

## 2021-07-13 DIAGNOSIS — R41.82 ALTERED MENTAL STATUS, UNSPECIFIED ALTERED MENTAL STATUS TYPE: Primary | ICD-10-CM

## 2021-07-13 DIAGNOSIS — G20.C PARKINSONISM, UNSPECIFIED PARKINSONISM TYPE: ICD-10-CM

## 2021-07-13 DIAGNOSIS — N39.0 RECURRENT UTI: ICD-10-CM

## 2021-07-13 DIAGNOSIS — Z86.73 HISTORY OF CVA (CEREBROVASCULAR ACCIDENT): ICD-10-CM

## 2021-07-13 DIAGNOSIS — G31.84 MILD COGNITIVE IMPAIRMENT: ICD-10-CM

## 2021-07-13 PROCEDURE — 99999 PR STA SHADOW: CPT | Mod: PBBFAC,,, | Performed by: NURSE PRACTITIONER

## 2021-07-13 PROCEDURE — 99999 PR STA SHADOW: ICD-10-PCS | Mod: PBBFAC,,, | Performed by: NURSE PRACTITIONER

## 2021-07-13 PROCEDURE — 99999 PR PBB SHADOW E&M-EST. PATIENT-LVL III: CPT | Mod: PBBFAC,,, | Performed by: NURSE PRACTITIONER

## 2021-07-13 PROCEDURE — 99213 OFFICE O/P EST LOW 20 MIN: CPT | Mod: PBBFAC | Performed by: NURSE PRACTITIONER

## 2021-07-13 PROCEDURE — 99214 OFFICE O/P EST MOD 30 MIN: CPT | Mod: S$PBB | Performed by: NURSE PRACTITIONER

## 2021-07-13 RX ORDER — CARBIDOPA AND LEVODOPA 25; 100 MG/1; MG/1
1 TABLET ORAL 3 TIMES DAILY
Qty: 270 TABLET | Refills: 1 | Status: SHIPPED | OUTPATIENT
Start: 2021-07-13 | End: 2022-03-14 | Stop reason: SDUPTHER

## 2021-07-14 ENCOUNTER — LAB VISIT (OUTPATIENT)
Dept: LAB | Facility: HOSPITAL | Age: 81
End: 2021-07-14
Attending: HOSPITALIST
Payer: MEDICARE

## 2021-07-14 DIAGNOSIS — R30.0 DYSURIA: Primary | ICD-10-CM

## 2021-07-14 LAB
BILIRUB UR QL STRIP: NEGATIVE
CLARITY UR: CLEAR
COLOR UR: YELLOW
GLUCOSE UR QL STRIP: NEGATIVE
HGB UR QL STRIP: NEGATIVE
KETONES UR QL STRIP: NEGATIVE
LEUKOCYTE ESTERASE UR QL STRIP: NEGATIVE
NITRITE UR QL STRIP: NEGATIVE
PH UR STRIP: 6 [PH] (ref 5–8)
PROT UR QL STRIP: NEGATIVE
SP GR UR STRIP: 1.02 (ref 1–1.03)
URN SPEC COLLECT METH UR: NORMAL
UROBILINOGEN UR STRIP-ACNC: NEGATIVE EU/DL

## 2021-07-14 PROCEDURE — 81003 URINALYSIS AUTO W/O SCOPE: CPT | Performed by: HOSPITALIST

## 2021-07-14 PROCEDURE — 87086 URINE CULTURE/COLONY COUNT: CPT | Performed by: HOSPITALIST

## 2021-07-16 LAB
BACTERIA UR CULT: NORMAL
BACTERIA UR CULT: NORMAL

## 2022-01-05 ENCOUNTER — HOSPITAL ENCOUNTER (EMERGENCY)
Facility: HOSPITAL | Age: 82
Discharge: NURSING, REHAB OR LONG-TERM CARE | End: 2022-01-05
Attending: STUDENT IN AN ORGANIZED HEALTH CARE EDUCATION/TRAINING PROGRAM
Payer: MEDICARE

## 2022-01-05 VITALS
HEART RATE: 60 BPM | OXYGEN SATURATION: 94 % | SYSTOLIC BLOOD PRESSURE: 175 MMHG | DIASTOLIC BLOOD PRESSURE: 77 MMHG | RESPIRATION RATE: 18 BRPM

## 2022-01-05 DIAGNOSIS — R55 SYNCOPE: ICD-10-CM
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
## 2022-01-05 LAB
ALBUMIN SERPL BCP-MCNC: 3.3 G/DL (ref 3.5–5.2)
ALP SERPL-CCNC: 93 U/L (ref 55–135)
ALT SERPL W/O P-5'-P-CCNC: 16 U/L (ref 10–44)
ANION GAP SERPL CALC-SCNC: 9 MMOL/L (ref 8–16)
AST SERPL-CCNC: 13 U/L (ref 10–40)
BACTERIA #/AREA URNS HPF: ABNORMAL /HPF
BASOPHILS # BLD AUTO: 0.04 K/UL (ref 0–0.2)
BASOPHILS NFR BLD: 0.5 % (ref 0–1.9)
BILIRUB SERPL-MCNC: 0.7 MG/DL (ref 0.1–1)
BILIRUB UR QL STRIP: NEGATIVE
BUN SERPL-MCNC: 21 MG/DL (ref 8–23)
CALCIUM SERPL-MCNC: 8.9 MG/DL (ref 8.7–10.5)
CHLORIDE SERPL-SCNC: 103 MMOL/L (ref 95–110)
CLARITY UR: CLEAR
CO2 SERPL-SCNC: 28 MMOL/L (ref 23–29)
COLOR UR: YELLOW
CREAT SERPL-MCNC: 1.1 MG/DL (ref 0.5–1.4)
DIFFERENTIAL METHOD: ABNORMAL
EOSINOPHIL # BLD AUTO: 0.3 K/UL (ref 0–0.5)
EOSINOPHIL NFR BLD: 2.9 % (ref 0–8)
ERYTHROCYTE [DISTWIDTH] IN BLOOD BY AUTOMATED COUNT: 13.4 % (ref 11.5–14.5)
EST. GFR  (AFRICAN AMERICAN): >60 ML/MIN/1.73 M^2
EST. GFR  (NON AFRICAN AMERICAN): >60 ML/MIN/1.73 M^2
GLUCOSE SERPL-MCNC: 102 MG/DL (ref 70–110)
GLUCOSE UR QL STRIP: NEGATIVE
HCT VFR BLD AUTO: 43.9 % (ref 40–54)
HGB BLD-MCNC: 14.4 G/DL (ref 14–18)
HGB UR QL STRIP: ABNORMAL
IMM GRANULOCYTES # BLD AUTO: 0.02 K/UL (ref 0–0.04)
IMM GRANULOCYTES NFR BLD AUTO: 0.2 % (ref 0–0.5)
KETONES UR QL STRIP: NEGATIVE
LEUKOCYTE ESTERASE UR QL STRIP: ABNORMAL
LYMPHOCYTES # BLD AUTO: 1.1 K/UL (ref 1–4.8)
LYMPHOCYTES NFR BLD: 13.2 % (ref 18–48)
MCH RBC QN AUTO: 33.6 PG (ref 27–31)
MCHC RBC AUTO-ENTMCNC: 32.8 G/DL (ref 32–36)
MCV RBC AUTO: 103 FL (ref 82–98)
MICROSCOPIC COMMENT: ABNORMAL
MONOCYTES # BLD AUTO: 0.7 K/UL (ref 0.3–1)
MONOCYTES NFR BLD: 8.1 % (ref 4–15)
NEUTROPHILS # BLD AUTO: 6.4 K/UL (ref 1.8–7.7)
NEUTROPHILS NFR BLD: 75.1 % (ref 38–73)
NITRITE UR QL STRIP: NEGATIVE
NRBC BLD-RTO: 0 /100 WBC
PH UR STRIP: 6 [PH] (ref 5–8)
PLATELET # BLD AUTO: 178 K/UL (ref 150–450)
PMV BLD AUTO: 10.5 FL (ref 9.2–12.9)
POTASSIUM SERPL-SCNC: 4.2 MMOL/L (ref 3.5–5.1)
PROT SERPL-MCNC: 7.1 G/DL (ref 6–8.4)
PROT UR QL STRIP: NEGATIVE
RBC # BLD AUTO: 4.28 M/UL (ref 4.6–6.2)
RBC #/AREA URNS HPF: 1 /HPF (ref 0–4)
SODIUM SERPL-SCNC: 140 MMOL/L (ref 136–145)
SP GR UR STRIP: 1.01 (ref 1–1.03)
URN SPEC COLLECT METH UR: ABNORMAL
UROBILINOGEN UR STRIP-ACNC: NEGATIVE EU/DL
WBC # BLD AUTO: 8.5 K/UL (ref 3.9–12.7)
WBC #/AREA URNS HPF: 50 /HPF (ref 0–5)

## 2022-01-05 PROCEDURE — 85025 COMPLETE CBC W/AUTO DIFF WBC: CPT | Performed by: NURSE PRACTITIONER

## 2022-01-05 PROCEDURE — 93010 EKG 12-LEAD: ICD-10-PCS | Mod: ,,, | Performed by: INTERNAL MEDICINE

## 2022-01-05 PROCEDURE — 36415 COLL VENOUS BLD VENIPUNCTURE: CPT | Performed by: NURSE PRACTITIONER

## 2022-01-05 PROCEDURE — 93010 ELECTROCARDIOGRAM REPORT: CPT | Mod: ,,, | Performed by: INTERNAL MEDICINE

## 2022-01-05 PROCEDURE — 87086 URINE CULTURE/COLONY COUNT: CPT | Performed by: NURSE PRACTITIONER

## 2022-01-05 PROCEDURE — 93005 ELECTROCARDIOGRAM TRACING: CPT

## 2022-01-05 PROCEDURE — 81000 URINALYSIS NONAUTO W/SCOPE: CPT | Performed by: NURSE PRACTITIONER

## 2022-01-05 PROCEDURE — 99285 EMERGENCY DEPT VISIT HI MDM: CPT | Mod: 25

## 2022-01-05 PROCEDURE — 80053 COMPREHEN METABOLIC PANEL: CPT | Performed by: NURSE PRACTITIONER

## 2022-01-05 RX ORDER — CEFPODOXIME PROXETIL 200 MG/1
200 TABLET, FILM COATED ORAL 2 TIMES DAILY
Qty: 20 TABLET | Refills: 0 | Status: SHIPPED | OUTPATIENT
Start: 2022-01-05 | End: 2022-01-05 | Stop reason: SDUPTHER

## 2022-01-05 RX ORDER — CIPROFLOXACIN 500 MG/1
500 TABLET ORAL 2 TIMES DAILY
Qty: 20 TABLET | Refills: 0 | Status: SHIPPED | OUTPATIENT
Start: 2022-01-05 | End: 2022-01-05 | Stop reason: SDUPTHER

## 2022-01-05 RX ORDER — CIPROFLOXACIN 500 MG/1
500 TABLET ORAL 2 TIMES DAILY
Qty: 20 TABLET | Refills: 0 | Status: SHIPPED | OUTPATIENT
Start: 2022-01-05 | End: 2022-01-15

## 2022-01-05 NOTE — ED PROVIDER NOTES
Encounter Date: 1/5/2022       History     Chief Complaint   Patient presents with    Fall     Pt fell out of his wheelchair while sleeping. LOC reported. Hematoma noted to forehead     Chief complaint:  Syncope  81-year-old male with history of AFib CAD hypertension high cholesterol cataracts presents to the ED by EMS after having fall out of his wheelchair.  Patient states that he was feeling very sleepy and believes he fell asleep and fell forward hitting the front of his head he does report that he takes blood thinners does think that he temporarily lost consciousness but is unsure denies any pain at this time denies any dizziness headaches blurred vision weakness is unsure of his last tetanus shot        Review of patient's allergies indicates:   Allergen Reactions    No known drug allergies      Past Medical History:   Diagnosis Date    *Atrial fibrillation     Anxiety     Atrial fibrillation 7/5/2012    Blood transfusion     CAD (coronary artery disease) 7/10/2012    Cancer     skin    Cataract     removed from both    Clotting disorder     Coronary artery disease     Depression     HTN (hypertension) 7/10/2012    Hx-TIA (transient ischemic attack) 7/10/2012    Hyperlipidemia     Hypertension     Myocardial infarction     Renal calculus, left 8/18/2017    S/P CABG (coronary artery bypass graft) 1/8/2013    Stroke 2/12    TIA    Urinary tract infection      Past Surgical History:   Procedure Laterality Date    CATARACT EXTRACTION W/  INTRAOCULAR LENS IMPLANT Right 03/27/2008    CATARACT EXTRACTION W/  INTRAOCULAR LENS IMPLANT Left 12/06/2007    CORONARY ARTERY BYPASS GRAFT      DILATION OF URETHRA N/A 1/20/2020    Procedure: DILATION, URETHRA;  Surgeon: Ge Ramon MD;  [facility];  Service: Urology;  Laterality: N/A;    TRANSURETHRAL RESECTION OF PROSTATE N/A 1/20/2020    Procedure: TURP (TRANSURETHRAL RESECTION OF PROSTATE);  Surgeon: Ge Ramon MD;  [facility];  Service: Urology;  Laterality: N/A;    triple bypass       Family History   Problem Relation Age of Onset    Heart attack Father     Heart disease Father     Stroke Father     Alcohol abuse Father     Stroke Mother     Dementia Mother     Hypertension Mother     Diabetes Mother     Melanoma Neg Hx     Psoriasis Neg Hx     Lupus Neg Hx     Eczema Neg Hx     Acne Neg Hx     Prostate cancer Neg Hx     Kidney disease Neg Hx      Social History     Tobacco Use    Smoking status: Never Smoker    Smokeless tobacco: Never Used   Substance Use Topics    Alcohol use: No    Drug use: No     Review of Systems   Constitutional: Negative.    Respiratory: Negative.    Cardiovascular: Negative.    Gastrointestinal: Negative.    Skin: Positive for wound.   Neurological: Negative.        Physical Exam     Initial Vitals [01/05/22 1425]   BP Pulse Resp Temp SpO2   (!) 175/77 60 18 -- (!) 92 %      MAP       --         Physical Exam    Nursing note and vitals reviewed.  Constitutional: He appears well-developed and well-nourished.   HENT:   Head: Normocephalic and atraumatic.   Eyes: EOM are normal. Pupils are equal, round, and reactive to light.   Neck: Neck supple.   Normal range of motion.  Cardiovascular: Normal rate and regular rhythm. Exam reveals no gallop and no friction rub.    No murmur heard.  Pulmonary/Chest: Breath sounds normal. He has no wheezes. He has no rhonchi. He has no rales.   Abdominal: Abdomen is soft. Bowel sounds are normal.   Musculoskeletal:      Cervical back: Normal range of motion and neck supple.     Neurological: He is alert and oriented to person, place, and time.   Skin: Skin is warm and dry. Capillary refill takes less than 2 seconds. There is erythema.   Frontal forehead hematoma with superficial abrasion   Psychiatric: He has a normal mood and affect. Thought content normal.         ED Course   Procedures  Labs Reviewed   CBC W/ AUTO DIFFERENTIAL - Abnormal; Notable for the  following components:       Result Value    RBC 4.28 (*)      (*)     MCH 33.6 (*)     Gran % 75.1 (*)     Lymph % 13.2 (*)     All other components within normal limits   COMPREHENSIVE METABOLIC PANEL - Abnormal; Notable for the following components:    Albumin 3.3 (*)     All other components within normal limits   URINALYSIS, REFLEX TO URINE CULTURE - Abnormal; Notable for the following components:    Occult Blood UA 1+ (*)     Leukocytes, UA 1+ (*)     All other components within normal limits    Narrative:     Specimen Source->Urine   URINALYSIS MICROSCOPIC - Abnormal; Notable for the following components:    WBC, UA 50 (*)     All other components within normal limits    Narrative:     Specimen Source->Urine   CULTURE, URINE     EKG Readings: (Independently Interpreted)   Initial Reading: No STEMI. Rhythm: Atrial Fibrillation. Heart Rate: 56. Ectopy: No Ectopy. Conduction: Normal.     ECG Results          EKG 12-lead (Final result)  Result time 01/05/22 15:24:50    Final result by Interface, Lab In [facility] (01/05/22 15:24:50)                 Narrative:    Test Reason : R55    Vent. Rate : 056 BPM     Atrial Rate : 375 BPM     P-R Int : 000 ms          QRS Dur : 084 ms      QT Int : 428 ms       P-R-T Axes : 000 037 051 degrees     QTc Int : 413 ms    Atrial fibrillation with slow ventricular response  Abnormal ECG  When compared with ECG of 09-JUN-2020 16:12,  T wave inversion no longer evident in Inferior leads  T wave inversion no longer evident in Lateral leads  Confirmed by Dutsin QUINTANA, Hesham BEYER ([id number]) on 1/5/2022 3:24:37 PM    Referred By: [email or web address]   SELF           Confirmed By:Hesham Chan MD                            Imaging Results          CT Head Without Contrast (Final result)  Result time 01/05/22 14:56:19    Final result by Jewell Martin MD (01/05/22 14:56:19)                 Impression:      No acute intracranial findings.    Age-appropriate cerebral volume loss with moderate patchy  decreased attenuation supratentorial white matter while nonspecific suggestive for chronic ischemic change.    No evidence for acute intracranial hemorrhage.  Clinical correlation and further evaluation as warranted.      Electronically signed by: Jewell Martin MD  Date:    01/05/2022  Time:    14:56             Narrative:    EXAMINATION:  CT HEAD WITHOUT CONTRAST    CLINICAL HISTORY:  Syncope, simple, normal neuro exam;    TECHNIQUE:  Multiple sequential 5 mm axial images of the head without contrast.  Coronal and sagittal reformatted imaging from the axial acquisition.    COMPARISON:  11/25/2019    FINDINGS:  There is age-appropriate generalized cerebral volume loss.  Compensatory enlargement of the ventricle sulci and cisterns without hydrocephalus.  There is no midline shift or mass effect.  There is moderate ill-defined decreased attenuation in the supratentorial white matter while nonspecific suggestive for chronic ischemic change.  There is no evidence for acute intracranial hemorrhage or sulcal effacement.  There is no midline shift or mass effect.  Prominent vascular calcifications.  Mucous retention cyst in the right maxillary antra.  Remaining visualized paranasal sinuses and mastoid air cells are clear..                               X-Ray Chest AP Portable (Final result)  Result time 01/05/22 14:51:03    Final result by Jewell Martin MD (01/05/22 14:51:03)                 Impression:      No acute abnormality.      Electronically signed by: Jewell Martin MD  Date:    01/05/2022  Time:    14:51             Narrative:    EXAMINATION:  XR CHEST AP PORTABLE    CLINICAL HISTORY:  Syncope and collapse    TECHNIQUE:  Single frontal view of the chest was performed.    COMPARISON:  11/25/2019    FINDINGS:  The lungs are clear with normal appearance of pulmonary vasculature. No pleural effusion. No evident pneumothorax.    The cardiac silhouette is normal in size. The hilar and mediastinal contours are  unremarkable.Median sternotomy wires are midline.    Bones are intact.                                 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  CT was negative for intracranial bleed CBC CMP grossly within normal limits EKG shows AFib which is patient's baseline is states he feels well otherwise currently stable for discharge patient was noted to have elevated WBC use 1 leukocyte placed on Cipro prophylactically given return precautions including but not limited to new or worsening symptoms                      Clinical Impression:   Final diagnoses:  [R55] Syncope  [W19.XXXA] Fall, initial encounter (Primary)          ED Disposition Condition    Discharge Stable        ED Prescriptions     Medication Sig Dispense Start Date End Date Auth. Provider    cefpodoxime (VANTIN) 200 MG tablet  (Status: Discontinued) Take 1 tablet (200 mg total) by mouth 2 (two) times daily. for 10 days 20 tablet 1/5/2022 1/5/2022 Abram Obrien MD    ciprofloxacin HCl (CIPRO) 500 MG tablet  (Status: Discontinued) Take 1 tablet (500 mg total) by mouth 2 (two) times daily. for 10 days 20 tablet 1/5/2022 1/5/2022 Abram Obrien MD    ciprofloxacin HCl (CIPRO) 500 MG tablet Take 1 tablet (500 mg total) by mouth 2 (two) times daily. for 10 days 20 tablet 1/5/2022 1/15/2022 Abram Obrien MD        Follow-up Information     None          Laura Simmons, BENJAMIN  01/05/22 [id number]       Laura Simmons, BENJAMIN  01/05/22 [id number]

## 2022-01-05 NOTE — DISCHARGE INSTRUCTIONS
Develop any headache dizziness blurred vision please return to the ED immediately otherwise please follow-up with your primary care doctor

## 2022-01-07 LAB
BACTERIA UR CULT: NORMAL
BACTERIA UR CULT: NORMAL

## 2022-01-21 ENCOUNTER — LAB VISIT (OUTPATIENT)
Dept: LAB | Facility: HOSPITAL | Age: 82
End: 2022-01-21
Attending: HOSPITALIST
Payer: MEDICARE

## 2022-01-21 DIAGNOSIS — R30.0 DYSURIA: Primary | ICD-10-CM

## 2022-01-21 PROCEDURE — 87077 CULTURE AEROBIC IDENTIFY: CPT | Performed by: HOSPITALIST

## 2022-01-21 PROCEDURE — 87086 URINE CULTURE/COLONY COUNT: CPT | Performed by: HOSPITALIST

## 2022-01-21 PROCEDURE — 87186 SC STD MICRODIL/AGAR DIL: CPT | Performed by: HOSPITALIST

## 2022-01-21 PROCEDURE — 87088 URINE BACTERIA CULTURE: CPT | Performed by: HOSPITALIST

## 2022-01-23 ENCOUNTER — HOSPITAL ENCOUNTER (EMERGENCY)
Facility: HOSPITAL | Age: 82
Discharge: HOME | End: 2022-01-23
Attending: STUDENT IN AN ORGANIZED HEALTH CARE EDUCATION/TRAINING PROGRAM
Payer: MEDICARE

## 2022-01-23 VITALS
OXYGEN SATURATION: 97 % | DIASTOLIC BLOOD PRESSURE: 99 MMHG | TEMPERATURE: 97 F | RESPIRATION RATE: 20 BRPM | HEART RATE: 84 BPM | SYSTOLIC BLOOD PRESSURE: 141 MMHG

## 2022-01-23 DIAGNOSIS — R41.0 CONFUSION: ICD-10-CM

## 2022-01-23 DIAGNOSIS — N30.01 ACUTE CYSTITIS WITH HEMATURIA: Primary | ICD-10-CM

## 2022-01-23 LAB
ALBUMIN SERPL BCP-MCNC: 2.9 G/DL (ref 3.5–5.2)
ALP SERPL-CCNC: 81 U/L (ref 55–135)
ALT SERPL W/O P-5'-P-CCNC: 29 U/L (ref 10–44)
AMMONIA PLAS-SCNC: 21 UMOL/L (ref 10–50)
AMPHET+METHAMPHET UR QL: NEGATIVE
ANION GAP SERPL CALC-SCNC: 10 MMOL/L (ref 8–16)
APAP SERPL-MCNC: <3 UG/ML (ref 10–20)
AST SERPL-CCNC: 15 U/L (ref 10–40)
BACTERIA #/AREA URNS HPF: ABNORMAL /HPF
BARBITURATES UR QL SCN>200 NG/ML: NEGATIVE
BASOPHILS # BLD AUTO: 0.03 K/UL (ref 0–0.2)
BASOPHILS NFR BLD: 0.3 % (ref 0–1.9)
BENZODIAZ UR QL SCN>200 NG/ML: NEGATIVE
BILIRUB SERPL-MCNC: 1.1 MG/DL (ref 0.1–1)
BILIRUB UR QL STRIP: ABNORMAL
BUN SERPL-MCNC: 28 MG/DL (ref 8–23)
BZE UR QL SCN: NEGATIVE
CALCIUM SERPL-MCNC: 9.1 MG/DL (ref 8.7–10.5)
CANNABINOIDS UR QL SCN: NEGATIVE
CHLORIDE SERPL-SCNC: 103 MMOL/L (ref 95–110)
CLARITY UR: ABNORMAL
CO2 SERPL-SCNC: 26 MMOL/L (ref 23–29)
COLOR UR: ABNORMAL
CREAT SERPL-MCNC: 1.1 MG/DL (ref 0.5–1.4)
CREAT UR-MCNC: 137.1 MG/DL (ref 23–375)
DIFFERENTIAL METHOD: ABNORMAL
EOSINOPHIL # BLD AUTO: 0.1 K/UL (ref 0–0.5)
EOSINOPHIL NFR BLD: 1.1 % (ref 0–8)
ERYTHROCYTE [DISTWIDTH] IN BLOOD BY AUTOMATED COUNT: 12.9 % (ref 11.5–14.5)
EST. GFR  (AFRICAN AMERICAN): >60 ML/MIN/1.73 M^2
EST. GFR  (NON AFRICAN AMERICAN): >60 ML/MIN/1.73 M^2
ETHANOL SERPL-MCNC: <10 MG/DL
GLUCOSE SERPL-MCNC: 125 MG/DL (ref 70–110)
GLUCOSE UR QL STRIP: NEGATIVE
HCT VFR BLD AUTO: 45.3 % (ref 40–54)
HGB BLD-MCNC: 15.8 G/DL (ref 14–18)
HGB UR QL STRIP: ABNORMAL
HYALINE CASTS #/AREA URNS LPF: 0 /LPF
IMM GRANULOCYTES # BLD AUTO: 0.03 K/UL (ref 0–0.04)
IMM GRANULOCYTES NFR BLD AUTO: 0.3 % (ref 0–0.5)
KETONES UR QL STRIP: ABNORMAL
LACTATE SERPL-SCNC: 1.1 MMOL/L (ref 0.5–2.2)
LEUKOCYTE ESTERASE UR QL STRIP: ABNORMAL
LYMPHOCYTES # BLD AUTO: 0.7 K/UL (ref 1–4.8)
LYMPHOCYTES NFR BLD: 6.6 % (ref 18–48)
MCH RBC QN AUTO: 34.1 PG (ref 27–31)
MCHC RBC AUTO-ENTMCNC: 34.9 G/DL (ref 32–36)
MCV RBC AUTO: 98 FL (ref 82–98)
METHADONE UR QL SCN>300 NG/ML: NEGATIVE
MICROSCOPIC COMMENT: ABNORMAL
MONOCYTES # BLD AUTO: 0.9 K/UL (ref 0.3–1)
MONOCYTES NFR BLD: 8.2 % (ref 4–15)
NEUTROPHILS # BLD AUTO: 8.8 K/UL (ref 1.8–7.7)
NEUTROPHILS NFR BLD: 83.5 % (ref 38–73)
NITRITE UR QL STRIP: POSITIVE
NRBC BLD-RTO: 0 /100 WBC
OPIATES UR QL SCN: NEGATIVE
PCP UR QL SCN>25 NG/ML: NEGATIVE
PH UR STRIP: 8 [PH] (ref 5–8)
PLATELET # BLD AUTO: 267 K/UL (ref 150–450)
PMV BLD AUTO: 10 FL (ref 9.2–12.9)
POTASSIUM SERPL-SCNC: 3.6 MMOL/L (ref 3.5–5.1)
PROT SERPL-MCNC: 6.8 G/DL (ref 6–8.4)
PROT UR QL STRIP: ABNORMAL
RBC # BLD AUTO: 4.64 M/UL (ref 4.6–6.2)
RBC #/AREA URNS HPF: 30 /HPF (ref 0–4)
SALICYLATES SERPL-MCNC: <5 MG/DL (ref 15–30)
SODIUM SERPL-SCNC: 139 MMOL/L (ref 136–145)
SP GR UR STRIP: 1.02 (ref 1–1.03)
TOXICOLOGY INFORMATION: NORMAL
URN SPEC COLLECT METH UR: ABNORMAL
UROBILINOGEN UR STRIP-ACNC: 1 EU/DL
WBC # BLD AUTO: 10.49 K/UL (ref 3.9–12.7)
WBC #/AREA URNS HPF: >100 /HPF (ref 0–5)

## 2022-01-23 PROCEDURE — 80143 DRUG ASSAY ACETAMINOPHEN: CPT | Performed by: STUDENT IN AN ORGANIZED HEALTH CARE EDUCATION/TRAINING PROGRAM

## 2022-01-23 PROCEDURE — 83605 ASSAY OF LACTIC ACID: CPT | Performed by: STUDENT IN AN ORGANIZED HEALTH CARE EDUCATION/TRAINING PROGRAM

## 2022-01-23 PROCEDURE — 80307 DRUG TEST PRSMV CHEM ANLYZR: CPT | Performed by: STUDENT IN AN ORGANIZED HEALTH CARE EDUCATION/TRAINING PROGRAM

## 2022-01-23 PROCEDURE — 80053 COMPREHEN METABOLIC PANEL: CPT | Performed by: STUDENT IN AN ORGANIZED HEALTH CARE EDUCATION/TRAINING PROGRAM

## 2022-01-23 PROCEDURE — 82077 ASSAY SPEC XCP UR&BREATH IA: CPT | Performed by: STUDENT IN AN ORGANIZED HEALTH CARE EDUCATION/TRAINING PROGRAM

## 2022-01-23 PROCEDURE — 85025 COMPLETE CBC W/AUTO DIFF WBC: CPT | Performed by: STUDENT IN AN ORGANIZED HEALTH CARE EDUCATION/TRAINING PROGRAM

## 2022-01-23 PROCEDURE — 82140 ASSAY OF AMMONIA: CPT | Performed by: STUDENT IN AN ORGANIZED HEALTH CARE EDUCATION/TRAINING PROGRAM

## 2022-01-23 PROCEDURE — 87086 URINE CULTURE/COLONY COUNT: CPT | Performed by: STUDENT IN AN ORGANIZED HEALTH CARE EDUCATION/TRAINING PROGRAM

## 2022-01-23 PROCEDURE — 80179 DRUG ASSAY SALICYLATE: CPT | Performed by: STUDENT IN AN ORGANIZED HEALTH CARE EDUCATION/TRAINING PROGRAM

## 2022-01-23 PROCEDURE — 63600175 PHARM REV CODE 636 W HCPCS: Performed by: STUDENT IN AN ORGANIZED HEALTH CARE EDUCATION/TRAINING PROGRAM

## 2022-01-23 PROCEDURE — 96365 THER/PROPH/DIAG IV INF INIT: CPT

## 2022-01-23 PROCEDURE — 36415 COLL VENOUS BLD VENIPUNCTURE: CPT | Performed by: STUDENT IN AN ORGANIZED HEALTH CARE EDUCATION/TRAINING PROGRAM

## 2022-01-23 PROCEDURE — 87077 CULTURE AEROBIC IDENTIFY: CPT | Performed by: STUDENT IN AN ORGANIZED HEALTH CARE EDUCATION/TRAINING PROGRAM

## 2022-01-23 PROCEDURE — 99284 EMERGENCY DEPT VISIT MOD MDM: CPT | Mod: 25

## 2022-01-23 PROCEDURE — 87088 URINE BACTERIA CULTURE: CPT | Performed by: STUDENT IN AN ORGANIZED HEALTH CARE EDUCATION/TRAINING PROGRAM

## 2022-01-23 PROCEDURE — 81000 URINALYSIS NONAUTO W/SCOPE: CPT | Mod: 59 | Performed by: STUDENT IN AN ORGANIZED HEALTH CARE EDUCATION/TRAINING PROGRAM

## 2022-01-23 PROCEDURE — 87186 SC STD MICRODIL/AGAR DIL: CPT | Performed by: STUDENT IN AN ORGANIZED HEALTH CARE EDUCATION/TRAINING PROGRAM

## 2022-01-23 RX ORDER — SODIUM CHLORIDE 0.9 % (FLUSH) 0.9 %
10 SYRINGE (ML) INJECTION
Status: DISCONTINUED | OUTPATIENT
Start: 2022-01-23 | End: 2022-01-23 | Stop reason: HOSPADM

## 2022-01-23 RX ORDER — CEFUROXIME AXETIL 500 MG/1
500 TABLET ORAL 2 TIMES DAILY
Qty: 14 TABLET | Refills: 0 | Status: ON HOLD | OUTPATIENT
Start: 2022-01-23 | End: 2022-01-31 | Stop reason: HOSPADM

## 2022-01-23 RX ADMIN — CEFTRIAXONE 1 G: 1 INJECTION, SOLUTION INTRAVENOUS at 11:01

## 2022-01-23 NOTE — DISCHARGE INSTRUCTIONS
Return to the ED if you have worsening confusion, fevers over 100.4F, nausea, vomiting, abdominal pain.

## 2022-01-23 NOTE — ED PROVIDER NOTES
"Encounter Date: 2022    This document was partially completed using speech recognition software and may contain misspellings, grammatical errors, and/or unexpected word substitutions.       History     Chief Complaint   Patient presents with    Urinary Tract Infection     Pt from the [facility] with recent urinary tract infection and had antibiotics, here for re-eval due to unable to test at [facility] at this time.     81-year-old male with history of atrial fibrillation, Parkinson's, CAD, hypertension, hyperlipidemia presents to the emergency department via EMS after being sent by the [facility] for UA testing. They attempted a UA at the [facility] but there saturnino wasn't working so sent him here via EMS. He still has dysuria but denies any fevers, nausea, vomiting, flank pain, abdominal pain. He recently finished a course of antibiotics and they wanted to retest to make sure his UA was negative.    Daughter states he's more confused than usual (asking to talk to his wife who has been  for 2 years) and this usually occurs when he has an UTI.    Diagnosed with UTI on UA on 21, placed on cipro. Finished that antibiotic course. Culture from then showed "Multiple organisms isolated. None in predominance."    Repeat UCx 2 days ago showed Proteus with susceptibilities pending.    Daughter declines any imaging - CT head or CXR.        Review of patient's allergies indicates:   Allergen Reactions    No known drug allergies      Past Medical History:   Diagnosis Date    *Atrial fibrillation     Anxiety     Atrial fibrillation 2012    Blood transfusion     CAD (coronary artery disease) 7/10/2012    Cancer     skin    Cataract     removed from both    Clotting disorder     Coronary artery disease     Depression     HTN (hypertension) 7/10/2012    Hx-TIA (transient ischemic attack) 7/10/2012    Hyperlipidemia     Hypertension     Myocardial infarction     Renal calculus, left 2017    S/P " CABG (coronary artery bypass graft) 1/8/2013    Stroke 2/12    TIA    Urinary tract infection      Past Surgical History:   Procedure Laterality Date    CATARACT EXTRACTION W/  INTRAOCULAR LENS IMPLANT Right 03/27/2008    CATARACT EXTRACTION W/  INTRAOCULAR LENS IMPLANT Left 12/06/2007    CORONARY ARTERY BYPASS GRAFT      DILATION OF URETHRA N/A 1/20/2020    Procedure: DILATION, URETHRA;  Surgeon: Ge Ramon MD;  [facility] OR;  Service: Urology;  Laterality: N/A;    TRANSURETHRAL RESECTION OF PROSTATE N/A 1/20/2020    Procedure: TURP (TRANSURETHRAL RESECTION OF PROSTATE);  Surgeon: Ge Ramon MD;  Location: [facility] OR;  Service: Urology;  Laterality: N/A;    triple bypass       Family History   Problem Relation Age of Onset    Heart attack Father     Heart disease Father     Stroke Father     Alcohol abuse Father     Stroke Mother     Dementia Mother     Hypertension Mother     Diabetes Mother     Melanoma Neg Hx     Psoriasis Neg Hx     Lupus Neg Hx     Eczema Neg Hx     Acne Neg Hx     Prostate cancer Neg Hx     Kidney disease Neg Hx      Social History     Tobacco Use    Smoking status: Never Smoker    Smokeless tobacco: Never Used   Substance Use Topics    Alcohol use: No    Drug use: No     Review of Systems   Constitutional: Negative for chills and fever.   HENT: Negative for congestion, rhinorrhea and sneezing.    Eyes: Negative for discharge and redness.   Respiratory: Negative for cough and shortness of breath.    Cardiovascular: Negative for chest pain and palpitations.   Gastrointestinal: Negative for abdominal pain, diarrhea and vomiting.   Genitourinary: Positive for dysuria. Negative for difficulty urinating, flank pain and urgency.   Musculoskeletal: Negative for back pain and neck pain.   Skin: Negative for rash and wound.   Neurological: Negative for weakness, numbness and headaches.       Physical Exam     Initial Vitals   BP Pulse Resp Temp SpO2   01/23/22  1029 01/23/22 1030 01/23/22 1030 01/23/22 1037 01/23/22 1032   (!) 141/99 92 20 97.4 °F (36.3 °C) (!) 94 %      MAP       --                Physical Exam    Nursing note and vitals reviewed.  Constitutional: He appears well-developed. He is not diaphoretic. No distress.   HENT:   Head: Normocephalic and atraumatic.   Right Ear: External ear normal.   Left Ear: External ear normal.   Nose: Nose normal.   Eyes: Conjunctivae are normal. Right eye exhibits no discharge. Left eye exhibits no discharge. No scleral icterus.   Cardiovascular: Normal rate. An irregular rhythm present.    Pulmonary/Chest: Breath sounds normal. No stridor. No respiratory distress. He has no wheezes. He has no rhonchi. He has no rales.   Abdominal: Abdomen is soft. He exhibits no distension. There is no abdominal tenderness. There is no guarding.   Musculoskeletal:         General: No edema.     Neurological: He is alert. He is disoriented. GCS score is 15. GCS eye subscore is 4. GCS verbal subscore is 5. GCS motor subscore is 6.   Alert, oriented to name, birthday, [place] President ([place])    Disoriented to year (2024)   Skin: Skin is warm and dry. Capillary refill takes less than 2 seconds.   Psychiatric: He has a normal mood and affect.         ED Course   Procedures  Labs Reviewed   URINALYSIS, REFLEX TO URINE CULTURE - Abnormal; Notable for the following components:       Result Value    Color, UA Red (*)     Appearance, UA Cloudy (*)     Protein, UA 3+ (*)     Ketones, UA 1+ (*)     Bilirubin (UA) 1+ (*)     Occult Blood UA 3+ (*)     Nitrite, UA Positive (*)     Leukocytes, UA 3+ (*)     All other components within normal limits    Narrative:     Specimen Source->Urine   URINALYSIS MICROSCOPIC - Abnormal; Notable for the following components:    RBC, UA 30 (*)     WBC, UA >100 (*)     Bacteria Moderate (*)     All other components within normal limits    Narrative:     Specimen Source->Urine   COMPREHENSIVE METABOLIC PANEL - Abnormal;  Notable for the following components:    Glucose 125 (*)     BUN 28 (*)     Albumin 2.9 (*)     Total Bilirubin 1.1 (*)     All other components within normal limits   CBC W/ AUTO DIFFERENTIAL - Abnormal; Notable for the following components:    MCH 34.1 (*)     Gran # (ANC) 8.8 (*)     Lymph # 0.7 (*)     Gran % 83.5 (*)     Lymph % 6.6 (*)     All other components within normal limits   ACETAMINOPHEN LEVEL - Abnormal; Notable for the following components:    Acetaminophen (Tylenol), Serum <3.0 (*)     All other components within normal limits   SALICYLATE LEVEL - Abnormal; Notable for the following components:    Salicylate Lvl <5.0 (*)     All other components within normal limits   CULTURE, URINE   AMMONIA   ALCOHOL,MEDICAL (ETHANOL)   LACTIC ACID, PLASMA   DRUG SCREEN PANEL, URINE EMERGENCY   DRUG SCREEN PANEL, URINE EMERGENCY    Narrative:     Specimen Source->Urine          Imaging Results    None          Medications   sodium chloride 0.9% flush 10 mL (has no administration in time range)   cefTRIAXone (ROCEPHIN) 1 g/50 mL D5W IVPB (0 g Intravenous Stopped 1/23/22 1232)     Medical Decision Making:   Differential Diagnosis:   Differential considerations include (in no particular order): hypo/hyperglycemia, CVA, infection (cellulitis, pneumonia, cystitis, meningitis, encephalitis), alcohol intoxication, electrolyte abnormalities, drug intoxication, uremia, trauma, hypoglycemia, hypoxemia, seizures  ED Management:  Based on the patient's evaluation, patient appears well for discharge home. UA with evidence of infection. Reviewed recent culture but no susceptibilities available. Will treat with IV ceftriaxone and discharge home with ceftin. UCx pending.    Daughter is insistent on admission. Given no leukocytosis, lactic acidosis, no fever, and no UCx - I believe the patient can trial a course of oral antibiotics. If UCx showed multi-drug resistance bacteria or the patient fails outpatient treatment, may need  admission. Daughter is in agreement with the plan.                      Clinical Impression:   Final diagnoses:  [N30.01] Acute cystitis with hematuria (Primary)  [R41.0] Confusion          ED Disposition Condition    Discharge Stable        ED Prescriptions     Medication Sig Dispense Start Date End Date Auth. Provider    cefUROXime (CEFTIN) 500 MG tablet Take 1 tablet (500 mg total) by mouth 2 (two) times daily. for 7 days 14 tablet 1/23/2022 1/30/2022 Nicho Akins DO        Follow-up Information     Follow up With Specialties Details Why Contact Info    Timo Conti MD Family Medicine Schedule an appointment as soon as possible for a visit in 1 week As needed [id number] [place] [id number]  [phone]             Nicho Akins DO  01/23/22 4834

## 2022-01-24 LAB — BACTERIA UR CULT: ABNORMAL

## 2022-01-25 LAB — BACTERIA UR CULT: ABNORMAL

## 2022-01-26 ENCOUNTER — HOSPITAL ENCOUNTER (INPATIENT)
Facility: HOSPITAL | Age: 82
LOS: 5 days | Discharge: NURSING, REHAB OR LONG-TERM CARE | DRG: 065 | End: 2022-01-31
Attending: PSYCHIATRY & NEUROLOGY | Admitting: PSYCHIATRY & NEUROLOGY
Payer: MEDICARE

## 2022-01-26 ENCOUNTER — HOSPITAL ENCOUNTER (EMERGENCY)
Facility: HOSPITAL | Age: 82
Discharge: ANOTHER HOSPITAL | End: 2022-01-26
Attending: SURGERY
Payer: MEDICARE

## 2022-01-26 VITALS
HEART RATE: 79 BPM | WEIGHT: 162.5 LBS | SYSTOLIC BLOOD PRESSURE: 134 MMHG | RESPIRATION RATE: 23 BRPM | DIASTOLIC BLOOD PRESSURE: 65 MMHG | BODY MASS INDEX: 23.99 KG/M2 | TEMPERATURE: 98 F | OXYGEN SATURATION: 100 %

## 2022-01-26 DIAGNOSIS — R41.82 ALTERED MENTAL STATUS: ICD-10-CM

## 2022-01-26 DIAGNOSIS — N30.00 ACUTE CYSTITIS WITHOUT HEMATURIA: ICD-10-CM

## 2022-01-26 DIAGNOSIS — I61.5 IVH (INTRAVENTRICULAR HEMORRHAGE): ICD-10-CM

## 2022-01-26 DIAGNOSIS — I61.5 INTRAVENTRICULAR HEMORRHAGE: ICD-10-CM

## 2022-01-26 DIAGNOSIS — G93.40 ACUTE ENCEPHALOPATHY: ICD-10-CM

## 2022-01-26 DIAGNOSIS — I61.5 INTRAVENTRICULAR HEMORRHAGE: Primary | ICD-10-CM

## 2022-01-26 LAB
ABO + RH BLD: NORMAL
ALBUMIN SERPL BCP-MCNC: 3 G/DL (ref 3.5–5.2)
ALP SERPL-CCNC: 101 U/L (ref 55–135)
ALT SERPL W/O P-5'-P-CCNC: 17 U/L (ref 10–44)
ANION GAP SERPL CALC-SCNC: 11 MMOL/L (ref 8–16)
AST SERPL-CCNC: 21 U/L (ref 10–40)
BACTERIA #/AREA URNS HPF: ABNORMAL /HPF
BASOPHILS # BLD AUTO: 0.06 K/UL (ref 0–0.2)
BASOPHILS NFR BLD: 0.5 % (ref 0–1.9)
BILIRUB SERPL-MCNC: 1.2 MG/DL (ref 0.1–1)
BILIRUB UR QL STRIP: NEGATIVE
BLD GP AB SCN CELLS X3 SERPL QL: NORMAL
BNP SERPL-MCNC: 82 PG/ML (ref 0–99)
BUN SERPL-MCNC: 22 MG/DL (ref 8–23)
CALCIUM SERPL-MCNC: 9.7 MG/DL (ref 8.7–10.5)
CHLORIDE SERPL-SCNC: 101 MMOL/L (ref 95–110)
CHOLEST SERPL-MCNC: 83 MG/DL (ref 120–199)
CHOLEST/HDLC SERPL: 3.1 {RATIO} (ref 2–5)
CK MB SERPL-MCNC: 18.9 NG/ML (ref 0.1–6.5)
CK MB SERPL-RTO: 2.6 % (ref 0–5)
CK SERPL-CCNC: 719 U/L (ref 20–200)
CK SERPL-CCNC: 719 U/L (ref 20–200)
CLARITY UR: ABNORMAL
CO2 SERPL-SCNC: 27 MMOL/L (ref 23–29)
COLOR UR: YELLOW
CREAT SERPL-MCNC: 1.2 MG/DL (ref 0.5–1.4)
DIFFERENTIAL METHOD: ABNORMAL
EOSINOPHIL # BLD AUTO: 0.1 K/UL (ref 0–0.5)
EOSINOPHIL NFR BLD: 0.8 % (ref 0–8)
ERYTHROCYTE [DISTWIDTH] IN BLOOD BY AUTOMATED COUNT: 13.3 % (ref 11.5–14.5)
EST. GFR  (AFRICAN AMERICAN): >60 ML/MIN/1.73 M^2
EST. GFR  (NON AFRICAN AMERICAN): 56 ML/MIN/1.73 M^2
ESTIMATED AVG GLUCOSE: 103 MG/DL (ref 68–131)
ESTIMATED AVG GLUCOSE: 103 MG/DL (ref 68–131)
GLUCOSE SERPL-MCNC: 105 MG/DL (ref 70–110)
GLUCOSE UR QL STRIP: NEGATIVE
HBA1C MFR BLD: 5.2 % (ref 4–5.6)
HBA1C MFR BLD: 5.2 % (ref 4–5.6)
HCT VFR BLD AUTO: 52.6 % (ref 40–54)
HDLC SERPL-MCNC: 27 MG/DL (ref 40–75)
HDLC SERPL: 32.5 % (ref 20–50)
HGB BLD-MCNC: 18 G/DL (ref 14–18)
HGB UR QL STRIP: ABNORMAL
HYALINE CASTS #/AREA URNS LPF: 0 /LPF
IMM GRANULOCYTES # BLD AUTO: 0.09 K/UL (ref 0–0.04)
IMM GRANULOCYTES NFR BLD AUTO: 0.7 % (ref 0–0.5)
KETONES UR QL STRIP: ABNORMAL
LACTATE SERPL-SCNC: 1.2 MMOL/L (ref 0.5–2.2)
LDLC SERPL CALC-MCNC: 48 MG/DL (ref 63–159)
LEUKOCYTE ESTERASE UR QL STRIP: ABNORMAL
LYMPHOCYTES # BLD AUTO: 1.1 K/UL (ref 1–4.8)
LYMPHOCYTES NFR BLD: 8.3 % (ref 18–48)
MCH RBC QN AUTO: 33.8 PG (ref 27–31)
MCHC RBC AUTO-ENTMCNC: 34.2 G/DL (ref 32–36)
MCV RBC AUTO: 99 FL (ref 82–98)
MICROSCOPIC COMMENT: ABNORMAL
MONOCYTES # BLD AUTO: 1.4 K/UL (ref 0.3–1)
MONOCYTES NFR BLD: 10.2 % (ref 4–15)
NEUTROPHILS # BLD AUTO: 10.6 K/UL (ref 1.8–7.7)
NEUTROPHILS NFR BLD: 79.5 % (ref 38–73)
NITRITE UR QL STRIP: NEGATIVE
NONHDLC SERPL-MCNC: 56 MG/DL
NRBC BLD-RTO: 0 /100 WBC
PH UR STRIP: 6 [PH] (ref 5–8)
PLATELET # BLD AUTO: 288 K/UL (ref 150–450)
PMV BLD AUTO: 9.7 FL (ref 9.2–12.9)
POTASSIUM SERPL-SCNC: 4.3 MMOL/L (ref 3.5–5.1)
PROCALCITONIN SERPL IA-MCNC: 0.08 NG/ML
PROT SERPL-MCNC: 7.9 G/DL (ref 6–8.4)
PROT UR QL STRIP: ABNORMAL
RBC # BLD AUTO: 5.33 M/UL (ref 4.6–6.2)
RBC #/AREA URNS HPF: >100 /HPF (ref 0–4)
SARS-COV-2 RDRP RESP QL NAA+PROBE: NEGATIVE
SODIUM SERPL-SCNC: 139 MMOL/L (ref 136–145)
SP GR UR STRIP: >=1.03 (ref 1–1.03)
TRIGL SERPL-MCNC: 40 MG/DL (ref 30–150)
TROPONIN I SERPL DL<=0.01 NG/ML-MCNC: 0.01 NG/ML (ref 0–0.03)
TSH SERPL DL<=0.005 MIU/L-ACNC: 2.11 UIU/ML (ref 0.4–4)
URN SPEC COLLECT METH UR: ABNORMAL
UROBILINOGEN UR STRIP-ACNC: NEGATIVE EU/DL
WBC # BLD AUTO: 13.28 K/UL (ref 3.9–12.7)
WBC #/AREA URNS HPF: >100 /HPF (ref 0–5)

## 2022-01-26 PROCEDURE — 96361 HYDRATE IV INFUSION ADD-ON: CPT

## 2022-01-26 PROCEDURE — 25000003 PHARM REV CODE 250: Performed by: SURGERY

## 2022-01-26 PROCEDURE — 87040 BLOOD CULTURE FOR BACTERIA: CPT | Performed by: SURGERY

## 2022-01-26 PROCEDURE — 63600175 PHARM REV CODE 636 W HCPCS: Performed by: SURGERY

## 2022-01-26 PROCEDURE — 99223 PR INITIAL HOSPITAL CARE,LEVL III: ICD-10-PCS | Mod: GC,,, | Performed by: NEUROLOGICAL SURGERY

## 2022-01-26 PROCEDURE — 25000003 PHARM REV CODE 250: Performed by: NURSE PRACTITIONER

## 2022-01-26 PROCEDURE — 83036 HEMOGLOBIN GLYCOSYLATED A1C: CPT | Performed by: NURSE PRACTITIONER

## 2022-01-26 PROCEDURE — 80053 COMPREHEN METABOLIC PANEL: CPT | Performed by: SURGERY

## 2022-01-26 PROCEDURE — 84443 ASSAY THYROID STIM HORMONE: CPT | Performed by: NURSE PRACTITIONER

## 2022-01-26 PROCEDURE — 99223 PR INITIAL HOSPITAL CARE,LEVL III: ICD-10-PCS | Mod: ,,, | Performed by: PHYSICIAN ASSISTANT

## 2022-01-26 PROCEDURE — 36415 COLL VENOUS BLD VENIPUNCTURE: CPT | Performed by: PSYCHIATRY & NEUROLOGY

## 2022-01-26 PROCEDURE — 85025 COMPLETE CBC W/AUTO DIFF WBC: CPT | Performed by: SURGERY

## 2022-01-26 PROCEDURE — 84145 PROCALCITONIN (PCT): CPT | Performed by: SURGERY

## 2022-01-26 PROCEDURE — 99291 CRITICAL CARE FIRST HOUR: CPT | Mod: 25

## 2022-01-26 PROCEDURE — 99223 1ST HOSP IP/OBS HIGH 75: CPT | Mod: GC,,, | Performed by: NEUROLOGICAL SURGERY

## 2022-01-26 PROCEDURE — 80061 LIPID PANEL: CPT | Performed by: NURSE PRACTITIONER

## 2022-01-26 PROCEDURE — 86901 BLOOD TYPING SEROLOGIC RH(D): CPT | Performed by: NURSE PRACTITIONER

## 2022-01-26 PROCEDURE — 82553 CREATINE MB FRACTION: CPT | Performed by: SURGERY

## 2022-01-26 PROCEDURE — 99291 CRITICAL CARE FIRST HOUR: CPT | Mod: ,,, | Performed by: NURSE PRACTITIONER

## 2022-01-26 PROCEDURE — U0002 COVID-19 LAB TEST NON-CDC: HCPCS | Performed by: SURGERY

## 2022-01-26 PROCEDURE — 84484 ASSAY OF TROPONIN QUANT: CPT | Performed by: SURGERY

## 2022-01-26 PROCEDURE — 93010 EKG 12-LEAD: ICD-10-PCS | Mod: ,,, | Performed by: INTERNAL MEDICINE

## 2022-01-26 PROCEDURE — 99285 EMERGENCY DEPT VISIT HI MDM: CPT | Mod: 25

## 2022-01-26 PROCEDURE — 96366 THER/PROPH/DIAG IV INF ADDON: CPT

## 2022-01-26 PROCEDURE — 20000000 HC ICU ROOM

## 2022-01-26 PROCEDURE — 99291 PR CRITICAL CARE, E/M 30-74 MINUTES: ICD-10-PCS | Mod: ,,, | Performed by: NURSE PRACTITIONER

## 2022-01-26 PROCEDURE — 99223 1ST HOSP IP/OBS HIGH 75: CPT | Mod: ,,, | Performed by: PHYSICIAN ASSISTANT

## 2022-01-26 PROCEDURE — 96365 THER/PROPH/DIAG IV INF INIT: CPT

## 2022-01-26 PROCEDURE — 83605 ASSAY OF LACTIC ACID: CPT | Performed by: SURGERY

## 2022-01-26 PROCEDURE — 93010 ELECTROCARDIOGRAM REPORT: CPT | Mod: ,,, | Performed by: INTERNAL MEDICINE

## 2022-01-26 PROCEDURE — 36415 COLL VENOUS BLD VENIPUNCTURE: CPT | Performed by: SURGERY

## 2022-01-26 PROCEDURE — 87086 URINE CULTURE/COLONY COUNT: CPT | Performed by: SURGERY

## 2022-01-26 PROCEDURE — 83880 ASSAY OF NATRIURETIC PEPTIDE: CPT | Performed by: SURGERY

## 2022-01-26 PROCEDURE — 93005 ELECTROCARDIOGRAM TRACING: CPT

## 2022-01-26 PROCEDURE — 81000 URINALYSIS NONAUTO W/SCOPE: CPT | Performed by: SURGERY

## 2022-01-26 RX ORDER — MEMANTINE HYDROCHLORIDE 10 MG/1
10 TABLET ORAL 2 TIMES DAILY
Status: DISCONTINUED | OUTPATIENT
Start: 2022-01-26 | End: 2022-01-31 | Stop reason: HOSPADM

## 2022-01-26 RX ORDER — ONDANSETRON 2 MG/ML
4 INJECTION INTRAMUSCULAR; INTRAVENOUS EVERY 8 HOURS PRN
Status: DISCONTINUED | OUTPATIENT
Start: 2022-01-26 | End: 2022-01-31 | Stop reason: HOSPADM

## 2022-01-26 RX ORDER — CARVEDILOL 12.5 MG/1
12.5 TABLET ORAL 2 TIMES DAILY
Status: DISCONTINUED | OUTPATIENT
Start: 2022-01-26 | End: 2022-01-31 | Stop reason: HOSPADM

## 2022-01-26 RX ORDER — SODIUM CHLORIDE 0.9 % (FLUSH) 0.9 %
10 SYRINGE (ML) INJECTION
Status: DISCONTINUED | OUTPATIENT
Start: 2022-01-26 | End: 2022-01-31 | Stop reason: HOSPADM

## 2022-01-26 RX ORDER — CARBIDOPA AND LEVODOPA 25; 100 MG/1; MG/1
1 TABLET ORAL 3 TIMES DAILY
Status: DISCONTINUED | OUTPATIENT
Start: 2022-01-27 | End: 2022-01-31 | Stop reason: HOSPADM

## 2022-01-26 RX ORDER — TAMSULOSIN HYDROCHLORIDE 0.4 MG/1
0.4 CAPSULE ORAL DAILY
Status: DISCONTINUED | OUTPATIENT
Start: 2022-01-27 | End: 2022-01-31 | Stop reason: HOSPADM

## 2022-01-26 RX ORDER — ATORVASTATIN CALCIUM 40 MG/1
40 TABLET, FILM COATED ORAL DAILY
Status: DISCONTINUED | OUTPATIENT
Start: 2022-01-27 | End: 2022-01-31 | Stop reason: HOSPADM

## 2022-01-26 RX ORDER — LABETALOL HCL 20 MG/4 ML
10 SYRINGE (ML) INTRAVENOUS EVERY 4 HOURS PRN
Status: DISCONTINUED | OUTPATIENT
Start: 2022-01-26 | End: 2022-01-31 | Stop reason: HOSPADM

## 2022-01-26 RX ADMIN — MEMANTINE 10 MG: 10 TABLET ORAL at 10:01

## 2022-01-26 RX ADMIN — CARVEDILOL 12.5 MG: 3.12 TABLET, FILM COATED ORAL at 10:01

## 2022-01-26 RX ADMIN — CEFTRIAXONE 2 G: 2 INJECTION, SOLUTION INTRAVENOUS at 03:01

## 2022-01-26 RX ADMIN — SODIUM CHLORIDE 1000 ML: 0.9 INJECTION, SOLUTION INTRAVENOUS at 02:01

## 2022-01-26 NOTE — ED PROVIDER NOTES
[facility]                                                 I reviewed the ER triage nurse's note before evaluating the patient    Chief Complaint  81 y.o. male with Altered Mental Status    History of Present Illness  Eliazar James presents to the emergency room with altered mental status today  Patient with altered mental status today, patient with continued confusion this week  Patient was seen 3 days ago in the emergency room with a urinary tract infection  Patient has had longstanding issues with urinary tract infection, on Ceftin at the NH  Daughter states they issues not improved with increasing confusion the last 48 hours  The patient is altered but also has vascular Parkinson's, the patient is a DNR per HX    The history is provided by the patient  Previous medical records were obtained from [employer]  Previous records are summarized from prior ER visits and hospitalizations    Past Medical History   -- *Atrial fibrillation    -- Anxiety    -- Atrial fibrillation    -- Blood transfusion    -- CAD (coronary artery disease)    -- Cancer    -- Cataract    -- Clotting disorder    -- Coronary artery disease    -- Depression    -- HTN (hypertension)    -- Hx-TIA (transient ischemic attack)    -- Hyperlipidemia    -- Hypertension    -- Myocardial infarction    -- Renal calculus, left    -- S/P CABG (coronary artery bypass graft)    -- Stroke    -- Urinary tract infection       Surgeries:  Cataract, CABG, D&C, TURP  No known drug allergies  No significant family history  No significant social history, nonsmoker    I have reviewed all of this patient's past medical, surgical, family, and social   histories as well as active allergies and medications documented in the  electronic medical record    Review of Systems and Physical Exam      Review of Systems (all other ROS are otherwise negative)  -- patient cannot give an accurate review of systems    Vital Signs (reviewed by the physician)  His  tympanic temperature is 98.2 °F (36.8 °C).   His blood pressure is 131/76 and his pulse is 85.   His respiration is 18 and oxygen saturation is 99%.     Physical Exam  -- Nursing note and vitals reviewed  -- Constitutional:  Mac aided white male  -- Head: Atraumatic. Normocephalic. No obvious abnormality  -- Eyes: Pupils are equal and reactive to light. Normal conjunctiva and lids  -- Cardiac: Normal rate, regular rhythm and normal heart sounds  -- Respiratory: Normal respiratory effort, breath sounds clear to auscultation  -- Gastrointestinal: Soft, no tenderness. Normal bowel sounds. Normal liver edge  -- Musculoskeletal: Normal range of motion, no effusions. Joints stable   -- Neurological:  Confused, no focal deficits noted  -- Vascular: Posterior tibial, dorsalis pedis and radial pulses 2+ bilaterally      Emergency Room Course      Urinalysis  Appearance, UA Cloudy (*)   Specific Gravity, UA >=1.030 (*)   Protein, UA 2+ (*)   Ketones, UA Trace (*)   Occult Blood UA 3+ (*)   Leukocytes, UA 3+ (*)   WBC, UA >100 (*)   Bacteria Many (*)     Lab Results (reviewed by the physician)     K 4.3      CO2 27   BUN 22   CREATININE 1.2      ALKPHOS 101   AST 21   ALT 17   BILITOT 1.2 (H)   ALBUMIN 3.0 (L)   PROT 7.9   WBC 13.28 (H)   HGB 18.0   HCT 52.6       (H)    (H)   CPKMB 18.9 (H)   TROPONINI 0.010   BNP 82   LACTATE 1.2     EKG (interpreted by the physician)  Overall Interpretation: normal rate and rhythm with no obvious ischemic changes  Normal sinus rhythm @ 85 bpm  No ST elevation or depression  No arrhythmia or QRS change noted  Similar when compared to previous EKG    CT head (images visualized & reports reviewed by the physician)  Interval development of hemorrhage layering dependently within the posterior horns of the bilateral lateral ventricles, new from the previous study of 01/05/2022.  There is slight prominence of the ventricular system, perhaps slightly more so  than compared to the prior examination which could suggest a component of developing hydrocephalus.   Age-appropriate generalized cerebral volume loss with moderate chronic microvascular ischemic disease.  No definite new parenchymal hypoattenuation to suggest an acute infarction.   Small foci of venous air noted.  Clinically correlate for recent intravenous vascular access.     Additional Work up (reviewed by the physician)  -- Blood cultures have also been drawn, results are pending  -- The urine today has been sent for lab culture, results pending  -- rapid Coronavirus PCR was negative    Medications Given  sodium chloride 0.9% bolus 1,000 mL    cefTRIAXone (ROCEPHIN) 2 g/50 mL D5W IVPB      Critical Care ED Physician Time (minutes):  -- Performed by: Homero Barrera M.D.  -- Date/Time: 3:28 PM 1/26/2022   -- Direct Patient Care (Face Time): 5  -- Additional History from Records or Taking Additional History: 5  -- Ordering, Reviewing, and Interpreting Diagnostic Studies: 5  -- Total Time in Documentation: 11  -- Consultation with Other Physicians: 5  -- Consultation with Family Related to Condition: 0  -- Total Critical Care Time: 31  -- Critical care was necessary to treat intraventricular hemorrhage and AMS  -- Critical care was time spent personally by me on the following activities:   -- blood draw for specimens discussions with consultants,   -- development of treatment plan with patient or surrogate,   -- examination of patient, ordering and performing treatments   -- review of radiographic studies, re-evaluation of pt's condition  -- review of labs and evaluation of response to treatment    Medical Decision Making     ED Course/ED Management  -- patient with altered mental status with intraventricular brain bleed noted today  -- this is new finding, patient had a fall 3 weeks ago at the nursing home  -- the patient has significant altered mental status but is maintaining his airway  -- the patient is a DNR,  patient's daughter at bedside given full review of situation  -- will treat UTI, previous urine culture with Proteus reviewed in the ER today  -- immediate transfer requested, however there is a severe bed shortage noted  -- transfer center contacted, patient needs neuro critical care as soon as possible    Assessment, Disposition, & Plan      Diagnosis  [R41.82] Altered mental status  [I61.5] Intraventricular hemorrhage (Primary)  [N30.00] Acute cystitis without hematuria    Disposition and Plan  -- Disposition: transfer  -- Condition: stable    This note is dictated on M*Modal word recognition program.  There are word recognition mistakes that are occasionally missed on review.         Homero Barrera MD  01/26/22 1521

## 2022-01-27 PROBLEM — G93.40 ACUTE ENCEPHALOPATHY: Status: ACTIVE | Noted: 2022-01-27

## 2022-01-27 LAB
ALBUMIN SERPL BCP-MCNC: 2.4 G/DL (ref 3.5–5.2)
ALP SERPL-CCNC: 82 U/L (ref 55–135)
ALT SERPL W/O P-5'-P-CCNC: 27 U/L (ref 10–44)
ANION GAP SERPL CALC-SCNC: 9 MMOL/L (ref 8–16)
APTT BLDCRRT: 29.3 SEC (ref 21–32)
ASCENDING AORTA: 3.16 CM
AST SERPL-CCNC: 14 U/L (ref 10–40)
AV INDEX (PROSTH): 1.15
AV MEAN GRADIENT: 1 MMHG
AV PEAK GRADIENT: 2 MMHG
AV VALVE AREA: 3.71 CM2
AV VELOCITY RATIO: 1.07
BACTERIA #/AREA URNS AUTO: ABNORMAL /HPF
BACTERIA UR CULT: NO GROWTH
BASOPHILS # BLD AUTO: 0.05 K/UL (ref 0–0.2)
BASOPHILS NFR BLD: 0.4 % (ref 0–1.9)
BILIRUB SERPL-MCNC: 0.9 MG/DL (ref 0.1–1)
BILIRUB UR QL STRIP: NEGATIVE
BSA FOR ECHO PROCEDURE: 1.82 M2
BUN SERPL-MCNC: 22 MG/DL (ref 8–23)
CALCIUM SERPL-MCNC: 8.2 MG/DL (ref 8.7–10.5)
CHLORIDE SERPL-SCNC: 107 MMOL/L (ref 95–110)
CK MB SERPL-MCNC: 7.8 NG/ML (ref 0.1–6.5)
CK MB SERPL-RTO: 1.7 % (ref 0–5)
CK SERPL-CCNC: 469 U/L (ref 20–200)
CLARITY UR REFRACT.AUTO: CLEAR
CO2 SERPL-SCNC: 24 MMOL/L (ref 23–29)
COLOR UR AUTO: YELLOW
CREAT SERPL-MCNC: 1 MG/DL (ref 0.5–1.4)
CV ECHO LV RWT: 0.41 CM
DIFFERENTIAL METHOD: ABNORMAL
DOP CALC AO PEAK VEL: 0.72 M/S
DOP CALC AO VTI: 11.26 CM
DOP CALC LVOT AREA: 3.2 CM2
DOP CALC LVOT DIAMETER: 2.03 CM
DOP CALC LVOT PEAK VEL: 0.77 M/S
DOP CALC LVOT STROKE VOLUME: 41.79 CM3
DOP CALCLVOT PEAK VEL VTI: 12.92 CM
ECHO LV POSTERIOR WALL: 0.97 CM (ref 0.6–1.1)
EJECTION FRACTION: 55 %
EOSINOPHIL # BLD AUTO: 0.2 K/UL (ref 0–0.5)
EOSINOPHIL NFR BLD: 1.2 % (ref 0–8)
ERYTHROCYTE [DISTWIDTH] IN BLOOD BY AUTOMATED COUNT: 13.2 % (ref 11.5–14.5)
EST. GFR  (AFRICAN AMERICAN): >60 ML/MIN/1.73 M^2
EST. GFR  (NON AFRICAN AMERICAN): >60 ML/MIN/1.73 M^2
FRACTIONAL SHORTENING: 43 % (ref 28–44)
GLUCOSE SERPL-MCNC: 92 MG/DL (ref 70–110)
GLUCOSE UR QL STRIP: NEGATIVE
HCT VFR BLD AUTO: 45 % (ref 40–54)
HGB BLD-MCNC: 14.9 G/DL (ref 14–18)
HGB UR QL STRIP: ABNORMAL
HYALINE CASTS UR QL AUTO: 0 /LPF
IMM GRANULOCYTES # BLD AUTO: 0.08 K/UL (ref 0–0.04)
IMM GRANULOCYTES NFR BLD AUTO: 0.6 % (ref 0–0.5)
INR PPP: 1.1 (ref 0.8–1.2)
INTERVENTRICULAR SEPTUM: 0.95 CM (ref 0.6–1.1)
KETONES UR QL STRIP: ABNORMAL
LA MAJOR: 5.12 CM
LA MINOR: 5.2 CM
LA WIDTH: 3.66 CM
LEFT ATRIUM SIZE: 4.26 CM
LEFT ATRIUM VOLUME INDEX: 37.8 ML/M2
LEFT ATRIUM VOLUME: 68.38 CM3
LEFT INTERNAL DIMENSION IN SYSTOLE: 2.7 CM (ref 2.1–4)
LEFT VENTRICLE DIASTOLIC VOLUME INDEX: 57.83 ML/M2
LEFT VENTRICLE DIASTOLIC VOLUME: 104.67 ML
LEFT VENTRICLE MASS INDEX: 87 G/M2
LEFT VENTRICLE SYSTOLIC VOLUME INDEX: 15 ML/M2
LEFT VENTRICLE SYSTOLIC VOLUME: 27.09 ML
LEFT VENTRICULAR INTERNAL DIMENSION IN DIASTOLE: 4.75 CM (ref 3.5–6)
LEFT VENTRICULAR MASS: 158.32 G
LEUKOCYTE ESTERASE UR QL STRIP: ABNORMAL
LYMPHOCYTES # BLD AUTO: 1.1 K/UL (ref 1–4.8)
LYMPHOCYTES NFR BLD: 8.7 % (ref 18–48)
MAGNESIUM SERPL-MCNC: 2 MG/DL (ref 1.6–2.6)
MCH RBC QN AUTO: 32.8 PG (ref 27–31)
MCHC RBC AUTO-ENTMCNC: 33.1 G/DL (ref 32–36)
MCV RBC AUTO: 99 FL (ref 82–98)
MICROSCOPIC COMMENT: ABNORMAL
MONOCYTES # BLD AUTO: 1.3 K/UL (ref 0.3–1)
MONOCYTES NFR BLD: 10.2 % (ref 4–15)
NEUTROPHILS # BLD AUTO: 10.2 K/UL (ref 1.8–7.7)
NEUTROPHILS NFR BLD: 78.9 % (ref 38–73)
NITRITE UR QL STRIP: NEGATIVE
NRBC BLD-RTO: 0 /100 WBC
PH UR STRIP: 6 [PH] (ref 5–8)
PHOSPHATE SERPL-MCNC: 2.9 MG/DL (ref 2.7–4.5)
PLATELET # BLD AUTO: 237 K/UL (ref 150–450)
PMV BLD AUTO: 10.1 FL (ref 9.2–12.9)
POTASSIUM SERPL-SCNC: 4 MMOL/L (ref 3.5–5.1)
PROT SERPL-MCNC: 5.8 G/DL (ref 6–8.4)
PROT UR QL STRIP: ABNORMAL
PROTHROMBIN TIME: 12 SEC (ref 9–12.5)
RA MAJOR: 5.24 CM
RA WIDTH: 2.65 CM
RBC # BLD AUTO: 4.54 M/UL (ref 4.6–6.2)
RBC #/AREA URNS AUTO: >100 /HPF (ref 0–4)
RIGHT VENTRICULAR END-DIASTOLIC DIMENSION: 2.53 CM
SINUS: 3.03 CM
SODIUM SERPL-SCNC: 140 MMOL/L (ref 136–145)
SP GR UR STRIP: >1.03 (ref 1–1.03)
STJ: 3.13 CM
TRICUSPID ANNULAR PLANE SYSTOLIC EXCURSION: 1.29 CM
URN SPEC COLLECT METH UR: ABNORMAL
VIT B12 SERPL-MCNC: 306 PG/ML (ref 210–950)
WBC # BLD AUTO: 12.91 K/UL (ref 3.9–12.7)
WBC #/AREA URNS AUTO: >100 /HPF (ref 0–5)

## 2022-01-27 PROCEDURE — 85610 PROTHROMBIN TIME: CPT | Performed by: NURSE PRACTITIONER

## 2022-01-27 PROCEDURE — 80053 COMPREHEN METABOLIC PANEL: CPT | Performed by: NURSE PRACTITIONER

## 2022-01-27 PROCEDURE — 25000003 PHARM REV CODE 250: Performed by: PSYCHIATRY & NEUROLOGY

## 2022-01-27 PROCEDURE — 85730 THROMBOPLASTIN TIME PARTIAL: CPT | Performed by: NURSE PRACTITIONER

## 2022-01-27 PROCEDURE — 25500020 PHARM REV CODE 255: Performed by: PSYCHIATRY & NEUROLOGY

## 2022-01-27 PROCEDURE — 99291 PR CRITICAL CARE, E/M 30-74 MINUTES: ICD-10-PCS | Mod: ,,,

## 2022-01-27 PROCEDURE — 99233 SBSQ HOSP IP/OBS HIGH 50: CPT | Mod: ,,, | Performed by: PSYCHIATRY & NEUROLOGY

## 2022-01-27 PROCEDURE — 81001 URINALYSIS AUTO W/SCOPE: CPT | Performed by: NURSE PRACTITIONER

## 2022-01-27 PROCEDURE — 92610 EVALUATE SWALLOWING FUNCTION: CPT

## 2022-01-27 PROCEDURE — 87077 CULTURE AEROBIC IDENTIFY: CPT | Performed by: NURSE PRACTITIONER

## 2022-01-27 PROCEDURE — 25000003 PHARM REV CODE 250: Performed by: NURSE PRACTITIONER

## 2022-01-27 PROCEDURE — 92523 SPEECH SOUND LANG COMPREHEN: CPT

## 2022-01-27 PROCEDURE — 85025 COMPLETE CBC W/AUTO DIFF WBC: CPT | Performed by: NURSE PRACTITIONER

## 2022-01-27 PROCEDURE — 99233 PR SUBSEQUENT HOSPITAL CARE,LEVL III: ICD-10-PCS | Mod: ,,, | Performed by: PSYCHIATRY & NEUROLOGY

## 2022-01-27 PROCEDURE — 82607 VITAMIN B-12: CPT

## 2022-01-27 PROCEDURE — 87086 URINE CULTURE/COLONY COUNT: CPT | Performed by: NURSE PRACTITIONER

## 2022-01-27 PROCEDURE — 87186 SC STD MICRODIL/AGAR DIL: CPT | Performed by: NURSE PRACTITIONER

## 2022-01-27 PROCEDURE — 99233 PR SUBSEQUENT HOSPITAL CARE,LEVL III: ICD-10-PCS | Mod: GC,,, | Performed by: NEUROLOGICAL SURGERY

## 2022-01-27 PROCEDURE — 87088 URINE BACTERIA CULTURE: CPT | Performed by: NURSE PRACTITIONER

## 2022-01-27 PROCEDURE — 63600175 PHARM REV CODE 636 W HCPCS: Performed by: PSYCHIATRY & NEUROLOGY

## 2022-01-27 PROCEDURE — 83735 ASSAY OF MAGNESIUM: CPT | Performed by: NURSE PRACTITIONER

## 2022-01-27 PROCEDURE — 95720 EEG PHY/QHP EA INCR W/VEEG: CPT | Mod: ,,, | Performed by: PSYCHIATRY & NEUROLOGY

## 2022-01-27 PROCEDURE — 43752 NASAL/OROGASTRIC W/TUBE PLMT: CPT

## 2022-01-27 PROCEDURE — 20000000 HC ICU ROOM

## 2022-01-27 PROCEDURE — 82553 CREATINE MB FRACTION: CPT | Performed by: NURSE PRACTITIONER

## 2022-01-27 PROCEDURE — 99291 CRITICAL CARE FIRST HOUR: CPT | Mod: ,,,

## 2022-01-27 PROCEDURE — 84100 ASSAY OF PHOSPHORUS: CPT | Performed by: NURSE PRACTITIONER

## 2022-01-27 PROCEDURE — 95720 PR EEG, W/VIDEO, CONT RECORD, I&R, >12<26 HRS: ICD-10-PCS | Mod: ,,, | Performed by: PSYCHIATRY & NEUROLOGY

## 2022-01-27 PROCEDURE — 95700 EEG CONT REC W/VID EEG TECH: CPT

## 2022-01-27 PROCEDURE — 95714 VEEG EA 12-26 HR UNMNTR: CPT

## 2022-01-27 PROCEDURE — 99233 SBSQ HOSP IP/OBS HIGH 50: CPT | Mod: GC,,, | Performed by: NEUROLOGICAL SURGERY

## 2022-01-27 PROCEDURE — 94761 N-INVAS EAR/PLS OXIMETRY MLT: CPT

## 2022-01-27 RX ORDER — CEFEPIME HYDROCHLORIDE 1 G/50ML
1 INJECTION, SOLUTION INTRAVENOUS
Status: DISCONTINUED | OUTPATIENT
Start: 2022-01-27 | End: 2022-01-28

## 2022-01-27 RX ORDER — MUPIROCIN 20 MG/G
OINTMENT TOPICAL 2 TIMES DAILY
Status: DISCONTINUED | OUTPATIENT
Start: 2022-01-27 | End: 2022-01-31 | Stop reason: HOSPADM

## 2022-01-27 RX ORDER — PROPRANOLOL HYDROCHLORIDE 80 MG/1
80 CAPSULE, EXTENDED RELEASE ORAL DAILY
Status: ON HOLD | COMMUNITY
Start: 2021-12-31 | End: 2022-01-31 | Stop reason: HOSPADM

## 2022-01-27 RX ADMIN — CARVEDILOL 12.5 MG: 3.12 TABLET, FILM COATED ORAL at 09:01

## 2022-01-27 RX ADMIN — MEMANTINE 10 MG: 10 TABLET ORAL at 09:01

## 2022-01-27 RX ADMIN — TAMSULOSIN HYDROCHLORIDE 0.4 MG: 0.4 CAPSULE ORAL at 08:01

## 2022-01-27 RX ADMIN — MUPIROCIN: 20 OINTMENT TOPICAL at 09:01

## 2022-01-27 RX ADMIN — CEFEPIME 1 G: 2 INJECTION, POWDER, FOR SOLUTION INTRAVENOUS at 11:01

## 2022-01-27 RX ADMIN — MEMANTINE 10 MG: 10 TABLET ORAL at 08:01

## 2022-01-27 RX ADMIN — MUPIROCIN: 20 OINTMENT TOPICAL at 10:01

## 2022-01-27 RX ADMIN — HUMAN ALBUMIN MICROSPHERES AND PERFLUTREN 0.66 MG: 10; .22 INJECTION, SOLUTION INTRAVENOUS at 09:01

## 2022-01-27 RX ADMIN — CARVEDILOL 12.5 MG: 3.12 TABLET, FILM COATED ORAL at 08:01

## 2022-01-27 RX ADMIN — CARBIDOPA AND LEVODOPA 1 TABLET: 25; 100 TABLET ORAL at 08:01

## 2022-01-27 RX ADMIN — CARBIDOPA AND LEVODOPA 1 TABLET: 25; 100 TABLET ORAL at 09:01

## 2022-01-27 RX ADMIN — CEFEPIME 1 G: 2 INJECTION, POWDER, FOR SOLUTION INTRAVENOUS at 10:01

## 2022-01-27 RX ADMIN — CARBIDOPA AND LEVODOPA 1 TABLET: 25; 100 TABLET ORAL at 03:01

## 2022-01-27 RX ADMIN — ATORVASTATIN CALCIUM 40 MG: 40 TABLET, FILM COATED ORAL at 08:01

## 2022-01-27 NOTE — HOSPITAL COURSE
Patient with IVH in occipital horns bilaterally. Echo with EF 55% and mild LAE. MRI Brain WO with areas of susceptibility in the right posterior sylvian fissure which could be acute or chronic subarachnoid blood.  Punctate susceptibility also seen in the left frontal lobe white matter probably from remote microhemorrhage. CTA H/N obtained and unremarkable for any AVMs, venous anomaly, or stenosis. During admission, patient with LLE increased tone/contracture with hamstring pain with movement for which baclofen was started. He was evaluated by PT/OT who recommended SNF and SLP who recommended regular thin diet. Patient is stable for discharge to SNF. Eliquis to be resumed 7 days post initial event (on 2/2/2022). At this time unclear as to what is the etiology of patient's IVH. Will focus on RF control at this time. He will follow-up with PCP in 1 week and with vascular neurology in 4-6 weeks.

## 2022-01-27 NOTE — ASSESSMENT & PLAN NOTE
- NSGY and Vn following   - SBP <160  - CTH: Interval development of hemorrhage layering dependently within the posterior horns of the bilateral lateral ventricles, new from the previous study of 01/05/2022.  There is slight prominence of the ventricular system, perhaps slightly more so than compared to the prior examination which could suggest a component of developing hydrocephalus.  - Repeat CTH pending   - Anticoagulation status: on Eliquis at home being held   - Q 1 vitals   - Q 1 neuro checks   - TSH, A1c, lipid, echo and EKG  -  Pt/OT/SLP eval and treat  - NPO

## 2022-01-27 NOTE — H&P
[facility]  Neurocritical Care  History & Physical    Admit Date: 1/26/2022  Service Date: 01/26/2022  Length of Stay: 0    Subjective:     Chief Complaint: IVH (intraventricular hemorrhage)    History of Present Illness: Eliazar James is an 82 yo M with a PMH of CAD, MI, CVA, vascular Parkinsons, HLD, HTN who presents to [facility] for IVH. He presented to [facility] emergency room with altered mental status today  Patient with altered mental status today, patient with continued confusion x1 week. Patient seen 3 days ago in the emergency room with a urinary tract infection. Patient has had longstanding issues with urinary tract infection, on Ceftin at the NH. Daughter Reported  AMS not improved with increasing confusion the last 48 hours. He is being admitted to [facility] for a higher level of care.       Past Medical History:   Diagnosis Date    *Atrial fibrillation     Anxiety     Atrial fibrillation 7/5/2012    Blood transfusion     CAD (coronary artery disease) 7/10/2012    Cancer     skin    Cataract     removed from both    Clotting disorder     Coronary artery disease     Depression     HTN (hypertension) 7/10/2012    Hx-TIA (transient ischemic attack) 7/10/2012    Hyperlipidemia     Hypertension     Myocardial infarction     Renal calculus, left 8/18/2017    S/P CABG (coronary artery bypass graft) 1/8/2013    Stroke 2/12    TIA    Urinary tract infection      Past Surgical History:   Procedure Laterality Date    CATARACT EXTRACTION W/  INTRAOCULAR LENS IMPLANT Right 03/27/2008    CATARACT EXTRACTION W/  INTRAOCULAR LENS IMPLANT Left 12/06/2007    CORONARY ARTERY BYPASS GRAFT      DILATION OF URETHRA N/A 1/20/2020    Procedure: DILATION, URETHRA;  Surgeon: Ge Ramon MD;  [facility]: [facility];  Service: Urology;  Laterality: N/A;    TRANSURETHRAL RESECTION OF PROSTATE N/A 1/20/2020    Procedure: TURP (TRANSURETHRAL RESECTION OF PROSTATE);  Surgeon: Ge Ramon MD;   Location[facility] OR;  Service: Urology;  Laterality: N/A;    triple bypass        Current Facility-Administered Medications on File Prior to Encounter   Medication Dose Route Frequency Provider Last Rate Last Admin    [COMPLETED] cefTRIAXone (ROCEPHIN) 2 g/50 mL D5W IVPB  2 g Intravenous ED 1 Time Homero Barrera MD   Stopped at 01/26/22 1702    [COMPLETED] sodium chloride 0.9% bolus 1,000 mL  1,000 mL Intravenous ED 1 Time Homero Barrera MD   Stopped at 01/26/22 1530     Current Outpatient Medications on File Prior to Encounter   Medication Sig Dispense Refill    ascorbic acid, vitamin C, (VITAMIN C) 500 MG tablet Take 500 mg by mouth once daily.      atorvastatin (LIPITOR) 40 MG tablet Take 1 tablet (40 mg total) by mouth once daily. 90 tablet 3    busPIRone (BUSPAR) 10 MG tablet Take 10 mg by mouth 2 (two) times daily.       carbidopa-levodopa  mg (SINEMET)  mg per tablet Take 1 tablet by mouth 3 (three) times daily. 270 tablet 1    CARTIA  mg 24 hr capsule Take 1 capsule (180 mg total) by mouth once daily. 90 capsule 3    carvediloL (COREG) 12.5 MG tablet Take 12.5 mg by mouth 2 (two) times daily.       cefUROXime (CEFTIN) 500 MG tablet Take 1 tablet (500 mg total) by mouth 2 (two) times daily. for 7 days 14 tablet 0    docusate sodium (COLACE) 100 MG capsule Take 100 mg by mouth once daily.      DULoxetine (CYMBALTA) 30 MG capsule Take 60 mg by mouth once daily.       ELIQUIS 5 mg Tab 5 mg 2 (two) times daily.       FLUoxetine 20 MG capsule Take 20 mg by mouth once daily.       fluticasone (FLONASE) 50 mcg/actuation nasal spray 2 sprays (100 mcg total) by Each Nare route once daily. 16 g 5    fosfomycin (MONUROL) 3 gram Pack       lactulose (CHRONULAC) 10 gram/15 mL solution       loratadine (CLARITIN) 10 mg tablet Take 10 mg by mouth once daily.      losartan (COZAAR) 50 MG tablet Take 50 mg by mouth once daily.       memantine (NAMENDA) 10 MG Tab 10 mg 2 (two) times  daily.       methenamine (HIPREX) 1 gram Tab Take 1 g by mouth 2 (two) times daily.      mirtazapine (REMERON) 30 MG tablet Take 1 tablet (30 mg total) by mouth every evening. (Patient taking differently: Take 45 mg by mouth every evening. ) 30 tablet 0    polyethylene glycol (GLYCOLAX) 17 gram PwPk Take by mouth.      tamsulosin (FLOMAX) 0.4 mg Cap         Allergies: No known drug allergies    Family History   Problem Relation Age of Onset    Heart attack Father     Heart disease Father     Stroke Father     Alcohol abuse Father     Stroke Mother     Dementia Mother     Hypertension Mother     Diabetes Mother     Melanoma Neg Hx     Psoriasis Neg Hx     Lupus Neg Hx     Eczema Neg Hx     Acne Neg Hx     Prostate cancer Neg Hx     Kidney disease Neg Hx      Social History     Tobacco Use    Smoking status: Never Smoker    Smokeless tobacco: Never Used   Substance Use Topics    Alcohol use: No    Drug use: No     Review of Systems   Unable to perform due to AMS     Objective:     Vitals:    Temp: 98.9 °F (37.2 °C)  Pulse: 82  BP: (!) 158/83  MAP (mmHg): 112  Resp: 20  SpO2: 100 %  O2 Device (Oxygen Therapy): room air    Temp  Min: 98 °F (36.7 °C)  Max: 98.9 °F (37.2 °C)  Pulse  Min: 79  Max: 85  BP  Min: 131/76  Max: 160/88  MAP (mmHg)  Min: 92  Max: 117  Resp  Min: 18  Max: 24  SpO2  Min: 99 %  Max: 100 %    No intake/output data recorded.           Physical Exam      Physical Exam:  GA: Alert, comfortable, no acute distress.   HEENT: No scleral icterus or JVD.   Pulmonary: Clear to auscultation A/L.   Cardiac: irregular rate controlled.   Abdominal: Bowel sounds present x 4.   Skin: No jaundice, rashes, or visible lesions.  Neuro:  --GCS: E4 V4 M6  --Mental Status:  Alert, oriented to person, follows commands   --CN II-XII grossly intact.   --Pupils 3mm, PERRL.   --Corneal reflex, gag, cough intact.  --LUE strength: 4/5  --RUE strength: 4/5  --LLE strength: 3/5  --RLE strength: 3/5    Unable  to test gait due to level of consciousness.    Today I personally reviewed pertinent medications, lines/drains/airways, imaging, laboratory results, notably: CTH    Assessment/Plan:     Neuro  * IVH (intraventricular hemorrhage)  - NSGY and Vn following   - SBP <160  - CTH: Interval development of hemorrhage layering dependently within the posterior horns of the bilateral lateral ventricles, new from the previous study of 01/05/2022.  There is slight prominence of the ventricular system, perhaps slightly more so than compared to the prior examination which could suggest a component of developing hydrocephalus.  - Repeat CTH pending   - Anticoagulation status: on Eliquis at home being held   - Q 1 vitals   - Q 1 neuro checks   - TSH, A1c, lipid, echo and EKG  -  Pt/OT/SLP eval and treat  - NPO       Cardiac/Vascular  Hyperlipidemia  - lipid panel   - atorvastatin daily     CAD (coronary artery disease)  - continue coreg   - hold anticoagulation in setting of IVH     Chronic atrial fibrillation  - continue coreg   - hold anticoagulation   - rate controlled     Renal/  Urinary tract infection without hematuria  - ua osh culture pending was being treated for recurrent uti at nursing home with ceftin  - previous urine culture with Proteus reviewed    Hematology  Long term current use of anticoagulant therapy  - hold anticoagulation           The patient is being Prophylaxed for:  Venous Thromboembolism with: Mechanical  Stress Ulcer with: Not Applicable   Ventilator Pneumonia with: not applicable    Activity Orders          Turn patient starting at 01/26 2200    Elevate HOB starting at 01/26 2028    Diet NPO: NPO starting at 01/26 2028        DNR      Critical condition in that Patient has a condition that poses threat to life and bodily function: IVH, Long term anticoagulation, Encephalopathy, UTI, CAD, HLD, HTN     35 minutes of Critical care time was spent personally by me on the following activities: development of  treatment plan with patient or surrogate and bedside caregivers, discussions with consultants, evaluation of patient's response to treatment, examination of patient, ordering and performing treatments and interventions, ordering and review of laboratory studies, ordering and review of radiographic studies, pulse oximetry, antibiotic titration if applicable, vasopressor titration if applicable, re-evaluation of patient's condition. This critical care time did not overlap with that of any other provider or involve time for any procedures. There is high probability for acute neurological change leading to clinical and possibly life-threatening deterioration requiring highest level of physician preparedness for urgent intervention.      Doug Telles, BENJAMIN  Neurocritical Care  [facility]

## 2022-01-27 NOTE — PROGRESS NOTES
[facility]  Neurocritical Care  Progress Note    Admit Date: 1/26/2022  Service Date: 01/27/2022  Length of Stay: 1    Subjective:     Chief Complaint: Acute encephalopathy    History of Present Illness: Eliazar James is an 82 yo M with a PMH of CAD, MI, CVA, vascular Parkinsons, HLD, HTN who presents to [facility] for IVH. He presented to [facility] emergency room with altered mental status today  Patient with altered mental status today, patient with continued confusion x1 week. Patient seen 3 days ago in the emergency room with a urinary tract infection. Patient has had longstanding issues with urinary tract infection, on Ceftin at the NH. Daughter Reported  AMS not improved with increasing confusion the last 48 hours. He is being admitted to [facility] for a higher level of care.       Hospital Course: 01/27/2022: Patient w/ continued waxing/waning lethargy. TSH, ammonia wnl. B12 and EEG pending. IVH stable. Changed abx to cefepime for concerns of resistance.       Interval History:  Patient w/ continued waxing/waning lethargy. TSH, ammonia wnl. B12 and EEG pending (r/o NCSE). IVH stable on imaging. Changed abx to cefepime for concerns of resistance.     Review of Systems   Unable to perform ROS: Mental status change     Objective:     Vitals:  Temp: 98.5 °F (36.9 °C)  Pulse: 82  Rhythm: normal sinus rhythm  BP: (!) 153/82  MAP (mmHg): 107  Resp: (!) 28  SpO2: 97 %  O2 Device (Oxygen Therapy): room air    Temp  Min: 98 °F (36.7 °C)  Max: 99 °F (37.2 °C)  Pulse  Min: 78  Max: 88  BP  Min: 116/70  Max: 162/83  MAP (mmHg)  Min: 88  Max: 117  Resp  Min: 18  Max: 33  SpO2  Min: 97 %  Max: 100 %    01/26 0701 - 01/27 0700  In: 70 [P.O.:70]  Out: 450 [Urine:450]   Unmeasured Output  Stool Occurrence: 0       Physical Exam  Vitals and nursing note reviewed.   Constitutional:       General: He is not in acute distress.     Appearance: He is normal weight. He is not ill-appearing, toxic-appearing or diaphoretic.       Comments: Patient lying in bed w/ legs curled up to left side.    HENT:      Head: Normocephalic.      Right Ear: External ear normal.      Left Ear: External ear normal.      Nose: Nose normal.      Mouth/Throat:      Mouth: Mucous membranes are moist.      Pharynx: Oropharynx is clear.   Eyes:      General:         Right eye: No discharge.         Left eye: No discharge.   Cardiovascular:      Rate and Rhythm: Normal rate and regular rhythm.      Pulses: Normal pulses.   Pulmonary:      Effort: Pulmonary effort is normal. No respiratory distress.   Abdominal:      General: Abdomen is flat.      Palpations: Abdomen is soft.      Tenderness: There is no abdominal tenderness.   Skin:     General: Skin is warm and dry.      Capillary Refill: Capillary refill takes less than 2 seconds.   Neurological:      Comments: No sedation.   E3 V4 M6    Patient lethargic, but awakens to voice and following commands.     PERRL, 3mm bilat. EOMI.     Motor:  RUE:4/5  LUE: 4/5  RLE:3/5  LLE: 3/5    Sensation grossly intact.     Gait deffered       Medications:  Continuous Scheduledatorvastatin, 40 mg, Daily  carbidopa-levodopa  mg, 1 tablet, TID  carvediloL, 12.5 mg, BID  ceFEPime (MAXIPIME) IVPB, 1 g, Q12H  memantine, 10 mg, BID  mupirocin, , BID  tamsulosin, 0.4 mg, Daily    PRNlabetalol, 10 mg, Q4H PRN  ondansetron, 4 mg, Q8H PRN  sodium chloride 0.9%, 10 mL, PRN      Today I personally reviewed pertinent medications, lines/drains/airways, imaging, cardiology results, laboratory results, microbiology results, notably: CTH     Diet  No diet orders on file  No diet orders on file    Assessment/Plan:     Neuro  * Acute encephalopathy  -Admitted to [facility] for close neurologic monitoring  -TSH WNL  -Ammonia WNL   -B12 pending  -EEG to r/o NCSE pending in setting of waxing/waning lethargy and new onset AMS  -Concerns for urosepsis w/ positive urine culture and history of recurrent UTI. Started on ceftriaxone intially, changed to  cefepime 1/27 for concerns of resistance.     IVH (intraventricular hemorrhage)  - NSGY and Vn following   - SBP <160  - CTH: Interval development of hemorrhage layering dependently within the posterior horns of the bilateral lateral ventricles, new from the previous study of 01/05/2022.  There is slight prominence of the ventricular system, perhaps slightly more so than compared to the prior examination which could suggest a component of developing hydrocephalus.  - Repeat CTH stable minimal IVH   - Anticoagulation status: on Eliquis at home being held   - Q 1 vitals   - Q 1 neuro checks   - TSH, A1c, lipid, echo and EKG  -  Pt/OT/SLP eval and treat      Parkinsonism  -Per family patient received his medications 1/26 prior to change in mental status  -If patient has continued lethargy 1/27 and is unable to pass chase, will place NG tube for carbidopa-levodopa and memantine administration to avoid withdrawal.     Cardiac/Vascular  Hyperlipidemia  - lipid panel   - atorvastatin daily     CAD (coronary artery disease)  - continue coreg   - hold anticoagulation in setting of IVH     Renal/  Urinary tract infection without hematuria  - ua osh culture pending was being treated for recurrent uti at nursing home with ceftin  - previous urine culture with Proteus reviewed. History of multiple drug resistent UTIs. Most recent urine cx sensitive to rocephin. Will broaden to cefepime out of precaution due to concern for again developing resistance.  - Follow repeat culture drawn at admission    Hematology  Long term current use of anticoagulant therapy  - hold anticoagulation           The patient is being Prophylaxed for:  Venous Thromboembolism with: Mechanical  Stress Ulcer with: None  Ventilator Pneumonia with: not applicable    Activity Orders          Turn patient starting at 01/26 2200    Elevate HOB starting at 01/26 2028        DNR    Critical condition in that Patient has a condition that poses threat to life and  bodily function: Acute encephalopathy, IVH, Long term anticoagulation, UTI, concerns for urosepsis, CAD, HLD, HTN     35 minutes of Critical care time was spent personally by me on the following activities: development of treatment plan with patient or surrogate and bedside caregivers, discussions with consultants, evaluation of patient's response to treatment, examination of patient, ordering and performing treatments and interventions, ordering and review of laboratory studies, ordering and review of radiographic studies, pulse oximetry, antibiotic titration if applicable, vasopressor titration if applicable, re-evaluation of patient's condition. This critical care time did not overlap with that of any other provider or involve time for any procedures. There is high probability for acute neurological change leading to clinical and possibly life-threatening deterioration requiring highest level of physician preparedness for urgent intervention.      Darlene Ledbetter PA-C  Neurocritical Care  [facility]

## 2022-01-27 NOTE — CONSULTS
[facility]  Wound Care    Patient Name:  Eliazar James   MRN:  [id number]  Date: 1/27/2022  Diagnosis: Acute encephalopathy    History:     Past Medical History:   Diagnosis Date    *Atrial fibrillation     Anxiety     Atrial fibrillation 7/5/2012    Blood transfusion     CAD (coronary artery disease) 7/10/2012    Cancer     skin    Cataract     removed from both    Clotting disorder     Coronary artery disease     Depression     HTN (hypertension) 7/10/2012    Hx-TIA (transient ischemic attack) 7/10/2012    Hyperlipidemia     Hypertension     Myocardial infarction     Renal calculus, left 8/18/2017    S/P CABG (coronary artery bypass graft) 1/8/2013    Stroke 2/12    TIA    Urinary tract infection        Social History     Socioeconomic History    Marital status:    Tobacco Use    Smoking status: Never Smoker    Smokeless tobacco: Never Used   Substance and Sexual Activity    Alcohol use: No    Drug use: No    Sexual activity: Not Currently       Precautions:     Allergies as of 01/26/2022 - Reviewed 01/26/2022   Allergen Reaction Noted    No known drug allergies  07/10/2012       WOC Assessment Details/Treatment     Wound consult received on patient's left foot.    Bilateral feet intact. Assessed with RN.    No wound care needed. Continue pressure injury prevention measures per nursing.    Contact wound care for any further needs.

## 2022-01-27 NOTE — ASSESSMENT & PLAN NOTE
- NSGY and Vn following   - SBP <160  - CTH: Interval development of hemorrhage layering dependently within the posterior horns of the bilateral lateral ventricles, new from the previous study of 01/05/2022.  There is slight prominence of the ventricular system, perhaps slightly more so than compared to the prior examination which could suggest a component of developing hydrocephalus.  - Repeat CTH stable minimal IVH   - Anticoagulation status: on Eliquis at home being held   - Q 1 vitals   - Q 1 neuro checks   - TSH, A1c, lipid, echo and EKG  -  Pt/OT/SLP eval and treat

## 2022-01-27 NOTE — PLAN OF CARE
Recommendations    1. As medically able, ADAT to cardiac with texture per SLP.    - If po intake <50%, recommend adding Boost Plus BID.     2. If unable to advance diet, recommend initiating TF of Isosource 1.5 advancing as tolerated to goal rate of 45 mL/hr to provide 1620 kcal, 73 gm protein, and 825 mL free fluid.     3. RD following.     Goals: receive nutrition by RD follow-up  Nutrition Goal Status: new

## 2022-01-27 NOTE — SUBJECTIVE & OBJECTIVE
Medications Prior to Admission   Medication Sig Dispense Refill Last Dose    ascorbic acid, vitamin C, (VITAMIN C) 500 MG tablet Take 500 mg by mouth once daily.       atorvastatin (LIPITOR) 40 MG tablet Take 1 tablet (40 mg total) by mouth once daily. 90 tablet 3     busPIRone (BUSPAR) 10 MG tablet Take 10 mg by mouth 2 (two) times daily.        carbidopa-levodopa  mg (SINEMET)  mg per tablet Take 1 tablet by mouth 3 (three) times daily. 270 tablet 1     CARTIA  mg 24 hr capsule Take 1 capsule (180 mg total) by mouth once daily. 90 capsule 3     carvediloL (COREG) 12.5 MG tablet Take 12.5 mg by mouth 2 (two) times daily.        cefUROXime (CEFTIN) 500 MG tablet Take 1 tablet (500 mg total) by mouth 2 (two) times daily. for 7 days 14 tablet 0     docusate sodium (COLACE) 100 MG capsule Take 100 mg by mouth once daily.       DULoxetine (CYMBALTA) 30 MG capsule Take 60 mg by mouth once daily.        ELIQUIS 5 mg Tab 5 mg 2 (two) times daily.        FLUoxetine 20 MG capsule Take 20 mg by mouth once daily.        fluticasone (FLONASE) 50 mcg/actuation nasal spray 2 sprays (100 mcg total) by Each Nare route once daily. 16 g 5     fosfomycin (MONUROL) 3 gram Pack        lactulose (CHRONULAC) 10 gram/15 mL solution        loratadine (CLARITIN) 10 mg tablet Take 10 mg by mouth once daily.       losartan (COZAAR) 50 MG tablet Take 50 mg by mouth once daily.        memantine (NAMENDA) 10 MG Tab 10 mg 2 (two) times daily.        methenamine (HIPREX) 1 gram Tab Take 1 g by mouth 2 (two) times daily.       mirtazapine (REMERON) 30 MG tablet Take 1 tablet (30 mg total) by mouth every evening. (Patient taking differently: Take 45 mg by mouth every evening. ) 30 tablet 0     polyethylene glycol (GLYCOLAX) 17 gram PwPk Take by mouth.       tamsulosin (FLOMAX) 0.4 mg Cap           Review of patient's allergies indicates:   Allergen Reactions    No known drug allergies        Past Medical  History:   Diagnosis Date    *Atrial fibrillation     Anxiety     Atrial fibrillation 7/5/2012    Blood transfusion     CAD (coronary artery disease) 7/10/2012    Cancer     skin    Cataract     removed from both    Clotting disorder     Coronary artery disease     Depression     HTN (hypertension) 7/10/2012    Hx-TIA (transient ischemic attack) 7/10/2012    Hyperlipidemia     Hypertension     Myocardial infarction     Renal calculus, left 8/18/2017    S/P CABG (coronary artery bypass graft) 1/8/2013    Stroke 2/12    TIA    Urinary tract infection      Past Surgical History:   Procedure Laterality Date    CATARACT EXTRACTION W/  INTRAOCULAR LENS IMPLANT Right 03/27/2008    CATARACT EXTRACTION W/  INTRAOCULAR LENS IMPLANT Left 12/06/2007    CORONARY ARTERY BYPASS GRAFT      DILATION OF URETHRA N/A 1/20/2020    Procedure: DILATION, URETHRA;  Surgeon: Ge Ramon MD;  Location: [facility] OR;  Service: Urology;  Laterality: N/A;    TRANSURETHRAL RESECTION OF PROSTATE N/A 1/20/2020    Procedure: TURP (TRANSURETHRAL RESECTION OF PROSTATE);  Surgeon: Ge Ramon MD;  Location: [facility] OR;  Service: Urology;  Laterality: N/A;    triple bypass       Family History     Problem Relation (Age of Onset)    Alcohol abuse Father    Dementia Mother    Diabetes Mother    Heart attack Father    Heart disease Father    Hypertension Mother    Stroke Father, Mother        Tobacco Use    Smoking status: Never Smoker    Smokeless tobacco: Never Used   Substance and Sexual Activity    Alcohol use: No    Drug use: No    Sexual activity: Not Currently     Review of Systems   Unable to perform ROS: Dementia     Objective:     Weight: 70 kg (154 lb 5.2 oz)  Body mass index is 24.17 kg/m².  Vital Signs (Most Recent):  Temp: 98.9 °F (37.2 °C) (01/26/22 2026)  Pulse: 78 (01/26/22 2106)  Resp: (!) 25 (01/26/22 2106)  BP: (!) 152/85 (01/26/22 2212)  SpO2: 98 % (01/26/22 2106) Vital Signs (24h Range):  Temp:  [98 °F  (36.7 °C)-98.9 °F (37.2 °C)] 98.9 °F (37.2 °C)  Pulse:  [78-85] 78  Resp:  [18-25] 25  SpO2:  [98 %-100 %] 98 %  BP: (131-160)/(65-93) 152/85                     Male External Urinary Catheter 01/26/22 2031 Small (Active)       Neurosurgery Physical Exam  [id number]  Frail, baseline dementia  Awake, oriented to self and place  PERRL, EOMI  Follows commands x4 symmetrically with good power  SILT    Significant Labs:  Recent Labs   Lab 01/26/22  1408         K 4.3      CO2 27   BUN 22   CREATININE 1.2   CALCIUM 9.7     Recent Labs   Lab 01/26/22  1408   WBC 13.28*   HGB 18.0   HCT 52.6        No results for input(s): LABPT, INR, APTT in the last 48 hours.  Microbiology Results (last 7 days)     ** No results found for the last 168 hours. **        All pertinent labs from the last 24 hours have been reviewed.    Significant Diagnostics:  I have reviewed all pertinent imaging results/findings within the past 24 hours.  I have reviewed and interpreted all pertinent imaging results/findings within the past 24 hours.

## 2022-01-27 NOTE — ASSESSMENT & PLAN NOTE
- ua osh culture pending was being treated for recurrent uti at nursing home with ceftin  - previous urine culture with Proteus reviewed

## 2022-01-27 NOTE — HPI
Eliazar James is a 81 y.o. male w pmh vascular dementia, CAD s/p CABG, HTN, HLD, Afib who presents today for AMS from nursing home. Pt worked up for UTI 3 days prior however concern for continued AMS. 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

## 2022-01-27 NOTE — ASSESSMENT & PLAN NOTE
Eliazar James is a 81 y.o. male with PMHx of CAD, MI, stroke, afib (eliquis), Parkinsons, HLD, and HTN who presented to OSH with AMS. CTH with IVH and patient transferred to [facility] for higher level of care. Etiology of IVH unclear at this time, previous CTA in 2019 without significant cerebrovascular abnormalities, such as AVM or aneurysm. No history of recent trauma. Echo with EF 55% and mild LAE.  -Fluctuating level of alertness. Awake, alert, cooperative on initial exam this morning. More drowsy on exam during afternoon rounds. Recommend MRI Brain WO and CTA Head and Neck.    Antithrombotics for secondary stroke prevention: Anticoagulants: hold home eliquis in setting of IVH    Statins for secondary stroke prevention and hyperlipidemia, if present:   Statins: Atorvastatin- 40 mg daily    Aggressive risk factor modification: HTN, HLD, A-Fib, CAD, stroke     Rehab efforts: The patient has been evaluated by a stroke team provider and the therapy needs have been fully considered based off the presenting complaints and exam findings. The following therapy evaluations are needed: PT evaluate and treat, OT evaluate and treat, SLP evaluate and treat, PM&R evaluate for appropriate placement    Diagnostics ordered/pending: CTA Head to assess vasculature , CTA Neck/Arch to assess vasculature, MRI head without contrast to assess brain parenchyma    VTE prophylaxis: Mechanical prophylaxis: Place SCDs  None: Reason for No Pharmacological VTE Prophylaxis: IVH    BP parameters: SBP <140

## 2022-01-27 NOTE — ASSESSMENT & PLAN NOTE
-Per family patient received his medications 1/26 prior to change in mental status  -If patient has continued lethargy 1/27 and is unable to pass chase, will place NG tube for carbidopa-levodopa and memantine administration to avoid withdrawal.

## 2022-01-27 NOTE — ASSESSMENT & PLAN NOTE
- ua osh culture pending was being treated for recurrent uti at nursing home with ceftin  - previous urine culture with Proteus reviewed. History of multiple drug resistent UTIs. Most recent urine cx sensitive to rocephin. Will broaden to cefepime out of precaution due to concern for again developing resistance.  - Follow repeat culture drawn at admission

## 2022-01-27 NOTE — PLAN OF CARE
Problem: SLP Goal  Goal: SLP Goal  Description: Speech Language Pathology Goals  Goals expected to be met by 2/10  1. Pt will tolerate regular diet with thin liquids without overt s/s aspiration.  2. Pt will implement speech strategies at short phrase level with min cues.   3. Pt will participate in ongoing assessment of higher level language/cognition pending clarification of premorbid functioning.     Outcome: Ongoing, Progressing    Evaluation completed.  Rec regular diet with thin liquids with strict aspiration precautions, meds whole buried in puree or crushed as needed.

## 2022-01-27 NOTE — SUBJECTIVE & OBJECTIVE
Neurologic Chief Complaint: AMS    Subjective:     Interval History: Patient is seen for follow-up neurological assessment and treatment recommendations: Recommend MRI Brain WO and CTA. Patient with fluctuating lethargy(alert and cooperative at times, drowsy and less cooperative at other times). Eliquis being held 2/2 IVH.    HPI, Past Medical, Family, and Social History remains the same as documented in the initial encounter.     Review of Systems   Unable to perform ROS: Mental status change     Scheduled Meds:   atorvastatin  40 mg Oral Daily    carbidopa-levodopa  mg  1 tablet Oral TID    carvediloL  12.5 mg Oral BID    ceFEPime (MAXIPIME) IVPB  1 g Intravenous Q12H    memantine  10 mg Oral BID    mupirocin   Nasal BID    tamsulosin  0.4 mg Oral Daily     Continuous Infusions:  PRN Meds:labetalol, ondansetron, sodium chloride 0.9%    Objective:     Vital Signs (Most Recent):  Temp: 98.5 °F (36.9 °C) (01/27/22 1102)  Pulse: 83 (01/27/22 1302)  Resp: 20 (01/27/22 1302)  BP: 118/69 (01/27/22 1302)  SpO2: 99 % (01/27/22 1302)  BP Location: Left arm    Vital Signs Range (Last 24H):  Temp:  [98 °F (36.7 °C)-99 °F (37.2 °C)]   Pulse:  [78-88]   Resp:  [18-33]   BP: (116-162)/(65-94)   SpO2:  [97 %-100 %]   BP Location: Left arm    Physical Exam  Vitals and nursing note reviewed.   Constitutional:       General: He is not in acute distress.     Appearance: He is not ill-appearing.   HENT:      Head: Normocephalic and atraumatic.      Right Ear: External ear normal.      Left Ear: External ear normal.      Nose: Nose normal.   Eyes:      Extraocular Movements: Extraocular movements intact.      Conjunctiva/sclera: Conjunctivae normal.   Cardiovascular:      Rate and Rhythm: Normal rate.   Pulmonary:      Effort: Pulmonary effort is normal. No respiratory distress.   Abdominal:      General: There is no distension.   Musculoskeletal:      Right lower leg: No edema.      Left lower leg: No edema.   Skin:      General: Skin is warm and dry.   Neurological:      Mental Status: He is alert. He is disoriented.      Comments: Able to follow commands.   Patient with fluctuating level of alertness. Alert and cooperative on initial exam this morning. More drowsy later during rounds.    Psychiatric:         Mood and Affect: Mood normal.         Neurological Exam:   LOC: alert  Attention Span: Good   Language: No aphasia  Articulation: Dysarthria  Orientation: Not oriented to time  Visual Fields: Full  EOM (CN III, IV, VI): Full/intact  Facial Movement (CN VII): Symmetric facial expression    Motor: Arm left  Normal 5/5  Leg left  Paresis: 4/5  Arm right  Normal 5/5  Leg right Paresis: 4/5  Cerebellum: No evidence of appendicular or axial ataxia  Sensation: intact to light touch throughout    Laboratory:  CMP:   Recent Labs   Lab 01/27/22 0049   CALCIUM 8.2*   ALBUMIN 2.4*   PROT 5.8*      K 4.0   CO2 24      BUN 22   CREATININE 1.0   ALKPHOS 82   ALT 27   AST 14   BILITOT 0.9     CBC:   Recent Labs   Lab 01/27/22 0049   WBC 12.91*   RBC 4.54*   HGB 14.9   HCT 45.0      MCV 99*   MCH 32.8*   MCHC 33.1     Lipid Panel:   Recent Labs   Lab 01/26/22  2100   CHOL 83*   LDLCALC 48.0*   HDL 27*   TRIG 40     Coagulation:   Recent Labs   Lab 01/27/22 0049   INR 1.1   APTT 29.3     Platelet Aggregation Study: No results for input(s): PLTAGG, PLTAGINTERP, PLTAGREGLACO, ADPPLTAGGREG in the last 168 hours.  Hgb A1C:   Recent Labs   Lab 01/26/22  2100   HGBA1C 5.2  5.2     TSH:   Recent Labs   Lab 01/26/22  2100   TSH 2.111       Diagnostic Results     Brain imaging:  MRI Brain recommended     CT Head WO 1/26/2022 2156  Ventricular enlargement and small layering intraventricular hemorrhages in the occipital horns bilaterally appears stable from prior.     Air is again noted in the cavernous sinus and branches of the jugular vein in the upper neck, similar to prior.  Correlate clinically for iatrogenic etiology from  venous access.        CT Head. Date: 01/26/22 1456  Interval development of hemorrhage layering dependently within the posterior horns of the bilateral lateral ventricles, new from the previous study of 01/05/2022.  There is slight prominence of the ventricular system, perhaps slightly more so than compared to the prior examination which could suggest a component of developing hydrocephalus.     Age-appropriate generalized cerebral volume loss with moderate chronic microvascular ischemic disease.  No definite new parenchymal hypoattenuation to suggest an acute infarction.     Small foci of venous air noted.  Clinically correlate for recent intravenous vascular access.     Vessel Imaging:  CTA recommended     Cardiac Evaluation:   TTE 1/27/22  · The left ventricle is normal in size with normal systolic function. The estimated ejection fraction is 55%.  · Normal right ventricular size with normal right ventricular systolic function.  · Normal left ventricular diastolic function.  · Mild left atrial enlargement.  · The IVC could not be visualized.

## 2022-01-27 NOTE — PROGRESS NOTES
[facility]  Vascular Neurology  [facility]  Progress Note    Assessment/Plan:     IVH (intraventricular hemorrhage)  Eliazar James is a 81 y.o. male with PMHx of CAD, MI, stroke, afib (eliquis), Parkinsons, HLD, and HTN who presented to OSH with AMS. CTH with IVH and patient transferred to [facility] for higher level of care. Etiology of IVH unclear at this time, previous CTA in 2019 without significant cerebrovascular abnormalities, such as AVM or aneurysm. No history of recent trauma. Echo with EF 55% and mild LAE.  -Fluctuating level of alertness. Awake, alert, cooperative on initial exam this morning. More drowsy on exam during afternoon rounds. Recommend MRI Brain WO and CTA Head and Neck.    Antithrombotics for secondary stroke prevention: Anticoagulants: hold home eliquis in setting of IVH    Statins for secondary stroke prevention and hyperlipidemia, if present:   Statins: Atorvastatin- 40 mg daily    Aggressive risk factor modification: HTN, HLD, A-Fib, CAD, stroke     Rehab efforts: The patient has been evaluated by a stroke team provider and the therapy needs have been fully considered based off the presenting complaints and exam findings. The following therapy evaluations are needed: PT evaluate and treat, OT evaluate and treat, SLP evaluate and treat, PM&R evaluate for appropriate placement    Diagnostics ordered/pending: CTA Head to assess vasculature , CTA Neck/Arch to assess vasculature, MRI head without contrast to assess brain parenchyma    VTE prophylaxis: Mechanical prophylaxis: Place SCDs  None: Reason for No Pharmacological VTE Prophylaxis: IVH    BP parameters: SBP <140        Urinary tract infection without hematuria  Recently diagnosed prior to admission  On ceftin at nursing home  Antibiotic changed to cefepime per [facility]    Hyperlipidemia  Stroke risk factor  LDL <70 on admission  Continue home atorvastatin 40 mg daily    CAD (coronary artery disease)  Stroke  risk factor  LDL <70 on admission  Continue home atorvastatin 40    Chronic atrial fibrillation  Stroke risk factor  On eliquis at home, Hold in setting of IVH  On coreg 12.5 BID         1/27- Recommend MRI Brain WO and CTA. Patient with fluctuating lethargy(alert and cooperative at times, drowsy and less cooperative at other times). Eliquis being held 2/2 IVH.      STROKE DOCUMENTATION        NIH Scale:  1a. Level of Consciousness: 1-->Not alert, but arousable by minor stimulation to obey, answer, or respond  1b. LOC Questions: 0-->Answers both questions correctly  1c. LOC Commands: 0-->Performs both tasks correctly  2. Best Gaze: 0-->Normal  3. Visual: 0-->No visual loss  4. Facial Palsy: 0-->Normal symmetrical movements  5a. Motor Arm, Left: 0-->No drift, limb holds 90 (or 45) degrees for full 10 secs  5b. Motor Arm, Right: 0-->No drift, limb holds 90 (or 45) degrees for full 10 secs  6a. Motor Leg, Left: 2-->Some effort against gravity, leg falls to bed by 5 secs, but has some effort against gravity  6b. Motor Leg, Right: 2-->Some effort against gravity, leg falls to bed by 5 secs, but has some effort against gravity  7. Limb Ataxia: 0-->Absent  8. Sensory: 0-->Normal, no sensory loss  9. Best Language: 0-->No aphasia, normal  10. Dysarthria: 1-->Mild-to-moderate dysarthria, patient slurs at least some words and, at worst, can be understood with some difficulty  11. Extinction and Inattention (formerly Neglect): 0-->No abnormality  Total (NIH Stroke Scale): 6       Modified [place] Score: 3  Penny Coma Scale:    ABCD2 Score:    [id number] Score:   HAS -BLED Score:   ICH Score:   Hunt & Sevilla Classification:      Hemorrhagic change of an Ischemic Stroke: Does this patient have an ischemic stroke with hemorrhagic changes? No     Neurologic Chief Complaint: AMS    Subjective:     Interval History: Patient is seen for follow-up neurological assessment and treatment recommendations: Recommend MRI Brain WO and CTA.  Patient with fluctuating lethargy(alert and cooperative at times, drowsy and less cooperative at other times). Eliquis being held 2/2 IVH.    HPI, Past Medical, Family, and Social History remains the same as documented in the initial encounter.     Review of Systems   Unable to perform ROS: Mental status change     Scheduled Meds:   atorvastatin  40 mg Oral Daily    carbidopa-levodopa  mg  1 tablet Oral TID    carvediloL  12.5 mg Oral BID    ceFEPime (MAXIPIME) IVPB  1 g Intravenous Q12H    memantine  10 mg Oral BID    mupirocin   Nasal BID    tamsulosin  0.4 mg Oral Daily     Continuous Infusions:  PRN Meds:labetalol, ondansetron, sodium chloride 0.9%    Objective:     Vital Signs (Most Recent):  Temp: 98.5 °F (36.9 °C) (01/27/22 1102)  Pulse: 83 (01/27/22 1302)  Resp: 20 (01/27/22 1302)  BP: 118/69 (01/27/22 1302)  SpO2: 99 % (01/27/22 1302)  BP Location: Left arm    Vital Signs Range (Last 24H):  Temp:  [98 °F (36.7 °C)-99 °F (37.2 °C)]   Pulse:  [78-88]   Resp:  [18-33]   BP: (116-162)/(65-94)   SpO2:  [97 %-100 %]   BP Location: Left arm    Physical Exam  Vitals and nursing note reviewed.   Constitutional:       General: He is not in acute distress.     Appearance: He is not ill-appearing.   HENT:      Head: Normocephalic and atraumatic.      Right Ear: External ear normal.      Left Ear: External ear normal.      Nose: Nose normal.   Eyes:      Extraocular Movements: Extraocular movements intact.      Conjunctiva/sclera: Conjunctivae normal.   Cardiovascular:      Rate and Rhythm: Normal rate.   Pulmonary:      Effort: Pulmonary effort is normal. No respiratory distress.   Abdominal:      General: There is no distension.   Musculoskeletal:      Right lower leg: No edema.      Left lower leg: No edema.   Skin:     General: Skin is warm and dry.   Neurological:      Mental Status: He is alert. He is disoriented.      Comments: Able to follow commands.   Patient with fluctuating level of alertness.  Alert and cooperative on initial exam this morning. More drowsy later during rounds.    Psychiatric:         Mood and Affect: Mood normal.         Neurological Exam:   LOC: alert  Attention Span: Good   Language: No aphasia  Articulation: Dysarthria  Orientation: Not oriented to time  Visual Fields: Full  EOM (CN III, IV, VI): Full/intact  Facial Movement (CN VII): Symmetric facial expression    Motor: Arm left  Normal 5/5  Leg left  Paresis: 4/5  Arm right  Normal 5/5  Leg right Paresis: 4/5  Cerebellum: No evidence of appendicular or axial ataxia  Sensation: intact to light touch throughout    Laboratory:  CMP:   Recent Labs   Lab 01/27/22 0049   CALCIUM 8.2*   ALBUMIN 2.4*   PROT 5.8*      K 4.0   CO2 24      BUN 22   CREATININE 1.0   ALKPHOS 82   ALT 27   AST 14   BILITOT 0.9     CBC:   Recent Labs   Lab 01/27/22 0049   WBC 12.91*   RBC 4.54*   HGB 14.9   HCT 45.0      MCV 99*   MCH 32.8*   MCHC 33.1     Lipid Panel:   Recent Labs   Lab 01/26/22  2100   CHOL 83*   LDLCALC 48.0*   HDL 27*   TRIG 40     Coagulation:   Recent Labs   Lab 01/27/22 0049   INR 1.1   APTT 29.3     Platelet Aggregation Study: No results for input(s): PLTAGG, PLTAGINTERP, PLTAGREGLACO, ADPPLTAGGREG in the last 168 hours.  Hgb A1C:   Recent Labs   Lab 01/26/22  2100   HGBA1C 5.2  5.2     TSH:   Recent Labs   Lab 01/26/22  2100   TSH 2.111       Diagnostic Results     Brain imaging:  MRI Brain recommended     CT Head WO 1/26/2022 2156  Ventricular enlargement and small layering intraventricular hemorrhages in the occipital horns bilaterally appears stable from prior.     Air is again noted in the cavernous sinus and branches of the jugular vein in the upper neck, similar to prior.  Correlate clinically for iatrogenic etiology from venous access.        CT Head. Date: 01/26/22 1456  Interval development of hemorrhage layering dependently within the posterior horns of the bilateral lateral ventricles, new from the  previous study of 01/05/2022.  There is slight prominence of the ventricular system, perhaps slightly more so than compared to the prior examination which could suggest a component of developing hydrocephalus.     Age-appropriate generalized cerebral volume loss with moderate chronic microvascular ischemic disease.  No definite new parenchymal hypoattenuation to suggest an acute infarction.     Small foci of venous air noted.  Clinically correlate for recent intravenous vascular access.     Vessel Imaging:  CTA recommended     Cardiac Evaluation:   TTE 1/27/22  · The left ventricle is normal in size with normal systolic function. The estimated ejection fraction is 55%.  · Normal right ventricular size with normal right ventricular systolic function.  · Normal left ventricular diastolic function.  · Mild left atrial enlargement.  · The IVC could not be visualized.          Elissa Harding PA-C  [facility]  Department of Vascular Neurology   [facility] - [facility]

## 2022-01-27 NOTE — PROGRESS NOTES
[facility]  Neurosurgery  Progress Note    Subjective:     History of Present Illness: Eliazar Jaems is a 81 y.o. male w pmh vascular dementia, CAD s/p CABG, HTN, HLD, Afib who presents today for AMS from nursing home. Pt worked up for UTI 3 days prior however concern for continued AMS. 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           Post-Op Info:  * No surgery found *         Interval History: naeon, exam improved overnight. CTH stable    Review of patient's allergies indicates:   Allergen Reactions    No known drug allergies        Past Medical History:   Diagnosis Date    *Atrial fibrillation     Anxiety     Atrial fibrillation 7/5/2012    Blood transfusion     CAD (coronary artery disease) 7/10/2012    Cancer     skin    Cataract     removed from both    Clotting disorder     Coronary artery disease     Depression     HTN (hypertension) 7/10/2012    Hx-TIA (transient ischemic attack) 7/10/2012    Hyperlipidemia     Hypertension     Myocardial infarction     Renal calculus, left 8/18/2017    S/P CABG (coronary artery bypass graft) 1/8/2013    Stroke 2/12    TIA    Urinary tract infection      Past Surgical History:   Procedure Laterality Date    CATARACT EXTRACTION W/  INTRAOCULAR LENS IMPLANT Right 03/27/2008    CATARACT EXTRACTION W/  INTRAOCULAR LENS IMPLANT Left 12/06/2007    CORONARY ARTERY BYPASS GRAFT      DILATION OF URETHRA N/A 1/20/2020    Procedure: DILATION, URETHRA;  Surgeon: Ge Ramon MD;  Location: [facility] OR;  Service: Urology;  Laterality: N/A;    TRANSURETHRAL RESECTION OF PROSTATE N/A 1/20/2020    Procedure: TURP (TRANSURETHRAL RESECTION OF PROSTATE);  Surgeon: Ge Ramon MD;  Location: [facility] OR;  Service: Urology;  Laterality: N/A;    triple bypass       Family  History     Problem Relation (Age of Onset)    Alcohol abuse Father    Dementia Mother    Diabetes Mother    Heart attack Father    Heart disease Father    Hypertension Mother    Stroke Father, Mother        Tobacco Use    Smoking status: Never Smoker    Smokeless tobacco: Never Used   Substance and Sexual Activity    Alcohol use: No    Drug use: No    Sexual activity: Not Currently     Review of Systems   Unable to perform ROS: Dementia     Objective:     Weight: 70 kg (154 lb 5.2 oz)  Body mass index is 24.17 kg/m².  Vital Signs (Most Recent):  Temp: 98.8 °F (37.1 °C) (01/27/22 0702)  Pulse: 85 (01/27/22 0702)  Resp: (!) 25 (01/27/22 0702)  BP: 126/69 (01/27/22 0702)  SpO2: 100 % (01/27/22 0702) Vital Signs (24h Range):  Temp:  [98 °F (36.7 °C)-99 °F (37.2 °C)] 98.8 °F (37.1 °C)  Pulse:  [78-88] 85  Resp:  [18-33] 25  SpO2:  [98 %-100 %] 100 %  BP: (120-162)/(65-94) 126/69     Date 01/27/22 0700 - 01/28/22 0659   Shift 3355-6673 0386-4626 7909-7105 24 Hour Total   INTAKE   P.O. 70   70   Shift Total(mL/kg) 70(1)   70(1)   OUTPUT   Shift Total(mL/kg)       Weight (kg) 70 70 70 70                   Male External Urinary Catheter 01/26/22 2031 Small (Active)       Neurosurgery Physical Exam    [id number]  Frail, baseline dementia  Awake, oriented x4  PERRL, EOMI  Follows commands x4 symmetrically with good power  SILT    Significant Labs:  Recent Labs   Lab 01/26/22  1408 01/27/22  0049    92    140   K 4.3 4.0    107   CO2 27 24   BUN 22 22   CREATININE 1.2 1.0   CALCIUM 9.7 8.2*   MG  --  2.0     Recent Labs   Lab 01/26/22  1408 01/27/22  0049   WBC 13.28* 12.91*   HGB 18.0 14.9   HCT 52.6 45.0    237     Recent Labs   Lab 01/27/22  0049   INR 1.1   APTT 29.3     Microbiology Results (last 7 days)     Procedure Component Value Units Date/Time    Urine culture [[id number]] Collected: 01/27/22 0052    Order Status: No result Specimen: Urine Updated: 01/27/22 0347        All pertinent labs  from the last 24 hours have been reviewed.    Significant Diagnostics:  I have reviewed all pertinent imaging results/findings within the past 24 hours.  I have reviewed and interpreted all pertinent imaging results/findings within the past 24 hours.    Assessment/Plan:     * IVH (intraventricular hemorrhage)  Eliazar James is a 81 y.o. male w pmh vascular dementia, CAD s/p CABG, HTN, HLD, Afib who presents today for AMS from nursing home. Pt worked up for UTI 3 days prior however concern for continued AMS. 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     Plan:  Admit [facility]CC for AMS workup  Recommend Holding all anticoagulation for two weeks post bleed  HOB>30, SBP<160, Na 135-150  Imaging reviewed, CTH stable IVH without significant change.   No neurosurgical intervention indicated at this time. Neurosurgery will signoff at this time.  Please contact with any further questions      Oleg Steel MD  Neurosurgery  [facility]

## 2022-01-27 NOTE — ASSESSMENT & PLAN NOTE
Eliazar James is a 81 y.o. male w pmh vascular dementia, CAD s/p CABG, HTN, HLD, Afib who presents today for AMS from nursing home. Pt worked up for UTI 3 days prior however concern for continued AMS. 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     Plan:  Admit NCC   Q1hr neurochecks  CBC, CMP, coags, type & screen, COVID  Hold all anticoagulation at this time  HOB>30, SBP<140, Na 135-150  Imaging reviewed, CTH stable IVH without significant change  No neurosurgical intervention indicated at this time. Neurosurgery will signoff at this time.

## 2022-01-27 NOTE — SUBJECTIVE & OBJECTIVE
Past Medical History:   Diagnosis Date    *Atrial fibrillation     Anxiety     Atrial fibrillation 7/5/2012    Blood transfusion     CAD (coronary artery disease) 7/10/2012    Cancer     skin    Cataract     removed from both    Clotting disorder     Coronary artery disease     Depression     HTN (hypertension) 7/10/2012    Hx-TIA (transient ischemic attack) 7/10/2012    Hyperlipidemia     Hypertension     Myocardial infarction     Renal calculus, left 8/18/2017    S/P CABG (coronary artery bypass graft) 1/8/2013    Stroke 2/12    TIA    Urinary tract infection      Past Surgical History:   Procedure Laterality Date    CATARACT EXTRACTION W/  INTRAOCULAR LENS IMPLANT Right 03/27/2008    CATARACT EXTRACTION W/  INTRAOCULAR LENS IMPLANT Left 12/06/2007    CORONARY ARTERY BYPASS GRAFT      DILATION OF URETHRA N/A 1/20/2020    Procedure: DILATION, URETHRA;  Surgeon: Ge Ramon MD;  Location: [facility] OR;  Service: Urology;  Laterality: N/A;    TRANSURETHRAL RESECTION OF PROSTATE N/A 1/20/2020    Procedure: TURP (TRANSURETHRAL RESECTION OF PROSTATE);  Surgeon: Ge Ramon MD;  Location: [facility] OR;  Service: Urology;  Laterality: N/A;    triple bypass       Family History   Problem Relation Age of Onset    Heart attack Father     Heart disease Father     Stroke Father     Alcohol abuse Father     Stroke Mother     Dementia Mother     Hypertension Mother     Diabetes Mother     Melanoma Neg Hx     Psoriasis Neg Hx     Lupus Neg Hx     Eczema Neg Hx     Acne Neg Hx     Prostate cancer Neg Hx     Kidney disease Neg Hx      Social History     Tobacco Use    Smoking status: Never Smoker    Smokeless tobacco: Never Used   Substance Use Topics    Alcohol use: No    Drug use: No     Review of patient's allergies indicates:   Allergen Reactions    No known drug allergies        Medications: I have reviewed the current medication administration record.    Medications Prior to  Admission   Medication Sig Dispense Refill Last Dose    ascorbic acid, vitamin C, (VITAMIN C) 500 MG tablet Take 500 mg by mouth once daily.       atorvastatin (LIPITOR) 40 MG tablet Take 1 tablet (40 mg total) by mouth once daily. 90 tablet 3     busPIRone (BUSPAR) 10 MG tablet Take 10 mg by mouth 2 (two) times daily.        carbidopa-levodopa  mg (SINEMET)  mg per tablet Take 1 tablet by mouth 3 (three) times daily. 270 tablet 1     CARTIA  mg 24 hr capsule Take 1 capsule (180 mg total) by mouth once daily. 90 capsule 3     carvediloL (COREG) 12.5 MG tablet Take 12.5 mg by mouth 2 (two) times daily.        cefUROXime (CEFTIN) 500 MG tablet Take 1 tablet (500 mg total) by mouth 2 (two) times daily. for 7 days 14 tablet 0     docusate sodium (COLACE) 100 MG capsule Take 100 mg by mouth once daily.       DULoxetine (CYMBALTA) 30 MG capsule Take 60 mg by mouth once daily.        ELIQUIS 5 mg Tab 5 mg 2 (two) times daily.        FLUoxetine 20 MG capsule Take 20 mg by mouth once daily.        fluticasone (FLONASE) 50 mcg/actuation nasal spray 2 sprays (100 mcg total) by Each Nare route once daily. 16 g 5     fosfomycin (MONUROL) 3 gram Pack        lactulose (CHRONULAC) 10 gram/15 mL solution        loratadine (CLARITIN) 10 mg tablet Take 10 mg by mouth once daily.       losartan (COZAAR) 50 MG tablet Take 50 mg by mouth once daily.        memantine (NAMENDA) 10 MG Tab 10 mg 2 (two) times daily.        methenamine (HIPREX) 1 gram Tab Take 1 g by mouth 2 (two) times daily.       mirtazapine (REMERON) 30 MG tablet Take 1 tablet (30 mg total) by mouth every evening. (Patient taking differently: Take 45 mg by mouth every evening. ) 30 tablet 0     polyethylene glycol (GLYCOLAX) 17 gram PwPk Take by mouth.       tamsulosin (FLOMAX) 0.4 mg Cap           Review of Systems   Constitutional: Positive for fatigue. Negative for fever.   HENT: Negative for drooling.    Eyes: Negative for visual  disturbance.   Respiratory: Negative for cough.    Cardiovascular: Negative for chest pain.   Gastrointestinal: Negative for nausea and vomiting.   Skin: Negative for rash.   Neurological: Positive for speech difficulty and weakness. Negative for facial asymmetry and numbness.   Psychiatric/Behavioral: Positive for confusion.     Objective:     Vital Signs (Most Recent):  Temp: 98.9 °F (37.2 °C) (01/26/22 2026)  Pulse: 78 (01/26/22 2106)  Resp: (!) 25 (01/26/22 2106)  BP: (!) 136/93 (01/26/22 2106)  SpO2: 98 % (01/26/22 2106)    Vital Signs Range (Last 24H):  Temp:  [98 °F (36.7 °C)-98.9 °F (37.2 °C)]   Pulse:  [78-85]   Resp:  [18-25]   BP: (131-160)/(65-93)   SpO2:  [98 %-100 %]     Physical Exam  Vitals reviewed.   Constitutional:       General: He is not in acute distress.     Appearance: He is well-developed and well-nourished.   HENT:      Head: Normocephalic and atraumatic.   Cardiovascular:      Rate and Rhythm: Normal rate.   Pulmonary:      Effort: Pulmonary effort is normal. No respiratory distress.   Skin:     General: Skin is warm and dry.         Neurological Exam:   LOC: obtunded  Attention Span: poor  Language: Global aphasia  Articulation: Dysarthria  Orientation: Not oriented to place, Not oriented to time  Facial Movement (CN VII): Symmetric facial expression    Motor: Arm left  Paresis: 2/5  Leg left  Paresis: 2/5  Arm right  Paresis: 2/5  Leg right Paresis: 2/5  Sensation: Intact to light touch, temperature and vibration  Tone: Normal tone throughout      Laboratory:  CMP:   Recent Labs   Lab 01/26/22  1408   CALCIUM 9.7   ALBUMIN 3.0*   PROT 7.9      K 4.3   CO2 27      BUN 22   CREATININE 1.2   ALKPHOS 101   ALT 17   AST 21   BILITOT 1.2*     CBC:   Recent Labs   Lab 01/26/22  1408   WBC 13.28*   RBC 5.33   HGB 18.0   HCT 52.6      MCV 99*   MCH 33.8*   MCHC 34.2     Lipid Panel:   Recent Labs   Lab 01/26/22  2100   CHOL 83*   LDLCALC 48.0*   HDL 27*   TRIG 40      Coagulation: No results for input(s): PT, INR, APTT in the last 168 hours.  Hgb A1C:   Recent Labs   Lab 01/26/22  2100   HGBA1C 5.2  5.2     TSH: No results for input(s): TSH in the last 168 hours.    Diagnostic Results:      Brain imaging:  CT Head pending      CT Head. Date: 01/26/22  Interval development of hemorrhage layering dependently within the posterior horns of the bilateral lateral ventricles, new from the previous study of 01/05/2022.  There is slight prominence of the ventricular system, perhaps slightly more so than compared to the prior examination which could suggest a component of developing hydrocephalus.     Age-appropriate generalized cerebral volume loss with moderate chronic microvascular ischemic disease.  No definite new parenchymal hypoattenuation to suggest an acute infarction.     Small foci of venous air noted.  Clinically correlate for recent intravenous vascular access.    Vessel Imaging:  pending    Cardiac Evaluation:   TTE pending

## 2022-01-27 NOTE — CONSULTS
"[facility]  Adult Nutrition  Consult Note    SUMMARY     Recommendations    1. As medically able, ADAT to cardiac with texture per SLP.    - If po intake <50%, recommend adding Boost Plus BID.     2. If unable to advance diet, recommend initiating TF of Isosource 1.5 advancing as tolerated to goal rate of 45 mL/hr to provide 1620 kcal, 73 gm protein, and 825 mL free fluid.     3. RD following.     Goals: receive nutrition by RD follow-up  Nutrition Goal Status: new  Communication of RD Recs:  (POC)    Assessment and Plan    Nutrition Problem  Inadequate Energy Intake    Related to (etiology):   Inability to consume sufficient energy     Signs and Symptoms (as evidenced by):   NPO with no alternative means of nutrition      Intervention (treatment strategy):  Collaboration of nutrition care with other providers    Nutrition Diagnosis Status:   New     Reason for Assessment    Reason For Assessment: consult  Diagnosis:  (IVH)  Relevant Medical History: NHR, dementia, CAD s/p CABG, HTN, HLD, Afib  Interdisciplinary Rounds: did not attend  General Information Comments: Pt admitted from nursing home for continued AMS. Pt has dementia; disoriented to time, place, situation. Currently TAVO in echo lab. NPO until evaluated by SLP. No significant wt loss PTA. Pt does not meet malnutrition criteria with current information. Will continue to monitor energy intake and wt changes.    Nutrition Discharge Planning: pending medical course    Nutrition Risk Screen    Nutrition Risk Screen: no indicators present    Nutrition/Diet History    Factors Affecting Nutritional Intake: NPO    Anthropometrics    Temp: 98.8 °F (37.1 °C)  Height Method: Estimated  Height: 5' 7" (170.2 cm)  Height (inches): 67 in  Weight Method: Bed Scale  Weight: 70 kg (154 lb 5.2 oz)  Weight (lb): 154.32 lb  Ideal Body Weight (IBW), Male: 148 lb  % Ideal Body Weight, Male (lb): 104.27 %  BMI (Calculated): 24.2   "   Lab/Procedures/Meds    Pertinent Labs Reviewed: reviewed  Pertinent Labs Comments: noted   Pertinent Medications Reviewed: reviewed  Pertinent Medications Comments: statin    Estimated/Assessed Needs    Weight Used For Calorie Calculations: 70 kg (154 lb 5.2 oz)  Energy Calorie Requirements (kcal): 1697 kcal/day  Energy Need Method: Faulk-St Jeor (x 1.25)  Protein Requirements: 70-84 gm/day (1.0-1.2 gm/kg)  Weight Used For Protein Calculations: 70 kg (154 lb 5.2 oz)  Fluid Requirements (mL): 1 mL/kcal or per MD     RDA Method (mL): 1697     Nutrition Prescription Ordered    Current Diet Order: NPO    Evaluation of Received Nutrient/Fluid Intake    I/O: -380mL since admit   Comments: LBM 1/24  Tolerance:  (will monitor)  % Intake of Estimated Energy Needs: 0 - 25 %  % Meal Intake: NPO    Nutrition Risk    Level of Risk/Frequency of Follow-up:  (f/u 1 x wk)       Monitor and Evaluation    Food and Nutrient Intake: energy intake  Food and Nutrient Adminstration: diet order,enteral and parenteral nutrition administration  Anthropometric Measurements: weight,weight change,body mass index  Biochemical Data, Medical Tests and Procedures: electrolyte and renal panel,gastrointestinal profile,glucose/endocrine profile,inflammatory profile,lipid profile  Nutrition-Focused Physical Findings: overall appearance       Nutrition Follow-Up    RD Follow-up?: Yes

## 2022-01-27 NOTE — PLAN OF CARE
Psychiatric Care Plan    POC reviewed with Eliazar James and family at 0300. Pt unable to verbalize understanding. Questions and concerns addressed. No acute events overnight. Pt progressing toward goals. Will continue to monitor. See below and flowsheets for full assessment and VS info.     -neuro exam unchanged, pt disoriented to time, place, situation   -Labs WDL   -CT complete     Neuro:  Penny Coma Scale  Best Eye Response: 4-->(E4) spontaneous  Best Motor Response: 6-->(M6) obeys commands  Best Verbal Response: 4-->(V4) confused  [place] Coma Scale Score: 14  Assessment Qualifiers: patient not sedated/intubated,no eye obstruction present  Pupil PERRLA: yes     24hr Temp:  [98 °F (36.7 °C)-99 °F (37.2 °C)]     CV:   Rhythm: normal sinus rhythm,atrial rhythm  BP goals:   SBP < 160  MAP > 65    Resp:   O2 Device (Oxygen Therapy): room air       Plan: N/A    GI/:     Diet/Nutrition Received: NPO  Last Bowel Movement: 01/24/22  Voiding Characteristics: external catheter    Intake/Output Summary (Last 24 hours) at 1/27/2022 0431  Last data filed at 1/27/2022 0100  Gross per 24 hour   Intake --   Output 50 ml   Net -50 ml     Unmeasured Output  Stool Occurrence: 0    Labs/Accuchecks:  Recent Labs   Lab 01/27/22 0049   WBC 12.91*   RBC 4.54*   HGB 14.9   HCT 45.0         Recent Labs   Lab 01/27/22 0049      K 4.0   CO2 24      BUN 22   CREATININE 1.0   ALKPHOS 82   ALT 27   AST 14   BILITOT 0.9      Recent Labs   Lab 01/27/22  0049   INR 1.1   APTT 29.3      Recent Labs   Lab 01/26/22  1408 01/26/22  1408 01/27/22  0049   *  719*   < > 469*   CPKMB 18.9*   < > 7.8*   TROPONINI 0.010  --   --    MB 2.6   < > 1.7    < > = values in this interval not displayed.       Electrolytes: N/A - electrolytes WDL  Accuchecks: none    Gtts:       LDA/Wounds:  Lines/Drains/Airways       Drain              Male External Urinary Catheter 01/26/22 2031 Small <1 day              Peripheral Intravenous Line                    Peripheral IV - Single Lumen 01/26/22 1421 18 G Right Antecubital <1 day         Peripheral IV - Single Lumen 01/26/22 1530 18 G Left Antecubital <1 day                  Wounds: No  Wound care consulted: No     Problem: Adult Inpatient Plan of Care  Goal: Plan of Care Review  Flowsheets (Taken 1/27/2022 0430)  Plan of Care Reviewed With: patient     Problem: Cerebral Tissue Perfusion (Stroke, Hemorrhagic)  Goal: Optimal Cerebral Tissue Perfusion  Intervention: Protect and Optimize Cerebral Perfusion  Flowsheets (Taken 1/27/2022 0430)  Sensory Stimulation Regulation:   care clustered   lighting decreased  Cerebral Perfusion Promotion: blood pressure monitored  Fluid/Electrolyte Management: fluids provided  Stabilization Measures: airway opened     Problem: Urinary Elimination Impaired (Stroke, Hemorrhagic)  Goal: Effective Urinary Elimination  Intervention: Promote Effective Bladder Elimination  Flowsheets (Taken 1/27/2022 0430)  Urinary Elimination Promotion:   absorbent pad/diaper use encouraged   catheter patency maintained

## 2022-01-27 NOTE — ASSESSMENT & PLAN NOTE
Eliazar James is a 81 y.o. male w pmh vascular dementia, CAD s/p CABG, HTN, HLD, Afib who presents today for AMS from nursing home. Pt worked up for UTI 3 days prior however concern for continued AMS. 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     Plan:  Admit NCC  Q1hr neurochecks  CBC, CMP, coags, type & screen, COVID  Hold all anticoagulation at this time  HOB>30, SBP<140, Na 135-150  Imaging reviewed, CTH stable IVH without significant change  No neurosurgical intervention indicated at this time, Continue to monitor closely and continue AMS workup

## 2022-01-27 NOTE — CONSULTS
[facility]  Vascular Neurology  Comprehensive Stroke Center  Consult Note    Inpatient consult to Vascular (Stroke) Neurology  Consult performed by: Anabel Murillo PA-C  Consult ordered by: Doug Telles NP        Assessment/Plan:     Patient is a 81 y.o. year old male with:    * IVH (intraventricular hemorrhage)  Eliazar James is a 81 y.o. male with PMHx of CAD, MI, stroke, afib (eliquis), Parkinsons, HLD, and HTN who presented to OSH with AMS. CT with IVH, patient transferred from OSH for further care. Etiology of IVH unclear at this time, previous CTA in 2019 without significant cerebrovascular abnormalities, such as AVM or aneurysm. No history of recent trauma.  Hold home eliquis.     Antithrombotics for secondary stroke prevention: Anticoagulants: hold home eliquis in setting of IVH    Statins for secondary stroke prevention and hyperlipidemia, if present:   Statins: Atorvastatin- 40 mg daily    Aggressive risk factor modification: HTN, HLD, A-Fib, CAD, stroke     Rehab efforts: The patient has been evaluated by a stroke team provider and the therapy needs have been fully considered based off the presenting complaints and exam findings. The following therapy evaluations are needed: PT evaluate and treat, OT evaluate and treat, SLP evaluate and treat, PM&R evaluate for appropriate placement    Diagnostics ordered/pending: CTA Head to assess vasculature , CTA Neck/Arch to assess vasculature, MRI head without contrast to assess brain parenchyma, TTE to assess cardiac function/status     VTE prophylaxis: Mechanical prophylaxis: Place SCDs  None: Reason for No Pharmacological VTE Prophylaxis: IVH    BP parameters: SBP <140        Urinary tract infection without hematuria  Recently diagnosed prior to admission  On ceftin at nursing home  Currently on ceftriaxone    Hyperlipidemia  Stroke risk factor  LDL <70 on admission  Continue home atorvastatin 40 mg daily    CAD (coronary artery  disease)  Stroke risk factor  LDL <70 on admission  Continue home atorvastatin 40    Chronic atrial fibrillation  Stroke risk factor  On eliquis at home  Hold in setting of [facility]        STROKE DOCUMENTATION          NIH Scale:  Interval: baseline  1a. Level of Consciousness: 2-->Not alert, requires repeated stimulation to attend, or is obtunded and requires strong or painful stimulation to make movements (not stereotyped)  1b. LOC Questions: 2-->Answers neither question correctly  1c. LOC Commands: 1-->Performs one task correctly  2. Best Gaze: 0-->Normal  3. Visual: 0-->No visual loss  4. Facial Palsy: 0-->Normal symmetrical movements  5a. Motor Arm, Left: 3-->No effort against gravity, limb falls  5b. Motor Arm, Right: 3-->No effort against gravity, limb falls  6a. Motor Leg, Left: 3-->No effort against gravity, leg falls to bed immediately  6b. Motor Leg, Right: 3-->No effort against gravity, leg falls to bed immediately  7. Limb Ataxia: 0-->Absent  8. Sensory: 0-->Normal, no sensory loss  9. Best Language: 2-->Severe aphasia, all communication is through fragmentary expression, great need for inference, questioning, and guessing by the listener. Range of information that can be exchanged is limited, listener carries burden of. . . (see row details)  10. Dysarthria: 1-->Mild-to-moderate dysarthria, patient slurs at least some words and, at worst, can be understood with some difficulty  11. Extinction and Inattention (formerly Neglect): 0-->No abnormality  Total (NIH Stroke Scale): 20    Modified Kenedy Score: 3  [place] Coma Scale:    ABCD2 Score:    [id number] Score:   HAS -BLED Score:   ICH Score:   Hunt & Sevilla Classification:       Thrombolysis Candidate? No, CT findings (ICH, SAH, Large core infarct)     Delays to Thrombolysis?  Not Applicable    Interventional Revascularization Candidate?   Is the patient eligible for mechanical endovascular reperfusion (OCTAVIO)?  No; at this time symptoms not suggestive of  large vessel occlusion    Delays to Thrombectomy? Not Applicable    Hemorrhagic change of an Ischemic Stroke: Does this patient have an ischemic stroke with hemorrhagic changes? No     Subjective:     History of Present Illness:  Eliazar James is a 81 y.o. male with PMHx of CAD, MI, stroke, afib (eliquis), Parkinsons, HLD, and HTN who was transferred from OSH for further care for IVH. Patient presented to OSH with AMS, seen in ED 3 days prior with UTI. Patient had no improvement in mental status with recent worsening over past 48 hours. Patient resides at NH, baseline oriented x 4.          Past Medical History:   Diagnosis Date    *Atrial fibrillation     Anxiety     Atrial fibrillation 7/5/2012    Blood transfusion     CAD (coronary artery disease) 7/10/2012    Cancer     skin    Cataract     removed from both    Clotting disorder     Coronary artery disease     Depression     HTN (hypertension) 7/10/2012    Hx-TIA (transient ischemic attack) 7/10/2012    Hyperlipidemia     Hypertension     Myocardial infarction     Renal calculus, left 8/18/2017    S/P CABG (coronary artery bypass graft) 1/8/2013    Stroke 2/12    TIA    Urinary tract infection      Past Surgical History:   Procedure Laterality Date    CATARACT EXTRACTION W/  INTRAOCULAR LENS IMPLANT Right 03/27/2008    CATARACT EXTRACTION W/  INTRAOCULAR LENS IMPLANT Left 12/06/2007    CORONARY ARTERY BYPASS GRAFT      DILATION OF URETHRA N/A 1/20/2020    Procedure: DILATION, URETHRA;  Surgeon: Ge Ramon MD;  [facility]: [facility];  Service: Urology;  Laterality: N/A;    TRANSURETHRAL RESECTION OF PROSTATE N/A 1/20/2020    Procedure: TURP (TRANSURETHRAL RESECTION OF PROSTATE);  Surgeon: Ge Ramon MD;  Location: [facility];  Service: Urology;  Laterality: N/A;    triple bypass       Family History   Problem Relation Age of Onset    Heart attack Father     Heart disease Father     Stroke Father     Alcohol abuse Father      Stroke Mother     Dementia Mother     Hypertension Mother     Diabetes Mother     Melanoma Neg Hx     Psoriasis Neg Hx     Lupus Neg Hx     Eczema Neg Hx     Acne Neg Hx     Prostate cancer Neg Hx     Kidney disease Neg Hx      Social History     Tobacco Use    Smoking status: Never Smoker    Smokeless tobacco: Never Used   Substance Use Topics    Alcohol use: No    Drug use: No     Review of patient's allergies indicates:   Allergen Reactions    No known drug allergies        Medications: I have reviewed the current medication administration record.    Medications Prior to Admission   Medication Sig Dispense Refill Last Dose    ascorbic acid, vitamin C, (VITAMIN C) 500 MG tablet Take 500 mg by mouth once daily.       atorvastatin (LIPITOR) 40 MG tablet Take 1 tablet (40 mg total) by mouth once daily. 90 tablet 3     busPIRone (BUSPAR) 10 MG tablet Take 10 mg by mouth 2 (two) times daily.        carbidopa-levodopa  mg (SINEMET)  mg per tablet Take 1 tablet by mouth 3 (three) times daily. 270 tablet 1     CARTIA  mg 24 hr capsule Take 1 capsule (180 mg total) by mouth once daily. 90 capsule 3     carvediloL (COREG) 12.5 MG tablet Take 12.5 mg by mouth 2 (two) times daily.        cefUROXime (CEFTIN) 500 MG tablet Take 1 tablet (500 mg total) by mouth 2 (two) times daily. for 7 days 14 tablet 0     docusate sodium (COLACE) 100 MG capsule Take 100 mg by mouth once daily.       DULoxetine (CYMBALTA) 30 MG capsule Take 60 mg by mouth once daily.        ELIQUIS 5 mg Tab 5 mg 2 (two) times daily.        FLUoxetine 20 MG capsule Take 20 mg by mouth once daily.        fluticasone (FLONASE) 50 mcg/actuation nasal spray 2 sprays (100 mcg total) by Each Nare route once daily. 16 g 5     fosfomycin (MONUROL) 3 gram Pack        lactulose (CHRONULAC) 10 gram/15 mL solution        loratadine (CLARITIN) 10 mg tablet Take 10 mg by mouth once daily.       losartan (COZAAR) 50 MG tablet  Take 50 mg by mouth once daily.        memantine (NAMENDA) 10 MG Tab 10 mg 2 (two) times daily.        methenamine (HIPREX) 1 gram Tab Take 1 g by mouth 2 (two) times daily.       mirtazapine (REMERON) 30 MG tablet Take 1 tablet (30 mg total) by mouth every evening. (Patient taking differently: Take 45 mg by mouth every evening. ) 30 tablet 0     polyethylene glycol (GLYCOLAX) 17 gram PwPk Take by mouth.       tamsulosin (FLOMAX) 0.4 mg Cap           Review of Systems   Constitutional: Positive for fatigue. Negative for fever.   HENT: Negative for drooling.    Eyes: Negative for visual disturbance.   Respiratory: Negative for cough.    Cardiovascular: Negative for chest pain.   Gastrointestinal: Negative for nausea and vomiting.   Skin: Negative for rash.   Neurological: Positive for speech difficulty and weakness. Negative for facial asymmetry and numbness.   Psychiatric/Behavioral: Positive for confusion.     Objective:     Vital Signs (Most Recent):  Temp: 98.9 °F (37.2 °C) (01/26/22 2026)  Pulse: 78 (01/26/22 2106)  Resp: (!) 25 (01/26/22 2106)  BP: (!) 136/93 (01/26/22 2106)  SpO2: 98 % (01/26/22 2106)    Vital Signs Range (Last 24H):  Temp:  [98 °F (36.7 °C)-98.9 °F (37.2 °C)]   Pulse:  [78-85]   Resp:  [18-25]   BP: (131-160)/(65-93)   SpO2:  [98 %-100 %]     Physical Exam  Vitals reviewed.   Constitutional:       General: He is not in acute distress.     Appearance: He is well-developed and well-nourished.   HENT:      Head: Normocephalic and atraumatic.   Cardiovascular:      Rate and Rhythm: Normal rate.   Pulmonary:      Effort: Pulmonary effort is normal. No respiratory distress.   Skin:     General: Skin is warm and dry.         Neurological Exam:   LOC: obtunded  Attention Span: poor  Language: Global aphasia  Articulation: Dysarthria  Orientation: Not oriented to place, Not oriented to time  Facial Movement (CN VII): Symmetric facial expression    Motor: Arm left  Paresis: 2/5  Leg left  Paresis:  2/5  Arm right  Paresis: 2/5  Leg right Paresis: 2/5  Sensation: Intact to light touch, temperature and vibration  Tone: Normal tone throughout      Laboratory:  CMP:   Recent Labs   Lab 01/26/22  1408   CALCIUM 9.7   ALBUMIN 3.0*   PROT 7.9      K 4.3   CO2 27      BUN 22   CREATININE 1.2   ALKPHOS 101   ALT 17   AST 21   BILITOT 1.2*     CBC:   Recent Labs   Lab 01/26/22  1408   WBC 13.28*   RBC 5.33   HGB 18.0   HCT 52.6      MCV 99*   MCH 33.8*   MCHC 34.2     Lipid Panel:   Recent Labs   Lab 01/26/22  2100   CHOL 83*   LDLCALC 48.0*   HDL 27*   TRIG 40     Coagulation: No results for input(s): PT, INR, APTT in the last 168 hours.  Hgb A1C:   Recent Labs   Lab 01/26/22  2100   HGBA1C 5.2  5.2     TSH: No results for input(s): TSH in the last 168 hours.    Diagnostic Results:      Brain imaging:  CT Head pending      CT Head. Date: 01/26/22  Interval development of hemorrhage layering dependently within the posterior horns of the bilateral lateral ventricles, new from the previous study of 01/05/2022.  There is slight prominence of the ventricular system, perhaps slightly more so than compared to the prior examination which could suggest a component of developing hydrocephalus.     Age-appropriate generalized cerebral volume loss with moderate chronic microvascular ischemic disease.  No definite new parenchymal hypoattenuation to suggest an acute infarction.     Small foci of venous air noted.  Clinically correlate for recent intravenous vascular access.    Vessel Imaging:  pending    Cardiac Evaluation:   TTE pending      Anabel Murillo PA-C  Comprehensive Stroke Center  Department of Vascular Neurology   [facility]

## 2022-01-27 NOTE — PLAN OF CARE
[facility]  Initial Discharge Assessment       Primary Care Provider: Timo Conti MD    Admission Diagnosis: Intraventricular hemorrhage [I61.5]    Admission Date: 1/26/2022  Expected Discharge Date: 2/2/2022    Discharge Barriers Identified: None    Payor: MEDICARE / Plan: MEDICARE PART A & B / Product Type: Government /     Extended Emergency Contact Information  Primary Emergency Contact: ivonne nix  Mobile Phone: [phone]  Relation: Daughter  Secondary Emergency Contact: Lissa James  Address: [address] [place] [address] [facility]  Home Phone: [phone]  Mobile Phone: [phone]  Relation: Spouse    Discharge Plan A: Return to nursing home  Discharge Plan B: Return to Nursing Home      [facility] - [place], [place] - [address] [place]  [address] [place]  [facility] [address]  Phone: [phone] Fax: [phone]    [facility] - [place] - [address]  [address]  [place] [address]  Phone: [phone] Fax: [phone]    [id number] - [place] - [address] [facility]  [address] [facility]  [place] [address]  Phone: [phone] Fax: [phone]    [facility] - [address] [address]  Phone: [phone] Fax: [phone]      Transferred from:  [facility]    Past Medical History:   Diagnosis Date    *Atrial fibrillation     Anxiety     Atrial fibrillation 7/5/2012    Blood transfusion     CAD (coronary artery disease) 7/10/2012    Cancer     skin    Cataract     removed from both    Clotting disorder     Coronary artery disease     Depression     HTN (hypertension) 7/10/2012    Hx-TIA (transient ischemic attack) 7/10/2012    Hyperlipidemia     Hypertension     Myocardial infarction     Renal calculus, left 8/18/2017    S/P CABG (coronary artery bypass graft) 1/8/2013    Stroke 2/12    TIA    Urinary tract  infection          CM met with patient and Kathy Trevinojosie (dtr and POA) [phone] in room for Discharge Planning Assessment.  Patient is unable to answer questions.  Per daughter, the patient is a resident at [facility].   Per daughter, the patient was requires assistance ADLS, but feeds self and transfers self to  and uses a wheelchair for ambulation.  Patient will have assistance from nursing facility staff upo discharge.   Discharge Planning Booklet given to patient/family and discussed.  All questions addressed.  CM will follow for needs.    Initial Assessment (most recent)     Adult Discharge Assessment - 01/27/22 1624        Discharge Assessment    Assessment Type Discharge Planning Assessment     Confirmed/corrected address, phone number and insurance Yes     Confirmed Demographics Correct on Facesheet     Source of Information family     If unable to respond/provide information was family/caregiver contacted? Yes     Contact Name/Number Kathy Mcbride (dtr and POA) [phone]     Communicated MITZI with patient/caregiver Date not available/Unable to determine     Reason For Admission Acute Encephalopathy     Lives With facility resident   [facility]    Facility Arrived From: [facility]     Do you expect to return to your current living situation? Yes     Do you have help at home or someone to help you manage your care at home? Yes     Who are your caregiver(s) and their phone number(s)? Kathy Rae (dtr and POA) [phone]     Prior to hospitilization cognitive status: Alert/Oriented     Current cognitive status: Unable to Assess     Walking or Climbing Stairs Difficulty ambulation difficulty, requires equipment;transferring difficulty, requires equipment;stair climbing difficulty, requires equipment     Mobility Management Wheelchair     Dressing/Bathing Difficulty bathing difficulty, assistance 1 person;dressing difficulty, assistance 1 person     Dressing/Bathing Management  facility     Equipment Currently Used at Home wheelchair     Readmission within 30 days? No     Patient currently being followed by outpatient case management? No     Do you currently have service(s) that help you manage your care at home? No     Do you take prescription medications? Yes     Do you have prescription coverage? Yes     Coverage Medicare/Medicaid     Do you have any problems affording any of your prescribed medications? No     Is the patient taking medications as prescribed? yes     Who is going to help you get home at discharge? Kathy Mcbride (dtr and POA) [phone]     How do you get to doctors appointments? health plan transportation     Are you on dialysis? No     Do you take coumadin? No     Discharge Plan A Return to nursing home     Discharge Plan B Return to Nursing Home     DME Needed Upon Discharge  none     Discharge Plan discussed with: Adult children     Discharge Barriers Identified None        Relationship/Environment    Name(s) of Who Lives With Patient Facility resident                    Lisy Garner RN, CCRN-K, CCM  Neuro-Critical Care   [phone]

## 2022-01-27 NOTE — NURSING
Patient arrived to [facility] from [place] [facility] >> [facility]     Type of stroke/diagnosis: ICH    TPA start and end time (if applicable)    Thrombectomy start and end time (if applicable)    Current symptoms: confusion, delayed responses, follows commands, slurred speech    Skin assessment done: Yes  Wounds noted: L pinky blister, generalized bruising    *If wounds noted, was Wound Care consulted? Yes    [place] Completed? N    Patient Belongings on Admit: Gray long sleeve shirt, socks    NCC notified: BENJAMIN Telles

## 2022-01-27 NOTE — PLAN OF CARE
Called patient's daughter Regina Novak, to clarify code status over the phone, who stated that the patient's wishes are for DNR/DNI, with the understanding that if his heart stops. He wouldn't wish for chest compressions, shocks or medications given in attempt to bring back to pump, also not to have a tube down his throat or be on a breathing machine in that situation. Code status changed in the system.    Hesham Beatty  Neurocritical Care fellow

## 2022-01-27 NOTE — SUBJECTIVE & OBJECTIVE
Interval History:  Patient w/ continued waxing/waning lethargy. TSH, ammonia wnl. B12 and EEG pending (r/o NCSE). IVH stable on imaging. Changed abx to cefepime for concerns of resistance.     Review of Systems   Unable to perform ROS: Mental status change     Objective:     Vitals:  Temp: 98.5 °F (36.9 °C)  Pulse: 82  Rhythm: normal sinus rhythm  BP: (!) 153/82  MAP (mmHg): 107  Resp: (!) 28  SpO2: 97 %  O2 Device (Oxygen Therapy): room air    Temp  Min: 98 °F (36.7 °C)  Max: 99 °F (37.2 °C)  Pulse  Min: 78  Max: 88  BP  Min: 116/70  Max: 162/83  MAP (mmHg)  Min: 88  Max: 117  Resp  Min: 18  Max: 33  SpO2  Min: 97 %  Max: 100 %    01/26 0701 - 01/27 0700  In: 70 [P.O.:70]  Out: 450 [Urine:450]   Unmeasured Output  Stool Occurrence: 0       Physical Exam  Vitals and nursing note reviewed.   Constitutional:       General: He is not in acute distress.     Appearance: He is normal weight. He is not ill-appearing, toxic-appearing or diaphoretic.      Comments: Patient lying in bed w/ legs curled up to left side.    HENT:      Head: Normocephalic.      Right Ear: External ear normal.      Left Ear: External ear normal.      Nose: Nose normal.      Mouth/Throat:      Mouth: Mucous membranes are moist.      Pharynx: Oropharynx is clear.   Eyes:      General:         Right eye: No discharge.         Left eye: No discharge.   Cardiovascular:      Rate and Rhythm: Normal rate and regular rhythm.      Pulses: Normal pulses.   Pulmonary:      Effort: Pulmonary effort is normal. No respiratory distress.   Abdominal:      General: Abdomen is flat.      Palpations: Abdomen is soft.      Tenderness: There is no abdominal tenderness.   Skin:     General: Skin is warm and dry.      Capillary Refill: Capillary refill takes less than 2 seconds.   Neurological:      Comments: No sedation.   E3 V4 M6    Patient lethargic, but awakens to voice and following commands.     PERRL, 3mm bilat. EOMI.     Motor:  RUE:4/5  LUE: 4/5  RLE:3/5  LLE:  3/5    Sensation grossly intact.     Gait deffered       Medications:  Continuous Scheduledatorvastatin, 40 mg, Daily  carbidopa-levodopa  mg, 1 tablet, TID  carvediloL, 12.5 mg, BID  ceFEPime (MAXIPIME) IVPB, 1 g, Q12H  memantine, 10 mg, BID  mupirocin, , BID  tamsulosin, 0.4 mg, Daily    PRNlabetalol, 10 mg, Q4H PRN  ondansetron, 4 mg, Q8H PRN  sodium chloride 0.9%, 10 mL, PRN      Today I personally reviewed pertinent medications, lines/drains/airways, imaging, cardiology results, laboratory results, microbiology results, notably: [facility]     Diet  No diet orders on file  No diet orders on file

## 2022-01-27 NOTE — ASSESSMENT & PLAN NOTE
-Admitted to [facility] for close neurologic monitoring  -TSH WNL  -Ammonia WNL   -B12 pending  -EEG to r/o NCSE pending in setting of waxing/waning lethargy and new onset AMS  -Concerns for urosepsis w/ positive urine culture and history of recurrent UTI. Started on ceftriaxone intially, changed to cefepime 1/27 for concerns of resistance.

## 2022-01-27 NOTE — PT/OT/SLP PROGRESS
Occupational Therapy      Patient Name:  Eliazar James   MRN:  [id number]    Patient not seen today secondary to minimally responsive to verbal/tactile stimulation. Nurse reports that cognition/alertness fluctuates. Pt is a NH resident x 2 years and has (B)LE contractions. Will follow-up.    1/27/2022

## 2022-01-27 NOTE — ASSESSMENT & PLAN NOTE
Eliazar James is a 81 y.o. male with PMHx of CAD, MI, stroke, afib (eliquis), Parkinsons, HLD, and HTN who presented to OSH with AMS. CT with IVH, patient transferred from OSH for further care. Etiology of IVH unclear at this time, previous CTA in 2019 without significant cerebrovascular abnormalities, such as AVM or aneurysm. No history of recent trauma.  Hold home eliquis.     Antithrombotics for secondary stroke prevention: Anticoagulants: hold home eliquis in setting of IVH    Statins for secondary stroke prevention and hyperlipidemia, if present:   Statins: Atorvastatin- 40 mg daily    Aggressive risk factor modification: HTN, HLD, A-Fib, CAD, stroke     Rehab efforts: The patient has been evaluated by a stroke team provider and the therapy needs have been fully considered based off the presenting complaints and exam findings. The following therapy evaluations are needed: PT evaluate and treat, OT evaluate and treat, SLP evaluate and treat, PM&R evaluate for appropriate placement    Diagnostics ordered/pending: CTA Head to assess vasculature , CTA Neck/Arch to assess vasculature, MRI head without contrast to assess brain parenchyma, TTE to assess cardiac function/status     VTE prophylaxis: Mechanical prophylaxis: Place SCDs  None: Reason for No Pharmacological VTE Prophylaxis: IVH    BP parameters: SBP <140

## 2022-01-27 NOTE — HPI
Eliazar James is a 81 y.o. male with PMHx of CAD, MI, stroke, afib (eliquis), Parkinsons, HLD, and HTN who was transferred from OSH for further care for IVH. Patient presented to OSH with AMS, seen in ED 3 days prior with UTI. Patient had no improvement in mental status with recent worsening over past 48 hours. Patient resides at NH, baseline oriented x 4.

## 2022-01-27 NOTE — ASSESSMENT & PLAN NOTE
Recently diagnosed prior to admission  On ceftin at nursing home  Antibiotic changed to cefepime per NCC

## 2022-01-27 NOTE — PT/OT/SLP PROGRESS
Physical Therapy      Patient Name:  Eliazar James   MRN:  [id number]    Patient not seen today secondary to  (Patient very lethargic, unable to meaningfully follow commands). Will follow-up 1/27/2022.

## 2022-01-27 NOTE — SUBJECTIVE & OBJECTIVE
Past Medical History:   Diagnosis Date    *Atrial fibrillation     Anxiety     Atrial fibrillation 7/5/2012    Blood transfusion     CAD (coronary artery disease) 7/10/2012    Cancer     skin    Cataract     removed from both    Clotting disorder     Coronary artery disease     Depression     HTN (hypertension) 7/10/2012    Hx-TIA (transient ischemic attack) 7/10/2012    Hyperlipidemia     Hypertension     Myocardial infarction     Renal calculus, left 8/18/2017    S/P CABG (coronary artery bypass graft) 1/8/2013    Stroke 2/12    TIA    Urinary tract infection      Past Surgical History:   Procedure Laterality Date    CATARACT EXTRACTION W/  INTRAOCULAR LENS IMPLANT Right 03/27/2008    CATARACT EXTRACTION W/  INTRAOCULAR LENS IMPLANT Left 12/06/2007    CORONARY ARTERY BYPASS GRAFT      DILATION OF URETHRA N/A 1/20/2020    Procedure: DILATION, URETHRA;  Surgeon: Ge Ramon MD;  Location: [facility] OR;  Service: Urology;  Laterality: N/A;    TRANSURETHRAL RESECTION OF PROSTATE N/A 1/20/2020    Procedure: TURP (TRANSURETHRAL RESECTION OF PROSTATE);  Surgeon: Ge Ramon MD;  Location: [facility] OR;  Service: Urology;  Laterality: N/A;    triple bypass        Current Facility-Administered Medications on File Prior to Encounter   Medication Dose Route Frequency Provider Last Rate Last Admin    [COMPLETED] cefTRIAXone (ROCEPHIN) 2 g/50 mL D5W IVPB  2 g Intravenous ED 1 Time Homero Barrera MD   Stopped at 01/26/22 1702    [COMPLETED] sodium chloride 0.9% bolus 1,000 mL  1,000 mL Intravenous ED 1 Time Homero Barrera MD   Stopped at 01/26/22 1530     Current Outpatient Medications on File Prior to Encounter   Medication Sig Dispense Refill    ascorbic acid, vitamin C, (VITAMIN C) 500 MG tablet Take 500 mg by mouth once daily.      atorvastatin (LIPITOR) 40 MG tablet Take 1 tablet (40 mg total) by mouth once daily. 90 tablet 3    busPIRone (BUSPAR) 10 MG tablet Take 10 mg by mouth 2 (two)  times daily.       carbidopa-levodopa  mg (SINEMET)  mg per tablet Take 1 tablet by mouth 3 (three) times daily. 270 tablet 1    CARTIA  mg 24 hr capsule Take 1 capsule (180 mg total) by mouth once daily. 90 capsule 3    carvediloL (COREG) 12.5 MG tablet Take 12.5 mg by mouth 2 (two) times daily.       cefUROXime (CEFTIN) 500 MG tablet Take 1 tablet (500 mg total) by mouth 2 (two) times daily. for 7 days 14 tablet 0    docusate sodium (COLACE) 100 MG capsule Take 100 mg by mouth once daily.      DULoxetine (CYMBALTA) 30 MG capsule Take 60 mg by mouth once daily.       ELIQUIS 5 mg Tab 5 mg 2 (two) times daily.       FLUoxetine 20 MG capsule Take 20 mg by mouth once daily.       fluticasone (FLONASE) 50 mcg/actuation nasal spray 2 sprays (100 mcg total) by Each Nare route once daily. 16 g 5    fosfomycin (MONUROL) 3 gram Pack       lactulose (CHRONULAC) 10 gram/15 mL solution       loratadine (CLARITIN) 10 mg tablet Take 10 mg by mouth once daily.      losartan (COZAAR) 50 MG tablet Take 50 mg by mouth once daily.       memantine (NAMENDA) 10 MG Tab 10 mg 2 (two) times daily.       methenamine (HIPREX) 1 gram Tab Take 1 g by mouth 2 (two) times daily.      mirtazapine (REMERON) 30 MG tablet Take 1 tablet (30 mg total) by mouth every evening. (Patient taking differently: Take 45 mg by mouth every evening. ) 30 tablet 0    polyethylene glycol (GLYCOLAX) 17 gram PwPk Take by mouth.      tamsulosin (FLOMAX) 0.4 mg Cap         Allergies: No known drug allergies    Family History   Problem Relation Age of Onset    Heart attack Father     Heart disease Father     Stroke Father     Alcohol abuse Father     Stroke Mother     Dementia Mother     Hypertension Mother     Diabetes Mother     Melanoma Neg Hx     Psoriasis Neg Hx     Lupus Neg Hx     Eczema Neg Hx     Acne Neg Hx     Prostate cancer Neg Hx     Kidney disease Neg Hx      Social History     Tobacco Use    Smoking  status: Never Smoker    Smokeless tobacco: Never Used   Substance Use Topics    Alcohol use: No    Drug use: No     Review of Systems   Unable to perform due to AMS     Objective:     Vitals:    Temp: 98.9 °F (37.2 °C)  Pulse: 82  BP: (!) 158/83  MAP (mmHg): 112  Resp: 20  SpO2: 100 %  O2 Device (Oxygen Therapy): room air    Temp  Min: 98 °F (36.7 °C)  Max: 98.9 °F (37.2 °C)  Pulse  Min: 79  Max: 85  BP  Min: 131/76  Max: 160/88  MAP (mmHg)  Min: 92  Max: 117  Resp  Min: 18  Max: 24  SpO2  Min: 99 %  Max: 100 %    No intake/output data recorded.           Physical Exam      Physical Exam:  GA: Alert, comfortable, no acute distress.   HEENT: No scleral icterus or JVD.   Pulmonary: Clear to auscultation A/L.   Cardiac: irregular rate controlled.   Abdominal: Bowel sounds present x 4.   Skin: No jaundice, rashes, or visible lesions.  Neuro:  --GCS: E4 V4 M6  --Mental Status:  Alert, oriented to person, follows commands   --CN II-XII grossly intact.   --Pupils 3mm, PERRL.   --Corneal reflex, gag, cough intact.  --LUE strength: 4/5  --RUE strength: 4/5  --LLE strength: 3/5  --RLE strength: 3/5    Unable to test gait due to level of consciousness.    Today I personally reviewed pertinent medications, lines/drains/airways, imaging, laboratory results, notably: [facility]

## 2022-01-27 NOTE — PT/OT/SLP EVAL
Speech Language Pathology Evaluation  Cognitive/Bedside Swallow    Patient Name:  Eliazar James   MRN:  [id number]  Admitting Diagnosis: IVH (intraventricular hemorrhage)    Recommendations:                  General Recommendations:  Dysphagia therapy, Speech/language therapy and Cognitive-linguistic therapy  Diet recommendations:  Regular, Thin   Aspiration Precautions: 1 bite/sip at a time, Alternating bites/sips, Assistance with meals, Avoid talking while eating, Check for pocketing/oral residue, Eliminate distractions, Feed only when awake/alert, HOB to 90 degrees, Meds crushed in puree, Meds whole buried in puree, Small bites/sips and Strict aspiration precautions   General Precautions: Standard, aspiration,fall  Communication strategies:  provide increased time to answer and go to room if call light pushed    History:     Past Medical History:   Diagnosis Date    *Atrial fibrillation     Anxiety     Atrial fibrillation 7/5/2012    Blood transfusion     CAD (coronary artery disease) 7/10/2012    Cancer     skin    Cataract     removed from both    Clotting disorder     Coronary artery disease     Depression     HTN (hypertension) 7/10/2012    Hx-TIA (transient ischemic attack) 7/10/2012    Hyperlipidemia     Hypertension     Myocardial infarction     Renal calculus, left 8/18/2017    S/P CABG (coronary artery bypass graft) 1/8/2013    Stroke 2/12    TIA    Urinary tract infection        Past Surgical History:   Procedure Laterality Date    CATARACT EXTRACTION W/  INTRAOCULAR LENS IMPLANT Right 03/27/2008    CATARACT EXTRACTION W/  INTRAOCULAR LENS IMPLANT Left 12/06/2007    CORONARY ARTERY BYPASS GRAFT      DILATION OF URETHRA N/A 1/20/2020    Procedure: DILATION, URETHRA;  Surgeon: Ge Ramon MD;  Location: [facility];  Service: Urology;  Laterality: N/A;    TRANSURETHRAL RESECTION OF PROSTATE N/A 1/20/2020    Procedure: TURP (TRANSURETHRAL RESECTION OF PROSTATE);  Surgeon: Ge DAVE  "MD Yenny;  Location: [facility] OR;  Service: Urology;  Laterality: N/A;    triple bypass         Social History: Patient lives in a nursing home.  He states he has lived in the nursing home for the past 2 years.  Somewhat questionable historian as pt initially stated in was in the nursing home for Parkinson's though later reported he was unaware he had Parkinson's until hospital admission.  He denied difficulty swallowing though reports speech/voice changes.          Prior Intubation HX:  None this admission     Modified Barium Swallow: none reported     Chest X-Rays: 1/26: No acute abnormality    Prior diet: reg/thin    Subjective     "Oh it's very different.  It's so soft now."      Nurse cleared for evaluation, reports pt tolerating ice chips overnight.     Pain/Comfort:  · Pain Rating 1: 0/10  · Pain Rating Post-Intervention 1: 0/10    Respiratory Status: Room air    Objective:     Cognitive Status:    Orientation Person, Place and Situation  Memory Immediate Recall 100% and Delayed 0/3 unassociated words recalled after delay  Problem Solving Categories 1/2 IND, 2/2 with min A, Solutions simple situations 100% and Compare/contrast min cues to be more specific       Receptive Language:   Comprehension:   Pt required repetitions, appeared 2/2 decreased hearing  Questions Complex yes/no 100%  Commands  multistep basic commands 1/1    Pragmatics:    WFL    Expressive Language:  Verbal:    pt able to communicate basic ideas  Naming Confrontation 100% with self corrections, some mild semantic paraphasias , Divergent responsive 12 items stated in concrete cat in 1 min, 15-20 WFL and Single word responsive naming 100%      Motor Speech:  Dysarthria      Voice:   Intensity low, intermittent appropriate voicing given effort     Visual-Spatial:  tba    Reading:   tba     Written Expression:   tba     Oral Musculature Evaluation  · Oral Musculature: general weakness (tremor noted)  · Dentition: present and " adequate  · Secretion Management: adequate  · Mandibular Strength and Mobility: impaired  · Oral Labial Strength and Mobility: WFL  · Lingual Strength and Mobility: impaired strength  · Volitional Cough: decreased strength  · Volitional Swallow: WFL  · Voice Prior to PO Intake: clear, low intensity, intermittent appropriate voicing    Bedside Swallow Eval:   Consistencies Assessed:  · Thin liquids    · Puree    · Solids        Oral Phase:   · WFL  · Prolonged mastication   · Tremor noted  · Piecemeal deglutition with liquids     Pharyngeal Phase:   · no overt clinical signs/symptoms of aspiration    Compensatory Strategies  · Multiple swallows    Treatment: Education provided re: role of SLP, diet recs, swallow precs, s/s aspiration, and POC.  Pt verbalized understanding and agreement.       Assessment:     Eliazar James is a 81 y.o. male with an SLP diagnosis of Dysarthria and Cognitive-Linguistic Impairment.  He presents with questionable baseline functioning related to baseline dementia/Parkinson's Disease.  SLP to f/u.     Goals:   Multidisciplinary Problems     SLP Goals        Problem: SLP Goal    Goal Priority Disciplines Outcome   SLP Goal     SLP Ongoing, Progressing   Description: Speech Language Pathology Goals  Goals expected to be met by 2/10  1. Pt will tolerate regular diet with thin liquids without overt s/s aspiration.  2. Pt will implement speech strategies at short phrase level with min cues.   3. Pt will participate in ongoing assessment of higher level language/cognition pending clarification of premorbid functioning.                            Plan:     · Patient to be seen:  3 x/week   · Plan of Care expires:  02/26/22  · Plan of Care reviewed with:  patient   · SLP Follow-Up:  Yes       Discharge recommendations:  Discharge Facility/Level of Care Needs:  (return to NH with SNF?)   Barriers to Discharge:  Level of Skilled Assistance Needed      Time Tracking:     SLP Treatment Date:    01/27/22  Speech Start Time:  0733  Speech Stop Time:  0752     Speech Total Time (min):  19 min    Billable Minutes: Eval 11  and Eval Swallow and Oral Function 8    01/27/2022

## 2022-01-27 NOTE — SUBJECTIVE & OBJECTIVE
Interval History: naeon, exam improved overnight. [facility] stable    Review of patient's allergies indicates:   Allergen Reactions    No known drug allergies        Past Medical History:   Diagnosis Date    *Atrial fibrillation     Anxiety     Atrial fibrillation 7/5/2012    Blood transfusion     CAD (coronary artery disease) 7/10/2012    Cancer     skin    Cataract     removed from both    Clotting disorder     Coronary artery disease     Depression     HTN (hypertension) 7/10/2012    Hx-TIA (transient ischemic attack) 7/10/2012    Hyperlipidemia     Hypertension     Myocardial infarction     Renal calculus, left 8/18/2017    S/P CABG (coronary artery bypass graft) 1/8/2013    Stroke 2/12    TIA    Urinary tract infection      Past Surgical History:   Procedure Laterality Date    CATARACT EXTRACTION W/  INTRAOCULAR LENS IMPLANT Right 03/27/2008    CATARACT EXTRACTION W/  INTRAOCULAR LENS IMPLANT Left 12/06/2007    CORONARY ARTERY BYPASS GRAFT      DILATION OF URETHRA N/A 1/20/2020    Procedure: DILATION, URETHRA;  Surgeon: Ge Ramon MD;  Location: [facility] OR;  Service: Urology;  Laterality: N/A;    TRANSURETHRAL RESECTION OF PROSTATE N/A 1/20/2020    Procedure: TURP (TRANSURETHRAL RESECTION OF PROSTATE);  Surgeon: Ge Ramon MD;  Location: [facility] OR;  Service: Urology;  Laterality: N/A;    triple bypass       Family History     Problem Relation (Age of Onset)    Alcohol abuse Father    Dementia Mother    Diabetes Mother    Heart attack Father    Heart disease Father    Hypertension Mother    Stroke Father, Mother        Tobacco Use    Smoking status: Never Smoker    Smokeless tobacco: Never Used   Substance and Sexual Activity    Alcohol use: No    Drug use: No    Sexual activity: Not Currently     Review of Systems   Unable to perform ROS: Dementia     Objective:     Weight: 70 kg (154 lb 5.2 oz)  Body mass index is 24.17 kg/m².  Vital Signs (Most Recent):  Temp: 98.8 °F (37.1 °C)  (01/27/22 0702)  Pulse: 85 (01/27/22 0702)  Resp: (!) 25 (01/27/22 0702)  BP: 126/69 (01/27/22 0702)  SpO2: 100 % (01/27/22 0702) Vital Signs (24h Range):  Temp:  [98 °F (36.7 °C)-99 °F (37.2 °C)] 98.8 °F (37.1 °C)  Pulse:  [78-88] 85  Resp:  [18-33] 25  SpO2:  [98 %-100 %] 100 %  BP: (120-162)/(65-94) 126/69     Date 01/27/22 0700 - 01/28/22 0659   Shift 0288-4156 9730-5619 9728-4515 24 Hour Total   INTAKE   P.O. 70   70   Shift Total(mL/kg) 70(1)   70(1)   OUTPUT   Shift Total(mL/kg)       Weight (kg) 70 70 70 70                   Male External Urinary Catheter 01/26/22 2031 Small (Active)       Neurosurgery Physical Exam    E4V4M6  Frail, baseline dementia  Awake, oriented x4  PERRL, EOMI  Follows commands x4 symmetrically with good power  SILT    Significant Labs:  Recent Labs   Lab 01/26/22  1408 01/27/22  0049    92    140   K 4.3 4.0    107   CO2 27 24   BUN 22 22   CREATININE 1.2 1.0   CALCIUM 9.7 8.2*   MG  --  2.0     Recent Labs   Lab 01/26/22  1408 01/27/22 0049   WBC 13.28* 12.91*   HGB 18.0 14.9   HCT 52.6 45.0    237     Recent Labs   Lab 01/27/22  0049   INR 1.1   APTT 29.3     Microbiology Results (last 7 days)     Procedure Component Value Units Date/Time    Urine culture [[id number]] Collected: 01/27/22 0052    Order Status: No result Specimen: Urine Updated: 01/27/22 0347        All pertinent labs from the last 24 hours have been reviewed.    Significant Diagnostics:  I have reviewed all pertinent imaging results/findings within the past 24 hours.  I have reviewed and interpreted all pertinent imaging results/findings within the past 24 hours.

## 2022-01-27 NOTE — HOSPITAL COURSE
01/27/2022: Patient w/ continued waxing/waning lethargy. TSH, ammonia wnl. B12 and EEG pending. IVH stable. Changed abx to cefepime for concerns of resistance.   01/28/2022: Patient awake overnight, but at baseline consistently per family. Awaiting EEG read, if no seizures will d/c. MRI after EEG removal. Possible step down to Vascular Neurology today.   01/29/2022: NAEO. MRI done. PO B12 started. Step down to Vascular Neurology.

## 2022-01-27 NOTE — HPI
Eliazar James is an 80 yo M with a PMH of CAD, MI, CVA, vascular Parkinsons, HLD, HTN who presents to [facility] for IVH. He presented to osh emergency room with altered mental status today  Patient with altered mental status today, patient with continued confusion x1 week. Patient seen 3 days ago in the emergency room with a urinary tract infection. Patient has had longstanding issues with urinary tract infection, on Ceftin at the NH. Daughter Reported  AMS not improved with increasing confusion the last 48 hours. He is being admitted to [facility] for a higher level of care.

## 2022-01-27 NOTE — NURSING
2145- Pt transferred to CT by RNx1 via bed with continuous cardiac monitoring.   2210- Pt returned to unit. FAHAD SANTOS.

## 2022-01-27 NOTE — CONSULTS
[facility]  Neurosurgery  Consult Note    Consults  Subjective:     Chief Complaint/Reason for Admission: AMS    History of Present Illness: Eliazar James is a 81 y.o. male w pmh vascular dementia, CAD s/p CABG, HTN, HLD, Afib who presents today for AMS from nursing home. Pt worked up for UTI 3 days prior however concern for continued AMS. 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           Medications Prior to Admission   Medication Sig Dispense Refill Last Dose    ascorbic acid, vitamin C, (VITAMIN C) 500 MG tablet Take 500 mg by mouth once daily.       atorvastatin (LIPITOR) 40 MG tablet Take 1 tablet (40 mg total) by mouth once daily. 90 tablet 3     busPIRone (BUSPAR) 10 MG tablet Take 10 mg by mouth 2 (two) times daily.        carbidopa-levodopa  mg (SINEMET)  mg per tablet Take 1 tablet by mouth 3 (three) times daily. 270 tablet 1     CARTIA  mg 24 hr capsule Take 1 capsule (180 mg total) by mouth once daily. 90 capsule 3     carvediloL (COREG) 12.5 MG tablet Take 12.5 mg by mouth 2 (two) times daily.        cefUROXime (CEFTIN) 500 MG tablet Take 1 tablet (500 mg total) by mouth 2 (two) times daily. for 7 days 14 tablet 0     docusate sodium (COLACE) 100 MG capsule Take 100 mg by mouth once daily.       DULoxetine (CYMBALTA) 30 MG capsule Take 60 mg by mouth once daily.        ELIQUIS 5 mg Tab 5 mg 2 (two) times daily.        FLUoxetine 20 MG capsule Take 20 mg by mouth once daily.        fluticasone (FLONASE) 50 mcg/actuation nasal spray 2 sprays (100 mcg total) by Each Nare route once daily. 16 g 5     fosfomycin (MONUROL) 3 gram Pack        lactulose (CHRONULAC) 10 gram/15 mL solution        loratadine (CLARITIN) 10 mg tablet Take 10 mg by mouth once daily.       losartan  (COZAAR) 50 MG tablet Take 50 mg by mouth once daily.        memantine (NAMENDA) 10 MG Tab 10 mg 2 (two) times daily.        methenamine (HIPREX) 1 gram Tab Take 1 g by mouth 2 (two) times daily.       mirtazapine (REMERON) 30 MG tablet Take 1 tablet (30 mg total) by mouth every evening. (Patient taking differently: Take 45 mg by mouth every evening. ) 30 tablet 0     polyethylene glycol (GLYCOLAX) 17 gram PwPk Take by mouth.       tamsulosin (FLOMAX) 0.4 mg Cap           Review of patient's allergies indicates:   Allergen Reactions    No known drug allergies        Past Medical History:   Diagnosis Date    *Atrial fibrillation     Anxiety     Atrial fibrillation 7/5/2012    Blood transfusion     CAD (coronary artery disease) 7/10/2012    Cancer     skin    Cataract     removed from both    Clotting disorder     Coronary artery disease     Depression     HTN (hypertension) 7/10/2012    Hx-TIA (transient ischemic attack) 7/10/2012    Hyperlipidemia     Hypertension     Myocardial infarction     Renal calculus, left 8/18/2017    S/P CABG (coronary artery bypass graft) 1/8/2013    Stroke 2/12    TIA    Urinary tract infection      Past Surgical History:   Procedure Laterality Date    CATARACT EXTRACTION W/  INTRAOCULAR LENS IMPLANT Right 03/27/2008    CATARACT EXTRACTION W/  INTRAOCULAR LENS IMPLANT Left 12/06/2007    CORONARY ARTERY BYPASS GRAFT      DILATION OF URETHRA N/A 1/20/2020    Procedure: DILATION, URETHRA;  Surgeon: Ge Ramon MD;  Location: [facility] OR;  Service: Urology;  Laterality: N/A;    TRANSURETHRAL RESECTION OF PROSTATE N/A 1/20/2020    Procedure: TURP (TRANSURETHRAL RESECTION OF PROSTATE);  Surgeon: Ge Ramon MD;  Location: [facility] OR;  Service: Urology;  Laterality: N/A;    triple bypass       Family History     Problem Relation (Age of Onset)    Alcohol abuse Father    Dementia Mother    Diabetes Mother    Heart attack Father    Heart disease Father     Hypertension Mother    Stroke Father, Mother        Tobacco Use    Smoking status: Never Smoker    Smokeless tobacco: Never Used   Substance and Sexual Activity    Alcohol use: No    Drug use: No    Sexual activity: Not Currently     Review of Systems   Unable to perform ROS: Dementia     Objective:     Weight: 70 kg (154 lb 5.2 oz)  Body mass index is 24.17 kg/m².  Vital Signs (Most Recent):  Temp: 98.9 °F (37.2 °C) (01/26/22 2026)  Pulse: 78 (01/26/22 2106)  Resp: (!) 25 (01/26/22 2106)  BP: (!) 152/85 (01/26/22 2212)  SpO2: 98 % (01/26/22 2106) Vital Signs (24h Range):  Temp:  [98 °F (36.7 °C)-98.9 °F (37.2 °C)] 98.9 °F (37.2 °C)  Pulse:  [78-85] 78  Resp:  [18-25] 25  SpO2:  [98 %-100 %] 98 %  BP: (131-160)/(65-93) 152/85                     Male External Urinary Catheter 01/26/22 2031 Small (Active)       Neurosurgery Physical Exam  [id number]  Frail, baseline dementia  Awake, oriented to self and place  PERRL, EOMI  Follows commands x4 symmetrically with good power  SILT    Significant Labs:  Recent Labs   Lab 01/26/22  1408         K 4.3      CO2 27   BUN 22   CREATININE 1.2   CALCIUM 9.7     Recent Labs   Lab 01/26/22  1408   WBC 13.28*   HGB 18.0   HCT 52.6        No results for input(s): LABPT, INR, APTT in the last 48 hours.  Microbiology Results (last 7 days)     ** No results found for the last 168 hours. **        All pertinent labs from the last 24 hours have been reviewed.    Significant Diagnostics:  I have reviewed all pertinent imaging results/findings within the past 24 hours.  I have reviewed and interpreted all pertinent imaging results/findings within the past 24 hours.    Assessment/Plan:     * IVH (intraventricular hemorrhage)  Eliazar James is a 81 y.o. male w pmh vascular dementia, CAD s/p CABG, HTN, HLD, Afib who presents today for AMS from nursing home. Pt worked up for UTI 3 days prior however concern for continued AMS. 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     Plan:  Admit NCC  Q1hr neurochecks  CBC, CMP, coags, type & screen, COVID  Hold all anticoagulation at this time  HOB>30, SBP<140, Na 135-150  Imaging reviewed, CTH stable IVH without significant change  No neurosurgical intervention indicated at this time, Continue to monitor closely and continue AMS workup            Thank you for your consult. I will follow-up with patient. Please contact us if you have any additional questions.    Oleg Steel MD  Neurosurgery  [facility]

## 2022-01-27 NOTE — PLAN OF CARE
[facility] Care Plan    POC reviewed with Eliazar James and family at 1400. Pt verbalized understanding. Questions and concerns addressed with daughters at bedside. Will continue to monitor. See below and flowsheets for full assessment and VS info.   -Pt neuro exam inconsistent. Pt either awake and oriented x3, following commands or extremely hard to arouse with no verbal response. [facility] team aware.  -Pt placed on EEG.   -NGT placed    Neuro:  [place] Coma Scale  Best Eye Response: 4-->(E4) spontaneous  Best Motor Response: 6-->(M6) obeys commands  Best Verbal Response: 4-->(V4) confused  [place] Coma Scale Score: 14  Assessment Qualifiers: patient not sedated/intubated  Pupil PERRLA: yes     24 hr Temp:  [98 °F (36.7 °C)-99 °F (37.2 °C)]     CV:   Rhythm: normal sinus rhythm  BP goals:   SBP < 160  MAP > 65    Resp:   O2 Device (Oxygen Therapy): room air       Plan: N/A    GI/:     Diet/Nutrition Received: NPO  Last Bowel Movement: 01/24/22  Voiding Characteristics: external catheter    Intake/Output Summary (Last 24 hours) at 1/27/2022 1642  Last data filed at 1/27/2022 1500  Gross per 24 hour   Intake 421.86 ml   Output 750 ml   Net -328.14 ml     Unmeasured Output  Stool Occurrence: 0    Labs/Accuchecks:  Recent Labs   Lab 01/27/22 0049   WBC 12.91*   RBC 4.54*   HGB 14.9   HCT 45.0         Recent Labs   Lab 01/27/22 0049      K 4.0   CO2 24      BUN 22   CREATININE 1.0   ALKPHOS 82   ALT 27   AST 14   BILITOT 0.9      Recent Labs   Lab 01/27/22 0049   INR 1.1   APTT 29.3      Recent Labs   Lab 01/26/22  1408 01/26/22  1408 01/27/22 0049   *  719*   < > 469*   CPKMB 18.9*   < > 7.8*   TROPONINI 0.010  --   --    MB 2.6   < > 1.7    < > = values in this interval not displayed.       Electrolytes: N/A - electrolytes WDL  Accuchecks: none    Gtts:       LDA/Wounds:  Lines/Drains/Airways       Drain              Male External Urinary Catheter 01/26/22 2031 Small <1 day               Peripheral Intravenous Line                   Peripheral IV - Single Lumen 01/26/22 1421 18 G Right Antecubital 1 day         Peripheral IV - Single Lumen 01/26/22 1530 18 G Left Antecubital 1 day                  Wounds: No  Wound care consulted: No

## 2022-01-28 LAB
ALBUMIN SERPL BCP-MCNC: 2.1 G/DL (ref 3.5–5.2)
ALP SERPL-CCNC: 76 U/L (ref 55–135)
ALT SERPL W/O P-5'-P-CCNC: 5 U/L (ref 10–44)
ANION GAP SERPL CALC-SCNC: 10 MMOL/L (ref 8–16)
AST SERPL-CCNC: 26 U/L (ref 10–40)
BASOPHILS # BLD AUTO: 0.04 K/UL (ref 0–0.2)
BASOPHILS NFR BLD: 0.4 % (ref 0–1.9)
BILIRUB SERPL-MCNC: 1.1 MG/DL (ref 0.1–1)
BUN SERPL-MCNC: 21 MG/DL (ref 8–23)
CALCIUM SERPL-MCNC: 7.8 MG/DL (ref 8.7–10.5)
CHLORIDE SERPL-SCNC: 105 MMOL/L (ref 95–110)
CO2 SERPL-SCNC: 24 MMOL/L (ref 23–29)
CREAT SERPL-MCNC: 0.8 MG/DL (ref 0.5–1.4)
DIFFERENTIAL METHOD: ABNORMAL
EOSINOPHIL # BLD AUTO: 0.2 K/UL (ref 0–0.5)
EOSINOPHIL NFR BLD: 2.2 % (ref 0–8)
ERYTHROCYTE [DISTWIDTH] IN BLOOD BY AUTOMATED COUNT: 13 % (ref 11.5–14.5)
EST. GFR  (AFRICAN AMERICAN): >60 ML/MIN/1.73 M^2
EST. GFR  (NON AFRICAN AMERICAN): >60 ML/MIN/1.73 M^2
GLUCOSE SERPL-MCNC: 88 MG/DL (ref 70–110)
HCT VFR BLD AUTO: 42.6 % (ref 40–54)
HGB BLD-MCNC: 14 G/DL (ref 14–18)
IMM GRANULOCYTES # BLD AUTO: 0.04 K/UL (ref 0–0.04)
IMM GRANULOCYTES NFR BLD AUTO: 0.4 % (ref 0–0.5)
LYMPHOCYTES # BLD AUTO: 0.9 K/UL (ref 1–4.8)
LYMPHOCYTES NFR BLD: 9.5 % (ref 18–48)
MAGNESIUM SERPL-MCNC: 1.9 MG/DL (ref 1.6–2.6)
MCH RBC QN AUTO: 32.9 PG (ref 27–31)
MCHC RBC AUTO-ENTMCNC: 32.9 G/DL (ref 32–36)
MCV RBC AUTO: 100 FL (ref 82–98)
MONOCYTES # BLD AUTO: 1 K/UL (ref 0.3–1)
MONOCYTES NFR BLD: 9.7 % (ref 4–15)
NEUTROPHILS # BLD AUTO: 7.6 K/UL (ref 1.8–7.7)
NEUTROPHILS NFR BLD: 77.8 % (ref 38–73)
NRBC BLD-RTO: 0 /100 WBC
PHOSPHATE SERPL-MCNC: 2.3 MG/DL (ref 2.7–4.5)
PLATELET # BLD AUTO: 210 K/UL (ref 150–450)
PMV BLD AUTO: 10.5 FL (ref 9.2–12.9)
POTASSIUM SERPL-SCNC: 3.6 MMOL/L (ref 3.5–5.1)
PROT SERPL-MCNC: 5.9 G/DL (ref 6–8.4)
RBC # BLD AUTO: 4.25 M/UL (ref 4.6–6.2)
SODIUM SERPL-SCNC: 139 MMOL/L (ref 136–145)
WBC # BLD AUTO: 9.81 K/UL (ref 3.9–12.7)

## 2022-01-28 PROCEDURE — 99233 SBSQ HOSP IP/OBS HIGH 50: CPT | Mod: ,,,

## 2022-01-28 PROCEDURE — 97530 THERAPEUTIC ACTIVITIES: CPT

## 2022-01-28 PROCEDURE — 43752 NASAL/OROGASTRIC W/TUBE PLMT: CPT

## 2022-01-28 PROCEDURE — 25000003 PHARM REV CODE 250

## 2022-01-28 PROCEDURE — 84100 ASSAY OF PHOSPHORUS: CPT | Performed by: NURSE PRACTITIONER

## 2022-01-28 PROCEDURE — 85025 COMPLETE CBC W/AUTO DIFF WBC: CPT | Performed by: NURSE PRACTITIONER

## 2022-01-28 PROCEDURE — 63600175 PHARM REV CODE 636 W HCPCS

## 2022-01-28 PROCEDURE — 94761 N-INVAS EAR/PLS OXIMETRY MLT: CPT

## 2022-01-28 PROCEDURE — 92526 ORAL FUNCTION THERAPY: CPT

## 2022-01-28 PROCEDURE — 80053 COMPREHEN METABOLIC PANEL: CPT | Performed by: NURSE PRACTITIONER

## 2022-01-28 PROCEDURE — 99233 PR SUBSEQUENT HOSPITAL CARE,LEVL III: ICD-10-PCS | Mod: ,,,

## 2022-01-28 PROCEDURE — 63600175 PHARM REV CODE 636 W HCPCS: Performed by: PSYCHIATRY & NEUROLOGY

## 2022-01-28 PROCEDURE — 25000003 PHARM REV CODE 250: Performed by: NURSE PRACTITIONER

## 2022-01-28 PROCEDURE — 20000000 HC ICU ROOM

## 2022-01-28 PROCEDURE — 99233 PR SUBSEQUENT HOSPITAL CARE,LEVL III: ICD-10-PCS | Mod: ,,, | Performed by: PSYCHIATRY & NEUROLOGY

## 2022-01-28 PROCEDURE — 83735 ASSAY OF MAGNESIUM: CPT | Performed by: NURSE PRACTITIONER

## 2022-01-28 PROCEDURE — 97165 OT EVAL LOW COMPLEX 30 MIN: CPT

## 2022-01-28 PROCEDURE — 97535 SELF CARE MNGMENT TRAINING: CPT

## 2022-01-28 PROCEDURE — 97162 PT EVAL MOD COMPLEX 30 MIN: CPT

## 2022-01-28 PROCEDURE — 99233 SBSQ HOSP IP/OBS HIGH 50: CPT | Mod: ,,, | Performed by: PSYCHIATRY & NEUROLOGY

## 2022-01-28 PROCEDURE — 97112 NEUROMUSCULAR REEDUCATION: CPT

## 2022-01-28 RX ORDER — ACETAMINOPHEN 325 MG/1
650 TABLET ORAL EVERY 6 HOURS PRN
Status: DISCONTINUED | OUTPATIENT
Start: 2022-01-28 | End: 2022-01-31 | Stop reason: HOSPADM

## 2022-01-28 RX ORDER — SENNOSIDES 8.6 MG/1
8.6 TABLET ORAL 2 TIMES DAILY
Status: DISCONTINUED | OUTPATIENT
Start: 2022-01-28 | End: 2022-01-31 | Stop reason: HOSPADM

## 2022-01-28 RX ORDER — CEFEPIME HYDROCHLORIDE 1 G/50ML
1 INJECTION, SOLUTION INTRAVENOUS
Status: DISCONTINUED | OUTPATIENT
Start: 2022-01-28 | End: 2022-01-31 | Stop reason: HOSPADM

## 2022-01-28 RX ORDER — POLYETHYLENE GLYCOL 3350 17 G/17G
17 POWDER, FOR SOLUTION ORAL DAILY
Status: DISCONTINUED | OUTPATIENT
Start: 2022-01-29 | End: 2022-01-28

## 2022-01-28 RX ORDER — HEPARIN SODIUM 5000 [USP'U]/ML
5000 INJECTION, SOLUTION INTRAVENOUS; SUBCUTANEOUS EVERY 8 HOURS
Status: DISCONTINUED | OUTPATIENT
Start: 2022-01-28 | End: 2022-01-31 | Stop reason: HOSPADM

## 2022-01-28 RX ADMIN — ACETAMINOPHEN 650 MG: 325 TABLET ORAL at 06:01

## 2022-01-28 RX ADMIN — MUPIROCIN: 20 OINTMENT TOPICAL at 09:01

## 2022-01-28 RX ADMIN — CARBIDOPA AND LEVODOPA 1 TABLET: 25; 100 TABLET ORAL at 09:01

## 2022-01-28 RX ADMIN — MEMANTINE 10 MG: 10 TABLET ORAL at 09:01

## 2022-01-28 RX ADMIN — CARBIDOPA AND LEVODOPA 1 TABLET: 25; 100 TABLET ORAL at 08:01

## 2022-01-28 RX ADMIN — ATORVASTATIN CALCIUM 40 MG: 40 TABLET, FILM COATED ORAL at 08:01

## 2022-01-28 RX ADMIN — HEPARIN SODIUM 5000 UNITS: 5000 INJECTION INTRAVENOUS; SUBCUTANEOUS at 02:01

## 2022-01-28 RX ADMIN — CEFEPIME HYDROCHLORIDE 1 G: 1 INJECTION, SOLUTION INTRAVENOUS at 06:01

## 2022-01-28 RX ADMIN — SENNOSIDES 8.6 MG: 8.6 TABLET, COATED ORAL at 09:01

## 2022-01-28 RX ADMIN — CEFEPIME HYDROCHLORIDE 1 G: 1 INJECTION, SOLUTION INTRAVENOUS at 11:01

## 2022-01-28 RX ADMIN — CARVEDILOL 12.5 MG: 3.12 TABLET, FILM COATED ORAL at 09:01

## 2022-01-28 RX ADMIN — MEMANTINE 10 MG: 10 TABLET ORAL at 08:01

## 2022-01-28 RX ADMIN — CARVEDILOL 12.5 MG: 3.12 TABLET, FILM COATED ORAL at 08:01

## 2022-01-28 RX ADMIN — HEPARIN SODIUM 5000 UNITS: 5000 INJECTION INTRAVENOUS; SUBCUTANEOUS at 09:01

## 2022-01-28 RX ADMIN — CARBIDOPA AND LEVODOPA 1 TABLET: 25; 100 TABLET ORAL at 02:01

## 2022-01-28 RX ADMIN — TAMSULOSIN HYDROCHLORIDE 0.4 MG: 0.4 CAPSULE ORAL at 08:01

## 2022-01-28 NOTE — PLAN OF CARE
01/28/22 1602   Post-Acute Status   Post-Acute Authorization Placement   Post-Acute Placement Status Referrals Sent  ([facility])     SW observed therapy recs for SNF. Pt family not present at bedside. Contacted Pt daughter/POA via phone and discussed SNF. She reported Pt is from [facility] in [place] and they would like him to return there with SNF services. Sent referral via [facility].     Dayana Way LCSW  Neurocritical Care   [facility]  [id number]

## 2022-01-28 NOTE — PLAN OF CARE
Problem: Occupational Therapy Goal  Goal: Occupational Therapy Goal  Description: Goals to be met by: 2/11/2022     Patient will increase functional independence with ADLs by performing:    Feeding with Set-up Assistance.  UE Dressing with Set-up Assistance.  Grooming while seated with Set-up Assistance.  Toileting from bedside commode with Stand-by Assistance for hygiene and clothing management.   Toilet transfer to bedside commode with Contact Guard Assistance.    Outcome: Ongoing, Progressing     Evaluation complete-see note for details. Goals and POC established.

## 2022-01-28 NOTE — ASSESSMENT & PLAN NOTE
Eliazar James is a 81 y.o. male with PMHx of CAD, MI, stroke, afib (eliquis), Parkinsons, HLD, and HTN who presented to OSH with AMS. CTH with IVH and patient transferred to [facility] for higher level of care. CTA in 2019 without significant cerebrovascular abnormalities, AVM or aneurysm. No history of recent trauma. Echo with EF 55% and mild LAE.     Recommend MRI MP rage W WO and CTA Head and Neck to evaluate for etiology of IVH.   Patient Alert and cooperative on exam, continues to have weakness on bilateral lower extremities. Therapy recommending SNF. Stable for stepdown.       Antithrombotics for secondary stroke prevention: Anticoagulants: hold home eliquis in setting of IVH    Statins for secondary stroke prevention and hyperlipidemia, if present:   Statins: Atorvastatin- 40 mg daily    Aggressive risk factor modification: HTN, HLD, A-Fib, CAD, stroke     Rehab efforts: The patient has been evaluated by a stroke team provider and the therapy needs have been fully considered based off the presenting complaints and exam findings. The following therapy evaluations are needed: PT evaluate and treat, OT evaluate and treat, SLP evaluate and treat, PM&R evaluate for appropriate placement    Diagnostics ordered/pending: CTA Head to assess vasculature , CTA Neck/Arch to assess vasculature, Other: MRI MP rage w wo    VTE prophylaxis: Mechanical prophylaxis: Place SCDs  None: Reason for No Pharmacological VTE Prophylaxis: IVH    BP parameters: SBP <140

## 2022-01-28 NOTE — PT/OT/SLP EVAL
Physical Therapy Co-Evaluation and Co-Treatment    Patient Name:  Eliazar James   MRN:  [id number]    Recommendations:     Discharge Recommendations:  nursing facility, skilled (return to NH with SNF)   Discharge Equipment Recommendations: none   Barriers to discharge: decreased functional mobility    Assessment:     Eliazar James is a 81 y.o. male admitted with a medical diagnosis of Acute encephalopathy.  Pt tolerated PT evaluation well.  Pt tolerates edge of bed mobility fairly well and participates in sit to stand transfers which are largely restricted by LE contractures as stated below.  Posterior lean present while EOB.  Pt is from NH that reportedly has therapy available according to pt's daughter.  Pt is not safe for home discharge at this time due to patient's status as: a fall risk and cognitively impaired and requires skilled PT.      Impairments and functional limitations:  weakness,impaired endurance,impaired self care skills,impaired functional mobilty,gait instability,impaired balance,impaired cognition,decreased coordination,decreased lower extremity function,decreased safety awareness,abnormal tone,decreased ROM,impaired coordination.  These deficits affect their roles and responsibilities in which they were able to complete prior to admit.  Rehab Prognosis:   Fair; patient would benefit from acute skilled PT services 4 x/week to address these deficits, improve quality of life, focus on recovery of impairments, provide patient/caregiver education, reduce fall risk, and reach maximum level of function.  Pt is moderately motivated to participated in skilled PT.    Recent Surgery:   * No surgery found *      Plan:     During this hospitalization, patient to be seen 4 x/week to address the identified rehab impairments via gait training,therapeutic activities,therapeutic exercises,neuromuscular re-education,wheelchair management/training and progress toward the following goals:    · Plan of Care  "Expires:  02/27/22    Subjective     Chief Complaint: "hello, I don't have any pain right now""  Patient/Family Comments/Goals: Progress to SNF  Pain/Comfort:  · Pain Rating 1: 0/10    Patients cultural, spiritual, Shinto conflicts given the current situation: no    Living Environment:  Patient lives in a(n) nursing home.   Pt utilizes rolling walker for ambulation of home distances. Pt reportedly to be primarily wheelchair bound and propells self with B LEs  Prior to admission, patient required assist with ADLs.   DME owned: wheelchair,hospital bed.   DME not currently used: rolling walker.   Upon discharge, patient will have assistance from staff with 24/7 assist.     Objective:     Communicated with nursing prior to session.  Patient found HOB elevated with NG tube,blood pressure cuff,pulse ox (continuous),telemetry,peripheral IV,Condom Catheter,EEG  upon PT entry to room.    General Precautions: Standard, aspiration,fall,contact,seizure   Orthopedic Precautions:N/A   Braces: N/A   Oxygen Device:      Exams:  · Cognitive Exam:  Patient is oriented to Person, Place, Time.  Pt oriented to month not year for time.   · RLE ROM: WFL except knee flexion contracture  · RLE Strength: grossly 4-/5  · LLE ROM: WFL except knee flexion contracture  · LLE Strength: grossly 4-/5 (L LE worse than R)  · Postural Exam:  Patient presented with the following abnormalities:    · -       Rounded shoulders  · -       Forward head  · Sensation:    · -       Intact    Functional Mobility:  · Bed Mobility:  Rolling Right: maximal assistance  · Scooting: maximal assistance  · Supine to Sit: maximal assistance for LE management and trunk management  · Sit to Supine: maximal assistance for LE management and trunk management  · Head of bed position: HOB elevated   · EOB for 15' with mod to maximum assist  · Posterior lean    · Transfers:     · Sit to Stand: maximal assistance of 2 persons with hand-held assist with cues for hand " placement and foot placement   · B LE unable to reach full extension and patient not able to reach full stand  · Stands lasting ~5-7sec    · Gait: n/a    · Balance:   Position Score Time   Static Sitting POOR: MODERATE assist to maintain 7 minute(s)   Dynamic Sitting POOR-: MAXIMAL assist to maintain  8 minute(s)   Static Standing POOR-: MAXIMAL assist to maintain  ~7 seconds   Dynamic Standing n/a: dependent n/a     · Additional comments: n/a    Therapeutic Activities:  Patient educated on role of acute care PT and PT POC, safety while in hospital including calling nurse for mobility, call light usage, benefits of out of bed mobility, breathing technique, fall risk, assistive device use, bed mobility , transfers, positioning, posture, risks of prolonged bed rest, possible discharge disposition  and benefits of continued PT by explanation.    Patient demonstrates good understanding of education provided this day.   Whiteboard updated   Daughter present with good understanding of education this day.     Therapeutic Exercises:  n/a    AM-PAC 6 CLICK MOBILITY  Total Score:9     Patient left HOB elevated with all lines intact, call button in reach, RN notified and family present.    GOALS:   Multidisciplinary Problems     Physical Therapy Goals        Problem: Physical Therapy Goal    Goal Priority Disciplines Outcome Goal Variances Interventions   Physical Therapy Goal     PT, PT/OT Ongoing, Progressing     Description: Goals to be met by: 22    Patient will increase functional independence with mobility by performin. Supine to sit with contact guard assistance  2. Sit to supine with supervision  3. Rolling to Left and Right with supervision  4. Sit to stand transfer with minimum assistance  5. Gait  x 10 feet with minimum assistance using LRAD as needed  6. Wheelchair propulsion x50 feet with supervision using bilateral upper extremities  7. Lower extremity exercise program x10 reps per handout, with  supervision                     History:     Past Medical History:   Diagnosis Date    *Atrial fibrillation     Anxiety     Atrial fibrillation 7/5/2012    Blood transfusion     CAD (coronary artery disease) 7/10/2012    Cancer     skin    Cataract     removed from both    Clotting disorder     Coronary artery disease     Depression     HTN (hypertension) 7/10/2012    Hx-TIA (transient ischemic attack) 7/10/2012    Hyperlipidemia     Hypertension     Myocardial infarction     Renal calculus, left 8/18/2017    S/P CABG (coronary artery bypass graft) 1/8/2013    Stroke 2/12    TIA    Urinary tract infection        Past Surgical History:   Procedure Laterality Date    CATARACT EXTRACTION W/  INTRAOCULAR LENS IMPLANT Right 03/27/2008    CATARACT EXTRACTION W/  INTRAOCULAR LENS IMPLANT Left 12/06/2007    CORONARY ARTERY BYPASS GRAFT      DILATION OF URETHRA N/A 1/20/2020    Procedure: DILATION, URETHRA;  Surgeon: Ge Ramon MD;  Location: [facility] OR;  Service: Urology;  Laterality: N/A;    TRANSURETHRAL RESECTION OF PROSTATE N/A 1/20/2020    Procedure: TURP (TRANSURETHRAL RESECTION OF PROSTATE);  Surgeon: Ge Ramon MD;  Location: [facility] OR;  Service: Urology;  Laterality: N/A;    triple bypass         Time Tracking:     PT Received On: 01/28/22  PT Start Time: 0953     PT Stop Time: 1022  PT Total Time (min): 29 min     Billable Minutes: Evaluation 12 and Neuromuscular Re-education 17    01/28/2022    Co-treatment performed for this visit due to patient need for two skilled therapists to ensure patient and staff safety and to accommodate for patient activity tolerance/pain management

## 2022-01-28 NOTE — PLAN OF CARE
[facility] Care Plan    POC reviewed with Eliazar James and family at 0300. Pt verbalized understanding. Questions and concerns addressed. No acute events overnight. Pt progressing toward goals. Will continue to monitor. See below and flowsheets for full assessment and VS info.     -neuro exam unchanged  -SBP maintained <160   -EEG cap in place     Neuro:  [place] Coma Scale  Best Eye Response: 4-->(E4) spontaneous  Best Motor Response: 6-->(M6) obeys commands  Best Verbal Response: 4-->(V4) confused  [place] Coma Scale Score: 14  Assessment Qualifiers: patient not sedated/intubated,no eye obstruction present  Pupil PERRLA: yes     24hr Temp:  [98 °F (36.7 °C)-98.9 °F (37.2 °C)]     CV:   Rhythm: normal sinus rhythm  BP goals:   SBP < 160  MAP > 65    Resp:   O2 Device (Oxygen Therapy): room air       Plan: N/A    GI/:     Diet/Nutrition Received: NPO  Last Bowel Movement: 01/24/22  Voiding Characteristics: external catheter    Intake/Output Summary (Last 24 hours) at 1/28/2022 0333  Last data filed at 1/27/2022 1500  Gross per 24 hour   Intake 421.86 ml   Output 700 ml   Net -278.14 ml     Unmeasured Output  Stool Occurrence: 0    Labs/Accuchecks:  Recent Labs   Lab 01/28/22  0029   WBC 9.81   RBC 4.25*   HGB 14.0   HCT 42.6         Recent Labs   Lab 01/28/22  0029      K 3.6   CO2 24      BUN 21   CREATININE 0.8   ALKPHOS 76   ALT 5*   AST 26   BILITOT 1.1*      Recent Labs   Lab 01/27/22  0049   INR 1.1   APTT 29.3      Recent Labs   Lab 01/26/22  1408 01/26/22  1408 01/27/22  0049   *  719*   < > 469*   CPKMB 18.9*   < > 7.8*   TROPONINI 0.010  --   --    MB 2.6   < > 1.7    < > = values in this interval not displayed.       Electrolytes: No replacement orders  Accuchecks: none    Gtts:       LDA/Wounds:  Lines/Drains/Airways       Drain              Male External Urinary Catheter 01/26/22 2031 Small 1 day         NG/OG Tube 01/27/22 1710 Left nostril <1 day              Peripheral  Intravenous Line                   Peripheral IV - Single Lumen 01/26/22 1421 18 G Right Antecubital 1 day         Peripheral IV - Single Lumen 01/28/22 0100 22 G Anterior;Left Forearm <1 day                  Wounds: No  Wound care consulted: No       Problem: Adult Inpatient Plan of Care  Goal: Plan of Care Review  Flowsheets (Taken 1/28/2022 0331)  Plan of Care Reviewed With: patient     Problem: Adjustment to Illness (Stroke, Hemorrhagic)  Goal: Optimal Coping  Intervention: Support Psychosocial Response to Stroke  Flowsheets (Taken 1/28/2022 0331)  Supportive Measures:   active listening utilized   positive reinforcement provided   verbalization of feelings encouraged  Family/Support System Care: support provided     Problem: Sensorimotor Impairment (Stroke, Hemorrhagic)  Goal: Improved Sensorimotor Function  Intervention: Optimize Range of Motion, Motor Control and Function  Flowsheets (Taken 1/28/2022 0331)  Positioning: Shoulder: safety cues provided  Spasticity Management: positioned with supportive device  Positioning/Transfer Devices:   pillows   wedge   in use  Range of Motion:   active ROM (range of motion) encouraged   ROM (range of motion) performed

## 2022-01-28 NOTE — PROCEDURES
Procedures     ICU EEG/VIDEO MONITORING REPORT    DATE OF SERVICE: 1/27/22-1/28/22  EEG NUMBER: [id number]  REQUESTED BY: Yolie  LOCATION OF [place]ICE: [facility] [place]    METHODOLOGY   Electroencephalographic (EEG) recording is with electrodes placed according to the International 10-20 placement system.  Thirty two (32) channels of digital signal are simultaneously recorded from the scalp and may include EKG, EMG, and/or eye monitors.   Recording band pass was 0.1 to 512 hz.  Digital video recording of the patient is simultaneously recorded with the EEG.  The nursing staff report clinical symptoms and may press an event button when the patient has symptoms of clinical interest to the treating physicians.  EEG and video recording is stored and archived in digital format.  The entire recording is visually reviewed and the times identified by computer analysis as being spikes or seizures are reviewed again.  Activation procedures which include photic stimulation, hyperventilation and instructing patients to perform simple task are done in selected patients.   Compresses spectral analysis (CSA) is also performed on the activity recorded from each individual channel.  This is displayed as a power display of frequencies from 0 to 30 Hz over time.   The CSA analysis is done and displayed continuously.  This is reviewed for asymmetries in power between homologous areas of the scalp and for presence of changes in power which canbe seen when seizures occur.  Sections of suspected abnormalities on the CSA is then compared with the original EEG recording.     Yatra software was also utilized in the review of this study.  This software suite analyzes the EEG recording in multiple domains.  Coherence and rhythmicity is computed to identify EEG sections which may contain organized seizures.  Each channel undergoes analysis to detect presence of spike and sharp waves which have special and morphological characteristic of epileptic  activity.  The routine EEG recording is converted from spacial into frequency domain.  This is then displayed comparing homologous areas to identify areas of significant asymmetry.  Algorithm to identify non-cortically generated artifact is used to separate eye movement, EMG and other artifact from the EEG.      Recording Times  Start on 1/27/22 at 12:17:10  Stop on 1/28/22 at 07:00:07  Start on 1/28/22 at 07:04:19   Stop on 1/28/22 at 14:35:33  A total of 25 hours of EEG was recorded.    EEG FINDINGS  The record shows a fair  organization at rest, consisting of a 8 Hz posterior dominant rhythm with good  reactivity. There is mild bilateral beta activity. There is continuous diffuse theta and delta range background slowing with shifting predominance.     Drowsiness is characterized by attenuation of the background, vertex waves, and bilateral theta slowing. Stage II sleep is characterized by slowing, vertex waves, and symmetric sleep spindles.     Provocative maneuvers including hyperventilation and photic stimulation were not  performed.     EKG recording shows a regular rhythm.    There is no push button or clinical event.    IMPRESSION:  Abnormal study due moderate  diffuse background slowing consistent with diffuse cerebral dysfunction and encephalopathy which may be on the basis of toxic, metabolic, or primary neuronal disorder.     Ryan Novak MD  Department of Neurology  [facility]

## 2022-01-28 NOTE — PLAN OF CARE
Problem: Physical Therapy Goal  Goal: Physical Therapy Goal  Description: Goals to be met by: 22    Patient will increase functional independence with mobility by performin. Supine to sit with contact guard assistance  2. Sit to supine with supervision  3. Rolling to Left and Right with supervision  4. Sit to stand transfer with minimum assistance  5. Gait  x 10 feet with minimum assistance using LRAD as needed  6. Wheelchair propulsion x50 feet with supervision using bilateral upper extremities  7. Lower extremity exercise program x10 reps per handout, with supervision    Outcome: Ongoing, Progressing     Pt tolerated PT session well.      All needs met, all questions answered.  Abram Means PT, DPT

## 2022-01-28 NOTE — PT/OT/SLP PROGRESS
"Speech Language Pathology Treatment    Patient Name:  Eliazar James   MRN:  [id number]  Admitting Diagnosis: Acute encephalopathy    Recommendations:                 General Recommendations:  Dysphagia therapy, Speech/language therapy and Cognitive-linguistic therapy  Diet recommendations:  Dental Soft, Liquid Diet Level: Thin   Aspiration Precautions: 1 bite/sip at a time, Alternating bites/sips, Assistance with meals, Avoid talking while eating, Check for pocketing/oral residue, Eliminate distractions, Feed only when awake/alert, HOB to 90 degrees, Meds crushed in puree, Small bites/sips and Strict aspiration precautions   General Precautions: Standard, aspiration,fall  Communication strategies:  provide increased time to answer and go to room if call light pushed    Subjective     "Shut up."    Nurse cleared pt for session though reported mental status change yesterday with decreased LOC, requiring NG tube placement.  She reports behavioral changes today with decreased participation     Pain/Comfort:  · Pain Rating 1:  ("everything is bothering me")  · Pain Rating Post-Intervention 1: 0/10    Respiratory Status: Room air    Objective:     Has the patient been evaluated by SLP for swallowing?   Yes  Keep patient NPO? No     Pt seen bedside, alert though somewhat resistant to therapy initially.  Definite change in overall demeanor compared to yesterday.  Pt was willing to accept po trials though verbalized discomfort with ng tube.  Pt noted to frequently sniff with occasional throat clear related to discomfort.  HOB raised upright.  Pt accepted ice chip x2, thin via straw x10, and cracker portion x3.  Oral prep remains functional though mildly increased with cracker.  Good oral clearance.  Throat clear noted with initially ice chip only which appeared 2/2 discomfort as pt immediately stated, "ow".  Slight, delayed cough observed following large thin liquid via straw x1.  No overt s/s aspiration given cues for " smaller sips.  Pt refused to answer orientation questions though was receptive to education re: orientation, diet recs, swallow precs, speech strategies, and POC. Pt verbalized agreement though will require reinforcement.           Assessment:     Eliazar James is a 81 y.o. male with an SLP diagnosis of Dysphagia, Dysarthria and Cognitive-Linguistic Impairment.      Goals:   Multidisciplinary Problems     SLP Goals        Problem: SLP Goal    Goal Priority Disciplines Outcome   SLP Goal     SLP Ongoing, Progressing   Description: Speech Language Pathology Goals  Goals expected to be met by 2/10  1. Pt will tolerate regular diet with thin liquids without overt s/s aspiration.  2. Pt will implement speech strategies at short phrase level with min cues.   3. Pt will participate in ongoing assessment of higher level language/cognition pending clarification of premorbid functioning.                            Plan:     · Patient to be seen:  3 x/week   · Plan of Care expires:  02/26/22  · Plan of Care reviewed with:  patient   · SLP Follow-Up:  Yes       Discharge recommendations:   (return to NH with SNF?)   Barriers to Discharge:  Level of Skilled Assistance Needed      Time Tracking:     SLP Treatment Date:   01/28/22  Speech Start Time:  0716  Speech Stop Time:  0732     Speech Total Time (min):  16 min    Billable Minutes: Treatment Swallowing Dysfunction 8 and Self Care/Home Management Training 8    01/28/2022

## 2022-01-28 NOTE — SUBJECTIVE & OBJECTIVE
Neurologic Chief Complaint: AMS    Subjective:     Interval History: Patient is seen for follow-up neurological assessment and treatment recommendations: Recommending CTA of the head and neck to evaluate etiology of IVH. Patient Alert and cooperative on exam, continues to have weakness on bilateral lower extremities. Therapy recommending SNF. Stable for stepdown.     HPI, Past Medical, Family, and Social History remains the same as documented in the initial encounter.     Review of Systems   Constitutional: Positive for activity change. Negative for fever.   HENT: Negative for trouble swallowing.    Eyes: Negative for photophobia and visual disturbance.   Respiratory: Negative for cough and shortness of breath.    Cardiovascular: Negative for chest pain and palpitations.   Gastrointestinal: Negative for nausea and vomiting.   Neurological: Positive for weakness. Negative for speech difficulty.   Psychiatric/Behavioral: Negative for confusion. The patient is not nervous/anxious.      Scheduled Meds:   atorvastatin  40 mg Oral Daily    carbidopa-levodopa  mg  1 tablet Oral TID    carvediloL  12.5 mg Oral BID    ceFEPime (MAXIPIME) IVPB  1 g Intravenous Q8H    heparin (porcine)  5,000 Units Subcutaneous Q8H    memantine  10 mg Oral BID    mupirocin   Nasal BID    tamsulosin  0.4 mg Oral Daily     Continuous Infusions:  PRN Meds:labetalol, ondansetron, sodium chloride 0.9%    Objective:     Vital Signs (Most Recent):  Temp: 98.6 °F (37 °C) (01/28/22 0702)  Pulse: 76 (01/28/22 1202)  Resp: 18 (01/28/22 1202)  BP: (!) 148/76 (01/28/22 1202)  SpO2: 100 % (01/28/22 1202)  BP Location: Left arm    Vital Signs Range (Last 24H):  Temp:  [98 °F (36.7 °C)-98.9 °F (37.2 °C)]   Pulse:  []   Resp:  [17-34]   BP: (105-153)/()   SpO2:  [80 %-100 %]   BP Location: Left arm    Physical Exam  Vitals and nursing note reviewed.   Constitutional:       General: He is not in acute distress.     Appearance: He is  not ill-appearing.   HENT:      Head: Normocephalic and atraumatic.      Right Ear: External ear normal.      Left Ear: External ear normal.      Nose: Nose normal.   Eyes:      Extraocular Movements: Extraocular movements intact.      Conjunctiva/sclera: Conjunctivae normal.   Cardiovascular:      Rate and Rhythm: Normal rate.   Pulmonary:      Effort: Pulmonary effort is normal. No respiratory distress.   Abdominal:      General: There is no distension.   Musculoskeletal:      Right lower leg: No edema.      Left lower leg: No edema.   Skin:     General: Skin is warm and dry.   Neurological:      Mental Status: He is alert and oriented to person, place, and time.      Cranial Nerves: No facial asymmetry.      Motor: Weakness present.   Psychiatric:         Mood and Affect: Mood normal.         Neurological Exam:   LOC: alert  Attention Span: Good   Language: No aphasia  Articulation: No dysarthria  Orientation: Person, Place, Time   Visual Fields: Full  EOM (CN III, IV, VI): Full/intact  Facial Movement (CN VII): Symmetric facial expression    Motor: Arm left  Normal 5/5  Leg left  Paresis: 3/5  Arm right  Normal 5/5  Leg right Paresis: 3/5  Cerebellum: No evidence of appendicular or axial ataxia  Sensation: intact to light touch throughout    Laboratory:  CMP:   Recent Labs   Lab 01/28/22  0029   CALCIUM 7.8*   ALBUMIN 2.1*   PROT 5.9*      K 3.6   CO2 24      BUN 21   CREATININE 0.8   ALKPHOS 76   ALT 5*   AST 26   BILITOT 1.1*     CBC:   Recent Labs   Lab 01/28/22  0029   WBC 9.81   RBC 4.25*   HGB 14.0   HCT 42.6      *   MCH 32.9*   MCHC 32.9     Lipid Panel:   Recent Labs   Lab 01/26/22  2100   CHOL 83*   LDLCALC 48.0*   HDL 27*   TRIG 40     Coagulation:   Recent Labs   Lab 01/27/22  0049   INR 1.1   APTT 29.3     Platelet Aggregation Study: No results for input(s): PLTAGG, PLTAGINTERP, PLTAGREGLACO, ADPPLTAGGREG in the last 168 hours.  Hgb A1C:   Recent Labs   Lab 01/26/22  2100    HGBA1C 5.2  5.2     TSH:   Recent Labs   Lab 01/26/22  2100   TSH 2.111       Diagnostic Results     Brain imaging:  MRI Brain recommended     CT Head WO 1/26/2022 2156  Ventricular enlargement and small layering intraventricular hemorrhages in the occipital horns bilaterally appears stable from prior.     Air is again noted in the cavernous sinus and branches of the jugular vein in the upper neck, similar to prior.  Correlate clinically for iatrogenic etiology from venous access.        CT Head. Date: 01/26/22 1456  Interval development of hemorrhage layering dependently within the posterior horns of the bilateral lateral ventricles, new from the previous study of 01/05/2022.  There is slight prominence of the ventricular system, perhaps slightly more so than compared to the prior examination which could suggest a component of developing hydrocephalus.     Age-appropriate generalized cerebral volume loss with moderate chronic microvascular ischemic disease.  No definite new parenchymal hypoattenuation to suggest an acute infarction.     Small foci of venous air noted.  Clinically correlate for recent intravenous vascular access.     Vessel Imaging:  CTA recommended     Cardiac Evaluation:   TTE 1/27/22  · The left ventricle is normal in size with normal systolic function. The estimated ejection fraction is 55%.  · Normal right ventricular size with normal right ventricular systolic function.  · Normal left ventricular diastolic function.  · Mild left atrial enlargement.  · The IVC could not be visualized.

## 2022-01-28 NOTE — PROGRESS NOTES
[facility]  Vascular Neurology  [facility]  Progress Note    Assessment/Plan:     * IVH (intraventricular hemorrhage)  Eliazar James is a 81 y.o. male with PMHx of CAD, MI, stroke, afib (eliquis), Parkinsons, HLD, and HTN who presented to OSH with AMS. CTH with IVH and patient transferred to C for higher level of care. CTA in 2019 without significant cerebrovascular abnormalities, AVM or aneurysm. No history of recent trauma. Echo with EF 55% and mild LAE.     Recommend MRI MP rage W WO and CTA Head and Neck to evaluate for etiology of IVH.   Patient Alert and cooperative on exam, continues to have weakness on bilateral lower extremities. Therapy recommending SNF. Stable for stepdown.       Antithrombotics for secondary stroke prevention: Anticoagulants: hold home eliquis in setting of IVH    Statins for secondary stroke prevention and hyperlipidemia, if present:   Statins: Atorvastatin- 40 mg daily    Aggressive risk factor modification: HTN, HLD, A-Fib, CAD, stroke     Rehab efforts: The patient has been evaluated by a stroke team provider and the therapy needs have been fully considered based off the presenting complaints and exam findings. The following therapy evaluations are needed: PT evaluate and treat, OT evaluate and treat, SLP evaluate and treat, PM&R evaluate for appropriate placement    Diagnostics ordered/pending: CTA Head to assess vasculature , CTA Neck/Arch to assess vasculature, Other: MRI MP rage w wo    VTE prophylaxis: Mechanical prophylaxis: Place SCDs  None: Reason for No Pharmacological VTE Prophylaxis: IVH    BP parameters: SBP <140        Parkinsonism  Continue cabidopa-levadopa  On memantine     Urinary tract infection without hematuria  Recently diagnosed prior to admission  On ceftin at nursing home  Antibiotic changed to cefepime per N[facility]    Hyperlipidemia  Stroke risk factor  LDL <70 on admission  Continue home atorvastatin 40 mg daily    CAD  (coronary artery disease)  Stroke risk factor  LDL <70 on admission  Continue home atorvastatin 40    Chronic atrial fibrillation  Stroke risk factor  On eliquis at home, Hold in setting of IVH           1/27- Recommend MRI Brain WO and CTA. Patient with fluctuating lethargy(alert and cooperative at times, drowsy and less cooperative at other times). Eliquis being held 2/2 IVH.  1/28-Recommending CTA of the head and neck to evaluate etiology of IVH. Patient Alert and cooperative on exam, continues to have weakness on bilateral lower extremities. Therapy recommending SNF. Stable for stepdown.       STROKE DOCUMENTATION        NIH Scale:  1a. Level of Consciousness: 0-->Alert, keenly responsive  1b. LOC Questions: 0-->Answers both questions correctly  1c. LOC Commands: 0-->Performs both tasks correctly  2. Best Gaze: 0-->Normal  3. Visual: 0-->No visual loss  4. Facial Palsy: 0-->Normal symmetrical movements  5a. Motor Arm, Left: 0-->No drift, limb holds 90 (or 45) degrees for full 10 secs  5b. Motor Arm, Right: 0-->No drift, limb holds 90 (or 45) degrees for full 10 secs  6a. Motor Leg, Left: 2-->Some effort against gravity, leg falls to bed by 5 secs, but has some effort against gravity  6b. Motor Leg, Right: 2-->Some effort against gravity, leg falls to bed by 5 secs, but has some effort against gravity  7. Limb Ataxia: 0-->Absent  8. Sensory: 0-->Normal, no sensory loss  9. Best Language: 0-->No aphasia, normal  10. Dysarthria: 0-->Normal  11. Extinction and Inattention (formerly Neglect): 0-->No abnormality  Total (NIH Stroke Scale): 4       Modified Chilton Score: 3  Penny Coma Scale:    ABCD2 Score:    [id number] Score:   HAS -BLED Score:   ICH Score:   Hunt & Sevilla Classification:      Hemorrhagic change of an Ischemic Stroke: Does this patient have an ischemic stroke with hemorrhagic changes? No     Neurologic Chief Complaint: AMS    Subjective:     Interval History: Patient is seen for follow-up  neurological assessment and treatment recommendations: Recommending CTA of the head and neck to evaluate etiology of IVH. Patient Alert and cooperative on exam, continues to have weakness on bilateral lower extremities. Therapy recommending SNF. Stable for stepdown.     HPI, Past Medical, Family, and Social History remains the same as documented in the initial encounter.     Review of Systems   Constitutional: Positive for activity change. Negative for fever.   HENT: Negative for trouble swallowing.    Eyes: Negative for photophobia and visual disturbance.   Respiratory: Negative for cough and shortness of breath.    Cardiovascular: Negative for chest pain and palpitations.   Gastrointestinal: Negative for nausea and vomiting.   Neurological: Positive for weakness. Negative for speech difficulty.   Psychiatric/Behavioral: Negative for confusion. The patient is not nervous/anxious.      Scheduled Meds:   atorvastatin  40 mg Oral Daily    carbidopa-levodopa  mg  1 tablet Oral TID    carvediloL  12.5 mg Oral BID    ceFEPime (MAXIPIME) IVPB  1 g Intravenous Q8H    heparin (porcine)  5,000 Units Subcutaneous Q8H    memantine  10 mg Oral BID    mupirocin   Nasal BID    tamsulosin  0.4 mg Oral Daily     Continuous Infusions:  PRN Meds:labetalol, ondansetron, sodium chloride 0.9%    Objective:     Vital Signs (Most Recent):  Temp: 98.6 °F (37 °C) (01/28/22 0702)  Pulse: 76 (01/28/22 1202)  Resp: 18 (01/28/22 1202)  BP: (!) 148/76 (01/28/22 1202)  SpO2: 100 % (01/28/22 1202)  BP Location: Left arm    Vital Signs Range (Last 24H):  Temp:  [98 °F (36.7 °C)-98.9 °F (37.2 °C)]   Pulse:  []   Resp:  [17-34]   BP: (105-153)/()   SpO2:  [80 %-100 %]   BP Location: Left arm    Physical Exam  Vitals and nursing note reviewed.   Constitutional:       General: He is not in acute distress.     Appearance: He is not ill-appearing.   HENT:      Head: Normocephalic and atraumatic.      Right Ear: External ear  normal.      Left Ear: External ear normal.      Nose: Nose normal.   Eyes:      Extraocular Movements: Extraocular movements intact.      Conjunctiva/sclera: Conjunctivae normal.   Cardiovascular:      Rate and Rhythm: Normal rate.   Pulmonary:      Effort: Pulmonary effort is normal. No respiratory distress.   Abdominal:      General: There is no distension.   Musculoskeletal:      Right lower leg: No edema.      Left lower leg: No edema.   Skin:     General: Skin is warm and dry.   Neurological:      Mental Status: He is alert and oriented to person, place, and time.      Cranial Nerves: No facial asymmetry.      Motor: Weakness present.   Psychiatric:         Mood and Affect: Mood normal.         Neurological Exam:   LOC: alert  Attention Span: Good   Language: No aphasia  Articulation: No dysarthria  Orientation: Person, Place, Time   Visual Fields: Full  EOM (CN III, IV, VI): Full/intact  Facial Movement (CN VII): Symmetric facial expression    Motor: Arm left  Normal 5/5  Leg left  Paresis: 3/5  Arm right  Normal 5/5  Leg right Paresis: 3/5  Cerebellum: No evidence of appendicular or axial ataxia  Sensation: intact to light touch throughout    Laboratory:  CMP:   Recent Labs   Lab 01/28/22  0029   CALCIUM 7.8*   ALBUMIN 2.1*   PROT 5.9*      K 3.6   CO2 24      BUN 21   CREATININE 0.8   ALKPHOS 76   ALT 5*   AST 26   BILITOT 1.1*     CBC:   Recent Labs   Lab 01/28/22  0029   WBC 9.81   RBC 4.25*   HGB 14.0   HCT 42.6      *   MCH 32.9*   MCHC 32.9     Lipid Panel:   Recent Labs   Lab 01/26/22  2100   CHOL 83*   LDLCALC 48.0*   HDL 27*   TRIG 40     Coagulation:   Recent Labs   Lab 01/27/22  0049   INR 1.1   APTT 29.3     Platelet Aggregation Study: No results for input(s): PLTAGG, PLTAGINTERP, PLTAGREGLACO, ADPPLTAGGREG in the last 168 hours.  Hgb A1C:   Recent Labs   Lab 01/26/22  2100   HGBA1C 5.2  5.2     TSH:   Recent Labs   Lab 01/26/22  2100   TSH 2.111       Diagnostic  Results     Brain imaging:  MRI Brain recommended     CT Head WO 1/26/2022 2156  Ventricular enlargement and small layering intraventricular hemorrhages in the occipital horns bilaterally appears stable from prior.     Air is again noted in the cavernous sinus and branches of the jugular vein in the upper neck, similar to prior.  Correlate clinically for iatrogenic etiology from venous access.        CT Head. Date: 01/26/22 1456  Interval development of hemorrhage layering dependently within the posterior horns of the bilateral lateral ventricles, new from the previous study of 01/05/2022.  There is slight prominence of the ventricular system, perhaps slightly more so than compared to the prior examination which could suggest a component of developing hydrocephalus.     Age-appropriate generalized cerebral volume loss with moderate chronic microvascular ischemic disease.  No definite new parenchymal hypoattenuation to suggest an acute infarction.     Small foci of venous air noted.  Clinically correlate for recent intravenous vascular access.     Vessel Imaging:  CTA recommended     Cardiac Evaluation:   TTE 1/27/22  · The left ventricle is normal in size with normal systolic function. The estimated ejection fraction is 55%.  · Normal right ventricular size with normal right ventricular systolic function.  · Normal left ventricular diastolic function.  · Mild left atrial enlargement.  · The IVC could not be visualized.          Leonor Betancourt NP  [facility]  Department of Vascular Neurology   [facility]

## 2022-01-28 NOTE — PT/OT/SLP EVAL
Occupational Therapy  Co- Evaluation and Treatment w/ PT    Name: Eliazar James  MRN: [id number]  Admitting Diagnosis:  Acute encephalopathy    Length of Stay: 2 days         Recommendations:     Discharge Recommendations: nursing facility, skilled (then transition back to NH)  Discharge Equipment Recommendations:  none  Barriers to discharge:   (increased assistance needed)    Plan:     Patient to be seen 3 x/week to address the above listed problems via self-care/home management,therapeutic exercises,therapeutic activities,neuromuscular re-education  · Plan of Care Expires:    · Plan of Care Reviewed with: patient    Assessment:     Eliazar James is a 81 y.o. male with a medical diagnosis of Acute encephalopathy.  He presents with the following performance deficits affecting function: weakness,impaired endurance,impaired self care skills,impaired functional mobilty,gait instability,impaired balance,decreased safety awareness,decreased lower extremity function,abnormal tone,decreased ROM,impaired cognition,decreased coordination.  Pt would benefit from skilled OT services in order to maximize independence with ADLs and facilitate safe discharge. Pt would benefit from SNF upon discharge to return to OF and decrease burden of care.     Time spent evaluating pt, performing bed mobility, EOB activity and practicing sit to stands    Relevant hx and current limitations:   BLE contractures (L>R)   PLOF: able to self transfer to w/c    EOB: Max-Mod A    Rehab Prognosis: Good; patient would benefit from acute skilled OT services to address these deficits and reach maximum level of function.       Subjective   Communicated with: SHANIA Santiago prior to session.  Patient found HOB elevated with NG tube,blood pressure cuff,pulse ox (continuous),telemetry,peripheral IV,Condom Catheter,EEG,SCD upon OT entry to room.    Chief Complaint: No chief complaint on file.    Patient/Family Comments/goals: figure out why he is  "contracted -pt's dtr    Pain/Comfort:  · Pain Rating 1: 0/10    Patients cultural, spiritual, Nondenominational conflicts given the current situation: no    Occupational Profile:  Living Environment: lives in NH w/ wis & sc  Prior Level of Function: Patient reports being Mod I with mobility (w/c) & assistance with LB ADLs.   Roles/Repsonsibilities:   Hand Dominance: right   Work: no.   Drive: no.   Managing Medicines/Managing Home: no.   Equipment Used at Home:  wheelchair,shower chair    Patient reports they will have assistance from NH staff upon discharge.      Objective:     Patient found with: NG tube,blood pressure cuff,pulse ox (continuous),telemetry,peripheral IV,Condom Catheter,EEG,SCD   General Precautions: aspiration,fall,contact,seizure   Orthopedic Precautions:N/A   Braces: N/A   Oxygen Device: Room Air  Vitals: BP (!) 148/76   Pulse 76   Temp 98.6 °F (37 °C) (Oral)   Resp 18   Ht 5' 7" (1.702 m)   Wt 69.9 kg (154 lb)   SpO2 100%   BMI 24.12 kg/m²     Cognitive and Psychosocial Function:   · AOx2 -- Person, Place and able to state month (year as 1926)   · Follows Commands/attention:follows one-step commands and follows two-step commands  · Communication:  clear/fluent  · Memory: No Deficits noted  · Safety awareness/insight to disability: impaired     Hearing: Intact    Vision:  Intact visual fields    Physical Exam:     Left UE Right UE   UE Edema N/A N/A   UE ROM AROM: WFL AROM: WFL   UE Strength WFL WFL    Strength WFL WFL   Sensation normal normal   Fine Motor Coordination:  intact intact   Gross Motor Coordination: intact intact     Occupational Performance:  Bed Mobility:       Rolling right: maximal assistance   Scooting: towards EOB with maximal assistance   Supine to Sit: maximal assistance   Sit to Supine: maximal assistance    Functional Mobility/Transfers:     Sit to Stand: maximal assistance and of 2 persons using hand-held assist; x2 trials EOB   o BLE unable to fully extend this " date    Activities of Daily Living:     Not observed this date, but upon arrival pt noted to be able to self feed ice chips from styrofoam cup (set-up assistance)    AMPA 6 Click ADL:  AMPA Total Score: 13    Treatment & Education:   Therapist provided facilitation and instruction of proper body mechanics, energy conservation, and fall prevention strategies during tasks listed above.   Pt educated on role of OT, POC and goals for therapy   Pt educated on importance of OOB activities with staff member assistance and sitting OOB majority of the day. -not this date   Updated communication board with level of assist required (unsafe) & educated RN/patient that pt is NOT appropriate for transfers and mobility with RN/PCT.     Additional staff present: PT for co-eval/tx due to patient's medical complexities requiring two skilled therapists in order to appropriately assess patient's functional deficits as well as ensure patient safety, accommodate for limited activity tolerance, and provide appropriate, skilled assistance to maximize functional potential during evaluation.    Patient left HOB elevated with all lines intact, call button in reach, bed alarm on, RN notified and dtr present    GOALS:   Multidisciplinary Problems     Occupational Therapy Goals        Problem: Occupational Therapy Goal    Goal Priority Disciplines Outcome Interventions   Occupational Therapy Goal     OT, PT/OT Ongoing, Progressing    Description: Goals to be met by: 2/11/2022     Patient will increase functional independence with ADLs by performing:    Feeding with Set-up Assistance.  UE Dressing with Set-up Assistance.  Grooming while seated with Set-up Assistance.  Toileting from bedside commode with Stand-by Assistance for hygiene and clothing management.   Toilet transfer to bedside commode with Contact Guard Assistance.                     History:     Past Medical History:   Diagnosis Date    *Atrial fibrillation     Anxiety      Atrial fibrillation 7/5/2012    Blood transfusion     CAD (coronary artery disease) 7/10/2012    Cancer     skin    Cataract     removed from both    Clotting disorder     Coronary artery disease     Depression     HTN (hypertension) 7/10/2012    Hx-TIA (transient ischemic attack) 7/10/2012    Hyperlipidemia     Hypertension     Myocardial infarction     Renal calculus, left 8/18/2017    S/P CABG (coronary artery bypass graft) 1/8/2013    Stroke 2/12    TIA    Urinary tract infection        Past Surgical History:   Procedure Laterality Date    CATARACT EXTRACTION W/  INTRAOCULAR LENS IMPLANT Right 03/27/2008    CATARACT EXTRACTION W/  INTRAOCULAR LENS IMPLANT Left 12/06/2007    CORONARY ARTERY BYPASS GRAFT      DILATION OF URETHRA N/A 1/20/2020    Procedure: DILATION, URETHRA;  Surgeon: Ge Ramon MD;  Location: [facility] OR;  Service: Urology;  Laterality: N/A;    TRANSURETHRAL RESECTION OF PROSTATE N/A 1/20/2020    Procedure: TURP (TRANSURETHRAL RESECTION OF PROSTATE);  Surgeon: Ge Ramon MD;  Location: [facility] OR;  Service: Urology;  Laterality: N/A;    triple bypass         Time Tracking:       OT Date of Treatment: 01/28/22  OT Start Time: 0955  OT Stop Time: 1019  OT Total Time (min): 24 min    Additional staff present: PT     Billable Minutes:  Evaluation 12  Therapeutic Activity 12      Layla (Jessica), OTR/L  [phone] (Pager #)  1/28/2022

## 2022-01-28 NOTE — SUBJECTIVE & OBJECTIVE
Interval History:  NAEO. MRI done. PO B12 started. Step down to Vascular Neurology.    Review of Systems   Constitutional: Negative for chills and fever.   Respiratory: Negative for cough and shortness of breath.    Gastrointestinal: Negative for abdominal pain, nausea and vomiting.   Neurological: Negative for syncope, speech difficulty and headaches.     Objective:     Vitals:  Temp: 98.6 °F (37 °C)  Pulse: 93  Rhythm: normal sinus rhythm  BP: (!) 141/83  MAP (mmHg): 106  Resp: 19  SpO2: 100 %  O2 Device (Oxygen Therapy): room air    Temp  Min: 98 °F (36.7 °C)  Max: 98.9 °F (37.2 °C)  Pulse  Min: 83  Max: 103  BP  Min: 105/57  Max: 153/80  MAP (mmHg)  Min: 74  Max: 112  Resp  Min: 17  Max: 34  SpO2  Min: 97 %  Max: 100 %    01/27 0701 - 01/28 0700  In: 351.9 [P.O.:350]  Out: 300 [Urine:300]   Unmeasured Output  Stool Occurrence: 0       Physical Exam  Vitals and nursing note reviewed.   Constitutional:       General: He is not in acute distress.     Appearance: He is normal weight. He is not ill-appearing, toxic-appearing or diaphoretic.      Comments: Patient awake in bed and talking.    HENT:      Head: Normocephalic.      Right Ear: External ear normal.      Left Ear: External ear normal.      Nose: Nose normal.      Mouth/Throat:      Mouth: Mucous membranes are moist.      Pharynx: Oropharynx is clear.   Eyes:      General:         Right eye: No discharge.         Left eye: No discharge.   Cardiovascular:      Rate and Rhythm: Normal rate and regular rhythm.      Pulses: Normal pulses.   Pulmonary:      Effort: Pulmonary effort is normal. No respiratory distress.   Abdominal:      General: Abdomen is flat.   Skin:     General: Skin is warm and dry.      Capillary Refill: Capillary refill takes less than 2 seconds.   Neurological:      Mental Status: He is alert.      Comments: No sedation.   E4 V5 M6    Patient awake and alert and following commands.     PERRL, 3mm bilat. EOMI.     Moving all extremities  spontaneously.   Sensation grossly intact.     Gait deffered       Medications:  Continuous Scheduledatorvastatin, 40 mg, Daily  carbidopa-levodopa  mg, 1 tablet, TID  carvediloL, 12.5 mg, BID  ceFEPime (MAXIPIME) IVPB, 1 g, Q8H  heparin (porcine), 5,000 Units, Q8H  memantine, 10 mg, BID  mupirocin, , BID  tamsulosin, 0.4 mg, Daily    PRNlabetalol, 10 mg, Q4H PRN  ondansetron, 4 mg, Q8H PRN  sodium chloride 0.9%, 10 mL, PRN      Today I personally reviewed pertinent medications, lines/drains/airways, imaging, cardiology results, laboratory results, microbiology results, notably: EEG, MRI     Diet  Diet Dysphagia Mechanical Soft (IDDSI Level 5) [facility]; Thin  Diet Dysphagia Mechanical Soft (IDDSI Level 5) [facility]; Thin

## 2022-01-28 NOTE — ASSESSMENT & PLAN NOTE
-Admitted to [facility] for close neurologic monitoring  -TSH WNL  -Ammonia WNL   -B12 pending  -EEG to r/o NCSE in setting of waxing/waning lethargy and new onset AMS  -initial read without any seizures. Awaiting official read from overnight. If not seizures will d/c EEG.   -Concerns for urosepsis w/ positive urine culture and history of recurrent UTI. Started on ceftriaxone intially, changed to cefepime 1/27 for concerns of resistance.   -1/28 Continue abx   -MRI brain pending after EEG off   -Patient at baseline overnight through today per family. Restarted diet and removed NG tube.   -If no seizures on EEG likely SD to VN 1/28

## 2022-01-28 NOTE — ASSESSMENT & PLAN NOTE
-Per family patient received his medications 1/26 prior to change in mental status  -If patient has continued lethargy 1/27 and is unable to pass chase, will place NG tube for carbidopa-levodopa and memantine administration to avoid withdrawal.   -NG tube placed 1/27 evening  -1/28 Patient at baseline and passed swallow per SLP  -NG tube removed, resume medications PO

## 2022-01-29 LAB
ALBUMIN SERPL BCP-MCNC: 2.4 G/DL (ref 3.5–5.2)
ALP SERPL-CCNC: 86 U/L (ref 55–135)
ALT SERPL W/O P-5'-P-CCNC: 13 U/L (ref 10–44)
ANION GAP SERPL CALC-SCNC: 9 MMOL/L (ref 8–16)
AST SERPL-CCNC: 29 U/L (ref 10–40)
BASOPHILS # BLD AUTO: 0.04 K/UL (ref 0–0.2)
BASOPHILS NFR BLD: 0.4 % (ref 0–1.9)
BILIRUB SERPL-MCNC: 1.1 MG/DL (ref 0.1–1)
BUN SERPL-MCNC: 18 MG/DL (ref 8–23)
CALCIUM SERPL-MCNC: 8.9 MG/DL (ref 8.7–10.5)
CHLORIDE SERPL-SCNC: 103 MMOL/L (ref 95–110)
CO2 SERPL-SCNC: 22 MMOL/L (ref 23–29)
CREAT SERPL-MCNC: 0.7 MG/DL (ref 0.5–1.4)
DIFFERENTIAL METHOD: ABNORMAL
EOSINOPHIL # BLD AUTO: 0.3 K/UL (ref 0–0.5)
EOSINOPHIL NFR BLD: 3.3 % (ref 0–8)
ERYTHROCYTE [DISTWIDTH] IN BLOOD BY AUTOMATED COUNT: 12.6 % (ref 11.5–14.5)
EST. GFR  (AFRICAN AMERICAN): >60 ML/MIN/1.73 M^2
EST. GFR  (NON AFRICAN AMERICAN): >60 ML/MIN/1.73 M^2
GLUCOSE SERPL-MCNC: 94 MG/DL (ref 70–110)
HCT VFR BLD AUTO: 46.8 % (ref 40–54)
HGB BLD-MCNC: 15.6 G/DL (ref 14–18)
IMM GRANULOCYTES # BLD AUTO: 0.05 K/UL (ref 0–0.04)
IMM GRANULOCYTES NFR BLD AUTO: 0.5 % (ref 0–0.5)
LYMPHOCYTES # BLD AUTO: 1.1 K/UL (ref 1–4.8)
LYMPHOCYTES NFR BLD: 11.6 % (ref 18–48)
MAGNESIUM SERPL-MCNC: 2 MG/DL (ref 1.6–2.6)
MCH RBC QN AUTO: 32.8 PG (ref 27–31)
MCHC RBC AUTO-ENTMCNC: 33.3 G/DL (ref 32–36)
MCV RBC AUTO: 99 FL (ref 82–98)
MONOCYTES # BLD AUTO: 0.9 K/UL (ref 0.3–1)
MONOCYTES NFR BLD: 9 % (ref 4–15)
NEUTROPHILS # BLD AUTO: 7.2 K/UL (ref 1.8–7.7)
NEUTROPHILS NFR BLD: 75.2 % (ref 38–73)
NRBC BLD-RTO: 0 /100 WBC
PHOSPHATE SERPL-MCNC: 2.3 MG/DL (ref 2.7–4.5)
PLATELET # BLD AUTO: 241 K/UL (ref 150–450)
PMV BLD AUTO: 10.3 FL (ref 9.2–12.9)
POTASSIUM SERPL-SCNC: 3.7 MMOL/L (ref 3.5–5.1)
PROT SERPL-MCNC: 6.9 G/DL (ref 6–8.4)
RBC # BLD AUTO: 4.75 M/UL (ref 4.6–6.2)
SODIUM SERPL-SCNC: 134 MMOL/L (ref 136–145)
WBC # BLD AUTO: 9.59 K/UL (ref 3.9–12.7)

## 2022-01-29 PROCEDURE — 11000001 HC ACUTE MED/SURG PRIVATE ROOM

## 2022-01-29 PROCEDURE — 63600175 PHARM REV CODE 636 W HCPCS

## 2022-01-29 PROCEDURE — 84100 ASSAY OF PHOSPHORUS: CPT | Performed by: NURSE PRACTITIONER

## 2022-01-29 PROCEDURE — 25000003 PHARM REV CODE 250: Performed by: NURSE PRACTITIONER

## 2022-01-29 PROCEDURE — 83735 ASSAY OF MAGNESIUM: CPT | Performed by: NURSE PRACTITIONER

## 2022-01-29 PROCEDURE — 99233 PR SUBSEQUENT HOSPITAL CARE,LEVL III: ICD-10-PCS | Mod: ,,, | Performed by: PSYCHIATRY & NEUROLOGY

## 2022-01-29 PROCEDURE — 85025 COMPLETE CBC W/AUTO DIFF WBC: CPT | Performed by: NURSE PRACTITIONER

## 2022-01-29 PROCEDURE — 25000003 PHARM REV CODE 250

## 2022-01-29 PROCEDURE — 25500020 PHARM REV CODE 255: Performed by: PSYCHIATRY & NEUROLOGY

## 2022-01-29 PROCEDURE — 25000003 PHARM REV CODE 250: Performed by: STUDENT IN AN ORGANIZED HEALTH CARE EDUCATION/TRAINING PROGRAM

## 2022-01-29 PROCEDURE — 99233 SBSQ HOSP IP/OBS HIGH 50: CPT | Mod: ,,, | Performed by: PSYCHIATRY & NEUROLOGY

## 2022-01-29 PROCEDURE — 63600175 PHARM REV CODE 636 W HCPCS: Performed by: PSYCHIATRY & NEUROLOGY

## 2022-01-29 PROCEDURE — 99233 SBSQ HOSP IP/OBS HIGH 50: CPT | Mod: ,,,

## 2022-01-29 PROCEDURE — 94761 N-INVAS EAR/PLS OXIMETRY MLT: CPT

## 2022-01-29 PROCEDURE — 80053 COMPREHEN METABOLIC PANEL: CPT | Performed by: NURSE PRACTITIONER

## 2022-01-29 PROCEDURE — 99233 PR SUBSEQUENT HOSPITAL CARE,LEVL III: ICD-10-PCS | Mod: ,,,

## 2022-01-29 RX ORDER — BACLOFEN 5 MG/1
5 TABLET ORAL 3 TIMES DAILY
Status: DISCONTINUED | OUTPATIENT
Start: 2022-01-29 | End: 2022-01-29

## 2022-01-29 RX ORDER — CYANOCOBALAMIN (VITAMIN B-12) 250 MCG
250 TABLET ORAL DAILY
Status: DISCONTINUED | OUTPATIENT
Start: 2022-01-29 | End: 2022-01-31 | Stop reason: HOSPADM

## 2022-01-29 RX ORDER — BACLOFEN 5 MG/1
5 TABLET ORAL 3 TIMES DAILY PRN
Status: DISCONTINUED | OUTPATIENT
Start: 2022-01-29 | End: 2022-01-30

## 2022-01-29 RX ADMIN — TAMSULOSIN HYDROCHLORIDE 0.4 MG: 0.4 CAPSULE ORAL at 09:01

## 2022-01-29 RX ADMIN — BACLOFEN 5 MG: 5 TABLET ORAL at 05:01

## 2022-01-29 RX ADMIN — IOHEXOL 100 ML: 350 INJECTION, SOLUTION INTRAVENOUS at 02:01

## 2022-01-29 RX ADMIN — HEPARIN SODIUM 5000 UNITS: 5000 INJECTION INTRAVENOUS; SUBCUTANEOUS at 02:01

## 2022-01-29 RX ADMIN — CEFEPIME HYDROCHLORIDE 1 G: 1 INJECTION, SOLUTION INTRAVENOUS at 06:01

## 2022-01-29 RX ADMIN — CARBIDOPA AND LEVODOPA 1 TABLET: 25; 100 TABLET ORAL at 02:01

## 2022-01-29 RX ADMIN — CEFEPIME HYDROCHLORIDE 1 G: 1 INJECTION, SOLUTION INTRAVENOUS at 03:01

## 2022-01-29 RX ADMIN — ATORVASTATIN CALCIUM 40 MG: 40 TABLET, FILM COATED ORAL at 09:01

## 2022-01-29 RX ADMIN — CARVEDILOL 12.5 MG: 3.12 TABLET, FILM COATED ORAL at 09:01

## 2022-01-29 RX ADMIN — SENNOSIDES 8.6 MG: 8.6 TABLET, COATED ORAL at 10:01

## 2022-01-29 RX ADMIN — MUPIROCIN: 20 OINTMENT TOPICAL at 10:01

## 2022-01-29 RX ADMIN — CARBIDOPA AND LEVODOPA 1 TABLET: 25; 100 TABLET ORAL at 10:01

## 2022-01-29 RX ADMIN — ACETAMINOPHEN 650 MG: 325 TABLET ORAL at 04:01

## 2022-01-29 RX ADMIN — MEMANTINE 10 MG: 10 TABLET ORAL at 10:01

## 2022-01-29 RX ADMIN — MEMANTINE 10 MG: 10 TABLET ORAL at 09:01

## 2022-01-29 RX ADMIN — MUPIROCIN: 20 OINTMENT TOPICAL at 09:01

## 2022-01-29 RX ADMIN — SENNOSIDES 8.6 MG: 8.6 TABLET, COATED ORAL at 09:01

## 2022-01-29 RX ADMIN — CARBIDOPA AND LEVODOPA 1 TABLET: 25; 100 TABLET ORAL at 09:01

## 2022-01-29 RX ADMIN — CEFEPIME HYDROCHLORIDE 1 G: 1 INJECTION, SOLUTION INTRAVENOUS at 10:01

## 2022-01-29 RX ADMIN — HEPARIN SODIUM 5000 UNITS: 5000 INJECTION INTRAVENOUS; SUBCUTANEOUS at 10:01

## 2022-01-29 RX ADMIN — CARVEDILOL 12.5 MG: 3.12 TABLET, FILM COATED ORAL at 10:01

## 2022-01-29 RX ADMIN — HEPARIN SODIUM 5000 UNITS: 5000 INJECTION INTRAVENOUS; SUBCUTANEOUS at 06:01

## 2022-01-29 RX ADMIN — CYANOCOBALAMIN TAB 250 MCG 250 MCG: 250 TAB at 10:01

## 2022-01-29 NOTE — PROGRESS NOTES
[facility] - Neurosurgery ([facility])  Neurocritical Care  Progress Note    Admit Date: 1/26/2022  Service Date: 01/29/2022  Length of Stay: 3    Subjective:     Chief Complaint: IVH (intraventricular hemorrhage)    History of Present Illness: Eliazar James is an 80 yo M with a PMH of CAD, MI, CVA, vascular Parkinsons, HLD, HTN who presents to [facility] for IVH. He presented to [facility] emergency room with altered mental status today  Patient with altered mental status today, patient with continued confusion x1 week. Patient seen 3 days ago in the emergency room with a urinary tract infection. Patient has had longstanding issues with urinary tract infection, on Ceftin at the NH. Daughter Reported  AMS not improved with increasing confusion the last 48 hours. He is being admitted to [facility] for a higher level of care.       Hospital Course: 01/27/2022: Patient w/ continued waxing/waning lethargy. TSH, ammonia wnl. B12 and EEG pending. IVH stable. Changed abx to cefepime for concerns of resistance.   01/28/2022: Patient awake overnight, but at baseline consistently per family. Awaiting EEG read, if no seizures will d/c. MRI after EEG removal. Possible step down to Vascular Neurology today.         Interval History:  Patient awake overnight, but at baseline consistently per family. Awaiting EEG read, if no seizures will d/c. MRI after EEG removal. Possible step down to Vascular Neurology today.       Review of Systems   Constitutional: Negative for chills and fever.   Respiratory: Negative for cough and shortness of breath.    Gastrointestinal: Negative for abdominal pain, nausea and vomiting.   Neurological: Negative for syncope, speech difficulty and headaches.     Objective:     Vitals:  Temp: 98.6 °F (37 °C)  Pulse: 93  Rhythm: normal sinus rhythm  BP: (!) 141/83  MAP (mmHg): 106  Resp: 19  SpO2: 100 %  O2 Device (Oxygen Therapy): room air    Temp  Min: 98 °F (36.7 °C)  Max: 98.9 °F (37.2 °C)  Pulse  Min: 83  Max: 103  BP  Min:  105/57  Max: 153/80  MAP (mmHg)  Min: 74  Max: 112  Resp  Min: 17  Max: 34  SpO2  Min: 97 %  Max: 100 %    01/27 0701 - 01/28 0700  In: 351.9 [P.O.:350]  Out: 300 [Urine:300]   Unmeasured Output  Stool Occurrence: 0       Physical Exam  Vitals and nursing note reviewed.   Constitutional:       General: He is not in acute distress.     Appearance: He is normal weight. He is not ill-appearing, toxic-appearing or diaphoretic.      Comments: Patient awake in bed and talking.    HENT:      Head: Normocephalic.      Right Ear: External ear normal.      Left Ear: External ear normal.      Nose: Nose normal.      Mouth/Throat:      Mouth: Mucous membranes are moist.      Pharynx: Oropharynx is clear.   Eyes:      General:         Right eye: No discharge.         Left eye: No discharge.   Cardiovascular:      Rate and Rhythm: Normal rate and regular rhythm.      Pulses: Normal pulses.   Pulmonary:      Effort: Pulmonary effort is normal. No respiratory distress.   Abdominal:      General: Abdomen is flat.   Skin:     General: Skin is warm and dry.      Capillary Refill: Capillary refill takes less than 2 seconds.   Neurological:      Mental Status: He is alert.      Comments: No sedation.   E4 V5 M6    Patient awake and alert and following commands.     PERRL, 3mm bilat. EOMI.     Moving all extremities spontaneously.   Sensation grossly intact.     Gait deffered       Medications:  Continuous Scheduledatorvastatin, 40 mg, Daily  carbidopa-levodopa  mg, 1 tablet, TID  carvediloL, 12.5 mg, BID  ceFEPime (MAXIPIME) IVPB, 1 g, Q8H  heparin (porcine), 5,000 Units, Q8H  memantine, 10 mg, BID  mupirocin, , BID  tamsulosin, 0.4 mg, Daily    PRNlabetalol, 10 mg, Q4H PRN  ondansetron, 4 mg, Q8H PRN  sodium chloride 0.9%, 10 mL, PRN      Today I personally reviewed pertinent medications, lines/drains/airways, imaging, cardiology results, laboratory results, microbiology results, notably: EEG, MRI     Diet  Diet Dysphagia  Mechanical Soft (IDDSI Level 5) [facility]; Thin  Diet Dysphagia Mechanical Soft (IDDSI Level 5) [facility]; Thin    Assessment/Plan:     Neuro  * IVH (intraventricular hemorrhage)  - NSGY and Vn following   - SBP <160  - CTH: Interval development of hemorrhage layering dependently within the posterior horns of the bilateral lateral ventricles, new from the previous study of 01/05/2022.  There is slight prominence of the ventricular system, perhaps slightly more so than compared to the prior examination which could suggest a component of developing hydrocephalus.  - Repeat CTH stable minimal IVH   - Anticoagulation status: on Eliquis at home being held   - Q 1 vitals   - Q 1 neuro checks   - TSH, A1c, lipid, echo and EKG  -Likely SD to Vascular Neurology 1/28 as patient at baseline  -  Pt/OT/SLP eval and treat      Acute encephalopathy  -Admitted to [facility] for close neurologic monitoring  -TSH WNL  -Ammonia WNL   -B12 pending  -EEG to r/o NCSE in setting of waxing/waning lethargy and new onset AMS  -initial read without any seizures. Awaiting official read from overnight. If not seizures will d/c EEG.   -Concerns for urosepsis w/ positive urine culture and history of recurrent UTI. Started on ceftriaxone intially, changed to cefepime 1/27 for concerns of resistance.   -1/28 Continue abx   -MRI brain pending after EEG off   -Patient at baseline overnight through today per family. Restarted diet and removed NG tube.   -If no seizures on EEG likely SD to VN    Parkinsonism  -Per family patient received his medications 1/26 prior to change in mental status  -If patient has continued lethargy 1/27 and is unable to pass chase, will place NG tube for carbidopa-levodopa and memantine administration to avoid withdrawal.   -NG tube placed 1/27 evening  -1/28 Patient at baseline and passed swallow per SLP  -NG tube removed, resume medications PO      Cardiac/Vascular  Hyperlipidemia  - lipid panel   - atorvastatin  daily     CAD (coronary artery disease)  - continue coreg   - hold anticoagulation in setting of IVH           The patient is being Prophylaxed for:  Venous Thromboembolism with: Mechanical or Chemical  Stress Ulcer with: Not Applicable   Ventilator Pneumonia with: not applicable    Activity Orders          Diet Dysphagia Mechanical Soft (IDDSI Level 5) [facility]; Thin: Dysphagia 2 (Mechanical Soft Ground) starting at 01/28 1053    Turn patient starting at 01/26 2200    Elevate HOB starting at 01/26 2028        DNR    Level III     Darlene Ledbetter PAChaoC  Neurocritical Care  [facility] - Neurosurgery ([facility])

## 2022-01-29 NOTE — PLAN OF CARE
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

## 2022-01-29 NOTE — PLAN OF CARE
[facility] Care Plan    POC reviewed with Eliazar James and family at 1400. Pt verbalized understanding. Questions and concerns addressed. No acute events today. Pt progressing toward goals. Will continue to monitor. See below and flowsheets for full assessment and VS info.   -MRI head completed  -PRN tylenol given x1 for mild pain    Neuro:  [place] Coma Scale  Best Eye Response: 4-->(E4) spontaneous  Best Motor Response: 6-->(M6) obeys commands  Best Verbal Response: 4-->(V4) confused  Penny Coma Scale Score: 14  Assessment Qualifiers: patient not sedated/intubated  Pupil PERRLA: yes     24 hr Temp:  [98 °F (36.7 °C)-98.9 °F (37.2 °C)]     CV:   Rhythm: normal sinus rhythm  BP goals:   SBP < 160  MAP > 65    Resp:   O2 Device (Oxygen Therapy): room air       Plan: N/A    GI/:     Diet/Nutrition Received: mechanical/dental soft  Last Bowel Movement: 01/24/22  Voiding Characteristics: external catheter    Intake/Output Summary (Last 24 hours) at 1/28/2022 1835  Last data filed at 1/28/2022 1834  Gross per 24 hour   Intake 581.74 ml   Output 800 ml   Net -218.26 ml     Unmeasured Output  Stool Occurrence: 0    Labs/Accuchecks:  Recent Labs   Lab 01/28/22  0029   WBC 9.81   RBC 4.25*   HGB 14.0   HCT 42.6         Recent Labs   Lab 01/28/22  0029      K 3.6   CO2 24      BUN 21   CREATININE 0.8   ALKPHOS 76   ALT 5*   AST 26   BILITOT 1.1*      Recent Labs   Lab 01/27/22  0049   INR 1.1   APTT 29.3      Recent Labs   Lab 01/26/22  1408 01/26/22  1408 01/27/22  0049   *  719*   < > 469*   CPKMB 18.9*   < > 7.8*   TROPONINI 0.010  --   --    MB 2.6   < > 1.7    < > = values in this interval not displayed.       Electrolytes: N/A - electrolytes WDL  Accuchecks: none    Gtts:       LDA/Wounds:  Lines/Drains/Airways       Drain              Male External Urinary Catheter 01/26/22 2031 Small 1 day              Peripheral Intravenous Line                   Peripheral IV - Single Lumen 01/26/22 1429  18 G Right Antecubital 2 days         Peripheral IV - Single Lumen 01/28/22 0100 22 G Anterior;Left Forearm <1 day                  Wounds: No  Wound care consulted: No

## 2022-01-29 NOTE — SUBJECTIVE & OBJECTIVE
Interval History:  NAEO. MRI done. PO B12 started. Step down to Vascular Neurology.    Review of Systems   Constitutional: Negative for chills and fever.   Respiratory: Negative for cough and shortness of breath.    Gastrointestinal: Negative for abdominal pain, nausea and vomiting.   Neurological: Negative for syncope, speech difficulty and headaches.     Objective:     Vitals:  Temp: 97.7 °F (36.5 °C)  Pulse: 92  Rhythm: normal sinus rhythm  BP: 125/70  MAP (mmHg): 103  Resp: 18  SpO2: 99 %  O2 Device (Oxygen Therapy): room air    Temp  Min: 97.6 °F (36.4 °C)  Max: 98.5 °F (36.9 °C)  Pulse  Min: 83  Max: 107  BP  Min: 105/64  Max: 162/85  MAP (mmHg)  Min: 80  Max: 117  Resp  Min: 12  Max: 27  SpO2  Min: 95 %  Max: 100 %    01/28 0701 - 01/29 0700  In: 581.7 [P.O.:450]  Out: 1300 [Urine:1300]   Unmeasured Output  Stool Occurrence: 0       Physical Exam  Vitals and nursing note reviewed.   Constitutional:       General: He is not in acute distress.     Appearance: He is normal weight. He is not ill-appearing, toxic-appearing or diaphoretic.      Comments: Patient awake in bed and talking.    HENT:      Head: Normocephalic.      Right Ear: External ear normal.      Left Ear: External ear normal.      Nose: Nose normal.      Mouth/Throat:      Mouth: Mucous membranes are moist.      Pharynx: Oropharynx is clear.   Eyes:      General:         Right eye: No discharge.         Left eye: No discharge.   Cardiovascular:      Rate and Rhythm: Normal rate and regular rhythm.      Pulses: Normal pulses.   Pulmonary:      Effort: Pulmonary effort is normal. No respiratory distress.   Abdominal:      General: Abdomen is flat.   Skin:     General: Skin is warm and dry.      Capillary Refill: Capillary refill takes less than 2 seconds.   Neurological:      Mental Status: He is alert.      Comments: No sedation.   E4 V5 M6    Patient awake and alert and following commands.     PERRL, 3mm bilat. EOMI.     Moving all extremities  spontaneously.   Sensation grossly intact.     Gait deffered       Medications:  Continuous Scheduledatorvastatin, 40 mg, Daily  carbidopa-levodopa  mg, 1 tablet, TID  carvediloL, 12.5 mg, BID  ceFEPime (MAXIPIME) IVPB, 1 g, Q8H  cyanocobalamin, 250 mcg, Daily  heparin (porcine), 5,000 Units, Q8H  memantine, 10 mg, BID  mupirocin, , BID  senna, 8.6 mg, BID  tamsulosin, 0.4 mg, Daily    PRNacetaminophen, 650 mg, Q6H PRN  labetalol, 10 mg, Q4H PRN  ondansetron, 4 mg, Q8H PRN  sodium chloride 0.9%, 10 mL, PRN      Today I personally reviewed pertinent medications, lines/drains/airways, imaging, cardiology results, laboratory results, microbiology results, notably: EEG, MRI     Diet  Diet Dysphagia Mechanical Soft (IDDSI Level 5) [facility]; Thin  Diet Dysphagia Mechanical Soft (IDDSI Level 5) [facility]; Thin

## 2022-01-29 NOTE — PROGRESS NOTES
[facility] - Neurosurgery ([facility])  Neurocritical Care  Progress Note    Admit Date: 1/26/2022  Service Date: 01/29/2022  Length of Stay: 3    Subjective:     Chief Complaint: IVH (intraventricular hemorrhage)    History of Present Illness: Eliazar James is an 80 yo M with a PMH of CAD, MI, CVA, vascular Parkinsons, HLD, HTN who presents to [facility] for IVH. He presented to osh emergency room with altered mental status today  Patient with altered mental status today, patient with continued confusion x1 week. Patient seen 3 days ago in the emergency room with a urinary tract infection. Patient has had longstanding issues with urinary tract infection, on Ceftin at the NH. Daughter Reported  AMS not improved with increasing confusion the last 48 hours. He is being admitted to [facility] for a higher level of care.       Hospital Course: 01/27/2022: Patient w/ continued waxing/waning lethargy. TSH, ammonia wnl. B12 and EEG pending. IVH stable. Changed abx to cefepime for concerns of resistance.   01/28/2022: Patient awake overnight, but at baseline consistently per family. Awaiting EEG read, if no seizures will d/c. MRI after EEG removal. Possible step down to Vascular Neurology today.   01/29/2022: NAEO. MRI done. PO B12 started. Step down to Vascular Neurology.       Interval History:  NAEO. MRI done. PO B12 started. Step down to Vascular Neurology.    Review of Systems   Constitutional: Negative for chills and fever.   Respiratory: Negative for cough and shortness of breath.    Gastrointestinal: Negative for abdominal pain, nausea and vomiting.   Neurological: Negative for syncope, speech difficulty and headaches.     Objective:     Vitals:  Temp: 97.7 °F (36.5 °C)  Pulse: 92  Rhythm: normal sinus rhythm  BP: 125/70  MAP (mmHg): 103  Resp: 18  SpO2: 99 %  O2 Device (Oxygen Therapy): room air    Temp  Min: 97.6 °F (36.4 °C)  Max: 98.5 °F (36.9 °C)  Pulse  Min: 83  Max: 107  BP  Min: 105/64  Max: 162/85  MAP (mmHg)  Min: 80   Max: 117  Resp  Min: 12  Max: 27  SpO2  Min: 95 %  Max: 100 %    01/28 0701 - 01/29 0700  In: 581.7 [P.O.:450]  Out: 1300 [Urine:1300]   Unmeasured Output  Stool Occurrence: 0       Physical Exam  Vitals and nursing note reviewed.   Constitutional:       General: He is not in acute distress.     Appearance: He is normal weight. He is not ill-appearing, toxic-appearing or diaphoretic.      Comments: Patient awake in bed and talking.    HENT:      Head: Normocephalic.      Right Ear: External ear normal.      Left Ear: External ear normal.      Nose: Nose normal.      Mouth/Throat:      Mouth: Mucous membranes are moist.      Pharynx: Oropharynx is clear.   Eyes:      General:         Right eye: No discharge.         Left eye: No discharge.   Cardiovascular:      Rate and Rhythm: Normal rate and regular rhythm.      Pulses: Normal pulses.   Pulmonary:      Effort: Pulmonary effort is normal. No respiratory distress.   Abdominal:      General: Abdomen is flat.   Skin:     General: Skin is warm and dry.      Capillary Refill: Capillary refill takes less than 2 seconds.   Neurological:      Mental Status: He is alert.      Comments: No sedation.   E4 V5 M6    Patient awake and alert and following commands.     PERRL, 3mm bilat. EOMI.     Moving all extremities spontaneously.   Sensation grossly intact.     Gait deffered       Medications:  Continuous Scheduledatorvastatin, 40 mg, Daily  carbidopa-levodopa  mg, 1 tablet, TID  carvediloL, 12.5 mg, BID  ceFEPime (MAXIPIME) IVPB, 1 g, Q8H  cyanocobalamin, 250 mcg, Daily  heparin (porcine), 5,000 Units, Q8H  memantine, 10 mg, BID  mupirocin, , BID  senna, 8.6 mg, BID  tamsulosin, 0.4 mg, Daily    PRNacetaminophen, 650 mg, Q6H PRN  labetalol, 10 mg, Q4H PRN  ondansetron, 4 mg, Q8H PRN  sodium chloride 0.9%, 10 mL, PRN      Today I personally reviewed pertinent medications, lines/drains/airways, imaging, cardiology results, laboratory results, microbiology results,  notably: EEG, MRI     Diet  Diet Dysphagia Mechanical Soft (IDDSI Level 5) [facility]; Thin  Diet Dysphagia Mechanical Soft (IDDSI Level 5) [facility]; Thin    Assessment/Plan:     Neuro  * IVH (intraventricular hemorrhage)  - NSGY and Vn following   - SBP <160  - CTH: Interval development of hemorrhage layering dependently within the posterior horns of the bilateral lateral ventricles, new from the previous study of 01/05/2022.  There is slight prominence of the ventricular system, perhaps slightly more so than compared to the prior examination which could suggest a component of developing hydrocephalus.  - Repeat CTH stable minimal IVH   - Anticoagulation status: on Eliquis at home being held   - Q 1 vitals   - Q 1 neuro checks   - TSH, A1c, lipid, echo and EKG  -Step down to Vascular Neurology 1/29  -  Pt/OT/SLP eval and treat      Acute encephalopathy  -Admitted to [facility] for close neurologic monitoring  -TSH WNL  -Ammonia WNL   -B12 pending  -EEG to r/o NCSE in setting of waxing/waning lethargy and new onset AMS  -initial read without any seizures. Awaiting official read from overnight. If not seizures will d/c EEG.   -Concerns for urosepsis w/ positive urine culture and history of recurrent UTI. Started on ceftriaxone intially, changed to cefepime 1/27 for concerns of resistance.   -1/28 Continue abx   -MRI brain pending after EEG off   -Patient at baseline overnight through today per family. Restarted diet and removed NG tube.   1/29  -EEG d/c  -MRI brain done  -Step down to Vascular Neurology     Parkinsonism  -Per family patient received his medications 1/26 prior to change in mental status  -If patient has continued lethargy 1/27 and is unable to pass chase, will place NG tube for carbidopa-levodopa and memantine administration to avoid withdrawal.   -NG tube placed 1/27 evening  -1/28 Patient at baseline and passed swallow per SLP  -NG tube removed, resume medications PO       Cardiac/Vascular  Hyperlipidemia  - lipid panel   - atorvastatin daily     CAD (coronary artery disease)  - continue coreg   - hold anticoagulation in setting of IVH     Renal/  Urinary tract infection without hematuria  - ua osh culture pending was being treated for recurrent uti at nursing home with ceftin  - previous urine culture with Proteus reviewed. History of multiple drug resistent UTIs. Most recent urine cx sensitive to rocephin. Will broaden to cefepime out of precaution due to concern for again developing resistance.  - Follow repeat culture drawn at admission    Hematology  Long term current use of anticoagulant therapy  - hold anticoagulation           The patient is being Prophylaxed for:  Venous Thromboembolism with: Mechanical or Chemical  Stress Ulcer with: Not Applicable   Ventilator Pneumonia with: not applicable    Activity Orders          Diet Dysphagia Mechanical Soft (IDDSI Level 5) [facility]; Thin: Dysphagia 2 (Mechanical Soft Ground) starting at 01/28 1053    Turn patient starting at 01/26 2200    Elevate HOB starting at 01/26 2028        DNR     Level III     Darlene Ledbetter PA-C  Neurocritical Care  [facility] - Neurosurgery ([facility])

## 2022-01-29 NOTE — ASSESSMENT & PLAN NOTE
-Admitted to [facility] for close neurologic monitoring  -TSH WNL  -Ammonia WNL   -B12 pending  -EEG to r/o NCSE in setting of waxing/waning lethargy and new onset AMS  -initial read without any seizures. Awaiting official read from overnight. If not seizures will d/c EEG.   -Concerns for urosepsis w/ positive urine culture and history of recurrent UTI. Started on ceftriaxone intially, changed to cefepime 1/27 for concerns of resistance.   -1/28 Continue abx   -MRI brain pending after EEG off   -Patient at baseline overnight through today per family. Restarted diet and removed NG tube.   1/29  -EEG d/c  -MRI brain done  -Step down to Vascular Neurology

## 2022-01-29 NOTE — ASSESSMENT & PLAN NOTE
- NSGY and Vn following   - SBP <160  - CTH: Interval development of hemorrhage layering dependently within the posterior horns of the bilateral lateral ventricles, new from the previous study of 01/05/2022.  There is slight prominence of the ventricular system, perhaps slightly more so than compared to the prior examination which could suggest a component of developing hydrocephalus.  - Repeat CTH stable minimal IVH   - Anticoagulation status: on Eliquis at home being held   - Q 1 vitals   - Q 1 neuro checks   - TSH, A1c, lipid, echo and EKG  -Step down to Vascular Neurology 1/29  -  Pt/OT/SLP eval and treat

## 2022-01-29 NOTE — PLAN OF CARE
Anticoagulation Stewardship Note Template    Indication:Atrial Fibrilation    Intended duration of therapy: indefinite    Current anticoagulation;   Anticoagulants     Ordered     Route Frequency Start Stop    01/28/22 1053  heparin (porcine)         SubQ Every 8 hours 01/28/22 1400 --          Current antiplatelets; None    Current NSAIDS- none    Contraindications: IVH    Insurance coverage: Payor: MEDICARE / Plan: MEDICARE PART A & B / Product Type: Government /      Patient and Caregiver education and understanding; satisfactory    Transition plan- [facility] transition plan: hold anticoagulation until- per Vascular Neurology     Follow up for anticoagulation arranged- No follow-ups on file.    Primary care physician- Timo Conti MD; Is this an [facility] physician? no

## 2022-01-29 NOTE — NURSING TRANSFER
Nursing Transfer Note      1/29/2022     Reason patient is being transferred: MD orders    Transfer to room 959 from 9098    Transfer via stretcher    Transfer with telebox, on room air    Transported by RN VQ    Medicines sent: yes    Any special needs or follow-up needed: fall risk    Chart send with patient: yes    Notified: Charge, MD    Report given to SHANIA Maki

## 2022-01-29 NOTE — PLAN OF CARE
Problem: Adult Inpatient Plan of Care  Goal: Plan of Care Review  Outcome: Ongoing, Progressing  Flowsheets (Taken 1/29/2022 1305)  Plan of Care Reviewed With: patient  Goal: Patient-Specific Goal (Individualized)  Description: Admit Date: 1/26/2022    Outcome: Ongoing, Progressing  Flowsheets (Taken 1/29/2022 1305)  Anxieties, Fears or Concerns: no  Individualized Care Needs: frequent reorientation   POC reviewed with the patient and they verbalized understanding. All comments and concerns addressed. Stroke booklet at bedside and reviewed with pt/family. Bed locked in lowest position with bed alarm set, call light within reach. Safety precautions maintained. Will continue to monitor for changes to POC and clinical condition.

## 2022-01-29 NOTE — PLAN OF CARE
[facility] Care Plan    POC reviewed with Eliazar James and family at 0300. Pt verbalized understanding. Questions and concerns addressed. No acute events overnight. Pt progressing toward goals. Will continue to monitor. See below and flowsheets for full assessment and VS info.     -neuro exam inconsistent, pt participates when wanted  -SBP's maintained <160   -pt finally rested throughout the night after being awake for the past 24 hours     Neuro:  [place] Coma Scale  Best Eye Response: 4-->(E4) spontaneous  Best Motor Response: 6-->(M6) obeys commands  Best Verbal Response: 4-->(V4) confused  [place]field Coma Scale Score: 14  Assessment Qualifiers: patient not sedated/intubated,no eye obstruction present  Pupil PERRLA: yes     24hr Temp:  [97.8 °F (36.6 °C)-98.6 °F (37 °C)]     CV:   Rhythm: normal sinus rhythm  BP goals:   SBP < 160  MAP > 65    Resp:   O2 Device (Oxygen Therapy): room air       Plan: N/A    GI/:     Diet/Nutrition Received: mechanical/dental soft  Last Bowel Movement: 01/24/22  Voiding Characteristics: external catheter    Intake/Output Summary (Last 24 hours) at 1/29/2022 0403  Last data filed at 1/28/2022 1834  Gross per 24 hour   Intake 581.74 ml   Output 800 ml   Net -218.26 ml     Unmeasured Output  Stool Occurrence: 0    Labs/Accuchecks:  Recent Labs   Lab 01/29/22  0015   WBC 9.59   RBC 4.75   HGB 15.6   HCT 46.8         Recent Labs   Lab 01/29/22  0015   *   K 3.7   CO2 22*      BUN 18   CREATININE 0.7   ALKPHOS 86   ALT 13   AST 29   BILITOT 1.1*      Recent Labs   Lab 01/27/22  0049   INR 1.1   APTT 29.3      Recent Labs   Lab 01/26/22  1408 01/26/22  1408 01/27/22  0049   *  719*   < > 469*   CPKMB 18.9*   < > 7.8*   TROPONINI 0.010  --   --    MB 2.6   < > 1.7    < > = values in this interval not displayed.       Electrolytes: No replacement orders  Accuchecks: none    Gtts:       LDA/Wounds:  Lines/Drains/Airways       Drain              Male External Urinary  Catheter 01/26/22 2031 Small 2 days              Peripheral Intravenous Line                   Peripheral IV - Single Lumen 01/26/22 1421 18 G Right Antecubital 2 days         Peripheral IV - Single Lumen 01/28/22 0100 22 G Anterior;Left Forearm 1 day                  Wounds: No  Wound care consulted: No     Problem: Adult Inpatient Plan of Care  Goal: Plan of Care Review  1/29/2022 0402 by Janeth Dillon RN  Flowsheets (Taken 1/29/2022 0402)  Plan of Care Reviewed With: patient  1/29/2022 0016 by Janeth Dillon RN  Flowsheets (Taken 1/29/2022 0016)  Plan of Care Reviewed With: patient     Problem: Urinary Elimination Impaired (Stroke, Hemorrhagic)  Goal: Effective Urinary Elimination  Intervention: Promote Effective Bladder Elimination  Flowsheets (Taken 1/29/2022 0402)  Urinary Elimination Promotion: absorbent pad/diaper use encouraged     Problem: Fall Injury Risk  Goal: Absence of Fall and Fall-Related Injury  Intervention: Identify and Manage Contributors  Flowsheets (Taken 1/29/2022 0402)  Self-Care Promotion:   independence encouraged   safe use of adaptive equipment encouraged  Medication Review/Management: medications reviewed

## 2022-01-29 NOTE — SUBJECTIVE & OBJECTIVE
Neurologic Chief Complaint: AMS    Subjective:     Interval History: Patient is seen for follow-up neurological assessment and treatment recommendations: Neuro exam stable. MRI with stable IVH and areas susceptibility in the right posterior sylvian fissure which could be acute or chronic subarachnoid blood.  Punctate susceptibility also seen in the left frontal lobe white matter probably from remote microhemorrhage. CTA pending for today.     HPI, Past Medical, Family, and Social History remains the same as documented in the initial encounter.     Review of Systems   Constitutional: Positive for activity change. Negative for fever.   HENT: Negative for trouble swallowing.    Eyes: Negative for photophobia and visual disturbance.   Respiratory: Negative for cough and shortness of breath.    Cardiovascular: Negative for chest pain and palpitations.   Gastrointestinal: Negative for nausea and vomiting.   Neurological: Positive for weakness. Negative for speech difficulty.   Psychiatric/Behavioral: Negative for confusion. The patient is not nervous/anxious.      Scheduled Meds:   atorvastatin  40 mg Oral Daily    carbidopa-levodopa  mg  1 tablet Oral TID    carvediloL  12.5 mg Oral BID    ceFEPime (MAXIPIME) IVPB  1 g Intravenous Q8H    cyanocobalamin  250 mcg Oral Daily    heparin (porcine)  5,000 Units Subcutaneous Q8H    memantine  10 mg Oral BID    mupirocin   Nasal BID    senna  8.6 mg Oral BID    tamsulosin  0.4 mg Oral Daily     Continuous Infusions:  PRN Meds:acetaminophen, labetalol, ondansetron, sodium chloride 0.9%    Objective:     Vital Signs (Most Recent):  Temp: 97.6 °F (36.4 °C) (01/29/22 0701)  Pulse: 96 (01/29/22 0738)  Resp: 13 (01/29/22 0701)  BP: 135/84 (01/29/22 0701)  SpO2: 95 % (01/29/22 0738)  BP Location: Left arm    Vital Signs Range (Last 24H):  Temp:  [97.6 °F (36.4 °C)-98.5 °F (36.9 °C)]   Pulse:  []   Resp:  [12-27]   BP: (105-162)/(56-97)   SpO2:  [95 %-100 %]   BP  Location: Left arm    Physical Exam  Vitals and nursing note reviewed.   Constitutional:       General: He is not in acute distress.     Appearance: He is not ill-appearing.   HENT:      Head: Normocephalic and atraumatic.      Right Ear: External ear normal.      Left Ear: External ear normal.      Nose: Nose normal.   Eyes:      Extraocular Movements: Extraocular movements intact.      Conjunctiva/sclera: Conjunctivae normal.   Cardiovascular:      Rate and Rhythm: Normal rate.   Pulmonary:      Effort: Pulmonary effort is normal. No respiratory distress.   Abdominal:      General: There is no distension.   Musculoskeletal:      Right lower leg: No edema.      Left lower leg: No edema.   Skin:     General: Skin is warm and dry.   Neurological:      Mental Status: He is alert and oriented to person, place, and time.      Cranial Nerves: No facial asymmetry.      Motor: Weakness present.   Psychiatric:         Mood and Affect: Mood normal.         Neurological Exam:   LOC: alert  Attention Span: Good   Language: No aphasia  Articulation: No dysarthria  Orientation: Person, Place, Time   Visual Fields: Full  EOM (CN III, IV, VI): Full/intact  Facial Movement (CN VII): Symmetric facial expression    Motor: Arm left  Normal 5/5  Leg left  Paresis: 3/5  Arm right  Normal 5/5  Leg right Paresis: 3/5  Cerebellum: No evidence of appendicular or axial ataxia  Sensation: intact to light touch throughout    Laboratory:  CMP:   Recent Labs   Lab 01/29/22  0015   CALCIUM 8.9   ALBUMIN 2.4*   PROT 6.9   *   K 3.7   CO2 22*      BUN 18   CREATININE 0.7   ALKPHOS 86   ALT 13   AST 29   BILITOT 1.1*     CBC:   Recent Labs   Lab 01/29/22  0015   WBC 9.59   RBC 4.75   HGB 15.6   HCT 46.8      MCV 99*   MCH 32.8*   MCHC 33.3     Lipid Panel:   Recent Labs   Lab 01/26/22  2100   CHOL 83*   LDLCALC 48.0*   HDL 27*   TRIG 40     Coagulation:   Recent Labs   Lab 01/27/22  0049   INR 1.1   APTT 29.3     Platelet  Aggregation Study: No results for input(s): PLTAGG, PLTAGINTERP, PLTAGREGLACO, ADPPLTAGGREG in the last 168 hours.  Hgb A1C:   Recent Labs   Lab 01/26/22 2100   HGBA1C 5.2  5.2     TSH:   Recent Labs   Lab 01/26/22 2100   TSH 2.111       Diagnostic Results     Brain imaging:  MRI Brain 1/28/2022  MRI of the brain demonstrates diffuse cerebral atrophy with enlargement of sulci and ventricles.  There is intraventricular hemorrhage with blood layering in the occipital horns similar in volume to the prior CT.  On the susceptibility weighted images there are additional areas susceptibility in the right posterior sylvian fissure which could be acute or chronic subarachnoid blood.  Punctate susceptibility also seen in the left frontal lobe white matter probably from remote microhemorrhage.     Patchy white matter changes are seen in the periventricular areas likely from chronic microvascular disease.  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  No diffusion restriction suggest  acute infarct.  No mass is seen.  Vascular flow voids are are patent.       CT Head WO 1/26/2022 2156  Ventricular enlargement and small layering intraventricular hemorrhages in the occipital horns bilaterally appears stable from prior.     Air is again noted in the cavernous sinus and branches of the jugular vein in the upper neck, similar to prior.  Correlate clinically for iatrogenic etiology from venous access.        CT Head. Date: 01/26/22 1456  Interval development of hemorrhage layering dependently within the posterior horns of the bilateral lateral ventricles, new from the previous study of 01/05/2022.  There is slight prominence of the ventricular system, perhaps slightly more so than compared to the prior examination which could suggest a component of developing hydrocephalus.     Age-appropriate generalized cerebral volume loss with moderate chronic microvascular ischemic disease.  No definite new parenchymal  hypoattenuation to suggest an acute infarction.     Small foci of venous air noted.  Clinically correlate for recent intravenous vascular access.     Vessel Imaging:  CTA recommended     Cardiac Evaluation:   TTE 1/27/22  · The left ventricle is normal in size with normal systolic function. The estimated ejection fraction is 55%.  · Normal right ventricular size with normal right ventricular systolic function.  · Normal left ventricular diastolic function.  · Mild left atrial enlargement.  · The IVC could not be visualized.

## 2022-01-29 NOTE — ASSESSMENT & PLAN NOTE
Eliazar James is a 81 y.o. male with PMHx of CAD, MI, stroke, afib (eliquis), Parkinsons, HLD, and HTN who presented to OSH with AMS. CTH with IVH and patient transferred to [facility] for higher level of care. CTA in 2019 without significant cerebrovascular abnormalities, AVM or aneurysm. No history of recent trauma. Echo with EF 55% and mild LAE. MRI with areas susceptibility in the right posterior sylvian fissure which could be acute or chronic subarachnoid blood.  Punctate susceptibility also seen in the left frontal lobe white matter probably from remote microhemorrhage. CTA Head and Neck to evaluate for etiology of IVH pending      Patient Alert and cooperative on exam, continues to have weakness on bilateral lower extremities. Therapy recommending SNF. Stable for stepdown.       Antithrombotics for secondary stroke prevention: Anticoagulants: hold home eliquis in setting of IVH    Statins for secondary stroke prevention and hyperlipidemia, if present:   Statins: Atorvastatin- 40 mg daily    Aggressive risk factor modification: HTN, HLD, A-Fib, CAD, stroke     Rehab efforts: The patient has been evaluated by a stroke team provider and the therapy needs have been fully considered based off the presenting complaints and exam findings. The following therapy evaluations are needed: PT evaluate and treat, OT evaluate and treat, SLP evaluate and treat, PM&R evaluate for appropriate placement    Diagnostics ordered/pending: CTA Head to assess vasculature , CTA Neck/Arch to assess vasculature, Other: MRI MP rage w wo    VTE prophylaxis: Mechanical prophylaxis: Place SCDs  None: Reason for No Pharmacological VTE Prophylaxis: IVH    BP parameters: SBP <140

## 2022-01-30 LAB
ALBUMIN SERPL BCP-MCNC: 2.4 G/DL (ref 3.5–5.2)
ALP SERPL-CCNC: 80 U/L (ref 55–135)
ALT SERPL W/O P-5'-P-CCNC: 24 U/L (ref 10–44)
ANION GAP SERPL CALC-SCNC: 9 MMOL/L (ref 8–16)
AST SERPL-CCNC: 23 U/L (ref 10–40)
BACTERIA UR CULT: ABNORMAL
BASOPHILS # BLD AUTO: 0.04 K/UL (ref 0–0.2)
BASOPHILS NFR BLD: 0.5 % (ref 0–1.9)
BILIRUB SERPL-MCNC: 0.9 MG/DL (ref 0.1–1)
BUN SERPL-MCNC: 10 MG/DL (ref 8–23)
CALCIUM SERPL-MCNC: 8.4 MG/DL (ref 8.7–10.5)
CHLORIDE SERPL-SCNC: 103 MMOL/L (ref 95–110)
CO2 SERPL-SCNC: 25 MMOL/L (ref 23–29)
CREAT SERPL-MCNC: 0.8 MG/DL (ref 0.5–1.4)
DIFFERENTIAL METHOD: ABNORMAL
EOSINOPHIL # BLD AUTO: 0.3 K/UL (ref 0–0.5)
EOSINOPHIL NFR BLD: 3.7 % (ref 0–8)
ERYTHROCYTE [DISTWIDTH] IN BLOOD BY AUTOMATED COUNT: 12.6 % (ref 11.5–14.5)
EST. GFR  (AFRICAN AMERICAN): >60 ML/MIN/1.73 M^2
EST. GFR  (NON AFRICAN AMERICAN): >60 ML/MIN/1.73 M^2
GLUCOSE SERPL-MCNC: 104 MG/DL (ref 70–110)
HCT VFR BLD AUTO: 43.6 % (ref 40–54)
HGB BLD-MCNC: 14.5 G/DL (ref 14–18)
IMM GRANULOCYTES # BLD AUTO: 0.04 K/UL (ref 0–0.04)
IMM GRANULOCYTES NFR BLD AUTO: 0.5 % (ref 0–0.5)
LYMPHOCYTES # BLD AUTO: 0.8 K/UL (ref 1–4.8)
LYMPHOCYTES NFR BLD: 8.9 % (ref 18–48)
MCH RBC QN AUTO: 32.4 PG (ref 27–31)
MCHC RBC AUTO-ENTMCNC: 33.3 G/DL (ref 32–36)
MCV RBC AUTO: 97 FL (ref 82–98)
MONOCYTES # BLD AUTO: 0.6 K/UL (ref 0.3–1)
MONOCYTES NFR BLD: 7 % (ref 4–15)
NEUTROPHILS # BLD AUTO: 6.7 K/UL (ref 1.8–7.7)
NEUTROPHILS NFR BLD: 79.4 % (ref 38–73)
NRBC BLD-RTO: 0 /100 WBC
PLATELET # BLD AUTO: 236 K/UL (ref 150–450)
PMV BLD AUTO: 10.3 FL (ref 9.2–12.9)
POTASSIUM SERPL-SCNC: 3.5 MMOL/L (ref 3.5–5.1)
PROT SERPL-MCNC: 6.2 G/DL (ref 6–8.4)
RBC # BLD AUTO: 4.48 M/UL (ref 4.6–6.2)
SODIUM SERPL-SCNC: 137 MMOL/L (ref 136–145)
WBC # BLD AUTO: 8.46 K/UL (ref 3.9–12.7)

## 2022-01-30 PROCEDURE — 25000003 PHARM REV CODE 250: Performed by: NURSE PRACTITIONER

## 2022-01-30 PROCEDURE — 99233 SBSQ HOSP IP/OBS HIGH 50: CPT | Mod: GC,,, | Performed by: PSYCHIATRY & NEUROLOGY

## 2022-01-30 PROCEDURE — 63600175 PHARM REV CODE 636 W HCPCS: Performed by: PSYCHIATRY & NEUROLOGY

## 2022-01-30 PROCEDURE — 85025 COMPLETE CBC W/AUTO DIFF WBC: CPT | Performed by: NURSE PRACTITIONER

## 2022-01-30 PROCEDURE — 11000001 HC ACUTE MED/SURG PRIVATE ROOM

## 2022-01-30 PROCEDURE — 63600175 PHARM REV CODE 636 W HCPCS

## 2022-01-30 PROCEDURE — 94761 N-INVAS EAR/PLS OXIMETRY MLT: CPT

## 2022-01-30 PROCEDURE — 25000003 PHARM REV CODE 250

## 2022-01-30 PROCEDURE — 36415 COLL VENOUS BLD VENIPUNCTURE: CPT | Performed by: NURSE PRACTITIONER

## 2022-01-30 PROCEDURE — 25000003 PHARM REV CODE 250: Performed by: STUDENT IN AN ORGANIZED HEALTH CARE EDUCATION/TRAINING PROGRAM

## 2022-01-30 PROCEDURE — 80053 COMPREHEN METABOLIC PANEL: CPT | Performed by: NURSE PRACTITIONER

## 2022-01-30 PROCEDURE — 99233 PR SUBSEQUENT HOSPITAL CARE,LEVL III: ICD-10-PCS | Mod: GC,,, | Performed by: PSYCHIATRY & NEUROLOGY

## 2022-01-30 RX ORDER — LOSARTAN POTASSIUM 25 MG/1
25 TABLET ORAL DAILY
Status: DISCONTINUED | OUTPATIENT
Start: 2022-01-30 | End: 2022-01-31 | Stop reason: HOSPADM

## 2022-01-30 RX ORDER — BACLOFEN 5 MG/1
5 TABLET ORAL 2 TIMES DAILY
Status: DISCONTINUED | OUTPATIENT
Start: 2022-01-30 | End: 2022-01-31 | Stop reason: HOSPADM

## 2022-01-30 RX ADMIN — MEMANTINE 10 MG: 10 TABLET ORAL at 08:01

## 2022-01-30 RX ADMIN — CEFEPIME HYDROCHLORIDE 1 G: 1 INJECTION, SOLUTION INTRAVENOUS at 06:01

## 2022-01-30 RX ADMIN — HEPARIN SODIUM 5000 UNITS: 5000 INJECTION INTRAVENOUS; SUBCUTANEOUS at 01:01

## 2022-01-30 RX ADMIN — CARVEDILOL 12.5 MG: 3.12 TABLET, FILM COATED ORAL at 08:01

## 2022-01-30 RX ADMIN — CARBIDOPA AND LEVODOPA 1 TABLET: 25; 100 TABLET ORAL at 08:01

## 2022-01-30 RX ADMIN — SENNOSIDES 8.6 MG: 8.6 TABLET, COATED ORAL at 08:01

## 2022-01-30 RX ADMIN — HEPARIN SODIUM 5000 UNITS: 5000 INJECTION INTRAVENOUS; SUBCUTANEOUS at 05:01

## 2022-01-30 RX ADMIN — HEPARIN SODIUM 5000 UNITS: 5000 INJECTION INTRAVENOUS; SUBCUTANEOUS at 08:01

## 2022-01-30 RX ADMIN — CARBIDOPA AND LEVODOPA 1 TABLET: 25; 100 TABLET ORAL at 02:01

## 2022-01-30 RX ADMIN — ATORVASTATIN CALCIUM 40 MG: 40 TABLET, FILM COATED ORAL at 08:01

## 2022-01-30 RX ADMIN — CEFEPIME HYDROCHLORIDE 1 G: 1 INJECTION, SOLUTION INTRAVENOUS at 02:01

## 2022-01-30 RX ADMIN — BACLOFEN 5 MG: 5 TABLET ORAL at 11:01

## 2022-01-30 RX ADMIN — TAMSULOSIN HYDROCHLORIDE 0.4 MG: 0.4 CAPSULE ORAL at 08:01

## 2022-01-30 RX ADMIN — MUPIROCIN: 20 OINTMENT TOPICAL at 08:01

## 2022-01-30 RX ADMIN — CYANOCOBALAMIN TAB 250 MCG 250 MCG: 250 TAB at 08:01

## 2022-01-30 RX ADMIN — CEFEPIME HYDROCHLORIDE 1 G: 1 INJECTION, SOLUTION INTRAVENOUS at 11:01

## 2022-01-30 RX ADMIN — LOSARTAN POTASSIUM 25 MG: 25 TABLET, FILM COATED ORAL at 02:01

## 2022-01-30 RX ADMIN — BACLOFEN 5 MG: 5 TABLET ORAL at 08:01

## 2022-01-30 NOTE — PLAN OF CARE
Problem: Adult Inpatient Plan of Care  Goal: Plan of Care Review  Outcome: Ongoing, Progressing  Flowsheets (Taken 1/30/2022 1433)  Plan of Care Reviewed With: patient  Goal: Patient-Specific Goal (Individualized)  Description: Admit Date: 1/26/2022  Outcome: Ongoing, Progressing  Flowsheets (Taken 1/30/2022 1433)  Anxieties, Fears or Concerns: no  Individualized Care Needs: frequent reorientation   POC reviewed with the patient and they verbalized understanding. All comments and concerns addressed. Bed locked in lowest position with bed alarm set, call light within reach. Safety precautions maintained.

## 2022-01-30 NOTE — SUBJECTIVE & OBJECTIVE
Neurologic Chief Complaint: AMS    Subjective:     Interval History: Patient is seen for follow-up neurological assessment and treatment recommendations: JULIETEON. Patient w/ persistent LLE increased tone/contrcacture w/ hamstring pain w/ movement. Schedueld Baclofen added. Pending discharge to SNF. Adding home BP meds for better HTN control.  Daughter at bedside updated on plan.     HPI, Past Medical, Family, and Social History remains the same as documented in the initial encounter.     Review of Systems   Constitutional: Positive for activity change. Negative for fever.   HENT: Negative for trouble swallowing.    Eyes: Negative for photophobia and visual disturbance.   Respiratory: Negative for cough and shortness of breath.    Cardiovascular: Negative for chest pain and palpitations.   Gastrointestinal: Negative for nausea and vomiting.   Neurological: Positive for weakness. Negative for speech difficulty.   Psychiatric/Behavioral: Negative for confusion. The patient is not nervous/anxious.      Scheduled Meds:   atorvastatin  40 mg Oral Daily    baclofen  5 mg Oral BID    carbidopa-levodopa  mg  1 tablet Oral TID    carvediloL  12.5 mg Oral BID    ceFEPime (MAXIPIME) IVPB  1 g Intravenous Q8H    cyanocobalamin  250 mcg Oral Daily    heparin (porcine)  5,000 Units Subcutaneous Q8H    losartan  25 mg Oral Daily    memantine  10 mg Oral BID    mupirocin   Nasal BID    senna  8.6 mg Oral BID    tamsulosin  0.4 mg Oral Daily     Continuous Infusions:  PRN Meds:acetaminophen, labetalol, ondansetron, sodium chloride 0.9%    Objective:     Vital Signs (Most Recent):  Temp: 97.9 °F (36.6 °C) (01/30/22 1231)  Pulse: 91 (01/30/22 1231)  Resp: 18 (01/30/22 1231)  BP: (!) 151/80 (01/30/22 1231)  SpO2: (!) 94 % (01/30/22 1231)  BP Location: Left arm    Vital Signs Range (Last 24H):  Temp:  [97.4 °F (36.3 °C)-98.4 °F (36.9 °C)]   Pulse:  []   Resp:  [18]   BP: (113-151)/(63-84)   SpO2:  [93 %-100 %]   BP  Location: Left arm    Physical Exam  Vitals and nursing note reviewed.   Constitutional:       General: He is not in acute distress.     Appearance: He is not ill-appearing.   HENT:      Head: Normocephalic and atraumatic.      Right Ear: External ear normal.      Left Ear: External ear normal.      Nose: Nose normal.   Eyes:      Extraocular Movements: Extraocular movements intact.      Conjunctiva/sclera: Conjunctivae normal.   Cardiovascular:      Rate and Rhythm: Normal rate.   Pulmonary:      Effort: Pulmonary effort is normal. No respiratory distress.   Abdominal:      General: There is no distension.   Musculoskeletal:      Right lower leg: No edema.      Left lower leg: No edema.   Skin:     General: Skin is warm and dry.   Neurological:      Mental Status: He is alert and oriented to person, place, and time.      Cranial Nerves: No facial asymmetry.      Motor: Weakness present.   Psychiatric:         Mood and Affect: Mood normal.     LLE w/ noted contractures/spasticity w/ note pain on attempt to straighten the limb in the hamstring.   No meningismus noted.     Neurological Exam:   LOC: Alert  Attention Span: Good   Language: No aphasia  Articulation: Mild dysarthria.   Orientation: Person, Place, Time   Visual Fields: Full  EOM (CN III, IV, VI): Full/intact  Facial Movement (CN VII): Symmetric facial expression    Motor: Arm left  Normal 5/5  Leg left  Paresis: 4-/5  Arm right  Normal 5/5  Leg right Paresis: 4/5  Cerebellum: No evidence of appendicular or axial ataxia  Sensation: intact to light touch throughout    Laboratory:  CMP:   Recent Labs   Lab 01/30/22  0728   CALCIUM 8.4*   ALBUMIN 2.4*   PROT 6.2      K 3.5   CO2 25      BUN 10   CREATININE 0.8   ALKPHOS 80   ALT 24   AST 23   BILITOT 0.9     CBC:   Recent Labs   Lab 01/30/22  0728   WBC 8.46   RBC 4.48*   HGB 14.5   HCT 43.6      MCV 97   MCH 32.4*   MCHC 33.3     Lipid Panel:   Recent Labs   Lab 01/26/22  2100   CHOL 83*    LDLCALC 48.0*   HDL 27*   TRIG 40     Coagulation:   Recent Labs   Lab 01/27/22  0049   INR 1.1   APTT 29.3     Platelet Aggregation Study: No results for input(s): PLTAGG, PLTAGINTERP, PLTAGREGLACO, ADPPLTAGGREG in the last 168 hours.  Hgb A1C:   Recent Labs   Lab 01/26/22  2100   HGBA1C 5.2  5.2     TSH:   Recent Labs   Lab 01/26/22  2100   TSH 2.111       Diagnostic Results     Brain imaging:  MRI Brain W/O 1/28/2022  MRI of the brain demonstrates diffuse cerebral atrophy with enlargement of sulci and ventricles.  There is intraventricular hemorrhage with blood layering in the occipital horns similar in volume to the prior CT.  On the susceptibility weighted images there are additional areas susceptibility in the right posterior sylvian fissure which could be acute or chronic subarachnoid blood.  Punctate susceptibility also seen in the left frontal lobe white matter probably from remote microhemorrhage.  Patchy white matter changes are seen in the periventricular areas likely from chronic microvascular disease.  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  No diffusion restriction suggest  acute infarct.  No mass is seen.  Vascular flow voids are are patent.    CT Head WO 1/26/2022 2156  Ventricular enlargement and small layering intraventricular hemorrhages in the occipital horns bilaterally appears stable from prior.     CT Head W/O 01/26/22 1456   Interval development of hemorrhage layering dependently within the posterior horns of the bilateral lateral ventricles, new from the previous study of 01/05/2022.  There is slight prominence of the ventricular system, perhaps slightly more so than compared to the prior examination which could suggest a component of developing hydrocephalus.    Vessel Imaging:  CTA H/N 1/29/2022  1. No major branch stenosis/occlusion intracranially.  No evidence of aneurysm or AVM.  2. No significant stenosis at the carotid bifurcations by NASCET criteria.  The  vertebral arteries are patent.  3. Stable appearance of minimal foci of intraventricular hemorrhage in the bilateral occipital horns without hydrocephalus.  4. Generalized cerebral volume loss.  Ventriculomegaly mildly out of proportion to the degree of sulcal prominence.  Findings may be seen with normal pressure hydrocephalus although overall it is similar dating back to 2018.    Cardiac Evaluation:   TTE 1/27/22  · The left ventricle is normal in size with normal systolic function. The estimated ejection fraction is 55%.  · Normal right ventricular size with normal right ventricular systolic function.  · Normal left ventricular diastolic function.  · Mild left atrial enlargement.  · The IVC could not be visualized.

## 2022-01-30 NOTE — ASSESSMENT & PLAN NOTE
Recently diagnosed prior to admission  On ceftin at nursing home  Antibiotic changed to cefepime per NCC x 5 - 1/30 is day 5

## 2022-01-30 NOTE — ASSESSMENT & PLAN NOTE
Eliazar James is a 81 y.o. male with PMHx of CAD, MI, stroke, afib (eliquis), Parkinsons, HLD, and HTN who presented to OSH with AMS. CTH with IVH and patient transferred to [facility] for higher level of care. CTA in 2019 without significant cerebrovascular abnormalities, AVM or aneurysm. No history of recent trauma. Echo with EF 55% and mild LAE. MRI with areas susceptibility in the right posterior sylvian fissure which could be acute or chronic subarachnoid blood.  Punctate susceptibility also seen in the left frontal lobe white matter probably from remote microhemorrhage. CTA H/N is unremarkable for any AVMs, venous anomaly.     At this time unclear as to what is the etiology of patient's IVH. Will focus on RF control at this time.   Holding Eliquis in the setting of IVH - will resume 7 days s/p initial event.     Antithrombotics for secondary stroke prevention: Anticoagulants: hold home eliquis in setting of IVH Resumed 7 days s/p index event.     Statins for secondary stroke prevention and hyperlipidemia, if present:   Statins: Atorvastatin- 40 mg daily    Aggressive risk factor modification: HTN, HLD, A-Fib, CAD, stroke     Rehab efforts: The patient has been evaluated by a stroke team provider and the therapy needs have been fully considered based off the presenting complaints and exam findings. The following therapy evaluations are needed: PT evaluate and treat, OT evaluate and treat, SLP evaluate and treat, PM&R evaluate for appropriate placement    Diagnostics ordered/pending: CTA Head to assess vasculature , CTA Neck/Arch to assess vasculature, Other: MRI MP rage w wo    VTE prophylaxis: Mechanical prophylaxis: Place SCDs  None: Reason for No Pharmacological VTE Prophylaxis: IVH    BP parameters: SBP <140

## 2022-01-30 NOTE — PLAN OF CARE
Problem: Adult Inpatient Plan of Care  Goal: Plan of Care Review  Outcome: Ongoing, Progressing  Goal: Patient-Specific Goal (Individualized)  Description: Admit Date: 1/26/2022  Outcome: Ongoing, Progressing  Goal: Absence of Hospital-Acquired Illness or Injury  Outcome: Ongoing, Progressing  Goal: Optimal Comfort and Wellbeing  Outcome: Ongoing, Progressing  Goal: Readiness for Transition of Care  Outcome: Ongoing, Progressing   POC reviewed and updated with the patient. Questions regarding POC were encouraged and addressed with the patient.  VSS. Patient is AO X 3 at this time. Fall/safety precautions maintained, no signs of injury noted during shift. . Patient was repositioned for comfort, bed locked in low position with side rails X 3, bed alarm set, and call light within reach. No acute signs of distress noted at this time.

## 2022-01-30 NOTE — PROGRESS NOTES
[facility] - Neurosurgery ([facility])  Vascular Neurology  Comprehensive Stroke Center  Progress Note    Assessment/Plan:     * IVH (intraventricular hemorrhage)  Eilazar James is a 81 y.o. male with PMHx of CAD, MI, stroke, afib (eliquis), Parkinsons, HLD, and HTN who presented to OSH with AMS. CTH with IVH and patient transferred to C for higher level of care. CTA in 2019 without significant cerebrovascular abnormalities, AVM or aneurysm. No history of recent trauma. Echo with EF 55% and mild LAE. MRI with areas susceptibility in the right posterior sylvian fissure which could be acute or chronic subarachnoid blood.  Punctate susceptibility also seen in the left frontal lobe white matter probably from remote microhemorrhage. CTA H/N is unremarkable for any AVMs, venous anomaly.     At this time unclear as to what is the etiology of patient's IVH. Will focus on RF control at this time.   Holding Eliquis in the setting of IVH - will resume 7 days s/p initial event.     Antithrombotics for secondary stroke prevention: Anticoagulants: hold home eliquis in setting of IVH Resumed 7 days s/p index event.     Statins for secondary stroke prevention and hyperlipidemia, if present:   Statins: Atorvastatin- 40 mg daily    Aggressive risk factor modification: HTN, HLD, A-Fib, CAD, stroke     Rehab efforts: The patient has been evaluated by a stroke team provider and the therapy needs have been fully considered based off the presenting complaints and exam findings. The following therapy evaluations are needed: PT evaluate and treat, OT evaluate and treat, SLP evaluate and treat, PM&R evaluate for appropriate placement    Diagnostics ordered/pending: CTA Head to assess vasculature , CTA Neck/Arch to assess vasculature, Other: MRI MP rage w wo    VTE prophylaxis: Mechanical prophylaxis: Place SCDs  None: Reason for No Pharmacological VTE Prophylaxis: IVH    BP parameters: SBP <140    Acute  encephalopathy  Resolved    Parkinsonism  Continue cabidopa-levadopa as per home dosing     Recurrent UTI  As per daughter   Seems to respond favorably to IV Abx  Hx of ESBL     Urinary tract infection without hematuria  Recently diagnosed prior to admission  On ceftin at nursing home  Antibiotic changed to cefepime per NCC x 5 - 1/30 is day 5    Mild cognitive impairment  Continue home Memantine     Impairment of balance  Recommendations for SNF s/p discharge     Hyperlipidemia  Stroke risk factor  LDL <70 on admission  Continue Atorvastatin 40 mg QHS    Essential hypertension  Stroke RF  Resuming home Losartan at 50% of usual dose   - 25 mg QD    CAD (coronary artery disease)  Stroke risk factor  LDL <70 on admission  Continue Atorvastatin 40 mg QHS    Long term current use of anticoagulant therapy  Holding in the setting of IVH    Chronic atrial fibrillation  Stroke risk factor  On eliquis at home, Hold in setting of IVH  Will resume 7 days from symptom onset for continuous secondary stroke prevention.            1/27- Recommend MRI Brain WO and CTA. Patient with fluctuating lethargy(alert and cooperative at times, drowsy and less cooperative at other times). Eliquis being held 2/2 IVH.  1/28-Recommending CTA of the head and neck to evaluate etiology of IVH. Patient Alert and cooperative on exam, continues to have weakness on bilateral lower extremities. Therapy recommending SNF. Stable for stepdown.   12/29-Neuro exam stable. MRI with stable IVH and areas susceptibility in the right posterior sylvian fissure which could be acute or chronic subarachnoid blood.  Punctate susceptibility also seen in the left frontal lobe white matter probably from remote microhemorrhage. CTA pending for today.   01/30/2022 NAEON. Patient w/ persistent LLE increased tone/contrcacture w/ hamstring pain w/ movement. Schedueld Baclofen added. Pending discharge to SNF. Adding home BP meds for better HTN control.        STROKE  DOCUMENTATION        NIH Scale:    1a. Level of Consciousness: 0-->Alert, keenly responsive  1b. LOC Questions: 0-->Answers both questions correctly  1c. LOC Commands: 0-->Performs both tasks correctly  2. Best Gaze: 0-->Normal  3. Visual: 0-->No visual loss  4. Facial Palsy: 0-->Normal symmetrical movements  5a. Motor Arm, Left: 0-->No drift, limb holds 90 (or 45) degrees for full 10 secs  5b. Motor Arm, Right: 0-->No drift, limb holds 90 (or 45) degrees for full 10 secs  6a. Motor Leg, Left: 2-->Some effort against gravity, leg falls to bed by 5 secs, but has some effort against gravity  6b. Motor Leg, Right: 1-->Anigravity w/ drift   7. Limb Ataxia: 0-->Absent  8. Sensory: 0-->Normal, no sensory loss  9. Best Language: 0-->No aphasia, normal  10. Dysarthria: 0-->Normal  11. Extinction and Inattention (formerly Neglect): 0-->No abnormality  Total (NIH Stroke Scale): 3      Modified Hutchinson Score: 3  [place] Coma Scale:    ABCD2 Score:    [id number] Score:   HAS -BLED Score:   ICH Score:   Hunt & Sevilla Classification:      Hemorrhagic change of an Ischemic Stroke: Does this patient have an ischemic stroke with hemorrhagic changes? No     Neurologic Chief Complaint: AMS    Subjective:     Interval History: Patient is seen for follow-up neurological assessment and treatment recommendations: NAEON. Patient w/ persistent LLE increased tone/contrcacture w/ hamstring pain w/ movement. Schedueld Baclofen added. Pending discharge to SNF. Adding home BP meds for better HTN control.  Daughter at bedside updated on plan.     HPI, Past Medical, Family, and Social History remains the same as documented in the initial encounter.     Review of Systems   Constitutional: Positive for activity change. Negative for fever.   HENT: Negative for trouble swallowing.    Eyes: Negative for photophobia and visual disturbance.   Respiratory: Negative for cough and shortness of breath.    Cardiovascular: Negative for chest pain and  palpitations.   Gastrointestinal: Negative for nausea and vomiting.   Neurological: Positive for weakness. Negative for speech difficulty.   Psychiatric/Behavioral: Negative for confusion. The patient is not nervous/anxious.      Scheduled Meds:   atorvastatin  40 mg Oral Daily    baclofen  5 mg Oral BID    carbidopa-levodopa  mg  1 tablet Oral TID    carvediloL  12.5 mg Oral BID    ceFEPime (MAXIPIME) IVPB  1 g Intravenous Q8H    cyanocobalamin  250 mcg Oral Daily    heparin (porcine)  5,000 Units Subcutaneous Q8H    losartan  25 mg Oral Daily    memantine  10 mg Oral BID    mupirocin   Nasal BID    senna  8.6 mg Oral BID    tamsulosin  0.4 mg Oral Daily     Continuous Infusions:  PRN Meds:acetaminophen, labetalol, ondansetron, sodium chloride 0.9%    Objective:     Vital Signs (Most Recent):  Temp: 97.9 °F (36.6 °C) (01/30/22 1231)  Pulse: 91 (01/30/22 1231)  Resp: 18 (01/30/22 1231)  BP: (!) 151/80 (01/30/22 1231)  SpO2: (!) 94 % (01/30/22 1231)  BP Location: Left arm    Vital Signs Range (Last 24H):  Temp:  [97.4 °F (36.3 °C)-98.4 °F (36.9 °C)]   Pulse:  []   Resp:  [18]   BP: (113-151)/(63-84)   SpO2:  [93 %-100 %]   BP Location: Left arm    Physical Exam  Vitals and nursing note reviewed.   Constitutional:       General: He is not in acute distress.     Appearance: He is not ill-appearing.   HENT:      Head: Normocephalic and atraumatic.      Right Ear: External ear normal.      Left Ear: External ear normal.      Nose: Nose normal.   Eyes:      Extraocular Movements: Extraocular movements intact.      Conjunctiva/sclera: Conjunctivae normal.   Cardiovascular:      Rate and Rhythm: Normal rate.   Pulmonary:      Effort: Pulmonary effort is normal. No respiratory distress.   Abdominal:      General: There is no distension.   Musculoskeletal:      Right lower leg: No edema.      Left lower leg: No edema.   Skin:     General: Skin is warm and dry.   Neurological:      Mental Status: He is  alert and oriented to person, place, and time.      Cranial Nerves: No facial asymmetry.      Motor: Weakness present.   Psychiatric:         Mood and Affect: Mood normal.     LLE w/ noted contractures/spasticity w/ note pain on attempt to straighten the limb in the hamstring.   No meningismus noted.     Neurological Exam:   LOC: Alert  Attention Span: Good   Language: No aphasia  Articulation: Mild dysarthria.   Orientation: Person, Place, Time   Visual Fields: Full  EOM (CN III, IV, VI): Full/intact  Facial Movement (CN VII): Symmetric facial expression    Motor: Arm left  Normal 5/5  Leg left  Paresis: 4-/5  Arm right  Normal 5/5  Leg right Paresis: 4/5  Cerebellum: No evidence of appendicular or axial ataxia  Sensation: intact to light touch throughout    Laboratory:  CMP:   Recent Labs   Lab 01/30/22  0728   CALCIUM 8.4*   ALBUMIN 2.4*   PROT 6.2      K 3.5   CO2 25      BUN 10   CREATININE 0.8   ALKPHOS 80   ALT 24   AST 23   BILITOT 0.9     CBC:   Recent Labs   Lab 01/30/22  0728   WBC 8.46   RBC 4.48*   HGB 14.5   HCT 43.6      MCV 97   MCH 32.4*   MCHC 33.3     Lipid Panel:   Recent Labs   Lab 01/26/22  2100   CHOL 83*   LDLCALC 48.0*   HDL 27*   TRIG 40     Coagulation:   Recent Labs   Lab 01/27/22  0049   INR 1.1   APTT 29.3     Platelet Aggregation Study: No results for input(s): PLTAGG, PLTAGINTERP, PLTAGREGLACO, ADPPLTAGGREG in the last 168 hours.  Hgb A1C:   Recent Labs   Lab 01/26/22  2100   HGBA1C 5.2  5.2     TSH:   Recent Labs   Lab 01/26/22  2100   TSH 2.111       Diagnostic Results     Brain imaging:  MRI Brain W/O 1/28/2022  MRI of the brain demonstrates diffuse cerebral atrophy with enlargement of sulci and ventricles.  There is intraventricular hemorrhage with blood layering in the occipital horns similar in volume to the prior CT.  On the susceptibility weighted images there are additional areas susceptibility in the right posterior sylvian fissure which could be acute or  chronic subarachnoid blood.  Punctate susceptibility also seen in the left frontal lobe white matter probably from remote microhemorrhage.  Patchy white matter changes are seen in the periventricular areas likely from chronic microvascular disease.  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  No diffusion restriction suggest  acute infarct.  No mass is seen.  Vascular flow voids are are patent.    CT Head WO 1/26/2022 2156  Ventricular enlargement and small layering intraventricular hemorrhages in the occipital horns bilaterally appears stable from prior.     CT Head W/O 01/26/22 1456   Interval development of hemorrhage layering dependently within the posterior horns of the bilateral lateral ventricles, new from the previous study of 01/05/2022.  There is slight prominence of the ventricular system, perhaps slightly more so than compared to the prior examination which could suggest a component of developing hydrocephalus.    Vessel Imaging:  CTA H/N 1/29/2022  1. No major branch stenosis/occlusion intracranially.  No evidence of aneurysm or AVM.  2. No significant stenosis at the carotid bifurcations by NASCET criteria.  The vertebral arteries are patent.  3. Stable appearance of minimal foci of intraventricular hemorrhage in the bilateral occipital horns without hydrocephalus.  4. Generalized cerebral volume loss.  Ventriculomegaly mildly out of proportion to the degree of sulcal prominence.  Findings may be seen with normal pressure hydrocephalus although overall it is similar dating back to 2018.    Cardiac Evaluation:   TTE 1/27/22  · The left ventricle is normal in size with normal systolic function. The estimated ejection fraction is 55%.  · Normal right ventricular size with normal right ventricular systolic function.  · Normal left ventricular diastolic function.  · Mild left atrial enlargement.  · The IVC could not be visualized.          Mallorie Palomares MD  Comprehensive Stroke  [place]  Department of Vascular Neurology   [facility] - Neurosurgery ([facility])

## 2022-01-30 NOTE — ASSESSMENT & PLAN NOTE
Stroke risk factor  On eliquis at home, Hold in setting of IVH  Will resume 7 days from symptom onset for continuous secondary stroke prevention.

## 2022-01-31 VITALS
HEIGHT: 67 IN | HEART RATE: 98 BPM | DIASTOLIC BLOOD PRESSURE: 59 MMHG | WEIGHT: 154 LBS | BODY MASS INDEX: 24.17 KG/M2 | OXYGEN SATURATION: 97 % | SYSTOLIC BLOOD PRESSURE: 110 MMHG | RESPIRATION RATE: 16 BRPM | TEMPERATURE: 98 F

## 2022-01-31 LAB
ALBUMIN SERPL BCP-MCNC: 2.4 G/DL (ref 3.5–5.2)
ALP SERPL-CCNC: 83 U/L (ref 55–135)
ALT SERPL W/O P-5'-P-CCNC: 9 U/L (ref 10–44)
ANION GAP SERPL CALC-SCNC: 9 MMOL/L (ref 8–16)
AST SERPL-CCNC: 18 U/L (ref 10–40)
BASOPHILS # BLD AUTO: 0.03 K/UL (ref 0–0.2)
BASOPHILS NFR BLD: 0.4 % (ref 0–1.9)
BILIRUB SERPL-MCNC: 0.8 MG/DL (ref 0.1–1)
BUN SERPL-MCNC: 10 MG/DL (ref 8–23)
CALCIUM SERPL-MCNC: 8.6 MG/DL (ref 8.7–10.5)
CHLORIDE SERPL-SCNC: 106 MMOL/L (ref 95–110)
CO2 SERPL-SCNC: 23 MMOL/L (ref 23–29)
CREAT SERPL-MCNC: 0.7 MG/DL (ref 0.5–1.4)
DIFFERENTIAL METHOD: ABNORMAL
EOSINOPHIL # BLD AUTO: 0.2 K/UL (ref 0–0.5)
EOSINOPHIL NFR BLD: 2.6 % (ref 0–8)
ERYTHROCYTE [DISTWIDTH] IN BLOOD BY AUTOMATED COUNT: 12.7 % (ref 11.5–14.5)
EST. GFR  (AFRICAN AMERICAN): >60 ML/MIN/1.73 M^2
EST. GFR  (NON AFRICAN AMERICAN): >60 ML/MIN/1.73 M^2
GLUCOSE SERPL-MCNC: 107 MG/DL (ref 70–110)
HCT VFR BLD AUTO: 44.1 % (ref 40–54)
HGB BLD-MCNC: 15 G/DL (ref 14–18)
IMM GRANULOCYTES # BLD AUTO: 0.03 K/UL (ref 0–0.04)
IMM GRANULOCYTES NFR BLD AUTO: 0.4 % (ref 0–0.5)
LYMPHOCYTES # BLD AUTO: 0.9 K/UL (ref 1–4.8)
LYMPHOCYTES NFR BLD: 11.4 % (ref 18–48)
MCH RBC QN AUTO: 33.3 PG (ref 27–31)
MCHC RBC AUTO-ENTMCNC: 34 G/DL (ref 32–36)
MCV RBC AUTO: 98 FL (ref 82–98)
MONOCYTES # BLD AUTO: 0.6 K/UL (ref 0.3–1)
MONOCYTES NFR BLD: 7.6 % (ref 4–15)
NEUTROPHILS # BLD AUTO: 6.3 K/UL (ref 1.8–7.7)
NEUTROPHILS NFR BLD: 77.6 % (ref 38–73)
NRBC BLD-RTO: 0 /100 WBC
PLATELET # BLD AUTO: 224 K/UL (ref 150–450)
PMV BLD AUTO: 9.8 FL (ref 9.2–12.9)
POTASSIUM SERPL-SCNC: 3.7 MMOL/L (ref 3.5–5.1)
PROT SERPL-MCNC: 6.2 G/DL (ref 6–8.4)
RBC # BLD AUTO: 4.51 M/UL (ref 4.6–6.2)
SODIUM SERPL-SCNC: 138 MMOL/L (ref 136–145)
WBC # BLD AUTO: 8.16 K/UL (ref 3.9–12.7)

## 2022-01-31 PROCEDURE — 85025 COMPLETE CBC W/AUTO DIFF WBC: CPT | Performed by: NURSE PRACTITIONER

## 2022-01-31 PROCEDURE — 99238 HOSP IP/OBS DSCHRG MGMT 30/<: CPT | Mod: ,,, | Performed by: PSYCHIATRY & NEUROLOGY

## 2022-01-31 PROCEDURE — 63600175 PHARM REV CODE 636 W HCPCS

## 2022-01-31 PROCEDURE — 25000003 PHARM REV CODE 250: Performed by: NURSE PRACTITIONER

## 2022-01-31 PROCEDURE — 63600175 PHARM REV CODE 636 W HCPCS: Performed by: PSYCHIATRY & NEUROLOGY

## 2022-01-31 PROCEDURE — 97535 SELF CARE MNGMENT TRAINING: CPT

## 2022-01-31 PROCEDURE — 97530 THERAPEUTIC ACTIVITIES: CPT

## 2022-01-31 PROCEDURE — 80053 COMPREHEN METABOLIC PANEL: CPT | Performed by: NURSE PRACTITIONER

## 2022-01-31 PROCEDURE — 99238 PR HOSPITAL DISCHARGE DAY,<30 MIN: ICD-10-PCS | Mod: ,,, | Performed by: PSYCHIATRY & NEUROLOGY

## 2022-01-31 PROCEDURE — 25000003 PHARM REV CODE 250

## 2022-01-31 PROCEDURE — 97112 NEUROMUSCULAR REEDUCATION: CPT

## 2022-01-31 PROCEDURE — 25000003 PHARM REV CODE 250: Performed by: STUDENT IN AN ORGANIZED HEALTH CARE EDUCATION/TRAINING PROGRAM

## 2022-01-31 PROCEDURE — 36415 COLL VENOUS BLD VENIPUNCTURE: CPT | Performed by: NURSE PRACTITIONER

## 2022-01-31 RX ORDER — CYANOCOBALAMIN (VITAMIN B-12) 250 MCG
250 TABLET ORAL DAILY
Qty: 30 TABLET | Refills: 0
Start: 2022-02-01 | End: 2023-07-07

## 2022-01-31 RX ORDER — LOSARTAN POTASSIUM 25 MG/1
50 TABLET ORAL DAILY
Qty: 60 TABLET | Refills: 0
Start: 2022-02-02 | End: 2023-04-13

## 2022-01-31 RX ORDER — CARVEDILOL 12.5 MG/1
12.5 TABLET ORAL 2 TIMES DAILY
Qty: 60 TABLET | Refills: 0
Start: 2022-01-31 | End: 2023-07-07

## 2022-01-31 RX ORDER — LOSARTAN POTASSIUM 25 MG/1
25 TABLET ORAL DAILY
Qty: 30 TABLET | Refills: 0
Start: 2022-02-01 | End: 2022-01-31

## 2022-01-31 RX ORDER — APIXABAN 5 MG/1
5 TABLET, FILM COATED ORAL 2 TIMES DAILY
Qty: 60 TABLET | Refills: 0
Start: 2022-02-02

## 2022-01-31 RX ORDER — CEFDINIR 300 MG/1
300 CAPSULE ORAL 2 TIMES DAILY
Qty: 4 CAPSULE | Refills: 0
Start: 2022-01-31 | End: 2023-04-13

## 2022-01-31 RX ORDER — BACLOFEN 5 MG/1
5 TABLET ORAL 2 TIMES DAILY
Qty: 20 TABLET | Refills: 0
Start: 2022-01-31 | End: 2023-07-07

## 2022-01-31 RX ADMIN — SENNOSIDES 8.6 MG: 8.6 TABLET, COATED ORAL at 09:01

## 2022-01-31 RX ADMIN — ATORVASTATIN CALCIUM 40 MG: 40 TABLET, FILM COATED ORAL at 09:01

## 2022-01-31 RX ADMIN — CARVEDILOL 12.5 MG: 3.12 TABLET, FILM COATED ORAL at 09:01

## 2022-01-31 RX ADMIN — TAMSULOSIN HYDROCHLORIDE 0.4 MG: 0.4 CAPSULE ORAL at 09:01

## 2022-01-31 RX ADMIN — HEPARIN SODIUM 5000 UNITS: 5000 INJECTION INTRAVENOUS; SUBCUTANEOUS at 03:01

## 2022-01-31 RX ADMIN — HEPARIN SODIUM 5000 UNITS: 5000 INJECTION INTRAVENOUS; SUBCUTANEOUS at 05:01

## 2022-01-31 RX ADMIN — MUPIROCIN: 20 OINTMENT TOPICAL at 09:01

## 2022-01-31 RX ADMIN — BACLOFEN 5 MG: 5 TABLET ORAL at 09:01

## 2022-01-31 RX ADMIN — CYANOCOBALAMIN TAB 250 MCG 250 MCG: 250 TAB at 09:01

## 2022-01-31 RX ADMIN — LOSARTAN POTASSIUM 25 MG: 25 TABLET, FILM COATED ORAL at 09:01

## 2022-01-31 RX ADMIN — MEMANTINE 10 MG: 10 TABLET ORAL at 09:01

## 2022-01-31 RX ADMIN — CARBIDOPA AND LEVODOPA 1 TABLET: 25; 100 TABLET ORAL at 09:01

## 2022-01-31 RX ADMIN — CEFEPIME HYDROCHLORIDE 1 G: 1 INJECTION, SOLUTION INTRAVENOUS at 12:01

## 2022-01-31 RX ADMIN — CEFEPIME HYDROCHLORIDE 1 G: 1 INJECTION, SOLUTION INTRAVENOUS at 03:01

## 2022-01-31 NOTE — ASSESSMENT & PLAN NOTE
Stroke risk factor  On eliquis at home, Hold in setting of IVH  Will resume 7 days from symptom onset for continuous secondary stroke prevention(2/2/2022).

## 2022-01-31 NOTE — DISCHARGE SUMMARY
[facility] - Neurosurgery ([facility])  Vascular Neurology  Comprehensive Stroke Center  Discharge Summary     Summary:     Admit Date: 1/26/2022  8:22 PM    Discharge Date and Time:  01/31/2022 1:01 PM    Attending Physician: Dhaval Chawla MD     Discharge Provider: Elissa Harding PA-C    History of Present Illness: Eliazar James is a 81 y.o. male with PMHx of CAD, MI, stroke, afib (eliquis), Parkinsons, HLD, and HTN who was transferred from OSH for further care for IVH. Patient presented to OSH with AMS, seen in ED 3 days prior with UTI. Patient had no improvement in mental status with recent worsening over past 48 hours. Patient resides at NH, baseline oriented x 4.      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 Echo with EF 55% and mild LAE. MRI Brain WO with areas of susceptibility in the right posterior sylvian fissure which could be acute or chronic subarachnoid blood.  Punctate susceptibility also seen in the left frontal lobe white matter probably from remote microhemorrhage. CTA H/N obtained and unremarkable for any AVMs, venous anomaly, or stenosis. During admission, patient with LLE increased tone/contracture with hamstring pain with movement for which baclofen was started. He was evaluated by PT/OT who recommended SNF and SLP who recommended regular thin diet. Patient is stable for discharge to SNF. Eliquis to be resumed 7 days post initial event (on 2/2/2022). At this time unclear as to what is the etiology of patient's IVH. Will focus on RF control at this time. He will follow-up with PCP in 1 week and with vascular neurology in 4-6 weeks.         Goals of Care Treatment Preferences:  Code Status: DNR       [place]: Yes         IVH in occipital horns- etiology unclear- will focus on RF control      STROKE DOCUMENTATION         NIH Scale:  1a. Level of Consciousness:  0-->Alert, keenly responsive  1b. LOC Questions: 0-->Answers both questions correctly  1c. LOC Commands: 0-->Performs both tasks correctly  2. Best Gaze: 0-->Normal  3. Visual: 0-->No visual loss  4. Facial Palsy: 0-->Normal symmetrical movements  5a. Motor Arm, Left: 0-->No drift, limb holds 90 (or 45) degrees for full 10 secs  5b. Motor Arm, Right: 0-->No drift, limb holds 90 (or 45) degrees for full 10 secs  6a. Motor Leg, Left: 2-->Some effort against gravity, leg falls to bed by 5 secs, but has some effort against gravity  6b. Motor Leg, Right: 1-->Drift, leg falls by the end of the 5-sec period but does not hit bed  7. Limb Ataxia: 0-->Absent  8. Sensory: 0-->Normal, no sensory loss  9. Best Language: 0-->No aphasia, normal  10. Dysarthria: 1-->Mild-to-moderate dysarthria, patient slurs at least some words and, at worst, can be understood with some difficulty  11. Extinction and Inattention (formerly Neglect): 0-->No abnormality  Total (NIH Stroke Scale): 4        Modified [place] Score: 3  Penny Coma Scale:    ABCD2 Score:    [id number] Score:   HAS -BLED Score:   ICH Score:   Hunt & Sevilla Classification:       Assessment/Plan:     Diagnostic Results:    Brain imaging:  MRI Brain W/O 1/28/2022  MRI of the brain demonstrates diffuse cerebral atrophy with enlargement of sulci and ventricles.  There is intraventricular hemorrhage with blood layering in the occipital horns similar in volume to the prior CT.  On the susceptibility weighted images there are additional areas susceptibility in the right posterior sylvian fissure which could be acute or chronic subarachnoid blood.  Punctate susceptibility also seen in the left frontal lobe white matter probably from remote microhemorrhage.  Patchy white matter changes are seen in the periventricular areas likely from chronic microvascular disease.  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  No diffusion restriction suggest  acute infarct.  No mass is  seen.  Vascular flow voids are are patent.     CT Head WO 1/26/2022 2156  Ventricular enlargement and small layering intraventricular hemorrhages in the occipital horns bilaterally appears stable from prior.     CT Head W/O 01/26/22 1456   Interval development of hemorrhage layering dependently within the posterior horns of the bilateral lateral ventricles, new from the previous study of 01/05/2022.  There is slight prominence of the ventricular system, perhaps slightly more so than compared to the prior examination which could suggest a component of developing hydrocephalus.     Vessel Imaging:  CTA H/N 1/29/2022  1. No major branch stenosis/occlusion intracranially.  No evidence of aneurysm or AVM.  2. No significant stenosis at the carotid bifurcations by NASCET criteria.  The vertebral arteries are patent.  3. Stable appearance of minimal foci of intraventricular hemorrhage in the bilateral occipital horns without hydrocephalus.  4. Generalized cerebral volume loss.  Ventriculomegaly mildly out of proportion to the degree of sulcal prominence.  Findings may be seen with normal pressure hydrocephalus although overall it is similar dating back to 2018.     Cardiac Evaluation:   TTE 1/27/22  · The left ventricle is normal in size with normal systolic function. The estimated ejection fraction is 55%.  · Normal right ventricular size with normal right ventricular systolic function.  · Normal left ventricular diastolic function.  · Mild left atrial enlargement.  · The IVC could not be visualized.      Interventions: None    Complications: None    Disposition: Skilled Nursing Facility    Final Active Diagnoses:    Diagnosis Date Noted POA    PRINCIPAL PROBLEM:  IVH (intraventricular hemorrhage) [I61.5] 01/26/2022 Yes    Acute encephalopathy [G93.40] 01/27/2022 Yes    Parkinsonism [G20] 07/13/2021 Yes    Recurrent UTI [N39.0] 01/14/2020 Yes    Urinary tract infection without hematuria [N39.0] 09/21/2019 Yes     Mild cognitive impairment [G31.84] 08/16/2019 Yes    Impairment of balance [R26.89] 12/19/2018 Yes    Hyperlipidemia [E78.5] 12/11/2012 Yes     Chronic    CAD (coronary artery disease) [I25.10] 07/10/2012 Yes     Chronic    Essential hypertension [I10] 07/10/2012 Yes    Chronic atrial fibrillation [I48.20] 07/05/2012 Yes    Long term current use of anticoagulant therapy [Z79.01] 07/05/2012 Not Applicable      Problems Resolved During this Admission:     No new Assessment & Plan notes have been filed under this hospital service since the last note was generated.  Service: Vascular Neurology      Recommendations:     Post-discharge complication risks: none    Stroke Education given to: patient    Follow-up in Stroke Clinic in 4-6 weeks.     Discharge Plan:  Eliquis to be resumed on 2/2/2022  Atorvastatin 40  Baclofen 5  Omnicef x 2 days to complete abx coverage for UTI  Follow-up with PCP in 1 week and with Vascular Neurology in 4-6 weeks    Follow Up:   Follow-up Information     Schedule an appointment as soon as possible for a visit with Timo Conti MD.    Specialty: Family Medicine  Why: Please call to schedule your hospital follow-up appointment for within 1 week of discharge from skilled nursing facility.  Contact information:  [address] [place] [address] [address] [place] [address] [phone]                         Patient Instructions:      Ambulatory referral/consult to Vascular Neurology   Standing Status: Future   Referral Priority: Routine Referral Type: Consultation   Referral Reason: Specialty Services Required   Requested Specialty: Vascular Neurology     Diet Cardiac   Order Comments: See Stroke Patient Education Guide Booklet for details.   Scheduling Instructions: Mechanical soft thin diet     Call 911 for any of the following:   Order Comments: Call 911  right away if any of the following warning signs come on suddenly, even if the symptoms only last for a few minutes. With stroke, timing is very  important.   - Warning Signs of Stroke:  - Weakness: You may feel a sudden weakness, tingling or loss of feeling on one side of your face or body.  - Vision Problems: You may have sudden double vision or trouble seeing in one or both eyes.  - Speech Problems: You may have sudden trouble talking, slured speech, or problems understanding others.  - Headache: You may have sudden, severe headache.  - Movement Problems: You may experience dizziness, a feeling of spinning, a loss of balance, a feeling of falling or blackouts.       Medications:  Reconciled Home Medications:      Medication List      START taking these medications    baclofen 5 mg Tab tablet  Commonly known as: LIORESAL  Take 1 tablet (5 mg total) by mouth 2 (two) times daily.     cefdinir 300 MG capsule  Commonly known as: OMNICEF  Take 1 capsule (300 mg total) by mouth 2 (two) times daily. for 2 days     cyanocobalamin 250 MCG tablet  Commonly known as: VITAMIN B-12  Take 1 tablet (250 mcg total) by mouth once daily.  Start taking on: February 1, 2022        CHANGE how you take these medications    ELIQUIS 5 mg Tab  Generic drug: apixaban  Take 1 tablet (5 mg total) by mouth 2 (two) times daily.  Start taking on: February 2, 2022  What changed:   · how to take this  · These instructions start on February 2, 2022. If you are unsure what to do until then, ask your doctor or other care provider.     losartan 25 MG tablet  Commonly known as: COZAAR  Take 2 tablets (50 mg total) by mouth once daily.  Start taking on: February 2, 2022  What changed:   · medication strength  · These instructions start on February 2, 2022. If you are unsure what to do until then, ask your doctor or other care provider.        CONTINUE taking these medications    atorvastatin 40 MG tablet  Commonly known as: LIPITOR  Take 1 tablet (40 mg total) by mouth once daily.     busPIRone 10 MG tablet  Commonly known as: BUSPAR  Take 10 mg by mouth 2 (two) times daily.      carbidopa-levodopa  mg  mg per tablet  Commonly known as: SINEMET  Take 1 tablet by mouth 3 (three) times daily.     carvediloL 12.5 MG tablet  Commonly known as: COREG  Take 1 tablet (12.5 mg total) by mouth 2 (two) times daily.     docusate sodium 100 MG capsule  Commonly known as: COLACE  Take 100 mg by mouth once daily.     DULoxetine 30 MG capsule  Commonly known as: CYMBALTA  Take 60 mg by mouth once daily.     FLUoxetine 20 MG capsule  Take 20 mg by mouth once daily.     fluticasone propionate 50 mcg/actuation nasal spray  Commonly known as: FLONASE  2 sprays (100 mcg total) by Each Nare route once daily.     loratadine 10 mg tablet  Commonly known as: CLARITIN  Take 10 mg by mouth once daily.     memantine 10 MG Tab  Commonly known as: NAMENDA  10 mg 2 (two) times daily.     methenamine 1 gram Tab  Commonly known as: HIPREX  Take 1 g by mouth 2 (two) times daily.     polyethylene glycol 17 gram Pwpk  Commonly known as: GLYCOLAX  Take by mouth.     tamsulosin 0.4 mg Cap  Commonly known as: FLOMAX     VITAMIN C 500 MG tablet  Generic drug: ascorbic acid (vitamin C)  Take 500 mg by mouth once daily.        STOP taking these medications    CARTIA  MG 24 hr capsule  Generic drug: diltiaZEM     cefUROXime 500 MG tablet  Commonly known as: CEFTIN     fosfomycin 3 gram Pack  Commonly known as: MONUROL     lactulose 10 gram/15 mL solution  Commonly known as: CHRONULAC     mirtazapine 30 MG tablet  Commonly known as: REMERON     propranoloL 80 MG 24 hr capsule  Commonly known as: INDERAL LA          *case discussed with staff*      Elissa Harding PA-C  Comprehensive Stroke Center  Department of Vascular Neurology   [facility] Neurosurgery [facility])

## 2022-01-31 NOTE — PLAN OF CARE
Anticoagulation Stewardship Note Template    Indication:Atrial Fibrillation    Intended duration of therapy: indefinite    Current anticoagulation;   Anticoagulants     Ordered     Route Frequency Start Stop    01/28/22 1053  heparin (porcine)         SubQ Every 8 hours 01/28/22 1400 --          Current antiplatelets; None    Current NSAIDS- none    Contraindications: IVH    Insurance coverage: Payor: MEDICARE / Plan: MEDICARE PART A & B / Product Type: Government /      Patient and Caregiver education and understanding; satisfactory    Transition plan- [facility] transition plan: hold anticoagulation until- 2/2/2022 (7 days post intial event)    Follow up for anticoagulation arranged- No follow-ups on file.    Primary care physician- Timo Conti MD; Is this an [facility] physician? no

## 2022-01-31 NOTE — PLAN OF CARE
[facility]    FACILITY TRANSFER ORDERS      Patient Name: Eliazar James  YOB: 1940    PCP: Timo Conti MD   PCP Address: [address] [place] [address] [address] [place] [address]  PCP Phone Number: [phone]  PCP Fax: [phone]    Encounter Date: 01/31/2022    Admit to: SNF    Vital Signs:  Routine    Diagnoses:   Active Hospital Problems    Diagnosis  POA    *IVH (intraventricular hemorrhage) [I61.5]  Yes    Acute encephalopathy [G93.40]  Yes    Parkinsonism [G20]  Yes    Recurrent UTI [N39.0]  Yes    Urinary tract infection without hematuria [N39.0]  Yes    Mild cognitive impairment [G31.84]  Yes     - 8/16/19 MMSE 23      Impairment of balance [R26.89]  Yes    Hyperlipidemia [E78.5]  Yes     Chronic     - LDL goal <70      CAD (coronary artery disease) [I25.10]  Yes     Chronic     - goal LDL <70  - BP goal < 130/80  - ASA      Essential hypertension [I10]  Yes    Chronic atrial fibrillation [I48.20]  Yes     - rate controlled   - OAC Warfarin      Long term current use of anticoagulant therapy [Z79.01]  Not Applicable      Resolved Hospital Problems   No resolved problems to display.       Allergies:  Review of patient's allergies indicates:   Allergen Reactions    No known drug allergies        Diet: Mechanical Soft, thin    Activities: Activity as tolerated, per facility protocol    Nursing:  per facility protocol    Labs:  per facility protocol    CONSULTS:    Physical Therapy to evaluate and treat. , Occupational Therapy to evaluate and treat. and Speech Therapy to evaluate and treat for Language and Cognition.    MISCELLANEOUS CARE:   per facility protocol    WOUND CARE ORDERS   per facility protocol, continue pressure injury prevention    Medications: Review discharge medications with patient and family and provide education.      Current Discharge Medication List      START taking these medications    Details   baclofen (LIORESAL) 5 mg Tab tablet Take 1 tablet (5  mg total) by mouth 2 (two) times daily.  Qty: 20 tablet, Refills: 0      cefdinir (OMNICEF) 300 MG capsule Take 1 capsule (300 mg total) by mouth 2 (two) times daily. for 2 days  Qty: 4 capsule, Refills: 0      cyanocobalamin (VITAMIN B-12) 250 MCG tablet Take 1 tablet (250 mcg total) by mouth once daily.  Qty: 30 tablet, Refills: 0         CONTINUE these medications which have CHANGED    Details   carvediloL (COREG) 12.5 MG tablet Take 1 tablet (12.5 mg total) by mouth 2 (two) times daily.  Qty: 60 tablet, Refills: 0    Comments: .      ELIQUIS 5 mg Tab Take 1 tablet (5 mg total) by mouth 2 (two) times daily.  Qty: 60 tablet, Refills: 0      losartan (COZAAR) 25 MG tablet Take 2 tablets (50 mg total) by mouth once daily.  Qty: 60 tablet, Refills: 0    Comments: .         CONTINUE these medications which have NOT CHANGED    Details   ascorbic acid, vitamin C, (VITAMIN C) 500 MG tablet Take 500 mg by mouth once daily.      atorvastatin (LIPITOR) 40 MG tablet Take 1 tablet (40 mg total) by mouth once daily.  Qty: 90 tablet, Refills: 3    Associated Diagnoses: Hyperlipidemia, unspecified hyperlipidemia type      busPIRone (BUSPAR) 10 MG tablet Take 10 mg by mouth 2 (two) times daily.       carbidopa-levodopa  mg (SINEMET)  mg per tablet Take 1 tablet by mouth 3 (three) times daily.  Qty: 270 tablet, Refills: 1    Associated Diagnoses: Parkinsonism, unspecified Parkinsonism type      docusate sodium (COLACE) 100 MG capsule Take 100 mg by mouth once daily.      DULoxetine (CYMBALTA) 30 MG capsule Take 60 mg by mouth once daily.       FLUoxetine 20 MG capsule Take 20 mg by mouth once daily.       fluticasone (FLONASE) 50 mcg/actuation nasal spray 2 sprays (100 mcg total) by Each Nare route once daily.  Qty: 16 g, Refills: 5      loratadine (CLARITIN) 10 mg tablet Take 10 mg by mouth once daily.      memantine (NAMENDA) 10 MG Tab 10 mg 2 (two) times daily.       methenamine (HIPREX) 1 gram Tab Take 1 g by mouth  2 (two) times daily.      polyethylene glycol (GLYCOLAX) 17 gram PwPk Take by mouth.      tamsulosin (FLOMAX) 0.4 mg Cap          STOP taking these medications       CARTIA  mg 24 hr capsule Comments:   Reason for Stopping:         cefUROXime (CEFTIN) 500 MG tablet Comments:   Reason for Stopping:         fosfomycin (MONUROL) 3 gram Pack Comments:   Reason for Stopping:         lactulose (CHRONULAC) 10 gram/15 mL solution Comments:   Reason for Stopping:         mirtazapine (REMERON) 30 MG tablet Comments:   Reason for Stopping:         propranoloL (INDERAL LA) 80 MG 24 hr capsule Comments:   Reason for Stopping:                  Immunizations Administered as of 1/31/2022     No immunizations on file.              _________________________________  Elissa Harding PA-C  01/31/2022

## 2022-01-31 NOTE — PLAN OF CARE
Problem: Adult Inpatient Plan of Care  Goal: Absence of Hospital-Acquired Illness or Injury  Outcome: Adequate for Care Transition  Goal: Optimal Comfort and Wellbeing  Outcome: Adequate for Care Transition  Goal: Readiness for Transition of Care  Outcome: Adequate for Care Transition     Problem: Adjustment to Illness (Stroke, Hemorrhagic)  Goal: Optimal Coping  Outcome: Adequate for Care Transition  Intervention: Support Psychosocial Response to Stroke  Flowsheets (Taken 1/31/2022 1748)  Supportive Measures:   active listening utilized   counseling provided   decision-making supported   relaxation techniques promoted   self-care encouraged   self-responsibility promoted   verbalization of feelings encouraged   goal-setting facilitated   guided imagery facilitated   positive reinforcement provided   self-reflection promoted     Problem: Cerebral Tissue Perfusion (Stroke, Hemorrhagic)  Goal: Optimal Cerebral Tissue Perfusion  Outcome: Adequate for Care Transition     Problem: Communication Impairment (Stroke, Hemorrhagic)  Goal: Effective Communication Skills  Outcome: Adequate for Care Transition  Intervention: Optimize Communication Skills  Flowsheets (Taken 1/31/2022 1748)  Communication Enhancement Strategies:   call light answered in person   communication board used   communication device used   healthcare instructions adapted   repetition utilized   one-step directions provided   verbal and visual cues paired   verbal communication attempts encouraged   family involved in communication plan   extra time allowed for response   device use encouraged     Problem: Pain (Stroke, Hemorrhagic)  Goal: Acceptable Pain Control  Outcome: Adequate for Care Transition     Problem: Respiratory Compromise (Stroke, Hemorrhagic)  Goal: Effective Oxygenation and Ventilation  Outcome: Adequate for Care Transition     Problem: Swallowing Impairment (Stroke, Hemorrhagic)  Goal: Optimal Eating and Swallowing Without  Aspiration  Outcome: Adequate for Care Transition     Problem: Urinary Elimination Impaired (Stroke, Hemorrhagic)  Goal: Effective Urinary Elimination  Outcome: Adequate for Care Transition     Problem: Restraint, Nonbehavioral (Nonviolent)  Goal: Absence of Harm or Injury  Outcome: Adequate for Care Transition     Problem: Adult Inpatient Plan of Care  Goal: Plan of Care Review  Outcome: Ongoing, Progressing  Flowsheets (Taken 1/31/2022 4504)  Plan of Care Reviewed With: patient

## 2022-01-31 NOTE — PT/OT/SLP PROGRESS
Physical Therapy Co-Treatment    Patient Name:  Eliazar James   MRN:  [id number]    Recommendations:     Discharge Recommendations:  nursing facility, skilled (back to NH)   Discharge Equipment Recommendations: bedside commode   Barriers to discharge: decreased functional mobility    Assessment:     Eliazar James is a 81 y.o. male admitted with a medical diagnosis of IVH (intraventricular hemorrhage).  Pt demonstrates the below listed impairments with decreased tolerance to functional mobility, balance instability and impaired cognition being the most limiting.  Pt tolerates out of bed mobility poorly and is noted to be more hesitant to participate in transfers.  Pt demonstrates a fear of falling despite education from therapist as well as resists transfers requiring multiple try's at times.  Pt positioned at end of session to provide prolonged stretch to knee flexors to improve ROM.  Poor understanding of midline with cues throughout session.  Pt is not safe for home discharge at this time due to patient's status as: a fall risk, cognitively impaired and patient has increased pain and requires skilled PT.      Impairments and functional limitations:  weakness,impaired endurance,impaired self care skills,impaired functional mobilty,gait instability,impaired balance,impaired cognition,decreased coordination,decreased upper extremity function,decreased lower extremity function,decreased safety awareness,pain,abnormal tone,decreased ROM,impaired coordination,impaired skin.  These deficits affect their roles and responsibilities in which they were able to complete prior to admit.  Rehab Prognosis:   Fair; patient would benefit from acute skilled PT services 4 x/week to address these deficits, improve quality of life, focus on recovery of impairments, provide patient/caregiver education, reduce fall risk, and reach maximum level of function.  Pt is moderately motivated to participated in skilled PT.    Recent Surgery:  "  * No surgery found *      Plan:     During this hospitalization, patient to be seen 4 x/week to address the identified rehab impairments via gait training,therapeutic activities,therapeutic exercises,neuromuscular re-education,wheelchair management/training and progress toward the following goals:    · Plan of Care Expires:  02/27/22    Subjective     Chief Complaint: "ah don't let me fall"  Patient/Family Comments/Goals: Progress to SNF  Pain/Comfort:  · Pain Rating 1: other (see comments) (does not rate)  · Location - Side 1: Bilateral  · Location - Orientation 1: generalized  · Location 1: back    Objective:     Communicated with RN prior to session.  Patient found HOB elevated with bed alarm,telemetry,Condom Catheter upon PT entry to room.     General Precautions: Standard, aspiration,fall,seizure   Orthopedic Precautions:N/A   Braces: N/A  Oxygen Device:      Functional Mobility:  · Bed Mobility:  Rolling Left: maximal assistance  · Rolling Right: maximal assistance  · Scooting: maximal assistance  · Supine to Sit: maximal assistance of 2 persons for LE management and trunk management  · Sit to Supine: maximal assistance of 2 persons for LE management and trunk management  · Scooting up to head of bed in supine: total assistance of 2 persons for LE management and trunk management  · Head of bed position: HOB elevated   · Tolerates EOB for 10min with total assist and posterior lean  · Tolerates BSC for 8min with posterior lean and cues throughout     · Transfers:     · Sit to Stand: total assistance of 2 persons with hand-held assist with cues for hand placement and foot placement  · Bed to BSC: total assistance of 2 persons with hand-held assist with cues for hand placement and foot placement using B knee blocks, going to R  · BSC to bed: total assistance of 2 persons with hand-held assist with cues for hand placement and foot placement using B knee blocks, going to L  · Pt requires x2 trials to reach BSC as " well as return form [facility] due to poor compliance with commands secondary to fear and poor mentation  · Pt noted to flail arms with transfers and reach out for inappropriate hand holds     · Gait: n/a    · Balance:   Position Score Time   Static Sitting POOR-: MAXIMAL assist to maintain sitting without back support 8 minute(s)   Dynamic Sitting POOR-: MAXIMAL assist to maintain  10 minute(s)   Static Standing n/a: dependent n/a   Dynamic Standing n/a: dependent n/a     · Additional comments: n/a    AM-PAC 6 CLICK MOBILITY  Turning over in bed (including adjusting bedclothes, sheets and blankets)?: 2  Sitting down on and standing up from a chair with arms (e.g., wheelchair, bedside commode, etc.): 2  Moving from lying on back to sitting on the side of the bed?: 2  Moving to and from a bed to a chair (including a wheelchair)?: 2  Need to walk in hospital room?: 1  Climbing 3-5 steps with a railing?: 1  Basic Mobility Total Score: 10     Therapeutic Activities:  Patient educated on role of acute care PT and PT POC, safety while in hospital including calling nurse for mobility, call light usage, benefits of out of bed mobility, breathing technique, fall risk, bed mobility , transfers, positioning, posture, risks of prolonged bed rest and benefits of continued PT by explanation.    Patient demonstrates inadequate understanding of education provided this day.   Whiteboard updated    Therapeutic Exercises:  n/a    Patient left HOB elevated with all lines intact, call button in reach, bed alarm on and RN notified.    GOALS:   Multidisciplinary Problems     Physical Therapy Goals        Problem: Physical Therapy Goal    Goal Priority Disciplines Outcome Goal Variances Interventions   Physical Therapy Goal     PT, PT/OT Ongoing, Progressing     Description: Goals to be met by: 22    Patient will increase functional independence with mobility by performin. Supine to sit with contact guard assistance  2. Sit to supine  with supervision  3. Rolling to Left and Right with supervision  4. Sit to stand transfer with minimum assistance  5. Gait  x 10 feet with minimum assistance using LRAD as needed  6. Wheelchair propulsion x50 feet with supervision using bilateral upper extremities  7. Lower extremity exercise program x10 reps per handout, with supervision                     Time Tracking:     PT Received On: 01/31/22  PT Start Time: 0940     PT Stop Time: 1009  PT Total Time (min): 29 min     Billable Minutes: Therapeutic Activity 15 and Neuromuscular Re-education 14    Treatment Type: Treatment  PT/PTA: PT     PTA Visit Number: 0     01/31/2022    Co-treatment performed for this visit due to patient need for two skilled therapists to ensure patient and staff safety and to accommodate for patient activity tolerance/pain management

## 2022-01-31 NOTE — MEDICAL/APP STUDENT
Subjective:       Patient ID: Eliazar James is a 81 y.o. male who presented to the [facility] ED for AMS with a PMHx of CAD, hyperlipidemia, chronic afib, parkinsons, and HTN. Mr James was subsequently transferred to [facility] (1/27) until his AMS resolved (1/29) and is now in Vascular Neurology awaiting SNF placement. 3 days prior to initial visit for AMS, patient was seen in ED for recurrence of UTI. Daughter reports that    Chief Complaint: Intraventricular hemorrhage in posterior horns of bilateral lateral ventricles    HPI  Review of Systems      Objective:      Physical Exam       Assessment:       Problem List Items Addressed This Visit        Neuro    * (Principal) IVH (intraventricular hemorrhage)    Relevant Orders    Echo (Completed)    Acute encephalopathy      Other Visit Diagnoses     Intraventricular hemorrhage              Plan:       ***

## 2022-01-31 NOTE — PLAN OF CARE
[facility] - Neuro[facility] (Hospital)  Discharge Final Note    Primary Care Provider: Timo Conti MD  Expected Discharge Date: 2/2/2022    Patient medically ready for discharge to [facility].  Nurse can call report to [phone].  Transportation arranged per EMS stretcher for 1330.   Is family/patient aware of discharge yes - family.  Hospital follow up scheduled, per SNF.  Vascular Neurology to schedule follow up if necessary.  Final Discharge Note (most recent)     Final Note - 01/31/22 1228        Final Note    Assessment Type Final Discharge Note     Anticipated Discharge Disposition Skilled Nursing Facility     Hospital Resources/Appts/Education Provided --   per snf       Post-Acute Status    Post-Acute Authorization Placement     Post-Acute Placement Status Set-up Complete/Auth obtained     Discharge Delays None known at this time                 Important Message from Medicare         Referral Info (most recent)     Referral Info    No documentation.               Contact Info     Timo Conti MD   Specialty: Family Medicine   Relationship: PCP - [address] [place] [address]   Phone: [phone]       Next Steps: Schedule an appointment as soon as possible for a visit    Instructions: Please call to schedule your hospital follow-up appointment for within 1 week of discharge from skilled nursing facility.        No future appointments.  Christal Epstein RN  Case Management  Ext: [phone]  01/31/2022

## 2022-01-31 NOTE — SUBJECTIVE & OBJECTIVE
Neurologic Chief Complaint: AMS    Subjective:     Interval History: Patient is seen for follow-up neurological assessment and treatment recommendations: SEPIDEHON. Patient stable for discharge to SNF. Neuro exam stable. Cefepime switched to omnicef for 2 more days.    HPI, Past Medical, Family, and Social History remains the same as documented in the initial encounter.     Review of Systems   Constitutional: Positive for activity change. Negative for fever.   HENT: Negative for trouble swallowing.    Eyes: Negative for photophobia and visual disturbance.   Respiratory: Negative for cough and shortness of breath.    Cardiovascular: Negative for chest pain and palpitations.   Gastrointestinal: Negative for nausea and vomiting.   Neurological: Positive for weakness. Negative for speech difficulty.   Psychiatric/Behavioral: Negative for confusion. The patient is not nervous/anxious.      Scheduled Meds:   atorvastatin  40 mg Oral Daily    baclofen  5 mg Oral BID    carbidopa-levodopa  mg  1 tablet Oral TID    carvediloL  12.5 mg Oral BID    ceFEPime (MAXIPIME) IVPB  1 g Intravenous Q8H    cyanocobalamin  250 mcg Oral Daily    heparin (porcine)  5,000 Units Subcutaneous Q8H    losartan  25 mg Oral Daily    memantine  10 mg Oral BID    mupirocin   Nasal BID    senna  8.6 mg Oral BID    tamsulosin  0.4 mg Oral Daily     Continuous Infusions:  PRN Meds:acetaminophen, labetalol, ondansetron, sodium chloride 0.9%    Objective:     Vital Signs (Most Recent):  Temp: 98.1 °F (36.7 °C) (01/31/22 1125)  Pulse: 90 (01/31/22 1125)  Resp: 18 (01/31/22 1125)  BP: (!) 99/56 (01/31/22 1125)  SpO2: 97 % (01/31/22 1125)  BP Location: Right arm    Vital Signs Range (Last 24H):  Temp:  [97.3 °F (36.3 °C)-98.1 °F (36.7 °C)]   Pulse:  []   Resp:  [16-18]   BP: ()/(56-81)   SpO2:  [96 %-98 %]   BP Location: Right arm    Physical Exam  Vitals and nursing note reviewed.   Constitutional:       General: He is not in  acute distress.     Appearance: He is not ill-appearing.   HENT:      Head: Normocephalic and atraumatic.      Right Ear: External ear normal.      Left Ear: External ear normal.      Nose: Nose normal.   Eyes:      Extraocular Movements: Extraocular movements intact.      Conjunctiva/sclera: Conjunctivae normal.   Cardiovascular:      Rate and Rhythm: Normal rate.   Pulmonary:      Effort: Pulmonary effort is normal. No respiratory distress.   Abdominal:      General: There is no distension.   Musculoskeletal:      Right lower leg: No edema.      Left lower leg: No edema.   Skin:     General: Skin is warm and dry.   Neurological:      Mental Status: He is alert and oriented to person, place, and time.      Cranial Nerves: No facial asymmetry.      Motor: Weakness present.   Psychiatric:         Mood and Affect: Mood normal.     LLE w/ noted contractures/spasticity w/ pain on attempt to straighten the limb in the hamstring.   No meningismus noted.     Neurological Exam:   LOC: Alert  Attention Span: Good   Language: No aphasia  Articulation: Mild dysarthria.   Orientation: Person, Place, Time   Visual Fields: Full  EOM (CN III, IV, VI): Full/intact  Facial Movement (CN VII): Symmetric facial expression    Motor: Arm left  Normal 5/5  Leg left  Paresis: 4-/5  Arm right  Normal 5/5  Leg right Paresis: 4/5  Cerebellum: No evidence of appendicular or axial ataxia  Sensation: intact to light touch throughout    Laboratory:  CMP:   Recent Labs   Lab 01/31/22  0640   CALCIUM 8.6*   ALBUMIN 2.4*   PROT 6.2      K 3.7   CO2 23      BUN 10   CREATININE 0.7   ALKPHOS 83   ALT 9*   AST 18   BILITOT 0.8     CBC:   Recent Labs   Lab 01/31/22  0640   WBC 8.16   RBC 4.51*   HGB 15.0   HCT 44.1      MCV 98   MCH 33.3*   MCHC 34.0     Lipid Panel:   Recent Labs   Lab 01/26/22  2100   CHOL 83*   LDLCALC 48.0*   HDL 27*   TRIG 40     Coagulation:   Recent Labs   Lab 01/27/22  0049   INR 1.1   APTT 29.3     Platelet  Aggregation Study: No results for input(s): PLTAGG, PLTAGINTERP, PLTAGREGLACO, ADPPLTAGGREG in the last 168 hours.  Hgb A1C:   Recent Labs   Lab 01/26/22 2100   HGBA1C 5.2  5.2     TSH:   Recent Labs   Lab 01/26/22 2100   TSH 2.111       Diagnostic Results     Brain imaging:  MRI Brain W/O 1/28/2022  MRI of the brain demonstrates diffuse cerebral atrophy with enlargement of sulci and ventricles.  There is intraventricular hemorrhage with blood layering in the occipital horns similar in volume to the prior CT.  On the susceptibility weighted images there are additional areas susceptibility in the right posterior sylvian fissure which could be acute or chronic subarachnoid blood.  Punctate susceptibility also seen in the left frontal lobe white matter probably from remote microhemorrhage.  Patchy white matter changes are seen in the periventricular areas likely from chronic microvascular disease.  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  No diffusion restriction suggest  acute infarct.  No mass is seen.  Vascular flow voids are are patent.    CT Head WO 1/26/2022 2156  Ventricular enlargement and small layering intraventricular hemorrhages in the occipital horns bilaterally appears stable from prior.     CT Head W/O 01/26/22 1456   Interval development of hemorrhage layering dependently within the posterior horns of the bilateral lateral ventricles, new from the previous study of 01/05/2022.  There is slight prominence of the ventricular system, perhaps slightly more so than compared to the prior examination which could suggest a component of developing hydrocephalus.    Vessel Imaging:  CTA H/N 1/29/2022  1. No major branch stenosis/occlusion intracranially.  No evidence of aneurysm or AVM.  2. No significant stenosis at the carotid bifurcations by NASCET criteria.  The vertebral arteries are patent.  3. Stable appearance of minimal foci of intraventricular hemorrhage in the bilateral occipital  horns without hydrocephalus.  4. Generalized cerebral volume loss.  Ventriculomegaly mildly out of proportion to the degree of sulcal prominence.  Findings may be seen with normal pressure hydrocephalus although overall it is similar dating back to 2018.    Cardiac Evaluation:   TTE 1/27/22  · The left ventricle is normal in size with normal systolic function. The estimated ejection fraction is 55%.  · Normal right ventricular size with normal right ventricular systolic function.  · Normal left ventricular diastolic function.  · Mild left atrial enlargement.  · The IVC could not be visualized.

## 2022-01-31 NOTE — ASSESSMENT & PLAN NOTE
Recently diagnosed prior to admission  On ceftin at nursing home  Antibiotic changed to cefepime per [facility] x 5 - 1/30 is day 5  Will be discharged on omnicef

## 2022-01-31 NOTE — PROGRESS NOTES
[facility] - Neurosurgery ([facility])  Vascular Neurology  Comprehensive Stroke Center  Progress Note    Assessment/Plan:     * IVH (intraventricular hemorrhage)  Eliazar James is a 81 y.o. male with PMHx of CAD, MI, stroke, afib (eliquis), Parkinsons, HLD, and HTN who presented to OSH with AMS. CTH with IVH and [place] [facility] [place] CTA in 2019 without significant cerebrovascular abnormalities, AVM or aneurysm. No history of recent trauma. Echo with EF 55% and mild LAE. MRI with areas susceptibility in the right posterior sylvian fissure which could be acute or chronic subarachnoid blood.  Punctate susceptibility also seen in the left frontal lobe white matter probably from remote microhemorrhage. CTA H/N is unremarkable for any AVMs, venous anomaly.     At this time unclear as to what is the etiology of patient's IVH. Will focus on RF control at this time.   Holding Eliquis in the setting of IVH - will resume 7 days s/p initial event(2/2/2022). Neuro exam stable. Patient stable for discharge to SNF.      Antithrombotics for secondary stroke prevention: Anticoagulants: hold home eliquis in setting of IVH Resumed 7 days s/p index event.     Statins for secondary stroke prevention and hyperlipidemia, if present:   Statins: Atorvastatin- 40 mg daily    Aggressive risk factor modification: HTN, HLD, A-Fib, CAD, stroke     Rehab efforts: The patient has been evaluated by a stroke team provider and the therapy needs have been fully considered based off the presenting complaints and exam findings. The following therapy evaluations are needed: PT evaluate and treat, OT evaluate and treat, SLP evaluate and treat, PM&R evaluate for appropriate placement    Diagnostics ordered/pending: None     VTE prophylaxis: Mechanical prophylaxis: Place SCDs  None: Reason for No Pharmacological VTE Prophylaxis: IVH    BP parameters: SBP <140      Parkinsonism  Continue cabidopa-levadopa as per home dosing      Urinary tract infection without hematuria  Recently diagnosed prior to admission  On ceftin at nursing home  Antibiotic changed to cefepime per NCC x 5 - 1/30 is day 5  Will be discharged on omnicef    Mild cognitive impairment  Continue home Memantine     Impairment of balance  Recommendations for SNF s/p discharge     Hyperlipidemia  Stroke risk factor  LDL <70 on admission  Continue Atorvastatin 40 mg QHS    Essential hypertension  Stroke RF  Resuming home Losartan at 50% of usual dose   - 25 mg QD    CAD (coronary artery disease)  Stroke risk factor  LDL <70 on admission  Continue Atorvastatin 40 mg QHS    Long term current use of anticoagulant therapy  Holding in the setting of IVH    Chronic atrial fibrillation  Stroke risk factor  On eliquis at home, Hold in setting of IVH  Will resume 7 days from symptom onset for continuous secondary stroke prevention(2/2/2022).            1/27- Recommend MRI Brain WO and CTA. Patient with fluctuating lethargy(alert and cooperative at times, drowsy and less cooperative at other times). Eliquis being held 2/2 IVH.  1/28-Recommending CTA of the head and neck to evaluate etiology of IVH. Patient Alert and cooperative on exam, continues to have weakness on bilateral lower extremities. Therapy recommending SNF. Stable for stepdown.   12/29-Neuro exam stable. MRI with stable IVH and areas susceptibility in the right posterior sylvian fissure which could be acute or chronic subarachnoid blood.  Punctate susceptibility also seen in the left frontal lobe white matter probably from remote microhemorrhage. CTA pending for today.   01/30/2022 NAEON. Patient w/ persistent LLE increased tone/contrcacture w/ hamstring pain w/ movement. [place]eld Baclofen added. Pending discharge to SNF. Adding home BP meds for better HTN control.  1/31/2022- NAEON. Patient stable for discharge to SNF. Neuro exam stable. Cefepime switched to omnicef for 2 more days.      STROKE DOCUMENTATION        NIH  Scale:  1a. Level of Consciousness: 0-->Alert, keenly responsive  1b. LOC Questions: 0-->Answers both questions correctly  1c. LOC Commands: 0-->Performs both tasks correctly  2. Best Gaze: 0-->Normal  3. Visual: 0-->No visual loss  4. Facial Palsy: 0-->Normal symmetrical movements  5a. Motor Arm, Left: 0-->No drift, limb holds 90 (or 45) degrees for full 10 secs  5b. Motor Arm, Right: 0-->No drift, limb holds 90 (or 45) degrees for full 10 secs  6a. Motor Leg, Left: 2-->Some effort against gravity, leg falls to bed by 5 secs, but has some effort against gravity  6b. Motor Leg, Right: 1-->Drift, leg falls by the end of the 5-sec period but does not hit bed  7. Limb Ataxia: 0-->Absent  8. Sensory: 0-->Normal, no sensory loss  9. Best Language: 0-->No aphasia, normal  10. Dysarthria: 1-->Mild-to-moderate dysarthria, patient slurs at least some words and, at worst, can be understood with some difficulty  11. Extinction and Inattention (formerly Neglect): 0-->No abnormality  Total (NIH Stroke Scale): 4       Modified Uvalde Score: 3  Penny Coma Scale:    ABCD2 Score:    FLBP2OG9-YHD Score:   HAS -BLED Score:   ICH Score:   Hunt & Sevilla Classification:      Hemorrhagic change of an Ischemic Stroke: Does this patient have an ischemic stroke with hemorrhagic changes? No     Neurologic Chief Complaint: AMS    Subjective:     Interval History: Patient is seen for follow-up neurological assessment and treatment recommendations: SEPIDEHON. Patient stable for discharge to SNF. Neuro exam stable. Cefepime switched to omnicef for 2 more days.    HPI, Past Medical, Family, and Social History remains the same as documented in the initial encounter.     Review of Systems   Constitutional: Positive for activity change. Negative for fever.   HENT: Negative for trouble swallowing.    Eyes: Negative for photophobia and visual disturbance.   Respiratory: Negative for cough and shortness of breath.    Cardiovascular: Negative for chest pain  and palpitations.   Gastrointestinal: Negative for nausea and vomiting.   Neurological: Positive for weakness. Negative for speech difficulty.   Psychiatric/Behavioral: Negative for confusion. The patient is not nervous/anxious.      Scheduled Meds:   atorvastatin  40 mg Oral Daily    baclofen  5 mg Oral BID    carbidopa-levodopa  mg  1 tablet Oral TID    carvediloL  12.5 mg Oral BID    ceFEPime (MAXIPIME) IVPB  1 g Intravenous Q8H    cyanocobalamin  250 mcg Oral Daily    heparin (porcine)  5,000 Units Subcutaneous Q8H    losartan  25 mg Oral Daily    memantine  10 mg Oral BID    mupirocin   Nasal BID    senna  8.6 mg Oral BID    tamsulosin  0.4 mg Oral Daily     Continuous Infusions:  PRN Meds:acetaminophen, labetalol, ondansetron, sodium chloride 0.9%    Objective:     Vital Signs (Most Recent):  Temp: 98.1 °F (36.7 °C) (01/31/22 1125)  Pulse: 90 (01/31/22 1125)  Resp: 18 (01/31/22 1125)  BP: (!) 99/56 (01/31/22 1125)  SpO2: 97 % (01/31/22 1125)  BP Location: Right arm    Vital Signs Range (Last 24H):  Temp:  [97.3 °F (36.3 °C)-98.1 °F (36.7 °C)]   Pulse:  []   Resp:  [16-18]   BP: ()/(56-81)   SpO2:  [96 %-98 %]   BP Location: Right arm    Physical Exam  Vitals and nursing note reviewed.   Constitutional:       General: He is not in acute distress.     Appearance: He is not ill-appearing.   HENT:      Head: Normocephalic and atraumatic.      Right Ear: External ear normal.      Left Ear: External ear normal.      Nose: Nose normal.   Eyes:      Extraocular Movements: Extraocular movements intact.      Conjunctiva/sclera: Conjunctivae normal.   Cardiovascular:      Rate and Rhythm: Normal rate.   Pulmonary:      Effort: Pulmonary effort is normal. No respiratory distress.   Abdominal:      General: There is no distension.   Musculoskeletal:      Right lower leg: No edema.      Left lower leg: No edema.   Skin:     General: Skin is warm and dry.   Neurological:      Mental Status: He  is alert and oriented to person, place, and time.      Cranial Nerves: No facial asymmetry.      Motor: Weakness present.   Psychiatric:         Mood and Affect: Mood normal.     LLE w/ noted contractures/spasticity w/ pain on attempt to straighten the limb in the hamstring.   No meningismus noted.     Neurological Exam:   LOC: Alert  Attention Span: Good   Language: No aphasia  Articulation: Mild dysarthria.   Orientation: Person, Place, Time   Visual Fields: Full  EOM (CN III, IV, VI): Full/intact  Facial Movement (CN VII): Symmetric facial expression    Motor: Arm left  Normal 5/5  Leg left  Paresis: 4-/5  Arm right  Normal 5/5  Leg right Paresis: 4/5  Cerebellum: No evidence of appendicular or axial ataxia  Sensation: intact to light touch throughout    Laboratory:  CMP:   Recent Labs   Lab 01/31/22  0640   CALCIUM 8.6*   ALBUMIN 2.4*   PROT 6.2      K 3.7   CO2 23      BUN 10   CREATININE 0.7   ALKPHOS 83   ALT 9*   AST 18   BILITOT 0.8     CBC:   Recent Labs   Lab 01/31/22  0640   WBC 8.16   RBC 4.51*   HGB 15.0   HCT 44.1      MCV 98   MCH 33.3*   MCHC 34.0     Lipid Panel:   Recent Labs   Lab 01/26/22  2100   CHOL 83*   LDLCALC 48.0*   HDL 27*   TRIG 40     Coagulation:   Recent Labs   Lab 01/27/22  0049   INR 1.1   APTT 29.3     Platelet Aggregation Study: No results for input(s): PLTAGG, PLTAGINTERP, PLTAGREGLACO, ADPPLTAGGREG in the last 168 hours.  Hgb A1C:   Recent Labs   Lab 01/26/22  2100   HGBA1C 5.2  5.2     TSH:   Recent Labs   Lab 01/26/22  2100   TSH 2.111       Diagnostic Results     Brain imaging:  MRI Brain W/O 1/28/2022  MRI of the brain demonstrates diffuse cerebral atrophy with enlargement of sulci and ventricles.  There is intraventricular hemorrhage with blood layering in the occipital horns similar in volume to the prior CT.  On the susceptibility weighted images there are additional areas susceptibility in the right posterior sylvian fissure which could be acute or  chronic subarachnoid blood.  Punctate susceptibility also seen in the left frontal lobe white matter probably from remote microhemorrhage.  Patchy white matter changes are seen in the periventricular areas likely from chronic microvascular disease.  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  No diffusion restriction suggest  acute infarct.  No mass is seen.  Vascular flow voids are are patent.    CT Head WO 1/26/2022 2156  Ventricular enlargement and small layering intraventricular hemorrhages in the occipital horns bilaterally appears stable from prior.     CT Head W/O 01/26/22 1456   Interval development of hemorrhage layering dependently within the posterior horns of the bilateral lateral ventricles, new from the previous study of 01/05/2022.  There is slight prominence of the ventricular system, perhaps slightly more so than compared to the prior examination which could suggest a component of developing hydrocephalus.    Vessel Imaging:  CTA H/N 1/29/2022  1. No major branch stenosis/occlusion intracranially.  No evidence of aneurysm or AVM.  2. No significant stenosis at the carotid bifurcations by NASCET criteria.  The vertebral arteries are patent.  3. Stable appearance of minimal foci of intraventricular hemorrhage in the bilateral occipital horns without hydrocephalus.  4. Generalized cerebral volume loss.  Ventriculomegaly mildly out of proportion to the degree of sulcal prominence.  Findings may be seen with normal pressure hydrocephalus although overall it is similar dating back to 2018.    Cardiac Evaluation:   TTE 1/27/22  · The left ventricle is normal in size with normal systolic function. The estimated ejection fraction is 55%.  · Normal right ventricular size with normal right ventricular systolic function.  · Normal left ventricular diastolic function.  · Mild left atrial enlargement.  · The IVC could not be visualized.          Elissa Harding PA-C  Comprehensive Stroke  [place]  Department of Vascular Neurology   [facility] - Neurosurgery ([facility])

## 2022-01-31 NOTE — PHYSICIAN QUERY
PT Name: Eliazar James  MR #: [id number]    DOCUMENTATION CLARIFICATION     CDS/: Brii Jara RN, CDS               Contact information: [email or web address]    This form is a permanent document in the medical record.     Query Date: January 31, 2022    By submitting this query, we are merely seeking further clarification of documentation. Please utilize your independent clinical judgment when addressing the question(s) below.    Supporting Clinical Findings Location in Medical Record     --IVH (intraventricular hemorrhage)         -CTA H/N is unremarkable for any AVMs, venous anomaly  --Acute encephalopathy        -Resolved  --Urinary tract infection without hematuria     -Recently diagnosed prior to admission    -On ceftin at nursing home        -Antibiotic changed to cefepime per NCC x 5 - 1/30 is day 5   Vas Neuro Note 1/30     --transferred from OSH for further care for IVH. Patient presented to OSH with AMS, seen in ED 3 days prior with UTI. Patient had no improvement in mental status with recent worsening over past 48 hours   Dicharge Summary     --IVH in occipital horns- etiology unclear  --PRINCIPAL PROBLEM:  IVH (intraventricular hemorrhage)  --Acute encephalopathy    Discharge Summary       Provider, please clarify if there is any clinical correlation between ___IVH (intraventricular hemorrhage)___ and _Acute encephalopathy ___.           Are the conditions:      [ x ] Due to or associated with each other   [  ] Unrelated to each other   [  ] Other explanation (Please Specify): ______________   [  ] Clinically Undetermined                                                                               Please document in your progress notes daily for the duration of treatment until resolved and include in your discharge summary.

## 2022-01-31 NOTE — PT/OT/SLP PROGRESS
Occupational Therapy   Co-Treatment w/ PT    Patient Name:  Eliazar James   MRN:  [id number]  Admit Date: 1/26/2022  Admitting Diagnosis:  IVH (intraventricular hemorrhage)   Length of Stay: 5 days  Recent Surgery: * No surgery found *           Recommendations:     Discharge Recommendations: nursing facility, skilled (transition back to NH)  Discharge Equipment Recommendations:  bedside commode  Barriers to discharge:   (increased assistance needed)    Plan:     Patient to be seen 3 x/week to address the above listed problems via self-care/home management,therapeutic activities,therapeutic exercises,neuromuscular re-education  · Plan of Care Expires:    · Plan of Care Reviewed with: patient    Assessment:   Eliazar James is a 81 y.o. male with a medical diagnosis of IVH (intraventricular hemorrhage).  He presents with the following performance deficits affecting function: weakness,impaired endurance,impaired self care skills,impaired functional mobilty,gait instability,impaired balance,decreased safety awareness,impaired skin,decreased lower extremity function,decreased ROM,impaired cognition,decreased coordination.  Pt would benefit from skilled OT services in order to maximize independence with ADLs and facilitate safe discharge. Pt would benefit from SNF upon discharge to return to PLOF and decrease burden of care. .    Time spent performing bed mobility, EOB activity w/ focus on sitting balance and midline sitting balance, transfer to and from BSC, self-care    Relevant hx and current limitations:  · BLE contractures (L>R)  · PLOF: able to self transfer to w/c   · EOB: Max-Mod A    Pt continues to require significant assist with functional mobility and safe completion of ADLs requiring Total-Mod A.  Patient is making progress towards goals.    Rehab Prognosis: Good; patient would benefit from acute skilled OT services to address these deficits and reach maximum level of function.        Subjective  "  Communicated with: SHANIA Claudio prior to session.  Patient found HOB elevated with bed alarm,telemetry,Condom Catheter,SCD upon OT entry to room.    Patient: I need to go to the bathroom    Pain/Comfort:  · Pain Rating 1:  (c/o back pain and BLE pain -unrated)    Objective:   Patient found with: bed alarm,telemetry,Condom Catheter,SCD   General Precautions: Standard, aspiration,fall   Orthopedic Precautions:N/A   Braces: N/A   Oxygen Device: Room Air  Vitals: BP (!) 99/56   Pulse 90   Temp 98.1 °F (36.7 °C)   Resp 18   Ht 5' 7" (1.702 m)   Wt 69.9 kg (154 lb)   SpO2 97%   BMI 24.12 kg/m²     Outcome Measures:  [facility] 6 Click ADL: 13    Occupational Performance:  Bed Mobility:       Rolling bilateral: maximal assistance   Scooting: towards EOB with maximal assistance   Supine to Sit: maximal assistance and 2 persons   Sit to Supine: maximal assistance and 2 persons    Functional Mobility/Transfers:     Sit to Stand: total assistance and of 2 persons using hand-held assist; x4 trials    Toilet Transfer: Stand Pivot with total assistance and of 2 persons using  hand-held assist to BSC  o Pt w/ increased resistance with transfer, not following commands to maintain safety, and increased apprehension    Activities of Daily Living:     Toileting: total assistance and of 2 persons to perform perineal hygiene and clothing management from BSC   o Pt stood w/ PT and OT provided posterior pericare      AMPAC 6 Click ADL:  [facility] Total Score: 13    Neuromuscular Re-Education:  Pt w/ poor sitting balance sitting EOB that steadily improved from Total-Min A d/t positioning and engaging pt in core strengthening exercises  Pt participated in exercises that work on dynamic sitting balance: crossing midline and recovering from leaning to far to one side or forward  Improved this date, able to correct to self to midline w/ VC 50% of the time  Pt w/ increased difficulty maintaining balance w/o BUE support    Treatment & " Education:   Therapist provided facilitation and instruction of proper body mechanics, energy conservation, and fall prevention strategies during tasks listed above.   Pt re-educated on importance of OOB activities with staff member assistance and sitting OOB majority of the day.  -not this date   Updated communication board with level of assist required (unsafe) & educated RN/patient that pt is NOT appropriate for transfers and mobility with RN/PCT.     Additional staff present: PT for co-tx due to patient's medical complexities requiring two skilled therapists in order to appropriately assess patient's functional deficits as well as ensure patient safety, accommodate for limited activity tolerance, and provide appropriate, skilled assistance to maximize functional potential during evaluation.    Patient left HOB elevated with all lines intact, call button in reach, bed alarm on and RN notified    GOALS:   Multidisciplinary Problems     Occupational Therapy Goals        Problem: Occupational Therapy Goal    Goal Priority Disciplines Outcome Interventions   Occupational Therapy Goal     OT, PT/OT Ongoing, Progressing    Description: Goals to be met by: 2/11/2022     Patient will increase functional independence with ADLs by performing:    Feeding with Set-up Assistance.  UE Dressing with Set-up Assistance.  Grooming while seated with Set-up Assistance.  Toileting from bedside commode with Stand-by Assistance for hygiene and clothing management.   Toilet transfer to bedside commode with Contact Guard Assistance.                     Time Tracking:     OT Date of Treatment: 01/31/22  OT Start Time: 0940  OT Stop Time: 1009  OT Total Time (min): 29 min    Additional staff present: PT    Billable Minutes:  Self Care/Home Management 14  Neuromuscular Re-education Artur Rich (Ali), OTR/L  [phone] (Pager #)  1/31/2022

## 2022-01-31 NOTE — PLAN OF CARE
Problem: Adult Inpatient Plan of Care  Goal: Plan of Care Review  Outcome: Ongoing, Progressing  Goal: Optimal Comfort and Wellbeing  Outcome: Ongoing, Progressing  Intervention: Monitor Pain and Promote Comfort  Flowsheets (Taken 1/31/2022 0401)  Pain Management Interventions:   position adjusted   quiet environment facilitated   care clustered   medication offered     Problem: Adjustment to Illness (Stroke, Hemorrhagic)  Goal: Optimal Coping  Outcome: Ongoing, Progressing  Intervention: Support Psychosocial Response to Stroke  Flowsheets (Taken 1/31/2022 0401)  Supportive Measures:   verbalization of feelings encouraged   positive reinforcement provided  Family/Support System Care: presence promoted     Problem: Cognitive Impairment (Stroke, Hemorrhagic)  Goal: Optimal Cognitive Function  Outcome: Ongoing, Progressing  Intervention: Optimize Cognitive Function  Flowsheets (Taken 1/31/2022 0401)  Environment Familiarity/Consistency: daily routine followed  Reorientation Measures:   calendar in view   clock in view  Sensory Stimulation Regulation:   care clustered   lighting decreased   quiet environment promoted   television on

## 2022-01-31 NOTE — NURSING
Report given to pt's assigned nurse at [facility]. Nigel called and got brief report amb status  on pt

## 2022-01-31 NOTE — ASSESSMENT & PLAN NOTE
Eliazar James is a 81 y.o. male with PMHx of CAD, MI, stroke, afib (eliquis), Parkinsons, HLD, and HTN who presented to OSH with AMS. CTH with IVH [place] [facility] [place] CTA in 2019 without significant cerebrovascular abnormalities, AVM or aneurysm. No history of recent trauma. Echo with EF 55% and mild LAE. MRI with areas susceptibility in the right posterior sylvian fissure which could be acute or chronic subarachnoid blood.  Punctate susceptibility also seen in the left frontal lobe white matter probably from remote microhemorrhage. CTA H/N is unremarkable for any AVMs, venous anomaly.     At this time unclear as to what is the etiology of patient's IVH. Will focus on RF control at this time.   Holding Eliquis in the setting of IVH - will resume 7 days s/p initial event(2/2/2022). Neuro exam stable. Patient stable for discharge to SNF.      Antithrombotics for secondary stroke prevention: Anticoagulants: hold home eliquis in setting of IVH Resumed 7 days s/p index event.     Statins for secondary stroke prevention and hyperlipidemia, if present:   Statins: Atorvastatin- 40 mg daily    Aggressive risk factor modification: HTN, HLD, A-Fib, CAD, stroke     Rehab efforts: The patient has been evaluated by a stroke team provider and the therapy needs have been fully considered based off the presenting complaints and exam findings. The following therapy evaluations are needed: PT evaluate and treat, OT evaluate and treat, SLP evaluate and treat, PM&R evaluate for appropriate placement    Diagnostics ordered/pending: None     VTE prophylaxis: Mechanical prophylaxis: Place SCDs  None: Reason for No Pharmacological VTE Prophylaxis: IVH    BP parameters: SBP <140

## 2022-02-01 LAB
BACTERIA BLD CULT: NORMAL
BACTERIA BLD CULT: NORMAL

## 2022-02-03 ENCOUNTER — TELEPHONE (OUTPATIENT)
Dept: NEUROLOGY | Facility: CLINIC | Age: 82
End: 2022-02-03
Payer: MEDICARE

## 2022-03-10 ENCOUNTER — OFFICE VISIT (OUTPATIENT)
Dept: NEUROLOGY | Facility: CLINIC | Age: 82
End: 2022-03-10
Payer: MEDICARE

## 2022-03-10 VITALS
HEART RATE: 80 BPM | WEIGHT: 153.88 LBS | DIASTOLIC BLOOD PRESSURE: 63 MMHG | BODY MASS INDEX: 24.15 KG/M2 | SYSTOLIC BLOOD PRESSURE: 111 MMHG | HEIGHT: 67 IN

## 2022-03-10 DIAGNOSIS — I61.5 IVH (INTRAVENTRICULAR HEMORRHAGE): ICD-10-CM

## 2022-03-10 PROCEDURE — 99214 OFFICE O/P EST MOD 30 MIN: CPT | Mod: S$PBB,GC,, | Performed by: STUDENT IN AN ORGANIZED HEALTH CARE EDUCATION/TRAINING PROGRAM

## 2022-03-10 PROCEDURE — 99214 PR OFFICE/OUTPT VISIT, EST, LEVL IV, 30-39 MIN: ICD-10-PCS | Mod: S$PBB,GC,, | Performed by: STUDENT IN AN ORGANIZED HEALTH CARE EDUCATION/TRAINING PROGRAM

## 2022-03-10 PROCEDURE — 99999 PR PBB SHADOW E&M-EST. PATIENT-LVL III: CPT | Mod: PBBFAC,GC,, | Performed by: STUDENT IN AN ORGANIZED HEALTH CARE EDUCATION/TRAINING PROGRAM

## 2022-03-10 PROCEDURE — 99213 OFFICE O/P EST LOW 20 MIN: CPT | Mod: PBBFAC | Performed by: STUDENT IN AN ORGANIZED HEALTH CARE EDUCATION/TRAINING PROGRAM

## 2022-03-10 PROCEDURE — 99213 OFFICE O/P EST LOW 20 MIN: CPT | Mod: PBBFAC | Performed by: PSYCHIATRY & NEUROLOGY

## 2022-03-10 PROCEDURE — 99999 PR PBB SHADOW E&M-EST. PATIENT-LVL III: ICD-10-PCS | Mod: PBBFAC,GC,, | Performed by: STUDENT IN AN ORGANIZED HEALTH CARE EDUCATION/TRAINING PROGRAM

## 2022-03-10 NOTE — PROGRESS NOTES
Vascular Neurology Clinic  Hospital Follow-up Clinic Visit    Patient Name: Eliazar James  MRN: [id number]  Date: 3/10/22  Referring Provider: Elissa Harding    CC: Intraventricular hemorrhage    SUBJECTIVE:      History of Present Illness: Eliazar James is an 81 y.o. male with a past medical history significant for CAD, MI, prior stroke, atrial fibrillation on eliquis, Parkinsons, HLD, and HTN who presents to clinic for follow-up for IVH.     He was admitted to [facility] from 1/26-1/31/22 after presenting with altered mentation and imaging showing a small amount of bilateral IVH. Additional work-up with MRI brain and CTA head/neck did not reveal an obvious etiology for his bleed. -160s on admission. He was subsequently discharged to SNF. His AC was resumed after 1 week. Since he was discharged, there has been no reported new or worsening neurologic symptoms. He has had 2 recent falls, which occur he transfers without assistance. Denies head trauma. Blood pressures have been stable.     Etiology: Risk factors include hypertension, anticoagulation use, and frequent falls, no vascular malformation or intracranial abnormality on CTA/MRI  Intervention: None    Work-up:  Imaging:  - NCCTH (1/26/2022): Ventricular enlargement and small layering intraventricular hemorrhages in the occipital horns bilaterally appears stable from prior.  - CTA h/n (1/29/2022): No major branch stenosis/occlusion intracranially.  No evidence of aneurysm or AVM. No significant stenosis at the carotid bifurcations by NASCET criteria.  The vertebral arteries are patent.  - MRI brain w/o C (1/28/2022): Small volume intraventricular hemorrhage layering in the occipital horns.  Additional areas of prior hemorrhage are seen in the brain, as above.    Cardiac Evaluation:  - TTE (1/27/2022): EF 55%, mild left atrial enlargement    Labs:  Recent Labs   Lab 01/26/22  2100   Hemoglobin A1C 5.2  5.2   LDL Cholesterol 48.0 L   HDL 27 L   Triglycerides  40   Cholesterol 83 L         Review of Systems: The following systems were reviewed with pertinent positives and negatives documented in the HPI: constitutional, eyes, CV, respiratory, GI, , musculoskeletal, skin, neurological, psychiatric    Past Medical History:  Past Medical History:   Diagnosis Date    *Atrial fibrillation     Anxiety     Atrial fibrillation 7/5/2012    Blood transfusion     CAD (coronary artery disease) 7/10/2012    Cancer     skin    Cataract     removed from both    Clotting disorder     Coronary artery disease     Depression     HTN (hypertension) 7/10/2012    Hx-TIA (transient ischemic attack) 7/10/2012    Hyperlipidemia     Hypertension     Myocardial infarction     Renal calculus, left 8/18/2017    S/P CABG (coronary artery bypass graft) 1/8/2013    Stroke 2/12    TIA    Urinary tract infection        Medications:    Current Outpatient Medications:     ascorbic acid, vitamin C, (VITAMIN C) 500 MG tablet, Take 500 mg by mouth once daily., Disp: , Rfl:     atorvastatin (LIPITOR) 40 MG tablet, Take 1 tablet (40 mg total) by mouth once daily., Disp: 90 tablet, Rfl: 3    baclofen (LIORESAL) 5 mg Tab tablet, Take 1 tablet (5 mg total) by mouth 2 (two) times daily., Disp: 20 tablet, Rfl: 0    busPIRone (BUSPAR) 10 MG tablet, Take 10 mg by mouth 2 (two) times daily. , Disp: , Rfl:     carbidopa-levodopa  mg (SINEMET)  mg per tablet, Take 1 tablet by mouth 3 (three) times daily., Disp: 270 tablet, Rfl: 1    carvediloL (COREG) 12.5 MG tablet, Take 1 tablet (12.5 mg total) by mouth 2 (two) times daily., Disp: 60 tablet, Rfl: 0    cefdinir (OMNICEF) 300 MG capsule, Take 1 capsule (300 mg total) by mouth 2 (two) times daily. for 2 days, Disp: 4 capsule, Rfl: 0    cyanocobalamin (VITAMIN B-12) 250 MCG tablet, Take 1 tablet (250 mcg total) by mouth once daily., Disp: 30 tablet, Rfl: 0    docusate sodium (COLACE) 100 MG capsule, Take 100 mg by mouth once daily.,  Disp: , Rfl:     DULoxetine (CYMBALTA) 30 MG capsule, Take 60 mg by mouth once daily. , Disp: , Rfl:     ELIQUIS 5 mg Tab, Take 1 tablet (5 mg total) by mouth 2 (two) times daily., Disp: 60 tablet, Rfl: 0    FLUoxetine 20 MG capsule, Take 20 mg by mouth once daily. , Disp: , Rfl:     fluticasone (FLONASE) 50 mcg/actuation nasal spray, 2 sprays (100 mcg total) by Each Nare route once daily., Disp: 16 g, Rfl: 5    loratadine (CLARITIN) 10 mg tablet, Take 10 mg by mouth once daily., Disp: , Rfl:     losartan (COZAAR) 25 MG tablet, Take 2 tablets (50 mg total) by mouth once daily., Disp: 60 tablet, Rfl: 0    memantine (NAMENDA) 10 MG Tab, 10 mg 2 (two) times daily. , Disp: , Rfl:     methenamine (HIPREX) 1 gram Tab, Take 1 g by mouth 2 (two) times daily., Disp: , Rfl:     polyethylene glycol (GLYCOLAX) 17 gram PwPk, Take by mouth., Disp: , Rfl:     tamsulosin (FLOMAX) 0.4 mg Cap, , Disp: , Rfl:   Any other notable medications as documented in HPI.    Allergies:  Review of patient's allergies indicates:   Allergen Reactions    No known drug allergies        Social History:  Social History     Socioeconomic History    Marital status:    Tobacco Use    Smoking status: Never Smoker    Smokeless tobacco: Never Used   Substance and Sexual Activity    Alcohol use: No    Drug use: No    Sexual activity: Not Currently     Any other notable Social History as documented in HPI.    Family History:  Family History   Problem Relation Age of Onset    Heart attack Father     Heart disease Father     Stroke Father     Alcohol abuse Father     Stroke Mother     Dementia Mother     Hypertension Mother     Diabetes Mother     Melanoma Neg Hx     Psoriasis Neg Hx     Lupus Neg Hx     Eczema Neg Hx     Acne Neg Hx     Prostate cancer Neg Hx     Kidney disease Neg Hx      Any other notable FMH as documented in HPI.      OBJECTIVE:     Physical Exam:   Vitals:    03/10/22 1322   BP: 111/63   Pulse: 80        GEN: NAD, pleasant, cooperative  CVS: RRR  CHEST: No signs of resp distress, on room air    NEURO:  Mental status: The patient is alert, attentive, and oriented to self, age, hospital, but not to month (February), year (2024)  Speech: Fluent speech with intact repetition, comprehension, and naming.     Cranial nerves:  CN II: Visual fields are full to confrontation. Pupils are 3mm and reactive to light.  CN III, IV, VI: EOMI, no nystagmus, no ptosis  CN V: Facial sensation is intact in all 3 divisions bilaterally.  CN VII: Face is symmetric with normal eye closure and smile.  CN VIII: Hearing is normal bilaterally  CN IX, X: Palate elevates symmetrically. Phonation is normal.  CN XI: Head turning and shoulder shrug are intact  CN XII: Tongue is midline with normal movements and no atrophy.    Motor: Cogwheel rigidity BUE, increased tone BLE. No pronator drift. Mild bradykinesia BUE.    -- RUE: 5/5  -- RLE: 4+/5 hip flexion, otherwise 5/5  -- LUE: 5/5  -- LLE: 4+/5 hip flexion, otherwise 5/5    Reflexes: Reflexes are 2+ and symmetric at the biceps, triceps, knees.    Sensory: Light touch, temperature are intact in bilateral upper and lower extremities.    Coordination: There is no dysmetria on finger-to-nose and heel-knee-shin. There are no abnormal or extraneous movements.    Gait/Stance: Postural instability      ASSESSMENT/PLAN:     Diagnosis/Etiology: Intraventricular hemorrhage  Stroke Risk Factors: HTN, anticoagulation use  Effects of Stroke: None    Recommendations:   - Additional studies: None  - Antiplatelet/Anticoagulation: Continue eliquis 5mg BID  - Lipid Management: Continue home atorvastatin 40mg daily  - Hypertension: Target systolic BP <140mmHg  - Therapy: Continue PT/OT/SLP as indicated  - RTC as needed      Problem List Items Addressed This Visit        Neuro    IVH (intraventricular hemorrhage)            Zahra Villalpando MD, PhD  Vascular Neurology Fellow, PGY-5

## 2022-07-27 ENCOUNTER — LAB VISIT (OUTPATIENT)
Dept: LAB | Facility: HOSPITAL | Age: 82
End: 2022-07-27
Attending: STUDENT IN AN ORGANIZED HEALTH CARE EDUCATION/TRAINING PROGRAM
Payer: MEDICARE

## 2022-07-27 DIAGNOSIS — N39.0 URINARY TRACT INFECTION, SITE NOT SPECIFIED: Primary | ICD-10-CM

## 2022-07-27 LAB
BILIRUB UR QL STRIP: NEGATIVE
CLARITY UR: CLEAR
COLOR UR: YELLOW
GLUCOSE UR QL STRIP: NEGATIVE
HGB UR QL STRIP: ABNORMAL
KETONES UR QL STRIP: NEGATIVE
LEUKOCYTE ESTERASE UR QL STRIP: NEGATIVE
MICROSCOPIC COMMENT: ABNORMAL
NITRITE UR QL STRIP: NEGATIVE
PH UR STRIP: 8 [PH] (ref 5–8)
PROT UR QL STRIP: NEGATIVE
RBC #/AREA URNS HPF: 11 /HPF (ref 0–4)
SP GR UR STRIP: 1.02 (ref 1–1.03)
URN SPEC COLLECT METH UR: ABNORMAL
UROBILINOGEN UR STRIP-ACNC: NEGATIVE EU/DL

## 2022-07-27 PROCEDURE — 81000 URINALYSIS NONAUTO W/SCOPE: CPT

## 2022-07-27 PROCEDURE — 87086 URINE CULTURE/COLONY COUNT: CPT

## 2022-07-29 LAB
BACTERIA UR CULT: NORMAL
BACTERIA UR CULT: NORMAL

## 2022-11-30 ENCOUNTER — APPOINTMENT (OUTPATIENT)
Dept: LAB | Facility: HOSPITAL | Age: 82
End: 2022-11-30
Attending: HOSPITALIST
Payer: MEDICARE

## 2022-11-30 DIAGNOSIS — R30.0 DYSURIA: Primary | ICD-10-CM

## 2022-11-30 LAB
BACTERIA #/AREA URNS HPF: ABNORMAL /HPF
BILIRUB UR QL STRIP: NEGATIVE
CLARITY UR: ABNORMAL
COLOR UR: YELLOW
GLUCOSE UR QL STRIP: NEGATIVE
HGB UR QL STRIP: ABNORMAL
KETONES UR QL STRIP: NEGATIVE
LEUKOCYTE ESTERASE UR QL STRIP: ABNORMAL
MICROSCOPIC COMMENT: ABNORMAL
NITRITE UR QL STRIP: POSITIVE
PH UR STRIP: 7 [PH] (ref 5–8)
PROT UR QL STRIP: ABNORMAL
RBC #/AREA URNS HPF: 3 /HPF (ref 0–4)
SP GR UR STRIP: 1.02 (ref 1–1.03)
URN SPEC COLLECT METH UR: ABNORMAL
UROBILINOGEN UR STRIP-ACNC: NEGATIVE EU/DL
WBC #/AREA URNS HPF: >100 /HPF (ref 0–5)

## 2022-11-30 PROCEDURE — 87088 URINE BACTERIA CULTURE: CPT

## 2022-11-30 PROCEDURE — 81000 URINALYSIS NONAUTO W/SCOPE: CPT

## 2022-11-30 PROCEDURE — 87077 CULTURE AEROBIC IDENTIFY: CPT

## 2022-11-30 PROCEDURE — 87086 URINE CULTURE/COLONY COUNT: CPT

## 2022-11-30 PROCEDURE — 87186 SC STD MICRODIL/AGAR DIL: CPT

## 2022-12-07 LAB
BACTERIA UR CULT: ABNORMAL
BACTERIA UR CULT: ABNORMAL

## 2023-02-26 ENCOUNTER — APPOINTMENT (OUTPATIENT)
Dept: LAB | Facility: HOSPITAL | Age: 83
End: 2023-02-26
Attending: HOSPITALIST
Payer: MEDICARE

## 2023-02-26 DIAGNOSIS — N39.0 UTI (URINARY TRACT INFECTION): Primary | ICD-10-CM

## 2023-02-26 LAB
BACTERIA #/AREA URNS HPF: ABNORMAL /HPF
BILIRUB UR QL STRIP: ABNORMAL
CLARITY UR: ABNORMAL
COLOR UR: ABNORMAL
GLUCOSE UR QL STRIP: NEGATIVE
HGB UR QL STRIP: ABNORMAL
HYALINE CASTS #/AREA URNS LPF: 0 /LPF
KETONES UR QL STRIP: ABNORMAL
LEUKOCYTE ESTERASE UR QL STRIP: ABNORMAL
MICROSCOPIC COMMENT: ABNORMAL
NITRITE UR QL STRIP: POSITIVE
PH UR STRIP: >8 [PH] (ref 5–8)
PROT UR QL STRIP: ABNORMAL
RBC #/AREA URNS HPF: >100 /HPF (ref 0–4)
SP GR UR STRIP: 1.01 (ref 1–1.03)
TRI-PHOS CRY URNS QL MICRO: ABNORMAL
URN SPEC COLLECT METH UR: ABNORMAL
UROBILINOGEN UR STRIP-ACNC: 1 EU/DL
WBC #/AREA URNS HPF: 35 /HPF (ref 0–5)

## 2023-02-26 PROCEDURE — 87077 CULTURE AEROBIC IDENTIFY: CPT

## 2023-02-26 PROCEDURE — 87186 SC STD MICRODIL/AGAR DIL: CPT

## 2023-02-26 PROCEDURE — 87086 URINE CULTURE/COLONY COUNT: CPT

## 2023-02-26 PROCEDURE — 87088 URINE BACTERIA CULTURE: CPT

## 2023-02-26 PROCEDURE — 81000 URINALYSIS NONAUTO W/SCOPE: CPT

## 2023-02-28 LAB — BACTERIA UR CULT: ABNORMAL

## 2023-04-13 ENCOUNTER — HOSPITAL ENCOUNTER (OUTPATIENT)
Dept: RADIOLOGY | Facility: HOSPITAL | Age: 83
Discharge: HOME | End: 2023-04-13
Attending: PHYSICIAN ASSISTANT
Payer: MEDICARE

## 2023-04-13 ENCOUNTER — TELEPHONE (OUTPATIENT)
Dept: ORTHOPEDICS | Facility: CLINIC | Age: 83
End: 2023-04-13
Payer: MEDICARE

## 2023-04-13 ENCOUNTER — OFFICE VISIT (OUTPATIENT)
Dept: NEUROLOGY | Facility: CLINIC | Age: 83
End: 2023-04-13
Payer: MEDICARE

## 2023-04-13 ENCOUNTER — OFFICE VISIT (OUTPATIENT)
Dept: ORTHOPEDICS | Facility: CLINIC | Age: 83
End: 2023-04-13
Payer: MEDICARE

## 2023-04-13 VITALS
BODY MASS INDEX: 24.1 KG/M2 | SYSTOLIC BLOOD PRESSURE: 114 MMHG | OXYGEN SATURATION: 99 % | DIASTOLIC BLOOD PRESSURE: 74 MMHG | HEIGHT: 67 IN | HEART RATE: 57 BPM

## 2023-04-13 VITALS
SYSTOLIC BLOOD PRESSURE: 112 MMHG | RESPIRATION RATE: 14 BRPM | DIASTOLIC BLOOD PRESSURE: 74 MMHG | HEIGHT: 67 IN | HEART RATE: 59 BPM | OXYGEN SATURATION: 99 % | BODY MASS INDEX: 24.1 KG/M2

## 2023-04-13 DIAGNOSIS — E78.2 MIXED HYPERLIPIDEMIA: Chronic | ICD-10-CM

## 2023-04-13 DIAGNOSIS — R26.9 GAIT DISORDER: ICD-10-CM

## 2023-04-13 DIAGNOSIS — M25.512 ACUTE PAIN OF LEFT SHOULDER: ICD-10-CM

## 2023-04-13 DIAGNOSIS — I61.5 IVH (INTRAVENTRICULAR HEMORRHAGE): ICD-10-CM

## 2023-04-13 DIAGNOSIS — M25.512 LEFT SHOULDER PAIN, UNSPECIFIED CHRONICITY: ICD-10-CM

## 2023-04-13 DIAGNOSIS — M25.512 LEFT SHOULDER PAIN, UNSPECIFIED CHRONICITY: Primary | ICD-10-CM

## 2023-04-13 DIAGNOSIS — Z86.73 HISTORY OF CVA (CEREBROVASCULAR ACCIDENT): ICD-10-CM

## 2023-04-13 DIAGNOSIS — M25.512 ACUTE PAIN OF LEFT SHOULDER: Primary | ICD-10-CM

## 2023-04-13 DIAGNOSIS — G31.84 MILD COGNITIVE IMPAIRMENT: Primary | ICD-10-CM

## 2023-04-13 DIAGNOSIS — Z91.81 AT RISK FOR FALLS: ICD-10-CM

## 2023-04-13 DIAGNOSIS — G20.C PARKINSONISM, UNSPECIFIED PARKINSONISM TYPE: ICD-10-CM

## 2023-04-13 DIAGNOSIS — R26.89 IMPAIRMENT OF BALANCE: ICD-10-CM

## 2023-04-13 DIAGNOSIS — I48.20 CHRONIC ATRIAL FIBRILLATION: ICD-10-CM

## 2023-04-13 PROBLEM — Z98.890 POST-OPERATIVE STATE: Status: RESOLVED | Noted: 2020-02-14 | Resolved: 2023-04-13

## 2023-04-13 PROBLEM — G93.40 ACUTE ENCEPHALOPATHY: Status: RESOLVED | Noted: 2022-01-27 | Resolved: 2023-04-13

## 2023-04-13 PROBLEM — R41.82 ALTERED MENTAL STATUS: Status: RESOLVED | Noted: 2021-07-13 | Resolved: 2023-04-13

## 2023-04-13 PROCEDURE — 73030 X-RAY EXAM OF SHOULDER: CPT | Mod: TC,LT

## 2023-04-13 PROCEDURE — 20610 DRAIN/INJ JOINT/BURSA W/O US: CPT | Mod: S$PBB | Performed by: PHYSICIAN ASSISTANT

## 2023-04-13 PROCEDURE — 99213 OFFICE O/P EST LOW 20 MIN: CPT | Mod: PBBFAC,25,27 | Performed by: PHYSICIAN ASSISTANT

## 2023-04-13 PROCEDURE — 99999 PR PBB SHADOW E&M-EST. PATIENT-LVL V: CPT | Mod: PBBFAC,,, | Performed by: NURSE PRACTITIONER

## 2023-04-13 PROCEDURE — 99215 OFFICE O/P EST HI 40 MIN: CPT | Mod: PBBFAC,25 | Performed by: NURSE PRACTITIONER

## 2023-04-13 PROCEDURE — 99999 PR STA SHADOW: CPT | Mod: PBBFAC,,, | Performed by: PHYSICIAN ASSISTANT

## 2023-04-13 PROCEDURE — 73030 X-RAY EXAM OF SHOULDER: CPT | Mod: 26,LT,, | Performed by: RADIOLOGY

## 2023-04-13 PROCEDURE — 20610 DRAIN/INJ JOINT/BURSA W/O US: CPT | Mod: PBBFAC | Performed by: PHYSICIAN ASSISTANT

## 2023-04-13 PROCEDURE — 99214 OFFICE O/P EST MOD 30 MIN: CPT | Mod: S$PBB | Performed by: NURSE PRACTITIONER

## 2023-04-13 PROCEDURE — 99999 PR PBB SHADOW E&M-EST. PATIENT-LVL III: CPT | Mod: PBBFAC,,, | Performed by: PHYSICIAN ASSISTANT

## 2023-04-13 PROCEDURE — 99999 PR STA SHADOW: ICD-10-PCS | Mod: PBBFAC,,, | Performed by: PHYSICIAN ASSISTANT

## 2023-04-13 PROCEDURE — 99999 PR STA SHADOW: CPT | Mod: PBBFAC,,, | Performed by: NURSE PRACTITIONER

## 2023-04-13 PROCEDURE — 99203 OFFICE O/P NEW LOW 30 MIN: CPT | Mod: S$PBB | Performed by: PHYSICIAN ASSISTANT

## 2023-04-13 PROCEDURE — 99999 PR PBB SHADOW E&M-EST. PATIENT-LVL V: ICD-10-PCS | Mod: PBBFAC,,, | Performed by: NURSE PRACTITIONER

## 2023-04-13 PROCEDURE — 73030 XR SHOULDER COMPLETE 2 OR MORE VIEWS LEFT: ICD-10-PCS | Mod: 26,LT,, | Performed by: RADIOLOGY

## 2023-04-13 RX ORDER — BUPROPION HYDROCHLORIDE 300 MG/1
300 TABLET ORAL
COMMUNITY
End: 2023-04-13

## 2023-04-13 RX ORDER — LACTULOSE 10 G/15ML
30 SOLUTION ORAL; RECTAL
COMMUNITY
End: 2023-04-13

## 2023-04-13 RX ORDER — GUAIFENESIN 1200 MG
TABLET, EXTENDED RELEASE 12 HR ORAL
COMMUNITY
End: 2023-07-07

## 2023-04-13 RX ORDER — TRIAMCINOLONE ACETONIDE 40 MG/ML
40 INJECTION, SUSPENSION INTRA-ARTICULAR; INTRAMUSCULAR ONCE
Status: COMPLETED | OUTPATIENT
Start: 2023-04-13 | End: 2023-04-13

## 2023-04-13 RX ORDER — ARIPIPRAZOLE 2 MG/1
2 TABLET ORAL DAILY
COMMUNITY
Start: 2023-03-17 | End: 2023-07-07

## 2023-04-13 RX ORDER — DICLOFENAC SODIUM 10 MG/G
GEL TOPICAL DAILY
COMMUNITY
Start: 2023-02-07 | End: 2023-07-07

## 2023-04-13 RX ORDER — CARBIDOPA AND LEVODOPA 25; 100 MG/1; MG/1
TABLET ORAL
Qty: 270 TABLET | Refills: 3 | Status: SHIPPED | OUTPATIENT
Start: 2023-04-13

## 2023-04-13 RX ORDER — MIRTAZAPINE 45 MG/1
45 TABLET, FILM COATED ORAL
COMMUNITY
End: 2023-04-13

## 2023-04-13 RX ADMIN — TRIAMCINOLONE ACETONIDE 40 MG: 40 INJECTION, SUSPENSION INTRA-ARTICULAR; INTRAMUSCULAR at 11:04

## 2023-04-13 NOTE — PROGRESS NOTES
"HPI: Eliazar James is a 82 y.o. male  with prior chronic lacunar infarcts by imaging (history of afib noted) with tremor, difficulty with walking developing over more recent years. Exam shows signs of parkinsonism, possibly vascular in nature.     He presents today to reestablish care. He is here with a  today, who is not familiar with his history. He was last seen in 7/2021. I ordered a UA at that visit, due to AMS. This was not completed, and he was lost to follow up.     Since his visit with me, he was admitted for an IVH in 2022. He presented with worsening AMS, days after treatment for a UTI. Per review of records, "He was admitted to [facility] from 1/26-1/31/22 after presenting with altered mentation and imaging showing a small amount of bilateral IVH. Additional work-up with MRI brain and CTA head/neck did not reveal an obvious etiology for his bleed. -160s on admission. He was subsequently discharged to SNF. His AC was resumed after 1 week. Since he was discharged, there has been no reported new or worsening neurologic symptoms. He has had 2 recent falls, which occur he transfers without assistance. Denies head trauma. Blood pressures have been stable. Etiology: Risk factors include hypertension, anticoagulation use, and frequent falls, no vascular malformation or intracranial abnormality on CTA/MRI".     He has been wheelchair bound for the past year. He does not understand why. He denies any acute weakness, but he hasn't walked in a year. No falls.     He feels that his memory is worse over time. He is not independent with any ADL's.     He does not feel that he has a good appetite. He sleeps well at night.     He is on daily medication to prevent UTI's, which have been recurrent. No hallucinations. This occurred prior with with a UTI.     He remains on Sinemet.     He has left shoulder pain. He had an X-ray recently, which was normal. Denies injury. He is concerned that this could be " cardiac in nature, as he had a similar complaint prior to bypass surgery years ago. Denies chest pain.     He is not currently in PT. It was helpful prior. He has been non ambulatory for 3 months after recurrent UTI's. A couple falls without injury.     Mood euthymic. Staff reports no problems except he does have some flashbacks/has PTSD.    He is a nursing home resident at the [facility].      He is no longer in counseling. He had this after the death of this wife.     His wife of 58 years is . Has 2 children and 8 grandkids.    Review of Systems   Constitutional:  Negative for fever.   HENT:  Negative for nosebleeds.    Eyes:  Negative for double vision.   Respiratory:  Negative for hemoptysis.    Cardiovascular:  Negative for leg swelling.   Gastrointestinal:  Negative for blood in stool.   Genitourinary:  Negative for hematuria.   Musculoskeletal:  Negative for falls.   Skin:  Negative for rash.   Neurological:  Positive for tremors.   Psychiatric/Behavioral:  Positive for memory loss. The patient is not nervous/anxious and does not have insomnia.      I have reviewed all of this patient's past medical and surgical histories as well as family and social histories and active allergies and medications as documented in the electronic medical record.    Exam:  Gen Appearance, well developed/nourished in no apparent distress  CV: 2+ distal pulses with no edema or swelling  Neuro:  MS: Awake, alert, oriented to place, person, time, situation. Sustains attention. Recent recent recall midlly impaired /remote memory intact, Language is full to spontaneous speech/repetition/naming/comprehension. Fund of Knowledge is full  CN: PERRL, Extraoccular movements and visual fields are full. Normal facial sensation and strength, Hearing symmetric, Tongue and Palate are midline and strong. Shoulder Shrug symmetric and strong.  Motor: Normal bulk, tone mildly increased in the legs, no hand or leg tremors noted today. 5/5  strength bilateral upper/lower extremities with 2+ reflexes and no clonu  Mild bradykinesia  Sensory: symmetric to light touch, pain, temp, and vibration,  Romberg negative  Cerebellar: Finger-nose,Heal-shin, Rapid alternating movements intact  Gait: not done. Seated in wheelchair today.     Imaging:   Imaging:  - NCCTH (1/26/2022): Ventricular enlargement and small layering intraventricular hemorrhages in the occipital horns bilaterally appears stable from prior.  - CTA h/n (1/29/2022): No major branch stenosis/occlusion intracranially.  No evidence of aneurysm or AVM. No significant stenosis at the carotid bifurcations by NASCET criteria.  The vertebral arteries are patent.  - MRI brain w/o C (1/28/2022): Small volume intraventricular hemorrhage layering in the occipital horns.  Additional areas of prior hemorrhage are seen in the brain, as above.     Cardiac Evaluation:  - TTE (1/27/2022): EF 55%, mild left atrial enlargement    CT head 2019: Chronic changes are seen intracranially from volume loss and small vessel ischemic changes with small remote lacunar infarcts, unchanged.     Interval development of paranasal sinus disease, worse on the right side, as above.  Correlate with any history of sinus disease.     MRI 2019 Brain: No acute intracranial process.     Changes of old lacunar type infarctions in the supratentorial and infratentorial brain.     Changes of marked cerebral volume loss.    Assessment/Plan: Eliazar James is a 82 y.o. male  with prior chronic lacunar infarcts by imaging (history of afib noted) with tremor, difficulty with walking developing over more recent years. Exam shows signs of parkinsonism, possibly vascular in nature.     I recommend:   1. Thought to have some contribution of symptoms related to chronic depression meds prior but changing meds have not clearly benefited him. Denies use of prior antipsychotics, but is on Abilify now for mood.     2. 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   -continue Sinemet, as hand and leg tremors note noted currently.     3. Note: CT head 2019/ MRI brain showed stable remote lacunar infarcts  -He has a history of TIA prior to use of coumadin for a-fib, now on NOAC for CVA prevention.   -LDL goal is less than 100 in this non -diabetic patient.     -ICH in 2022 after falls on Eliquis, but he denied CHI. He is now wheelchair bound-suspect, due to frequent falls that may have contributed.     4. On Namenda for memory with mild memory loss noted today. Resides in the NH currently. Monitor over time.     5. Mood treated by PCP, has PTSD. Discussed at length deep breathing and visualization.     6. Monitor REM sleep behavior complaints. Consider Trazodone with worsening. No issues currently.     7. Refer to SHERON Gallegos, Ortho for acute left shoulder pain x 3 weeks. He had x-rays at the nursing home and was advised by a nursing home provider that they were normal.     RTC 6 months    CC:  [facility].

## 2023-04-13 NOTE — PROGRESS NOTES
"Subjective:      Patient ID: Eliazar James is a 82 y.o. male.    Chief Complaint: Pain of the Left Shoulder ("pain has been going on for about a month")    Review of patient's allergies indicates:   Allergen Reactions    No known drug allergies       83 yo M presents to clinic with c/o left shoulder pain x  weeks.  Denies any injury or trauma.  Pain mostly to anterior shoulder.  Worse with any shoulder movement and improves some with rest.  No radiation of pain.  No numbness/tingling to LUE.  No neck pain or injury.  He has not tried any treatment for this yet.  He is a resident of the [facility].      Review of Systems   Constitutional: Negative for chills, diaphoresis and fever.   HENT:  Negative for congestion, ear discharge and ear pain.    Eyes:  Negative for blurred vision, discharge, double vision and pain.   Cardiovascular:  Negative for chest pain, claudication and cyanosis.   Respiratory:  Negative for cough, hemoptysis and shortness of breath.    Endocrine: Negative for cold intolerance and heat intolerance.   Skin:  Negative for color change, dry skin, itching and rash.   Musculoskeletal:  Positive for joint pain. Negative for arthritis, back pain, gout, joint swelling, muscle weakness and neck pain.   Gastrointestinal:  Negative for abdominal pain and change in bowel habit.   Neurological:  Negative for brief paralysis, disturbances in coordination, dizziness, numbness and paresthesias.   Psychiatric/Behavioral:  Negative for altered mental status and depression.        Objective:          General    Constitutional: He is oriented to person, place, and time. He appears well-developed and well-nourished. No distress.   HENT:   Head: Atraumatic.   Eyes: EOM are normal. Right eye exhibits no discharge. Left eye exhibits no discharge.   Cardiovascular:  Normal rate.            Pulmonary/Chest: Effort normal. No respiratory distress.   Abdominal: Soft.   Neurological: He is alert and oriented to person, " place, and time.   Psychiatric: He has a normal mood and affect. His behavior is normal.         Right Shoulder Exam     Inspection/Observation   Swelling: absent  Bruising: absent  Scars: absent  Deformity: absent    Range of Motion   Active abduction:  normal   Passive abduction:  normal   Forward Flexion:  normal   Forward Elevation: normal  Internal rotation 0 degrees:  normal     Tests & Signs   Beyer test: negative  Impingement: negative  Lift Off Sign: negative  Belly Press: negative    Other   Sensation: normal    Left Shoulder Exam     Inspection/Observation   Swelling: absent  Bruising: absent  Scars: absent  Deformity: absent    Range of Motion   Active abduction:  normal   Passive abduction:  normal   Forward Flexion:  normal   Forward Elevation: normal  Internal rotation 0 degrees:  abnormal     Tests & Signs   Beyer test: positive  Impingement: positive  Lift Off Sign: positive  Belly Press: positive    Other   Sensation: normal     Comments:  Tenderness to anterior shoulder, no erythema or warmth      Vascular Exam     Right Pulses      Radial:                    2+      Left Pulses      Radial:                    2+      Capillary Refill  Right Hand: normal capillary refill  Left Hand: normal capillary refill                Assessment:         Xray Left Shoulder 4/13/23:  The alignment is within normal limits.  No displaced fractures identified.  No evidence of lytic or blastic lesions.Mild moderate degenerative changes of the acromioclavicular joint.Soft tissues are unremarkable.      Encounter Diagnosis   Name Primary?    Acute pain of left shoulder Yes    Acute pain of left shoulder  -     Ambulatory referral/consult to Orthopedics  -     triamcinolone acetonide injection 40 mg               Plan:         The total face-to-face encounter time with this patient was 30 minutes and greater than 50% of of the encounter time was spent counseling the patient, coordinating care, and education  regarding the diagnosis. We have discussed a variety of treatment options including medications, injections, physical therapy and other alternative treatments. I also explained the indications, risks and benefits of surgery. Patient chooses to proceed with CSI and physical therapy at this time.  Today, the patient chooses  a CSI and understands a minimum of 3 months time must lapse after injection, prior to a surgical procedure due to increased risk of infection.       1. Injection Procedure  A time out was performed, including verification of patient ID, procedure, site and side, availability of information and equipment, review of safety issues, and agreement with consent, the procedure site was marked.    After time out was performed, the patient was prepped aseptically with chloraprep swabstick. A diagnostic and therapeutic injection of 1:3cc Kenalog/Marcaine was given under sterile technique using a 22g x 1.5 needle from the Posterior  aspect of the left Subacromial in the sitting position.      Eliazar James had no adverse reactions to the medication. Pain decreased. He was instructed to apply ice to the joint for 20 minutes and avoid strenuous activities for 24-36 hours following the injection. He was warned of possible blood sugar and/or blood pressure changes during that time. Following that time, he can resume regular activities.    He was reminded to call the clinic immediately for any adverse side effects as explained in clinic today.    2. Ice compress to the affected area 2-3x a day for 15-20 minutes as needed for pain management.  3. Referral to PT.   4. RTC in 6 weeks for follow-up, sooner if needed.    Patient voices understanding of and agreement with treatment plan. All of the patient's questions were answered and the patient will contact us if he has any questions or concerns in the interim.

## 2023-04-28 NOTE — PROGRESS NOTES
[facility] - Neurosurgery ([facility])  Vascular Neurology  Comprehensive Stroke Center  Progress Note    Assessment/Plan:     * IVH (intraventricular hemorrhage)  Eliazar James is a 81 y.o. male with PMHx of CAD, MI, stroke, afib (eliquis), Parkinsons, HLD, and HTN who presented to OSH with AMS. CTH with IVH and patient transferred to C for higher level of care. CTA in 2019 without significant cerebrovascular abnormalities, AVM or aneurysm. No history of recent trauma. Echo with EF 55% and mild LAE. MRI with areas susceptibility in the right posterior sylvian fissure which could be acute or chronic subarachnoid blood.  Punctate susceptibility also seen in the left frontal lobe white matter probably from remote microhemorrhage. CTA Head and Neck to evaluate for etiology of IVH pending      Patient Alert and cooperative on exam, continues to have weakness on bilateral lower extremities. Therapy recommending SNF. Stable for stepdown.       Antithrombotics for secondary stroke prevention: Anticoagulants: hold home eliquis in setting of IVH    Statins for secondary stroke prevention and hyperlipidemia, if present:   Statins: Atorvastatin- 40 mg daily    Aggressive risk factor modification: HTN, HLD, A-Fib, CAD, stroke     Rehab efforts: The patient has been evaluated by a stroke team provider and the therapy needs have been fully considered based off the presenting complaints and exam findings. The following therapy evaluations are needed: PT evaluate and treat, OT evaluate and treat, SLP evaluate and treat, PM&R evaluate for appropriate placement    Diagnostics ordered/pending: CTA Head to assess vasculature , CTA Neck/Arch to assess vasculature, Other: MRI MP rage w wo    VTE prophylaxis: Mechanical prophylaxis: Place SCDs  None: Reason for No Pharmacological VTE Prophylaxis: IVH    BP parameters: SBP <140        Parkinsonism  Continue cabidopa-levadopa  On memantine     Urinary tract infection without  hematuria  Recently diagnosed prior to admission  On ceftin at nursing home  Antibiotic changed to cefepime per NCC    Hyperlipidemia  Stroke risk factor  LDL <70 on admission  Continue home atorvastatin 40 mg daily    CAD (coronary artery disease)  Stroke risk factor  LDL <70 on admission  Continue home atorvastatin 40    Chronic atrial fibrillation  Stroke risk factor  On eliquis at home, Hold in setting of IVH           1/27- Recommend MRI Brain WO and CTA. Patient with fluctuating lethargy(alert and cooperative at times, drowsy and less cooperative at other times). Eliquis being held 2/2 IVH.  1/28-Recommending CTA of the head and neck to evaluate etiology of IVH. Patient Alert and cooperative on exam, continues to have weakness on bilateral lower extremities. Therapy recommending SNF. Stable for stepdown.   12/29-Neuro exam stable. MRI with stable IVH and areas susceptibility in the right posterior sylvian fissure which could be acute or chronic subarachnoid blood.  Punctate susceptibility also seen in the left frontal lobe white matter probably from remote microhemorrhage. CTA pending for today.       STROKE DOCUMENTATION        NIH Scale:  1a. Level of Consciousness: 0-->Alert, keenly responsive  1b. LOC Questions: 0-->Answers both questions correctly  1c. LOC Commands: 0-->Performs both tasks correctly  2. Best Gaze: 0-->Normal  3. Visual: 0-->No visual loss  4. Facial Palsy: 0-->Normal symmetrical movements  5a. Motor Arm, Left: 0-->No drift, limb holds 90 (or 45) degrees for full 10 secs  5b. Motor Arm, Right: 0-->No drift, limb holds 90 (or 45) degrees for full 10 secs  6a. Motor Leg, Left: 2-->Some effort against gravity, leg falls to bed by 5 secs, but has some effort against gravity  6b. Motor Leg, Right: 2-->Some effort against gravity, leg falls to bed by 5 secs, but has some effort against gravity  7. Limb Ataxia: 0-->Absent  8. Sensory: 0-->Normal, no sensory loss  9. Best Language: 0-->No aphasia,  normal  10. Dysarthria: 0-->Normal  11. Extinction and Inattention (formerly Neglect): 0-->No abnormality  Total (NIH Stroke Scale): 4       Modified Goyo Score: 3  M[place]field Coma Scale:    ABCD2 Score:    [id number] Score:   HAS -BLED Score:   ICH Score:   Hunt & Sevilla Classification:      Hemorrhagic change of an Ischemic Stroke: Does this patient have an ischemic stroke with hemorrhagic changes? No     Neurologic Chief Complaint: AMS    Subjective:     Interval History: Patient is seen for follow-up neurological assessment and treatment recommendations: Neuro exam stable. MRI with stable IVH and areas susceptibility in the right posterior sylvian fissure which could be acute or chronic subarachnoid blood.  Punctate susceptibility also seen in the left frontal lobe white matter probably from remote microhemorrhage. CTA pending for today.     HPI, Past Medical, Family, and Social History remains the same as documented in the initial encounter.     Review of Systems   Constitutional: Positive for activity change. Negative for fever.   HENT: Negative for trouble swallowing.    Eyes: Negative for photophobia and visual disturbance.   Respiratory: Negative for cough and shortness of breath.    Cardiovascular: Negative for chest pain and palpitations.   Gastrointestinal: Negative for nausea and vomiting.   Neurological: Positive for weakness. Negative for speech difficulty.   Psychiatric/Behavioral: Negative for confusion. The patient is not nervous/anxious.      Scheduled Meds:   atorvastatin  40 mg Oral Daily    carbidopa-levodopa  mg  1 tablet Oral TID    carvediloL  12.5 mg Oral BID    ceFEPime (MAXIPIME) IVPB  1 g Intravenous Q8H    cyanocobalamin  250 mcg Oral Daily    heparin (porcine)  5,000 Units Subcutaneous Q8H    memantine  10 mg Oral BID    mupirocin   Nasal BID    senna  8.6 mg Oral BID    tamsulosin  0.4 mg Oral Daily     Continuous Infusions:  PRN Meds:acetaminophen, labetalol,  ondansetron, sodium chloride 0.9%    Objective:     Vital Signs (Most Recent):  Temp: 97.6 °F (36.4 °C) (01/29/22 0701)  Pulse: 96 (01/29/22 0738)  Resp: 13 (01/29/22 0701)  BP: 135/84 (01/29/22 0701)  SpO2: 95 % (01/29/22 0738)  BP Location: Left arm    Vital Signs Range (Last 24H):  Temp:  [97.6 °F (36.4 °C)-98.5 °F (36.9 °C)]   Pulse:  []   Resp:  [12-27]   BP: (105-162)/(56-97)   SpO2:  [95 %-100 %]   BP Location: Left arm    Physical Exam  Vitals and nursing note reviewed.   Constitutional:       General: He is not in acute distress.     Appearance: He is not ill-appearing.   HENT:      Head: Normocephalic and atraumatic.      Right Ear: External ear normal.      Left Ear: External ear normal.      Nose: Nose normal.   Eyes:      Extraocular Movements: Extraocular movements intact.      Conjunctiva/sclera: Conjunctivae normal.   Cardiovascular:      Rate and Rhythm: Normal rate.   Pulmonary:      Effort: Pulmonary effort is normal. No respiratory distress.   Abdominal:      General: There is no distension.   Musculoskeletal:      Right lower leg: No edema.      Left lower leg: No edema.   Skin:     General: Skin is warm and dry.   Neurological:      Mental Status: He is alert and oriented to person, place, and time.      Cranial Nerves: No facial asymmetry.      Motor: Weakness present.   Psychiatric:         Mood and Affect: Mood normal.         Neurological Exam:   LOC: alert  Attention Span: Good   Language: No aphasia  Articulation: No dysarthria  Orientation: Person, Place, Time   Visual Fields: Full  EOM (CN III, IV, VI): Full/intact  Facial Movement (CN VII): Symmetric facial expression    Motor: Arm left  Normal 5/5  Leg left  Paresis: 3/5  Arm right  Normal 5/5  Leg right Paresis: 3/5  Cerebellum: No evidence of appendicular or axial ataxia  Sensation: intact to light touch throughout    Laboratory:  CMP:   Recent Labs   Lab 01/29/22  0015   CALCIUM 8.9   ALBUMIN 2.4*   PROT 6.9   *   K 3.7    CO2 22*      BUN 18   CREATININE 0.7   ALKPHOS 86   ALT 13   AST 29   BILITOT 1.1*     CBC:   Recent Labs   Lab 01/29/22  0015   WBC 9.59   RBC 4.75   HGB 15.6   HCT 46.8      MCV 99*   MCH 32.8*   MCHC 33.3     Lipid Panel:   Recent Labs   Lab 01/26/22  2100   CHOL 83*   LDLCALC 48.0*   HDL 27*   TRIG 40     Coagulation:   Recent Labs   Lab 01/27/22  0049   INR 1.1   APTT 29.3     Platelet Aggregation Study: No results for input(s): PLTAGG, PLTAGINTERP, PLTAGREGLACO, ADPPLTAGGREG in the last 168 hours.  Hgb A1C:   Recent Labs   Lab 01/26/22  2100   HGBA1C 5.2  5.2     TSH:   Recent Labs   Lab 01/26/22  2100   TSH 2.111       Diagnostic Results     Brain imaging:  MRI Brain 1/28/2022  MRI of the brain demonstrates diffuse cerebral atrophy with enlargement of sulci and ventricles.  There is intraventricular hemorrhage with blood layering in the occipital horns similar in volume to the prior CT.  On the susceptibility weighted images there are additional areas susceptibility in the right posterior sylvian fissure which could be acute or chronic subarachnoid blood.  Punctate susceptibility also seen in the left frontal lobe white matter probably from remote microhemorrhage.     Patchy white matter changes are seen in the periventricular areas likely from chronic microvascular disease.  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  No diffusion restriction suggest  acute infarct.  No mass is seen.  Vascular flow voids are are patent.       CT Head WO 1/26/2022 2156  Ventricular enlargement and small layering intraventricular hemorrhages in the occipital horns bilaterally appears stable from prior.     Air is again noted in the cavernous sinus and branches of the jugular vein in the upper neck, similar to prior.  Correlate clinically for iatrogenic etiology from venous access.        CT Head. Date: 01/26/22 1456  Interval development of hemorrhage layering dependently within the posterior horns  of the bilateral lateral ventricles, new from the previous study of 01/05/2022.  There is slight prominence of the ventricular system, perhaps slightly more so than compared to the prior examination which could suggest a component of developing hydrocephalus.     Age-appropriate generalized cerebral volume loss with moderate chronic microvascular ischemic disease.  No definite new parenchymal hypoattenuation to suggest an acute infarction.     Small foci of venous air noted.  Clinically correlate for recent intravenous vascular access.     Vessel Imaging:  CTA recommended     Cardiac Evaluation:   TTE 1/27/22  · The left ventricle is normal in size with normal systolic function. The estimated ejection fraction is 55%.  · Normal right ventricular size with normal right ventricular systolic function.  · Normal left ventricular diastolic function.  · Mild left atrial enlargement.  · The IVC could not be visualized.          Leonor Betancourt NP  Comprehensive Stroke Center  Department of Vascular Neurology   [facility])       clear

## 2023-05-17 ENCOUNTER — APPOINTMENT (OUTPATIENT)
Dept: LAB | Facility: HOSPITAL | Age: 83
End: 2023-05-17
Attending: HOSPITALIST
Payer: MEDICARE

## 2023-05-17 DIAGNOSIS — R30.0 DYSURIA: Primary | ICD-10-CM

## 2023-05-17 LAB
BACTERIA #/AREA URNS HPF: ABNORMAL /HPF
BILIRUB UR QL STRIP: NEGATIVE
CLARITY UR: ABNORMAL
COLOR UR: YELLOW
GLUCOSE UR QL STRIP: NEGATIVE
HGB UR QL STRIP: ABNORMAL
HYALINE CASTS #/AREA URNS LPF: 0 /LPF
KETONES UR QL STRIP: NEGATIVE
LEUKOCYTE ESTERASE UR QL STRIP: ABNORMAL
MICROSCOPIC COMMENT: ABNORMAL
NITRITE UR QL STRIP: POSITIVE
PH UR STRIP: >8 [PH] (ref 5–8)
PROT UR QL STRIP: ABNORMAL
RBC #/AREA URNS HPF: >100 /HPF (ref 0–4)
SP GR UR STRIP: 1.02 (ref 1–1.03)
TRI-PHOS CRY URNS QL MICRO: ABNORMAL
URN SPEC COLLECT METH UR: ABNORMAL
UROBILINOGEN UR STRIP-ACNC: 1 EU/DL
WBC #/AREA URNS HPF: >100 /HPF (ref 0–5)

## 2023-05-17 PROCEDURE — 81000 URINALYSIS NONAUTO W/SCOPE: CPT

## 2023-05-17 PROCEDURE — 87186 SC STD MICRODIL/AGAR DIL: CPT

## 2023-05-17 PROCEDURE — 87088 URINE BACTERIA CULTURE: CPT

## 2023-05-17 PROCEDURE — 87077 CULTURE AEROBIC IDENTIFY: CPT

## 2023-05-17 PROCEDURE — 87086 URINE CULTURE/COLONY COUNT: CPT

## 2023-05-20 LAB — BACTERIA UR CULT: ABNORMAL

## 2023-05-24 NOTE — PROGRESS NOTES
Subjective:      Patient ID: Eliazar James is a 82 y.o. male.    Chief Complaint: No chief complaint on file.    Review of patient's allergies indicates:   Allergen Reactions    No known drug allergies       Pt returns to clinic for follow up of left shoulder pain.  He reports that he had good relief of pain for about a month after CSI at last visit.  Pain has returned but not as severe as previously.  He has also been completing PT.  He is interested in another shoulder injection.    4/13/23:  83 yo M presents to clinic with c/o left shoulder pain x  weeks.  Denies any injury or trauma.  Pain mostly to anterior shoulder.  Worse with any shoulder movement and improves some with rest.  No radiation of pain.  No numbness/tingling to LUE.  No neck pain or injury.  He has not tried any treatment for this yet.  He is a resident of the [facility].      Review of Systems   Constitutional: Negative for chills, diaphoresis and fever.   HENT:  Negative for congestion, ear discharge and ear pain.    Eyes:  Negative for blurred vision, discharge, double vision and pain.   Cardiovascular:  Negative for chest pain, claudication and cyanosis.   Respiratory:  Negative for cough, hemoptysis and shortness of breath.    Endocrine: Negative for cold intolerance and heat intolerance.   Skin:  Negative for color change, dry skin, itching and rash.   Musculoskeletal:  Positive for joint pain. Negative for arthritis, back pain, gout, joint swelling, muscle weakness and neck pain.   Gastrointestinal:  Negative for abdominal pain and change in bowel habit.   Neurological:  Negative for brief paralysis, disturbances in coordination, dizziness, numbness and paresthesias.   Psychiatric/Behavioral:  Negative for altered mental status and depression.        Objective:          General    Constitutional: He is oriented to person, place, and time. He appears well-developed and well-nourished. No distress.   HENT:   Head: Atraumatic.   Eyes: EOM  are normal. Right eye exhibits no discharge. Left eye exhibits no discharge.   Cardiovascular:  Normal rate.            Pulmonary/Chest: Effort normal. No respiratory distress.   Abdominal: Soft.   Neurological: He is alert and oriented to person, place, and time.   Psychiatric: He has a normal mood and affect. His behavior is normal.         Right Shoulder Exam     Inspection/Observation   Swelling: absent  Bruising: absent  Scars: absent  Deformity: absent    Range of Motion   Active abduction:  normal   Passive abduction:  normal   Forward Flexion:  normal   Forward Elevation: normal  Internal rotation 0 degrees:  normal     Tests & Signs   Beyer test: negative  Impingement: negative  Lift Off Sign: negative  Belly Press: negative    Other   Sensation: normal    Left Shoulder Exam     Inspection/Observation   Swelling: absent  Bruising: absent  Scars: absent  Deformity: absent    Range of Motion   Active abduction:  normal   Passive abduction:  normal   Forward Flexion:  normal   Forward Elevation: normal  Internal rotation 0 degrees:  abnormal     Tests & Signs   Beyer test: positive  Impingement: positive  Lift Off Sign: positive  Belly Press: positive    Other   Sensation: normal     Comments:  No tenderness, erythema, warmth      Vascular Exam     Right Pulses      Radial:                    2+      Left Pulses      Radial:                    2+      Capillary Refill  Right Hand: normal capillary refill  Left Hand: normal capillary refill                Assessment:         Xray Left Shoulder 4/13/23:  The alignment is within normal limits.  No displaced fractures identified.  No evidence of lytic or blastic lesions.Mild moderate degenerative changes of the acromioclavicular joint.Soft tissues are unremarkable.      No diagnosis found.   There are no diagnoses linked to this encounter.             Plan:         The total face-to-face encounter time with this patient was 20 minutes and greater than 50% of  of the encounter time was spent counseling the patient, coordinating care, and education regarding the diagnosis. We have discussed a variety of treatment options including medications, injections, physical therapy and other alternative treatments. I also explained the indications, risks and benefits of surgery. Pt would like to continue PT at this time. He would also like to repeat shoulder injection when appropriate.      1. Ice compress to the affected area 2-3x a day for 15-20 minutes as needed for pain management.  2. Continue topical voltaren as directed as needed.  3. Continue PT as scheduled.  4. RTC in 6 weeks for follow-up and possible repeat left shoulder CSI, sooner if needed.    Patient voices understanding of and agreement with treatment plan. All of the patient's questions were answered and the patient will contact us if he has any questions or concerns in the interim.

## 2023-05-25 ENCOUNTER — OFFICE VISIT (OUTPATIENT)
Dept: ORTHOPEDICS | Facility: CLINIC | Age: 83
End: 2023-05-25
Payer: MEDICARE

## 2023-05-25 VITALS
WEIGHT: 153.88 LBS | BODY MASS INDEX: 24.15 KG/M2 | SYSTOLIC BLOOD PRESSURE: 104 MMHG | DIASTOLIC BLOOD PRESSURE: 62 MMHG | HEIGHT: 67 IN | HEART RATE: 72 BPM | OXYGEN SATURATION: 97 %

## 2023-05-25 DIAGNOSIS — M25.512 ACUTE PAIN OF LEFT SHOULDER: Primary | ICD-10-CM

## 2023-05-25 PROCEDURE — 99213 OFFICE O/P EST LOW 20 MIN: CPT | Mod: S$PBB | Performed by: PHYSICIAN ASSISTANT

## 2023-05-25 PROCEDURE — 99999 PR STA SHADOW: ICD-10-PCS | Mod: PBBFAC,,, | Performed by: PHYSICIAN ASSISTANT

## 2023-05-25 PROCEDURE — 99214 OFFICE O/P EST MOD 30 MIN: CPT | Mod: PBBFAC | Performed by: PHYSICIAN ASSISTANT

## 2023-05-25 PROCEDURE — 99999 PR STA SHADOW: CPT | Mod: PBBFAC,,, | Performed by: PHYSICIAN ASSISTANT

## 2023-05-25 PROCEDURE — 99999 PR PBB SHADOW E&M-EST. PATIENT-LVL IV: CPT | Mod: PBBFAC,,, | Performed by: PHYSICIAN ASSISTANT

## 2023-06-26 ENCOUNTER — LAB VISIT (OUTPATIENT)
Dept: LAB | Facility: HOSPITAL | Age: 83
End: 2023-06-26
Attending: HOSPITALIST
Payer: MEDICARE

## 2023-06-26 DIAGNOSIS — R30.0 DYSURIA: Primary | ICD-10-CM

## 2023-06-26 LAB
BACTERIA #/AREA URNS HPF: ABNORMAL /HPF
BILIRUB UR QL STRIP: NEGATIVE
CLARITY UR: ABNORMAL
COLOR UR: YELLOW
GLUCOSE UR QL STRIP: NEGATIVE
HGB UR QL STRIP: ABNORMAL
HYALINE CASTS #/AREA URNS LPF: 0 /LPF
KETONES UR QL STRIP: NEGATIVE
LEUKOCYTE ESTERASE UR QL STRIP: ABNORMAL
MICROSCOPIC COMMENT: ABNORMAL
NITRITE UR QL STRIP: POSITIVE
PH UR STRIP: 6 [PH] (ref 5–8)
PROT UR QL STRIP: ABNORMAL
RBC #/AREA URNS HPF: >100 /HPF (ref 0–4)
SP GR UR STRIP: 1.01 (ref 1–1.03)
SQUAMOUS #/AREA URNS HPF: 3 /HPF
URN SPEC COLLECT METH UR: ABNORMAL
UROBILINOGEN UR STRIP-ACNC: NEGATIVE EU/DL
WBC #/AREA URNS HPF: >100 /HPF (ref 0–5)

## 2023-06-26 PROCEDURE — 87088 URINE BACTERIA CULTURE: CPT

## 2023-06-26 PROCEDURE — 87086 URINE CULTURE/COLONY COUNT: CPT

## 2023-06-26 PROCEDURE — 87186 SC STD MICRODIL/AGAR DIL: CPT

## 2023-06-26 PROCEDURE — 87077 CULTURE AEROBIC IDENTIFY: CPT

## 2023-06-26 PROCEDURE — 81000 URINALYSIS NONAUTO W/SCOPE: CPT

## 2023-06-30 LAB — BACTERIA UR CULT: ABNORMAL

## 2023-07-07 ENCOUNTER — HOSPITAL ENCOUNTER (OUTPATIENT)
Dept: RADIOLOGY | Facility: HOSPITAL | Age: 83
Discharge: HOME | End: 2023-07-07
Attending: PHYSICIAN ASSISTANT
Payer: MEDICARE

## 2023-07-07 ENCOUNTER — OFFICE VISIT (OUTPATIENT)
Dept: ORTHOPEDICS | Facility: CLINIC | Age: 83
End: 2023-07-07
Payer: MEDICARE

## 2023-07-07 VITALS
SYSTOLIC BLOOD PRESSURE: 128 MMHG | HEART RATE: 75 BPM | HEIGHT: 67 IN | OXYGEN SATURATION: 98 % | RESPIRATION RATE: 18 BRPM | BODY MASS INDEX: 24.1 KG/M2 | DIASTOLIC BLOOD PRESSURE: 68 MMHG

## 2023-07-07 DIAGNOSIS — S49.92XA INJURY OF LEFT SHOULDER, INITIAL ENCOUNTER: Primary | ICD-10-CM

## 2023-07-07 DIAGNOSIS — S49.92XA INJURY OF LEFT SHOULDER, INITIAL ENCOUNTER: ICD-10-CM

## 2023-07-07 DIAGNOSIS — G89.29 CHRONIC LEFT SHOULDER PAIN: ICD-10-CM

## 2023-07-07 DIAGNOSIS — M25.512 CHRONIC LEFT SHOULDER PAIN: ICD-10-CM

## 2023-07-07 PROCEDURE — 99215 OFFICE O/P EST HI 40 MIN: CPT | Mod: S$PBB | Performed by: PHYSICIAN ASSISTANT

## 2023-07-07 PROCEDURE — 99999PBSHW PR PBB SHADOW TECHNICAL ONLY FILED TO HB: Mod: PBBFAC,,,

## 2023-07-07 PROCEDURE — 73030 X-RAY EXAM OF SHOULDER: CPT | Mod: 26,LT,, | Performed by: RADIOLOGY

## 2023-07-07 PROCEDURE — 99215 OFFICE O/P EST HI 40 MIN: CPT | Mod: PBBFAC,25 | Performed by: PHYSICIAN ASSISTANT

## 2023-07-07 PROCEDURE — 99999 PR PBB SHADOW E&M-EST. PATIENT-LVL V: CPT | Mod: PBBFAC,,, | Performed by: PHYSICIAN ASSISTANT

## 2023-07-07 PROCEDURE — 73030 X-RAY EXAM OF SHOULDER: CPT | Mod: TC,LT

## 2023-07-07 PROCEDURE — 20610 DRAIN/INJ JOINT/BURSA W/O US: CPT | Mod: PBBFAC | Performed by: PHYSICIAN ASSISTANT

## 2023-07-07 PROCEDURE — 99999PBSHW PR PBB SHADOW TECHNICAL ONLY FILED TO HB: ICD-10-PCS | Mod: PBBFAC,,,

## 2023-07-07 PROCEDURE — 99999 PR STA SHADOW: CPT | Mod: PBBFAC,,, | Performed by: PHYSICIAN ASSISTANT

## 2023-07-07 PROCEDURE — 73030 XR SHOULDER COMPLETE 2 OR MORE VIEWS LEFT: ICD-10-PCS | Mod: 26,LT,, | Performed by: RADIOLOGY

## 2023-07-07 PROCEDURE — 20610 DRAIN/INJ JOINT/BURSA W/O US: CPT | Mod: S$PBB | Performed by: PHYSICIAN ASSISTANT

## 2023-07-07 PROCEDURE — 99999 PR PBB SHADOW E&M-EST. PATIENT-LVL V: ICD-10-PCS | Mod: PBBFAC,,, | Performed by: PHYSICIAN ASSISTANT

## 2023-07-07 RX ORDER — BUPROPION HYDROCHLORIDE 300 MG/1
300 TABLET ORAL DAILY
COMMUNITY

## 2023-07-07 RX ORDER — MIRTAZAPINE 45 MG/1
45 TABLET, FILM COATED ORAL NIGHTLY
COMMUNITY

## 2023-07-07 RX ORDER — TRIAMCINOLONE ACETONIDE 40 MG/ML
40 INJECTION, SUSPENSION INTRA-ARTICULAR; INTRAMUSCULAR ONCE
Status: COMPLETED | OUTPATIENT
Start: 2023-07-07 | End: 2023-07-07

## 2023-07-07 RX ORDER — PROPRANOLOL HYDROCHLORIDE 80 MG/1
80 TABLET ORAL DAILY
COMMUNITY

## 2023-07-07 RX ADMIN — TRIAMCINOLONE ACETONIDE 40 MG: 40 INJECTION, SUSPENSION INTRA-ARTICULAR; INTRAMUSCULAR at 10:07

## 2023-07-07 NOTE — PROGRESS NOTES
Subjective:      Patient ID: Eliazar James is a 82 y.o. male.    Chief Complaint: Pain of the Left Shoulder    Review of patient's allergies indicates:   Allergen Reactions    No known drug allergies       Pt returns to clinic for follow up of left shoulder pain.  He reports that pain was improving but worse again after he was dropped onto his left shoulder about a month ago at the [facility].  He says that he did not have any xrays after the injury.  Aide from the [facility] that is accompanying him today does not know anything about the injury.  Today he c/o achy/sharp pain to anterior and posterior shoulder.  Worse with any movement and improves with rest.  He has not noticed any swelling, redness, or warmth to shoulder.  He denies numbness/tingling to LUE.    5/25/23:  Pt returns to clinic for follow up of left shoulder pain.  He reports that he had good relief of pain for about a month after CSI at last visit.  Pain has returned but not as severe as previously.  He has also been completing PT.  He is interested in another shoulder injection.    4/13/23:  83 yo M presents to clinic with c/o left shoulder pain x  weeks.  Denies any injury or trauma.  Pain mostly to anterior shoulder.  Worse with any shoulder movement and improves some with rest.  No radiation of pain.  No numbness/tingling to LUE.  No neck pain or injury.  He has not tried any treatment for this yet.  He is a resident of the [facility].      Review of Systems   Constitutional: Negative for chills, diaphoresis and fever.   HENT:  Negative for congestion, ear discharge and ear pain.    Eyes:  Negative for blurred vision, discharge, double vision and pain.   Cardiovascular:  Negative for chest pain, claudication and cyanosis.   Respiratory:  Negative for cough, hemoptysis and shortness of breath.    Endocrine: Negative for cold intolerance and heat intolerance.   Skin:  Negative for color change, dry skin, itching and rash.   Musculoskeletal:  Positive  for joint pain. Negative for arthritis, back pain, falls, gout, joint swelling, muscle weakness and neck pain.   Gastrointestinal:  Negative for abdominal pain and change in bowel habit.   Neurological:  Negative for brief paralysis, disturbances in coordination, dizziness, numbness and paresthesias.   Psychiatric/Behavioral:  Negative for altered mental status and depression.        Objective:          General    Constitutional: He is oriented to person, place, and time. He appears well-developed and well-nourished. No distress.   HENT:   Head: Atraumatic.   Eyes: EOM are normal. Right eye exhibits no discharge. Left eye exhibits no discharge.   Cardiovascular:  Normal rate.            Pulmonary/Chest: Effort normal. No respiratory distress.   Abdominal: Soft.   Neurological: He is alert and oriented to person, place, and time.   Psychiatric: He has a normal mood and affect. His behavior is normal.         Right Shoulder Exam     Inspection/Observation   Swelling: absent  Bruising: absent  Scars: absent  Deformity: absent    Range of Motion   Active abduction:  normal   Passive abduction:  normal   Forward Flexion:  normal   Forward Elevation: normal  Internal rotation 0 degrees:  normal     Tests & Signs   Beyer test: negative  Impingement: negative  Lift Off Sign: negative  Belly Press: negative    Other   Sensation: normal    Left Shoulder Exam     Inspection/Observation   Swelling: absent  Bruising: absent  Scars: absent  Deformity: absent    Range of Motion   Active abduction:  abnormal   Passive abduction:  abnormal   Forward Flexion:  abnormal   Forward Elevation: abnormal  Internal rotation 0 degrees:  abnormal     Tests & Signs   Beyer test: positive  Impingement: positive  Lift Off Sign: positive  Belly Press: positive    Other   Sensation: normal     Comments:  Tenderness to posterior shoulder      Vascular Exam       Capillary Refill  Right Hand: normal capillary refill  Left Hand: normal capillary  refill                Assessment:         Xray Left Shoulder 7/7/23:  No evidence of fracture.No significant degenerative changes.    Xray Left Shoulder 4/13/23:  The alignment is within normal limits.  No displaced fractures identified.  No evidence of lytic or blastic lesions.Mild moderate degenerative changes of the acromioclavicular joint.Soft tissues are unremarkable.      Encounter Diagnoses   Name Primary?    Injury of left shoulder, initial encounter Yes    Chronic left shoulder pain       Injury of left shoulder, initial encounter  -     X-Ray Shoulder 2 or More Views Left; Future; Expected date: 07/07/2023  -     triamcinolone acetonide injection 40 mg    Chronic left shoulder pain  -     triamcinolone acetonide injection 40 mg                 Plan:         The total face-to-face encounter time with this patient was 20 minutes and greater than 50% of of the encounter time was spent counseling the patient, coordinating care, and education regarding the diagnosis. We have discussed a variety of treatment options including medications, injections, physical therapy and other alternative treatments. I also explained the indications, risks and benefits of surgery. Pt would like to repeat CSI at this time. He reports that he had improvement of pain after last CSI until shoulder injury.  He does say that he would want to consider surgery if injection does not provide long term relief. We discussed that we would need additional imaging to see if there is a surgical procedure that would benefit him. We will proceed with MRI if not improved at follow up.  He would like to try physical therapy as well. He is requesting to complete therapy outside of the [facility], so we will send referral to [facility].    1. Injection Procedure  A time out was performed, including verification of patient ID, procedure, site and side, availability of information and equipment, review of safety issues, and agreement with  consent, the procedure site was marked.    After time out was performed, the patient was prepped aseptically with chloraprep swabstick. A diagnostic and therapeutic injection of 1:3cc Kenalog/Marcaine was given under sterile technique using a 22g x 1.5 needle from the Posterior  aspect of the left Subacromial in the sitting position.      Pt had no adverse reactions to the medication. Pain decreased. He was instructed to apply ice to the joint for 20 minutes and avoid strenuous activities for 24-36 hours following the injection. He was warned of possible blood sugar and/or blood pressure changes during that time. Following that time, he can resume regular activities.    He was reminded to call the clinic immediately for any adverse side effects as explained in clinic today.    2. Ice compress to the affected area 2-3x a day for 15-20 minutes as needed for pain management.  3. Continue topical voltaren as directed as needed.  4. Physical therapy referral.  5. RTC in 6 weeks for follow-up, sooner if needed. Consider MRI if not improved.    Patient voices understanding of and agreement with treatment plan. All of the patient's questions were answered and the patient will contact us if he has any questions or concerns in the interim.

## 2023-07-14 ENCOUNTER — LAB VISIT (OUTPATIENT)
Dept: LAB | Facility: HOSPITAL | Age: 83
End: 2023-07-14
Attending: HOSPITALIST
Payer: MEDICARE

## 2023-07-14 DIAGNOSIS — R31.9 HEMATURIA SYNDROME: Primary | ICD-10-CM

## 2023-07-14 LAB
ERYTHROCYTE [DISTWIDTH] IN BLOOD BY AUTOMATED COUNT: 14.6 % (ref 11.5–14.5)
HCT VFR BLD AUTO: 35.6 % (ref 40–54)
HGB BLD-MCNC: 11.9 G/DL (ref 14–18)
MCH RBC QN AUTO: 33.8 PG (ref 27–31)
MCHC RBC AUTO-ENTMCNC: 33.4 G/DL (ref 32–36)
MCV RBC AUTO: 101 FL (ref 82–98)
PLATELET # BLD AUTO: 230 K/UL (ref 150–450)
PMV BLD AUTO: 10 FL (ref 9.2–12.9)
RBC # BLD AUTO: 3.52 M/UL (ref 4.6–6.2)
WBC # BLD AUTO: 10.09 K/UL (ref 3.9–12.7)

## 2023-07-14 PROCEDURE — 85027 COMPLETE CBC AUTOMATED: CPT

## 2023-08-18 ENCOUNTER — OFFICE VISIT (OUTPATIENT)
Dept: ORTHOPEDICS | Facility: CLINIC | Age: 83
End: 2023-08-18
Payer: MEDICARE

## 2023-08-18 VITALS
HEIGHT: 67 IN | HEART RATE: 70 BPM | RESPIRATION RATE: 17 BRPM | BODY MASS INDEX: 24.03 KG/M2 | WEIGHT: 153.13 LBS | OXYGEN SATURATION: 97 % | SYSTOLIC BLOOD PRESSURE: 122 MMHG | DIASTOLIC BLOOD PRESSURE: 68 MMHG

## 2023-08-18 DIAGNOSIS — G89.29 CHRONIC LEFT SHOULDER PAIN: Primary | ICD-10-CM

## 2023-08-18 DIAGNOSIS — M25.512 CHRONIC LEFT SHOULDER PAIN: Primary | ICD-10-CM

## 2023-08-18 PROCEDURE — 99215 OFFICE O/P EST HI 40 MIN: CPT | Mod: PBBFAC | Performed by: PHYSICIAN ASSISTANT

## 2023-08-18 PROCEDURE — 99999 PR STA SHADOW: ICD-10-PCS | Mod: PBBFAC,,, | Performed by: PHYSICIAN ASSISTANT

## 2023-08-18 PROCEDURE — 99999 PR PBB SHADOW E&M-EST. PATIENT-LVL V: CPT | Mod: PBBFAC,,, | Performed by: PHYSICIAN ASSISTANT

## 2023-08-18 PROCEDURE — 99999 PR STA SHADOW: CPT | Mod: PBBFAC,,, | Performed by: PHYSICIAN ASSISTANT

## 2023-08-18 PROCEDURE — 99213 OFFICE O/P EST LOW 20 MIN: CPT | Mod: S$PBB | Performed by: PHYSICIAN ASSISTANT

## 2023-08-18 NOTE — PROGRESS NOTES
Subjective:      Patient ID: Eliazar James is a 82 y.o. male.    Chief Complaint: Pain of the Left Shoulder    Review of patient's allergies indicates:   Allergen Reactions    No known drug allergies       Pt returns to clinic with c/o left shoulder pain.  He says that he did not have much relief following CSI at last visit.  He says that he has not been able to complete PT.  He is asking to see a shoulder surgeon.    7/7/23:  Pt returns to clinic for follow up of left shoulder pain.  He reports that pain was improving but worse again after he was dropped onto his left shoulder about a month ago at the [facility].  He says that he did not have any xrays after the injury.  Aide from the [facility] that is accompanying him today does not know anything about the injury.  Today he c/o achy/sharp pain to anterior and posterior shoulder.  Worse with any movement and improves with rest.  He has not noticed any swelling, redness, or warmth to shoulder.  He denies numbness/tingling to LUE.    5/25/23:  Pt returns to clinic for follow up of left shoulder pain.  He reports that he had good relief of pain for about a month after CSI at last visit.  Pain has returned but not as severe as previously.  He has also been completing PT.  He is interested in another shoulder injection.    4/13/23:  81 yo M presents to clinic with c/o left shoulder pain x  weeks.  Denies any injury or trauma.  Pain mostly to anterior shoulder.  Worse with any shoulder movement and improves some with rest.  No radiation of pain.  No numbness/tingling to LUE.  No neck pain or injury.  He has not tried any treatment for this yet.  He is a resident of the [facility].        Review of Systems   Constitutional: Negative for chills, diaphoresis and fever.   HENT:  Negative for congestion, ear discharge and ear pain.    Eyes:  Negative for blurred vision, discharge, double vision and pain.   Cardiovascular:  Negative for chest pain, claudication and cyanosis.    Respiratory:  Negative for cough, hemoptysis and shortness of breath.    Endocrine: Negative for cold intolerance and heat intolerance.   Skin:  Negative for color change, dry skin, itching and rash.   Musculoskeletal:  Positive for joint pain. Negative for arthritis, back pain, falls, gout, joint swelling, muscle weakness and neck pain.   Gastrointestinal:  Negative for abdominal pain and change in bowel habit.   Neurological:  Negative for brief paralysis, disturbances in coordination, dizziness, numbness and paresthesias.   Psychiatric/Behavioral:  Negative for altered mental status and depression.          Objective:          General    Constitutional: He is oriented to person, place, and time. He appears well-developed and well-nourished. No distress.   HENT:   Head: Atraumatic.   Eyes: EOM are normal. Right eye exhibits no discharge. Left eye exhibits no discharge.   Cardiovascular:  Normal rate.            Pulmonary/Chest: Effort normal. No respiratory distress.   Abdominal: Soft.   Neurological: He is alert and oriented to person, place, and time.   Psychiatric: He has a normal mood and affect. His behavior is normal.         Right Shoulder Exam     Inspection/Observation   Swelling: absent  Bruising: absent  Scars: absent  Deformity: absent    Range of Motion   Active abduction:  normal   Passive abduction:  normal   Forward Flexion:  normal   Forward Elevation: normal  Internal rotation 0 degrees:  normal     Tests & Signs   Beyer test: negative  Impingement: negative  Lift Off Sign: negative  Belly Press: negative    Other   Sensation: normal    Left Shoulder Exam     Inspection/Observation   Swelling: absent  Bruising: absent  Scars: absent  Deformity: absent    Range of Motion   Active abduction:  abnormal   Passive abduction:  abnormal   Forward Flexion:  abnormal   Forward Elevation: abnormal  Internal rotation 0 degrees:  abnormal     Tests & Signs   Beyer test: positive  Impingement:  positive  Lift Off Sign: positive  Belly Press: positive    Other   Sensation: normal     Comments:  Tenderness to posterior shoulder      Vascular Exam       Capillary Refill  Right Hand: normal capillary refill  Left Hand: normal capillary refill                  Assessment:         Xray Left Shoulder 7/7/23:  No evidence of fracture.No significant degenerative changes.    Xray Left Shoulder 4/13/23:  The alignment is within normal limits.  No displaced fractures identified.  No evidence of lytic or blastic lesions.Mild moderate degenerative changes of the acromioclavicular joint.Soft tissues are unremarkable.      Encounter Diagnosis   Name Primary?    Chronic left shoulder pain Yes        Chronic left shoulder pain                   Plan:         The total face-to-face encounter time with this patient was 20 minutes and greater than 50% of of the encounter time was spent counseling the patient, coordinating care, and education regarding the diagnosis. We have discussed a variety of treatment options including medications, injections, physical therapy and other alternative treatments. I also explained the indications, risks and benefits of surgery. Pt is asking to see a surgeon at this time.  He does say that he would want to consider surgery if injection does not provide long term relief. We discussed that we would need additional imaging to see if there is a surgical procedure that would benefit him. He says that he has not been able to complete any physical therapy yet.  He would like to try physical therapy as well. He is requesting to complete therapy outside of the [facility], so we will send referral to [facility].    Appointment with Dr. Ogden at pt request.  2. Ice compress to the affected area 2-3x a day for 15-20 minutes as needed for pain management.  3. Continue topical voltaren as directed as needed.  4. Physical therapy referral. Will place new referral.  5. RTC as scheduled, sooner if  needed.    Patient voices understanding of and agreement with treatment plan. All of the patient's questions were answered and the patient will contact us if he has any questions or concerns in the interim.

## 2023-09-30 ENCOUNTER — HOSPITAL ENCOUNTER (EMERGENCY)
Facility: HOSPITAL | Age: 83
Discharge: HOME | End: 2023-10-01
Attending: SURGERY
Payer: MEDICARE

## 2023-09-30 DIAGNOSIS — N30.00 ACUTE CYSTITIS WITHOUT HEMATURIA: ICD-10-CM

## 2023-09-30 DIAGNOSIS — R19.7 DIARRHEA, UNSPECIFIED TYPE: Primary | ICD-10-CM

## 2023-09-30 LAB
ALBUMIN SERPL BCP-MCNC: 2.6 G/DL (ref 3.5–5.2)
ALP SERPL-CCNC: 76 U/L (ref 55–135)
ALT SERPL W/O P-5'-P-CCNC: <5 U/L (ref 10–44)
ANION GAP SERPL CALC-SCNC: 7 MMOL/L (ref 8–16)
AST SERPL-CCNC: 5 U/L (ref 10–40)
BACTERIA #/AREA URNS HPF: ABNORMAL /HPF
BASOPHILS # BLD AUTO: 0.04 K/UL (ref 0–0.2)
BASOPHILS NFR BLD: 0.7 % (ref 0–1.9)
BILIRUB SERPL-MCNC: 0.7 MG/DL (ref 0.1–1)
BILIRUB UR QL STRIP: ABNORMAL
BUN SERPL-MCNC: 23 MG/DL (ref 8–23)
CALCIUM SERPL-MCNC: 8.4 MG/DL (ref 8.7–10.5)
CAOX CRY URNS QL MICRO: ABNORMAL
CHLORIDE SERPL-SCNC: 110 MMOL/L (ref 95–110)
CLARITY UR: ABNORMAL
CO2 SERPL-SCNC: 23 MMOL/L (ref 23–29)
COLOR UR: ABNORMAL
CREAT SERPL-MCNC: 0.9 MG/DL (ref 0.5–1.4)
DIFFERENTIAL METHOD: ABNORMAL
EOSINOPHIL # BLD AUTO: 0.5 K/UL (ref 0–0.5)
EOSINOPHIL NFR BLD: 8.2 % (ref 0–8)
ERYTHROCYTE [DISTWIDTH] IN BLOOD BY AUTOMATED COUNT: 13.2 % (ref 11.5–14.5)
EST. GFR  (NO RACE VARIABLE): >60 ML/MIN/1.73 M^2
GLUCOSE SERPL-MCNC: 154 MG/DL (ref 70–110)
GLUCOSE UR QL STRIP: NEGATIVE
HCT VFR BLD AUTO: 33.7 % (ref 40–54)
HGB BLD-MCNC: 11.2 G/DL (ref 14–18)
HGB UR QL STRIP: ABNORMAL
HYALINE CASTS #/AREA URNS LPF: 0 /LPF
IMM GRANULOCYTES # BLD AUTO: 0.01 K/UL (ref 0–0.04)
IMM GRANULOCYTES NFR BLD AUTO: 0.2 % (ref 0–0.5)
KETONES UR QL STRIP: ABNORMAL
LEUKOCYTE ESTERASE UR QL STRIP: ABNORMAL
LYMPHOCYTES # BLD AUTO: 0.9 K/UL (ref 1–4.8)
LYMPHOCYTES NFR BLD: 16 % (ref 18–48)
MCH RBC QN AUTO: 32.9 PG (ref 27–31)
MCHC RBC AUTO-ENTMCNC: 33.2 G/DL (ref 32–36)
MCV RBC AUTO: 99 FL (ref 82–98)
MICROSCOPIC COMMENT: ABNORMAL
MONOCYTES # BLD AUTO: 1 K/UL (ref 0.3–1)
MONOCYTES NFR BLD: 17.2 % (ref 4–15)
NEUTROPHILS # BLD AUTO: 3.4 K/UL (ref 1.8–7.7)
NEUTROPHILS NFR BLD: 57.7 % (ref 38–73)
NITRITE UR QL STRIP: POSITIVE
NRBC BLD-RTO: 0 /100 WBC
PH UR STRIP: 6 [PH] (ref 5–8)
PLATELET # BLD AUTO: 240 K/UL (ref 150–450)
PMV BLD AUTO: 9 FL (ref 9.2–12.9)
POTASSIUM SERPL-SCNC: 3.5 MMOL/L (ref 3.5–5.1)
PROT SERPL-MCNC: 6.2 G/DL (ref 6–8.4)
PROT UR QL STRIP: ABNORMAL
RBC # BLD AUTO: 3.4 M/UL (ref 4.6–6.2)
RBC #/AREA URNS HPF: >100 /HPF (ref 0–4)
SODIUM SERPL-SCNC: 140 MMOL/L (ref 136–145)
SP GR UR STRIP: >=1.03 (ref 1–1.03)
SQUAMOUS #/AREA URNS HPF: 2 /HPF
URN SPEC COLLECT METH UR: ABNORMAL
UROBILINOGEN UR STRIP-ACNC: NEGATIVE EU/DL
WBC # BLD AUTO: 5.83 K/UL (ref 3.9–12.7)
WBC #/AREA URNS HPF: >100 /HPF (ref 0–5)

## 2023-09-30 PROCEDURE — 96365 THER/PROPH/DIAG IV INF INIT: CPT

## 2023-09-30 PROCEDURE — 81000 URINALYSIS NONAUTO W/SCOPE: CPT | Performed by: SURGERY

## 2023-09-30 PROCEDURE — 87077 CULTURE AEROBIC IDENTIFY: CPT | Performed by: SURGERY

## 2023-09-30 PROCEDURE — 36415 COLL VENOUS BLD VENIPUNCTURE: CPT | Performed by: SURGERY

## 2023-09-30 PROCEDURE — 87088 URINE BACTERIA CULTURE: CPT | Performed by: SURGERY

## 2023-09-30 PROCEDURE — 99284 EMERGENCY DEPT VISIT MOD MDM: CPT | Mod: 25

## 2023-09-30 PROCEDURE — 80053 COMPREHEN METABOLIC PANEL: CPT | Performed by: SURGERY

## 2023-09-30 PROCEDURE — 85025 COMPLETE CBC W/AUTO DIFF WBC: CPT | Performed by: SURGERY

## 2023-09-30 PROCEDURE — 63600175 PHARM REV CODE 636 W HCPCS: Performed by: SURGERY

## 2023-09-30 PROCEDURE — 87186 SC STD MICRODIL/AGAR DIL: CPT | Performed by: SURGERY

## 2023-09-30 PROCEDURE — 87086 URINE CULTURE/COLONY COUNT: CPT | Performed by: SURGERY

## 2023-09-30 PROCEDURE — 25000003 PHARM REV CODE 250: Performed by: SURGERY

## 2023-09-30 PROCEDURE — 96361 HYDRATE IV INFUSION ADD-ON: CPT

## 2023-09-30 RX ORDER — NITROFURANTOIN 25; 75 MG/1; MG/1
100 CAPSULE ORAL 2 TIMES DAILY
Qty: 14 CAPSULE | Refills: 0 | Status: SHIPPED | OUTPATIENT
Start: 2023-09-30 | End: 2023-10-07

## 2023-09-30 RX ADMIN — SODIUM CHLORIDE 1000 ML: 9 INJECTION, SOLUTION INTRAVENOUS at 09:09

## 2023-09-30 RX ADMIN — CEFTRIAXONE SODIUM 2 G: 2 INJECTION, POWDER, FOR SOLUTION INTRAMUSCULAR; INTRAVENOUS at 10:09

## 2023-10-01 VITALS
OXYGEN SATURATION: 98 % | RESPIRATION RATE: 16 BRPM | SYSTOLIC BLOOD PRESSURE: 103 MMHG | TEMPERATURE: 98 F | DIASTOLIC BLOOD PRESSURE: 55 MMHG | HEART RATE: 52 BPM | BODY MASS INDEX: 24.88 KG/M2 | WEIGHT: 168 LBS | HEIGHT: 69 IN

## 2023-10-01 NOTE — ED PROVIDER NOTES
Encounter Date: 9/30/2023       History     Chief Complaint   Patient presents with    Rectal Bleeding     Pt to ED via [facility] from [facility]; states 1 episode of diarrhea with bright red bleeding noted this evening. Pt on daily blood thinners. Pt has no complaints.      Eliazar James is a 82 y.o. male that presented with diarrhea today  Patient had discolored stool at the nursing home, nurse was concerned  Patient also had blood streaks in the stool per the nursing home nurse   Patient was brought to the ER with a completely soft benign abdomen  No active signs of GI bleeding, digital rectal exam on ER arrival now  I showed the stool sample to the daughter, no was no blood in the stool  Patient has no complaints, no abdominal pain, stable vital signs on ER triage    The history is provided by the patient, the nursing home and a relative.     Review of patient's allergies indicates:   Allergen Reactions    No known drug allergies      Past Medical History:   Diagnosis Date    *Atrial fibrillation     Anxiety     Atrial fibrillation 7/5/2012    Blood transfusion     CAD (coronary artery disease) 7/10/2012    Cancer     skin    Cataract     removed from both    Clotting disorder     Coronary artery disease     Depression     HTN (hypertension) 7/10/2012    Hx-TIA (transient ischemic attack) 7/10/2012    Hyperlipidemia     Hypertension     Myocardial infarction     Renal calculus, left 8/18/2017    S/P CABG (coronary artery bypass graft) 1/8/2013    Stroke 2/12    TIA    Urinary tract infection      Past Surgical History:   Procedure Laterality Date    CATARACT EXTRACTION W/  INTRAOCULAR LENS IMPLANT Right 03/27/2008    CATARACT EXTRACTION W/  INTRAOCULAR LENS IMPLANT Left 12/06/2007    CORONARY ARTERY BYPASS GRAFT      DILATION OF URETHRA N/A 1/20/2020    Procedure: DILATION, URETHRA;  Surgeon: Ge Ramon MD;  Location: [facility];  Service: Urology;  Laterality: N/A;    TRANSURETHRAL RESECTION OF PROSTATE N/A  1/20/2020    Procedure: TURP (TRANSURETHRAL RESECTION OF PROSTATE);  Surgeon: Ge Ramon MD;  Location: [facility];  Service: Urology;  Laterality: N/A;    triple bypass       Family History   Problem Relation Age of Onset    Heart attack Father     Heart disease Father     Stroke Father     Alcohol abuse Father     Stroke Mother     Dementia Mother     Hypertension Mother     Diabetes Mother     Melanoma Neg Hx     Psoriasis Neg Hx     Lupus Neg Hx     Eczema Neg Hx     Acne Neg Hx     Prostate cancer Neg Hx     Kidney disease Neg Hx      Social History     Tobacco Use    Smoking status: Never    Smokeless tobacco: Never   Substance Use Topics    Alcohol use: No    Drug use: No     Review of Systems   Constitutional:  Negative for activity change, appetite change, fatigue, fever and unexpected weight change.   HENT:  Negative for congestion, ear pain, mouth sores, nosebleeds, rhinorrhea, sinus pressure, sneezing and sore throat.    Eyes:  Negative for pain, discharge, redness and itching.   Respiratory:  Negative for apnea, cough, chest tightness and shortness of breath.    Cardiovascular:  Negative for chest pain, palpitations and leg swelling.   Gastrointestinal:  Positive for diarrhea. Negative for abdominal distention, abdominal pain, anal bleeding, constipation, nausea and vomiting.   Endocrine: Negative.    Genitourinary:  Negative for dysuria, enuresis, flank pain and frequency.   Musculoskeletal:  Negative for arthralgias, back pain, neck pain and neck stiffness.   Skin:  Negative for color change and wound.   Allergic/Immunologic: Negative.    Neurological:  Negative for dizziness, tremors, syncope, facial asymmetry, speech difficulty, weakness, light-headedness, numbness and headaches.   Hematological:  Negative for adenopathy. Does not bruise/bleed easily.   Psychiatric/Behavioral:  Negative for agitation, behavioral problems, hallucinations, self-injury and suicidal ideas. The patient is not  nervous/anxious.        Physical Exam     Initial Vitals [09/30/23 2044]   BP Pulse Resp Temp SpO2   (!) 91/50 63 20 97.8 °F (36.6 °C) 98 %      MAP       --         Physical Exam    Nursing note and vitals reviewed.  Constitutional: Vital signs are normal. He appears well-developed and well-nourished. He is cooperative.   HENT:   Head: Normocephalic and atraumatic.   Mouth/Throat: Uvula is midline and mucous membranes are normal.   Eyes: Conjunctivae, EOM and lids are normal. Pupils are equal, round, and reactive to light.   Neck: Neck supple.   Normal range of motion.   Full passive range of motion without pain.     Cardiovascular:  Normal rate, regular rhythm, S1 normal, S2 normal, normal heart sounds, intact distal pulses and normal pulses.           Pulmonary/Chest: Effort normal and breath sounds normal.   Abdominal: Abdomen is soft and flat. Bowel sounds are normal.   Musculoskeletal:         General: Normal range of motion.      Cervical back: Full passive range of motion without pain, normal range of motion and neck supple.     Neurological: He is alert and oriented to person, place, and time. He has normal strength.   Skin: Skin is warm, dry and intact. Capillary refill takes less than 2 seconds.         ED Course   Procedures  Labs Reviewed   COMPREHENSIVE METABOLIC PANEL - Abnormal; Notable for the following components:       Result Value    Glucose 154 (*)     Calcium 8.4 (*)     Albumin 2.6 (*)     AST 5 (*)     ALT <5 (*)     Anion Gap 7 (*)     All other components within normal limits   CBC W/ AUTO DIFFERENTIAL - Abnormal; Notable for the following components:    RBC 3.40 (*)     Hemoglobin 11.2 (*)     Hematocrit 33.7 (*)     MCV 99 (*)     MCH 32.9 (*)     MPV 9.0 (*)     Lymph # 0.9 (*)     Lymph % 16.0 (*)     Mono % 17.2 (*)     Eosinophil % 8.2 (*)     All other components within normal limits   URINALYSIS, REFLEX TO URINE CULTURE - Abnormal; Notable for the following components:    Color, UA  Brown (*)     Appearance, UA Cloudy (*)     Specific Gravity, UA >=1.030 (*)     Protein, UA 3+ (*)     Ketones, UA Trace (*)     Bilirubin (UA) 1+ (*)     Occult Blood UA 3+ (*)     Nitrite, UA Positive (*)     Leukocytes, UA 1+ (*)     All other components within normal limits    Narrative:     Specimen Source->Urine   URINALYSIS MICROSCOPIC - Abnormal; Notable for the following components:    RBC, UA >100 (*)     WBC, UA >100 (*)     Bacteria Many (*)     All other components within normal limits    Narrative:     Specimen Source->Urine   CULTURE, URINE          Imaging Results    None          Medications   sodium chloride 0.9% bolus 1,000 mL 1,000 mL (1,000 mLs Intravenous New Bag 9/30/23 2140)   cefTRIAXone (ROCEPHIN) 2 g in dextrose 5 % in water (D5W) 100 mL IVPB (MB+) (2 g Intravenous New Bag 9/30/23 2212)   sodium chloride 0.9% bolus 1,000 mL 1,000 mL (0 mLs Intravenous Stopped 9/30/23 2140)     Medical Decision Making  82-year-old male presents with discolored stool at the [facility]  Blood streaks in the stool per the [facility] nose on ER triage this p.m.  No blood in his stool on my examination, no complaint on ER interview  Differential includes enteritis, gastroenteritis, colitis, diarrhea, GI bleed     Problems Addressed:  Acute cystitis without hematuria: complicated acute illness or injury  Diarrhea, unspecified type: complicated acute illness or injury    Amount and/or Complexity of Data Reviewed  Labs: ordered. Decision-making details documented in ED Course.    ED Management & Risk of Complications, Morbidity, Mortality:  Lab work within normal limits, urinalysis shows urinary tract infection  Urine sent for culture, IV Rocephin given based on previous results  Antibiotic Macrobid prescribed on discharge, discharged to the NH tonight  Daughter counseled on warning signs symptoms to return to the ER today  This patient does not need to be hospitalized on ER evaluation today  The  patient's diagnosis is not limited by social determinants of health  Does not require surgery or procedure (major or minor), no risk factors  Family counseled to return to the ER with any concerning symptoms       Clinical Impression:   Final diagnoses:  [R19.7] Diarrhea, unspecified type (Primary)  [N30.00] Acute cystitis without hematuria        ED Disposition Condition    Discharge Stable          ED Prescriptions       Medication Sig Dispense Start Date End Date Auth. Provider    nitrofurantoin, macrocrystal-monohydrate, (MACROBID) 100 MG capsule Take 1 capsule (100 mg total) by mouth 2 (two) times daily. for 7 days 14 capsule 9/30/2023 10/7/2023 Homero Barrera MD          Follow-up Information       Follow up With Specialties Details Why Contact Info    Timo Conti MD Family Medicine Schedule an appointment as soon as possible for a visit in 2 days  [id number] [place] [address] [address] [id number]  [phone]               Homero Barrera MD  09/30/23 1275

## 2023-10-01 NOTE — ED NOTES
Spoke with Digna at NH, states [facility] was called to set up transportation back to facility, ETA ~ 2 hours.

## 2023-10-01 NOTE — ED NOTES
Spoke with SHANIA Sawyer at [facility] for discharge instructions and prescriptions. States will call back with plan of getting pt transported back to NH.

## 2023-10-03 LAB — BACTERIA UR CULT: ABNORMAL

## 2023-10-21 ENCOUNTER — LAB VISIT (OUTPATIENT)
Dept: LAB | Facility: HOSPITAL | Age: 83
End: 2023-10-21
Attending: HOSPITALIST
Payer: MEDICARE

## 2023-10-21 DIAGNOSIS — R19.7 DIARRHEA: Primary | ICD-10-CM

## 2023-10-21 DIAGNOSIS — G89.29 CHRONIC LEFT SHOULDER PAIN: ICD-10-CM

## 2023-10-21 DIAGNOSIS — M25.512 CHRONIC LEFT SHOULDER PAIN: ICD-10-CM

## 2023-10-21 LAB
C DIFF GDH STL QL: NEGATIVE
C DIFF TOX A+B STL QL IA: NEGATIVE

## 2023-10-21 PROCEDURE — 87449 NOS EACH ORGANISM AG IA: CPT | Performed by: HOSPITALIST

## 2024-02-16 NOTE — ASSESSMENT & PLAN NOTE
- NSGY and Vn following   - SBP <160  - CTH: Interval development of hemorrhage layering dependently within the posterior horns of the bilateral lateral ventricles, new from the previous study of 01/05/2022.  There is slight prominence of the ventricular system, perhaps slightly more so than compared to the prior examination which could suggest a component of developing hydrocephalus.  - Repeat CTH stable minimal IVH   - Anticoagulation status: on Eliquis at home being held   - Q 1 vitals   - Q 1 neuro checks   - TSH, A1c, lipid, echo and EKG  -Likely SD to Vascular Neurology 1/28 as patient at baseline  -  Pt/OT/SLP eval and treat     Patient's anemia is currently controlled. Has not received any PRBCs to date. Etiology likely d/t Iron deficiency  Current CBC reviewed-   Lab Results   Component Value Date    HGB 10.2 (L) 02/15/2024    HCT 33.3 (L) 02/15/2024     Monitor serial CBC and transfuse if patient becomes hemodynamically unstable, symptomatic or H/H drops below 7/21.

## 2024-03-26 NOTE — PROGRESS NOTES
"Eliazar James is a 79 y.o. male patient.   1. Benign prostatic hyperplasia with nocturia    2. Post-operative state    3. Recurrent UTI      Past Medical History:   Diagnosis Date    *Atrial fibrillation     Anxiety     Atrial fibrillation 7/5/2012    Blood transfusion     CAD (coronary artery disease) 7/10/2012    Cancer     skin    Cataract     removed from both    Clotting disorder     Coronary artery disease     Depression     HTN (hypertension) 7/10/2012    Hx-TIA (transient ischemic attack) 7/10/2012    Hyperlipidemia     Hypertension     Myocardial infarction     Renal calculus, left 8/18/2017    S/P CABG (coronary artery bypass graft) 1/8/2013    Stroke 2/12    TIA    Urinary tract infection      Past Surgical History Pertinent Negatives:   Procedure Date Noted    CYSTOSCOPY 08/18/2017    PROSTATE SURGERY 08/18/2017    VASECTOMY 08/18/2017     Scheduled Meds:  Continuous Infusions:  PRN Meds:    Review of patient's allergies indicates:   Allergen Reactions    No known drug allergies      There are no hospital problems to display for this patient.    Blood pressure 122/74, pulse 76, temperature 98.2 °F (36.8 °C), resp. rate 18, height 5' 9" (1.753 m), weight 78 kg (172 lb), SpO2 98 %.    Subjective:   Diet: Adequate intake.  Patient reports no nausea or vomiting.    Activity level: Normal.    Pain control: Well controlled.      Objective:  Vital signs (most recent): Blood pressure 122/74, pulse 76, temperature 98.2 °F (36.8 °C), resp. rate 18, height 5' 9" (1.753 m), weight 78 kg (172 lb), SpO2 98 %.  General appearance: Comfortable, well-appearing, in no acute distress and not in pain.    Lungs:  Normal respiratory rate and normal effort.  Breath sounds normal.    Heart: Normal rate.  Regular rhythm.  S1 normal.    Chest: Symmetric chest wall expansion.    Abdomen: Abdomen is soft.    Bowel sounds:  Bowel sounds are normal.    Tenderness: There is no abdominal tenderness tenderness. "    Extremities: There is normal range of motion.    Neurological: The patient is alert and oriented to person, place and time.  Normal strength.  Pupils are equal, round, and reactive to light.       Assessment:   Post-op: 25 days.    Condition: In stable condition.     Plan:  Encourage ambulation.  Regular diet.      Patient Instructions   F/U 6 months  Stop Flomax 2/29/20  F/U 6 months and as needed         Ge Ramon MD  2/14/2020   Community Resource